# Patient Record
Sex: MALE | Race: BLACK OR AFRICAN AMERICAN | NOT HISPANIC OR LATINO | Employment: FULL TIME | URBAN - METROPOLITAN AREA
[De-identification: names, ages, dates, MRNs, and addresses within clinical notes are randomized per-mention and may not be internally consistent; named-entity substitution may affect disease eponyms.]

---

## 2021-04-07 ENCOUNTER — APPOINTMENT (OUTPATIENT)
Dept: CARDIOLOGY | Facility: HOSPITAL | Age: 66
End: 2021-04-07

## 2021-04-07 ENCOUNTER — HOSPITAL ENCOUNTER (INPATIENT)
Facility: HOSPITAL | Age: 66
LOS: 13 days | Discharge: HOME | End: 2021-04-20
Attending: EMERGENCY MEDICINE | Admitting: FAMILY MEDICINE

## 2021-04-07 ENCOUNTER — APPOINTMENT (OUTPATIENT)
Dept: CT IMAGING | Facility: HOSPITAL | Age: 66
End: 2021-04-07

## 2021-04-07 ENCOUNTER — APPOINTMENT (OUTPATIENT)
Dept: GENERAL RADIOLOGY | Facility: HOSPITAL | Age: 66
End: 2021-04-07

## 2021-04-07 DIAGNOSIS — I95.9 HYPOTENSION, UNSPECIFIED HYPOTENSION TYPE: ICD-10-CM

## 2021-04-07 DIAGNOSIS — H91.93 BILATERAL HEARING LOSS, UNSPECIFIED HEARING LOSS TYPE: ICD-10-CM

## 2021-04-07 DIAGNOSIS — R79.89 ELEVATED LFTS: ICD-10-CM

## 2021-04-07 DIAGNOSIS — N17.9 ACUTE RENAL FAILURE, UNSPECIFIED ACUTE RENAL FAILURE TYPE (HCC): ICD-10-CM

## 2021-04-07 DIAGNOSIS — Z96.651 STATUS POST TOTAL RIGHT KNEE REPLACEMENT: ICD-10-CM

## 2021-04-07 DIAGNOSIS — R41.82 ALTERED MENTAL STATUS, UNSPECIFIED ALTERED MENTAL STATUS TYPE: Primary | ICD-10-CM

## 2021-04-07 DIAGNOSIS — R77.8 ELEVATED TROPONIN: ICD-10-CM

## 2021-04-07 LAB
ALBUMIN SERPL-MCNC: 3.5 G/DL (ref 3.5–5.2)
ALBUMIN SERPL-MCNC: 4.1 G/DL (ref 3.5–5.2)
ALBUMIN/GLOB SERPL: 1.3 G/DL
ALBUMIN/GLOB SERPL: 1.4 G/DL
ALP SERPL-CCNC: 100 U/L (ref 39–117)
ALP SERPL-CCNC: 101 U/L (ref 39–117)
ALT SERPL W P-5'-P-CCNC: 2553 U/L (ref 1–41)
ALT SERPL W P-5'-P-CCNC: 2631 U/L (ref 1–41)
ANION GAP SERPL CALCULATED.3IONS-SCNC: 20 MMOL/L (ref 5–15)
ANION GAP SERPL CALCULATED.3IONS-SCNC: 21 MMOL/L (ref 5–15)
ARTERIAL PATENCY WRIST A: ABNORMAL
AST SERPL-CCNC: 4533 U/L (ref 1–40)
AST SERPL-CCNC: 6207 U/L (ref 1–40)
ATMOSPHERIC PRESS: ABNORMAL MM[HG]
BACTERIA UR QL AUTO: ABNORMAL /HPF
BASE EXCESS BLDA CALC-SCNC: -1.9 MMOL/L (ref 0–2)
BASE EXCESS BLDA CALC-SCNC: -2.4 MMOL/L (ref 0–2)
BASE EXCESS BLDA CALC-SCNC: -6.3 MMOL/L (ref 0–2)
BASOPHILS # BLD AUTO: 0.02 10*3/MM3 (ref 0–0.2)
BASOPHILS NFR BLD AUTO: 0.1 % (ref 0–1.5)
BDY SITE: ABNORMAL
BH CV ECHO MEAS - AO ROOT AREA (BSA CORRECTED): 1.4
BH CV ECHO MEAS - AO ROOT AREA: 8.6 CM^2
BH CV ECHO MEAS - AO ROOT DIAM: 3.3 CM
BH CV ECHO MEAS - ASC AORTA: 2.6 CM
BH CV ECHO MEAS - BSA(HAYCOCK): 2.6 M^2
BH CV ECHO MEAS - BSA: 2.4 M^2
BH CV ECHO MEAS - BZI_BMI: 40.5 KILOGRAMS/M^2
BH CV ECHO MEAS - BZI_METRIC_HEIGHT: 177 CM
BH CV ECHO MEAS - BZI_METRIC_WEIGHT: 127 KG
BH CV ECHO MEAS - EDV(CUBED): 106.8 ML
BH CV ECHO MEAS - EDV(MOD-SP2): 82.7 ML
BH CV ECHO MEAS - EDV(MOD-SP4): 83.7 ML
BH CV ECHO MEAS - EDV(TEICH): 104.6 ML
BH CV ECHO MEAS - EF(CUBED): 28.9 %
BH CV ECHO MEAS - EF(MOD-SP2): 45.6 %
BH CV ECHO MEAS - EF(MOD-SP4): 49.3 %
BH CV ECHO MEAS - EF(TEICH): 23.4 %
BH CV ECHO MEAS - ESV(CUBED): 75.9 ML
BH CV ECHO MEAS - ESV(MOD-SP2): 45 ML
BH CV ECHO MEAS - ESV(MOD-SP4): 42.4 ML
BH CV ECHO MEAS - ESV(TEICH): 80.1 ML
BH CV ECHO MEAS - FS: 10.7 %
BH CV ECHO MEAS - IVS/LVPW: 0.7
BH CV ECHO MEAS - IVSD: 1 CM
BH CV ECHO MEAS - LA DIMENSION: 3.7 CM
BH CV ECHO MEAS - LA/AO: 1.1
BH CV ECHO MEAS - LAD MAJOR: 5 CM
BH CV ECHO MEAS - LAT PEAK E' VEL: 6.6 CM/SEC
BH CV ECHO MEAS - LATERAL E/E' RATIO: 8.6
BH CV ECHO MEAS - LV DIASTOLIC VOL/BSA (35-75): 34.9 ML/M^2
BH CV ECHO MEAS - LV MASS(C)D: 224.7 GRAMS
BH CV ECHO MEAS - LV MASS(C)DI: 93.6 GRAMS/M^2
BH CV ECHO MEAS - LV MAX PG: 2.4 MMHG
BH CV ECHO MEAS - LV MEAN PG: 1 MMHG
BH CV ECHO MEAS - LV SYSTOLIC VOL/BSA (12-30): 17.7 ML/M^2
BH CV ECHO MEAS - LV V1 MAX: 77.7 CM/SEC
BH CV ECHO MEAS - LV V1 MEAN: 42.8 CM/SEC
BH CV ECHO MEAS - LV V1 VTI: 14.7 CM
BH CV ECHO MEAS - LVIDD: 4.7 CM
BH CV ECHO MEAS - LVIDS: 4.2 CM
BH CV ECHO MEAS - LVLD AP2: 7.8 CM
BH CV ECHO MEAS - LVLD AP4: 7.7 CM
BH CV ECHO MEAS - LVLS AP2: 6.9 CM
BH CV ECHO MEAS - LVLS AP4: 6.5 CM
BH CV ECHO MEAS - LVOT AREA (M): 3.5 CM^2
BH CV ECHO MEAS - LVOT AREA: 3.5 CM^2
BH CV ECHO MEAS - LVOT DIAM: 2.1 CM
BH CV ECHO MEAS - LVPWD: 1.5 CM
BH CV ECHO MEAS - MED PEAK E' VEL: 6.4 CM/SEC
BH CV ECHO MEAS - MEDIAL E/E' RATIO: 8.9
BH CV ECHO MEAS - MV A MAX VEL: 51.8 CM/SEC
BH CV ECHO MEAS - MV DEC SLOPE: 313.5 CM/SEC^2
BH CV ECHO MEAS - MV DEC TIME: 0.18 SEC
BH CV ECHO MEAS - MV E MAX VEL: 57.4 CM/SEC
BH CV ECHO MEAS - MV E/A: 1.1
BH CV ECHO MEAS - MV MAX PG: 3.6 MMHG
BH CV ECHO MEAS - MV MEAN PG: 1 MMHG
BH CV ECHO MEAS - MV P1/2T MAX VEL: 92 CM/SEC
BH CV ECHO MEAS - MV P1/2T: 86 MSEC
BH CV ECHO MEAS - MV V2 MAX: 94.8 CM/SEC
BH CV ECHO MEAS - MV V2 MEAN: 55.4 CM/SEC
BH CV ECHO MEAS - MV V2 VTI: 34.1 CM
BH CV ECHO MEAS - MVA P1/2T LCG: 2.4 CM^2
BH CV ECHO MEAS - MVA(P1/2T): 2.6 CM^2
BH CV ECHO MEAS - MVA(VTI): 1.5 CM^2
BH CV ECHO MEAS - PA ACC TIME: 0.11 SEC
BH CV ECHO MEAS - PA PR(ACCEL): 31.3 MMHG
BH CV ECHO MEAS - RAP SYSTOLE: 8 MMHG
BH CV ECHO MEAS - RVSP: 33 MMHG
BH CV ECHO MEAS - SI(CUBED): 12.9 ML/M^2
BH CV ECHO MEAS - SI(LVOT): 21.2 ML/M^2
BH CV ECHO MEAS - SI(MOD-SP2): 15.7 ML/M^2
BH CV ECHO MEAS - SI(MOD-SP4): 17.2 ML/M^2
BH CV ECHO MEAS - SI(TEICH): 10.2 ML/M^2
BH CV ECHO MEAS - SV(CUBED): 30.9 ML
BH CV ECHO MEAS - SV(LVOT): 50.9 ML
BH CV ECHO MEAS - SV(MOD-SP2): 37.7 ML
BH CV ECHO MEAS - SV(MOD-SP4): 41.3 ML
BH CV ECHO MEAS - SV(TEICH): 24.5 ML
BH CV ECHO MEAS - TAPSE (>1.6): 1.2 CM
BH CV ECHO MEAS - TR MAX PG: 25 MMHG
BH CV ECHO MEAS - TR MAX VEL: 227 CM/SEC
BH CV ECHO MEASUREMENTS AVERAGE E/E' RATIO: 8.83
BH CV LOWER VASCULAR LEFT COMMON FEMORAL AUGMENT: NORMAL
BH CV LOWER VASCULAR LEFT COMMON FEMORAL COMPETENT: NORMAL
BH CV LOWER VASCULAR LEFT COMMON FEMORAL COMPRESS: NORMAL
BH CV LOWER VASCULAR LEFT COMMON FEMORAL PHASIC: NORMAL
BH CV LOWER VASCULAR LEFT COMMON FEMORAL SPONT: NORMAL
BH CV LOWER VASCULAR LEFT DISTAL FEMORAL AUGMENT: NORMAL
BH CV LOWER VASCULAR LEFT DISTAL FEMORAL COMPETENT: NORMAL
BH CV LOWER VASCULAR LEFT DISTAL FEMORAL COMPRESS: NORMAL
BH CV LOWER VASCULAR LEFT DISTAL FEMORAL PHASIC: NORMAL
BH CV LOWER VASCULAR LEFT DISTAL FEMORAL SPONT: NORMAL
BH CV LOWER VASCULAR LEFT GASTRONEMIUS COMPRESS: NORMAL
BH CV LOWER VASCULAR LEFT GREATER SAPH AK COMPRESS: NORMAL
BH CV LOWER VASCULAR LEFT GREATER SAPH BK COMPRESS: NORMAL
BH CV LOWER VASCULAR LEFT LESSER SAPH COMPRESS: NORMAL
BH CV LOWER VASCULAR LEFT MID FEMORAL AUGMENT: NORMAL
BH CV LOWER VASCULAR LEFT MID FEMORAL COMPETENT: NORMAL
BH CV LOWER VASCULAR LEFT MID FEMORAL COMPRESS: NORMAL
BH CV LOWER VASCULAR LEFT MID FEMORAL PHASIC: NORMAL
BH CV LOWER VASCULAR LEFT MID FEMORAL SPONT: NORMAL
BH CV LOWER VASCULAR LEFT PERONEAL AUGMENT: NORMAL
BH CV LOWER VASCULAR LEFT PERONEAL COMPRESS: NORMAL
BH CV LOWER VASCULAR LEFT POPLITEAL AUGMENT: NORMAL
BH CV LOWER VASCULAR LEFT POPLITEAL COMPETENT: NORMAL
BH CV LOWER VASCULAR LEFT POPLITEAL COMPRESS: NORMAL
BH CV LOWER VASCULAR LEFT POPLITEAL PHASIC: NORMAL
BH CV LOWER VASCULAR LEFT POPLITEAL SPONT: NORMAL
BH CV LOWER VASCULAR LEFT POSTERIOR TIBIAL AUGMENT: NORMAL
BH CV LOWER VASCULAR LEFT POSTERIOR TIBIAL COMPRESS: NORMAL
BH CV LOWER VASCULAR LEFT PROFUNDA FEMORAL AUGMENT: NORMAL
BH CV LOWER VASCULAR LEFT PROFUNDA FEMORAL COMPETENT: NORMAL
BH CV LOWER VASCULAR LEFT PROFUNDA FEMORAL COMPRESS: NORMAL
BH CV LOWER VASCULAR LEFT PROFUNDA FEMORAL PHASIC: NORMAL
BH CV LOWER VASCULAR LEFT PROFUNDA FEMORAL SPONT: NORMAL
BH CV LOWER VASCULAR LEFT PROXIMAL FEMORAL AUGMENT: NORMAL
BH CV LOWER VASCULAR LEFT PROXIMAL FEMORAL COMPETENT: NORMAL
BH CV LOWER VASCULAR LEFT PROXIMAL FEMORAL COMPRESS: NORMAL
BH CV LOWER VASCULAR LEFT PROXIMAL FEMORAL PHASIC: NORMAL
BH CV LOWER VASCULAR LEFT PROXIMAL FEMORAL SPONT: NORMAL
BH CV LOWER VASCULAR LEFT SAPHENOFEMORAL JUNCTION AUGMENT: NORMAL
BH CV LOWER VASCULAR LEFT SAPHENOFEMORAL JUNCTION COMPETENT: NORMAL
BH CV LOWER VASCULAR LEFT SAPHENOFEMORAL JUNCTION COMPRESS: NORMAL
BH CV LOWER VASCULAR LEFT SAPHENOFEMORAL JUNCTION PHASIC: NORMAL
BH CV LOWER VASCULAR LEFT SAPHENOFEMORAL JUNCTION SPONT: NORMAL
BH CV LOWER VASCULAR RIGHT COMMON FEMORAL AUGMENT: NORMAL
BH CV LOWER VASCULAR RIGHT COMMON FEMORAL COMPETENT: NORMAL
BH CV LOWER VASCULAR RIGHT COMMON FEMORAL COMPRESS: NORMAL
BH CV LOWER VASCULAR RIGHT COMMON FEMORAL PHASIC: NORMAL
BH CV LOWER VASCULAR RIGHT COMMON FEMORAL SPONT: NORMAL
BH CV LOWER VASCULAR RIGHT DISTAL FEMORAL AUGMENT: NORMAL
BH CV LOWER VASCULAR RIGHT DISTAL FEMORAL COMPETENT: NORMAL
BH CV LOWER VASCULAR RIGHT DISTAL FEMORAL COMPRESS: NORMAL
BH CV LOWER VASCULAR RIGHT DISTAL FEMORAL PHASIC: NORMAL
BH CV LOWER VASCULAR RIGHT DISTAL FEMORAL SPONT: NORMAL
BH CV LOWER VASCULAR RIGHT GASTRONEMIUS COMPRESS: NORMAL
BH CV LOWER VASCULAR RIGHT GREATER SAPH AK COMPRESS: NORMAL
BH CV LOWER VASCULAR RIGHT GREATER SAPH BK COMPRESS: NORMAL
BH CV LOWER VASCULAR RIGHT LESSER SAPH COMPRESS: NORMAL
BH CV LOWER VASCULAR RIGHT MID FEMORAL AUGMENT: NORMAL
BH CV LOWER VASCULAR RIGHT MID FEMORAL COMPETENT: NORMAL
BH CV LOWER VASCULAR RIGHT MID FEMORAL COMPRESS: NORMAL
BH CV LOWER VASCULAR RIGHT MID FEMORAL PHASIC: NORMAL
BH CV LOWER VASCULAR RIGHT MID FEMORAL SPONT: NORMAL
BH CV LOWER VASCULAR RIGHT PERONEAL AUGMENT: NORMAL
BH CV LOWER VASCULAR RIGHT PERONEAL COMPRESS: NORMAL
BH CV LOWER VASCULAR RIGHT POPLITEAL AUGMENT: NORMAL
BH CV LOWER VASCULAR RIGHT POPLITEAL COMPETENT: NORMAL
BH CV LOWER VASCULAR RIGHT POPLITEAL COMPRESS: NORMAL
BH CV LOWER VASCULAR RIGHT POPLITEAL PHASIC: NORMAL
BH CV LOWER VASCULAR RIGHT POPLITEAL SPONT: NORMAL
BH CV LOWER VASCULAR RIGHT POSTERIOR TIBIAL AUGMENT: NORMAL
BH CV LOWER VASCULAR RIGHT POSTERIOR TIBIAL COMPRESS: NORMAL
BH CV LOWER VASCULAR RIGHT PROFUNDA FEMORAL AUGMENT: NORMAL
BH CV LOWER VASCULAR RIGHT PROFUNDA FEMORAL COMPETENT: NORMAL
BH CV LOWER VASCULAR RIGHT PROFUNDA FEMORAL COMPRESS: NORMAL
BH CV LOWER VASCULAR RIGHT PROFUNDA FEMORAL PHASIC: NORMAL
BH CV LOWER VASCULAR RIGHT PROFUNDA FEMORAL SPONT: NORMAL
BH CV LOWER VASCULAR RIGHT PROXIMAL FEMORAL AUGMENT: NORMAL
BH CV LOWER VASCULAR RIGHT PROXIMAL FEMORAL COMPETENT: NORMAL
BH CV LOWER VASCULAR RIGHT PROXIMAL FEMORAL COMPRESS: NORMAL
BH CV LOWER VASCULAR RIGHT PROXIMAL FEMORAL PHASIC: NORMAL
BH CV LOWER VASCULAR RIGHT PROXIMAL FEMORAL SPONT: NORMAL
BH CV LOWER VASCULAR RIGHT SAPHENOFEMORAL JUNCTION AUGMENT: NORMAL
BH CV LOWER VASCULAR RIGHT SAPHENOFEMORAL JUNCTION COMPETENT: NORMAL
BH CV LOWER VASCULAR RIGHT SAPHENOFEMORAL JUNCTION COMPRESS: NORMAL
BH CV LOWER VASCULAR RIGHT SAPHENOFEMORAL JUNCTION PHASIC: NORMAL
BH CV LOWER VASCULAR RIGHT SAPHENOFEMORAL JUNCTION SPONT: NORMAL
BH CV VAS BP LEFT ARM: NORMAL MMHG
BH CV XLRA - TDI S': 6.5 CM/SEC
BILIRUB SERPL-MCNC: 0.5 MG/DL (ref 0–1.2)
BILIRUB SERPL-MCNC: 0.6 MG/DL (ref 0–1.2)
BILIRUB UR QL STRIP: NEGATIVE
BODY TEMPERATURE: 37 C
BUN SERPL-MCNC: 25 MG/DL (ref 8–23)
BUN SERPL-MCNC: 28 MG/DL (ref 8–23)
BUN/CREAT SERPL: 6.4 (ref 7–25)
BUN/CREAT SERPL: 6.7 (ref 7–25)
CALCIUM SPEC-SCNC: 6.7 MG/DL (ref 8.6–10.5)
CALCIUM SPEC-SCNC: 7.9 MG/DL (ref 8.6–10.5)
CHLORIDE SERPL-SCNC: 94 MMOL/L (ref 98–107)
CHLORIDE SERPL-SCNC: 97 MMOL/L (ref 98–107)
CLARITY UR: ABNORMAL
CO2 BLDA-SCNC: 22.8 MMOL/L (ref 22–33)
CO2 BLDA-SCNC: 26.5 MMOL/L (ref 22–33)
CO2 BLDA-SCNC: 26.7 MMOL/L (ref 22–33)
CO2 SERPL-SCNC: 22 MMOL/L (ref 22–29)
CO2 SERPL-SCNC: 26 MMOL/L (ref 22–29)
COHGB MFR BLD: 1.2 % (ref 0–2)
COHGB MFR BLD: 1.4 % (ref 0–2)
COHGB MFR BLD: 1.8 % (ref 0–2)
COLOR UR: YELLOW
CREAT SERPL-MCNC: 3.89 MG/DL (ref 0.76–1.27)
CREAT SERPL-MCNC: 4.2 MG/DL (ref 0.76–1.27)
D-LACTATE SERPL-SCNC: 4.8 MMOL/L (ref 0.5–2)
DEPRECATED RDW RBC AUTO: 47.1 FL (ref 37–54)
EOSINOPHIL # BLD AUTO: 0.03 10*3/MM3 (ref 0–0.4)
EOSINOPHIL NFR BLD AUTO: 0.2 % (ref 0.3–6.2)
EPAP: 0
EPAP: 6
ERYTHROCYTE [DISTWIDTH] IN BLOOD BY AUTOMATED COUNT: 14.8 % (ref 12.3–15.4)
FLUAV RNA RESP QL NAA+PROBE: NOT DETECTED
FLUBV RNA RESP QL NAA+PROBE: NOT DETECTED
GFR SERPL CREATININE-BSD FRML MDRD: 17 ML/MIN/1.73
GFR SERPL CREATININE-BSD FRML MDRD: 19 ML/MIN/1.73
GLOBULIN UR ELPH-MCNC: 2.7 GM/DL
GLOBULIN UR ELPH-MCNC: 2.9 GM/DL
GLUCOSE BLDC GLUCOMTR-MCNC: 114 MG/DL (ref 70–130)
GLUCOSE SERPL-MCNC: 116 MG/DL (ref 65–99)
GLUCOSE SERPL-MCNC: 89 MG/DL (ref 65–99)
GLUCOSE UR STRIP-MCNC: NEGATIVE MG/DL
HAV IGM SERPL QL IA: NORMAL
HBV CORE IGM SERPL QL IA: NORMAL
HBV SURFACE AG SERPL QL IA: NORMAL
HCO3 BLDA-SCNC: 21.3 MMOL/L (ref 20–26)
HCO3 BLDA-SCNC: 24.9 MMOL/L (ref 20–26)
HCO3 BLDA-SCNC: 25.1 MMOL/L (ref 20–26)
HCT VFR BLD AUTO: 34.5 % (ref 37.5–51)
HCT VFR BLD CALC: 30.7 %
HCT VFR BLD CALC: 31.3 %
HCT VFR BLD CALC: 31.8 %
HCV AB SER DONR QL: NORMAL
HGB BLD-MCNC: 10.3 G/DL (ref 13–17.7)
HGB BLDA-MCNC: 10 G/DL (ref 13.5–17.5)
HGB BLDA-MCNC: 10.2 G/DL (ref 13.5–17.5)
HGB BLDA-MCNC: 10.4 G/DL (ref 13.5–17.5)
HGB UR QL STRIP.AUTO: NEGATIVE
HOLD SPECIMEN: NORMAL
HYALINE CASTS UR QL AUTO: ABNORMAL /LPF
IMM GRANULOCYTES # BLD AUTO: 0.12 10*3/MM3 (ref 0–0.05)
IMM GRANULOCYTES NFR BLD AUTO: 0.9 % (ref 0–0.5)
INHALED O2 CONCENTRATION: 30 %
INHALED O2 CONCENTRATION: 35 %
INHALED O2 CONCENTRATION: 35 %
IPAP: 0
IPAP: 14
KETONES UR QL STRIP: NEGATIVE
LEFT ATRIUM VOLUME INDEX: 13.2 ML/M^2
LEFT ATRIUM VOLUME: 31.6 ML
LEUKOCYTE ESTERASE UR QL STRIP.AUTO: ABNORMAL
LV EF 2D ECHO EST: 30 %
LYMPHOCYTES # BLD AUTO: 2 10*3/MM3 (ref 0.7–3.1)
LYMPHOCYTES NFR BLD AUTO: 14.7 % (ref 19.6–45.3)
Lab: ABNORMAL
MAGNESIUM SERPL-MCNC: 1.6 MG/DL (ref 1.6–2.4)
MAXIMAL PREDICTED HEART RATE: 156 BPM
MCH RBC QN AUTO: 25.7 PG (ref 26.6–33)
MCHC RBC AUTO-ENTMCNC: 29.9 G/DL (ref 31.5–35.7)
MCV RBC AUTO: 86 FL (ref 79–97)
METHGB BLD QL: 0.1 % (ref 0–1.5)
METHGB BLD QL: 0.2 % (ref 0–1.5)
METHGB BLD QL: 0.5 % (ref 0–1.5)
MODALITY: ABNORMAL
MONOCYTES # BLD AUTO: 1.13 10*3/MM3 (ref 0.1–0.9)
MONOCYTES NFR BLD AUTO: 8.3 % (ref 5–12)
NEUTROPHILS NFR BLD AUTO: 10.33 10*3/MM3 (ref 1.7–7)
NEUTROPHILS NFR BLD AUTO: 75.8 % (ref 42.7–76)
NITRITE UR QL STRIP: NEGATIVE
NOTE: ABNORMAL
NOTIFIED BY: ABNORMAL
NOTIFIED WHO: ABNORMAL
NRBC BLD AUTO-RTO: 0 /100 WBC (ref 0–0.2)
OXYHGB MFR BLDV: 65.2 % (ref 94–99)
OXYHGB MFR BLDV: 96.7 % (ref 94–99)
OXYHGB MFR BLDV: 98 % (ref 94–99)
PAW @ PEAK INSP FLOW SETTING VENT: 0 CMH2O
PAW @ PEAK INSP FLOW SETTING VENT: 0 CMH2O
PCO2 BLDA: 51 MM HG (ref 35–45)
PCO2 BLDA: 52.4 MM HG (ref 35–45)
PCO2 BLDA: 53.9 MM HG (ref 35–45)
PCO2 TEMP ADJ BLD: 51 MM HG (ref 35–48)
PCO2 TEMP ADJ BLD: 52.4 MM HG (ref 35–48)
PCO2 TEMP ADJ BLD: 53.9 MM HG (ref 35–48)
PH BLDA: 7.23 PH UNITS (ref 7.35–7.45)
PH BLDA: 7.27 PH UNITS (ref 7.35–7.45)
PH BLDA: 7.29 PH UNITS (ref 7.35–7.45)
PH UR STRIP.AUTO: 5.5 [PH] (ref 5–8)
PH, TEMP CORRECTED: 7.23 PH UNITS
PH, TEMP CORRECTED: 7.27 PH UNITS
PH, TEMP CORRECTED: 7.29 PH UNITS
PLATELET # BLD AUTO: 274 10*3/MM3 (ref 140–450)
PMV BLD AUTO: 10.6 FL (ref 6–12)
PO2 BLDA: 122 MM HG (ref 83–108)
PO2 BLDA: 199 MM HG (ref 83–108)
PO2 BLDA: 43.1 MM HG (ref 83–108)
PO2 TEMP ADJ BLD: 122 MM HG (ref 83–108)
PO2 TEMP ADJ BLD: 199 MM HG (ref 83–108)
PO2 TEMP ADJ BLD: 43.1 MM HG (ref 83–108)
POTASSIUM SERPL-SCNC: 3.7 MMOL/L (ref 3.5–5.2)
POTASSIUM SERPL-SCNC: 3.9 MMOL/L (ref 3.5–5.2)
PROCALCITONIN SERPL-MCNC: 2.15 NG/ML (ref 0–0.25)
PROT SERPL-MCNC: 6.2 G/DL (ref 6–8.5)
PROT SERPL-MCNC: 7 G/DL (ref 6–8.5)
PROT UR QL STRIP: ABNORMAL
QT INTERVAL: 442 MS
QTC INTERVAL: 483 MS
RBC # BLD AUTO: 4.01 10*6/MM3 (ref 4.14–5.8)
RBC # UR: ABNORMAL /HPF
REF LAB TEST METHOD: ABNORMAL
RENAL EPI CELLS #/AREA URNS HPF: ABNORMAL /HPF
SARS-COV-2 RNA RESP QL NAA+PROBE: NOT DETECTED
SODIUM SERPL-SCNC: 140 MMOL/L (ref 136–145)
SODIUM SERPL-SCNC: 140 MMOL/L (ref 136–145)
SP GR UR STRIP: 1.02 (ref 1–1.03)
SQUAMOUS #/AREA URNS HPF: ABNORMAL /HPF
STRESS TARGET HR: 133 BPM
TOTAL RATE: 0 BREATHS/MINUTE
TOTAL RATE: 0 BREATHS/MINUTE
TRANS CELLS #/AREA URNS HPF: ABNORMAL /HPF
TROPONIN T SERPL-MCNC: 0.29 NG/ML (ref 0–0.03)
UROBILINOGEN UR QL STRIP: ABNORMAL
VENTILATOR MODE: ABNORMAL
VENTILATOR MODE: ABNORMAL
WBC # BLD AUTO: 13.63 10*3/MM3 (ref 3.4–10.8)
WBC UR QL AUTO: ABNORMAL /HPF
WHOLE BLOOD HOLD SPECIMEN: NORMAL
WHOLE BLOOD HOLD SPECIMEN: NORMAL

## 2021-04-07 PROCEDURE — 82375 ASSAY CARBOXYHB QUANT: CPT

## 2021-04-07 PROCEDURE — 93005 ELECTROCARDIOGRAM TRACING: CPT | Performed by: EMERGENCY MEDICINE

## 2021-04-07 PROCEDURE — 84145 PROCALCITONIN (PCT): CPT | Performed by: NURSE PRACTITIONER

## 2021-04-07 PROCEDURE — 99285 EMERGENCY DEPT VISIT HI MDM: CPT

## 2021-04-07 PROCEDURE — 83050 HGB METHEMOGLOBIN QUAN: CPT

## 2021-04-07 PROCEDURE — 84484 ASSAY OF TROPONIN QUANT: CPT | Performed by: EMERGENCY MEDICINE

## 2021-04-07 PROCEDURE — 93970 EXTREMITY STUDY: CPT | Performed by: INTERNAL MEDICINE

## 2021-04-07 PROCEDURE — 93306 TTE W/DOPPLER COMPLETE: CPT | Performed by: INTERNAL MEDICINE

## 2021-04-07 PROCEDURE — 25010000002 SULFUR HEXAFLUORIDE MICROSPH 60.7-25 MG RECONSTITUTED SUSPENSION: Performed by: INTERNAL MEDICINE

## 2021-04-07 PROCEDURE — 99223 1ST HOSP IP/OBS HIGH 75: CPT | Performed by: INTERNAL MEDICINE

## 2021-04-07 PROCEDURE — 82805 BLOOD GASES W/O2 SATURATION: CPT

## 2021-04-07 PROCEDURE — 93970 EXTREMITY STUDY: CPT

## 2021-04-07 PROCEDURE — 25010000002 THIAMINE PER 100 MG: Performed by: NURSE PRACTITIONER

## 2021-04-07 PROCEDURE — 93306 TTE W/DOPPLER COMPLETE: CPT

## 2021-04-07 PROCEDURE — 36600 WITHDRAWAL OF ARTERIAL BLOOD: CPT

## 2021-04-07 PROCEDURE — 94799 UNLISTED PULMONARY SVC/PX: CPT

## 2021-04-07 PROCEDURE — 36556 INSERT NON-TUNNEL CV CATH: CPT | Performed by: NURSE PRACTITIONER

## 2021-04-07 PROCEDURE — 71045 X-RAY EXAM CHEST 1 VIEW: CPT

## 2021-04-07 PROCEDURE — 80074 ACUTE HEPATITIS PANEL: CPT | Performed by: EMERGENCY MEDICINE

## 2021-04-07 PROCEDURE — 76937 US GUIDE VASCULAR ACCESS: CPT | Performed by: NURSE PRACTITIONER

## 2021-04-07 PROCEDURE — 87040 BLOOD CULTURE FOR BACTERIA: CPT | Performed by: NURSE PRACTITIONER

## 2021-04-07 PROCEDURE — 25010000002 HEPARIN (PORCINE) PER 1000 UNITS: Performed by: INTERNAL MEDICINE

## 2021-04-07 PROCEDURE — 80053 COMPREHEN METABOLIC PANEL: CPT | Performed by: EMERGENCY MEDICINE

## 2021-04-07 PROCEDURE — 87636 SARSCOV2 & INF A&B AMP PRB: CPT | Performed by: EMERGENCY MEDICINE

## 2021-04-07 PROCEDURE — 81001 URINALYSIS AUTO W/SCOPE: CPT | Performed by: EMERGENCY MEDICINE

## 2021-04-07 PROCEDURE — 82962 GLUCOSE BLOOD TEST: CPT

## 2021-04-07 PROCEDURE — 70450 CT HEAD/BRAIN W/O DYE: CPT

## 2021-04-07 PROCEDURE — 83605 ASSAY OF LACTIC ACID: CPT | Performed by: NURSE PRACTITIONER

## 2021-04-07 PROCEDURE — 83735 ASSAY OF MAGNESIUM: CPT | Performed by: EMERGENCY MEDICINE

## 2021-04-07 PROCEDURE — 80053 COMPREHEN METABOLIC PANEL: CPT | Performed by: INTERNAL MEDICINE

## 2021-04-07 PROCEDURE — 85025 COMPLETE CBC W/AUTO DIFF WBC: CPT | Performed by: EMERGENCY MEDICINE

## 2021-04-07 RX ORDER — SODIUM CHLORIDE 0.9 % (FLUSH) 0.9 %
10 SYRINGE (ML) INJECTION EVERY 12 HOURS SCHEDULED
Status: DISCONTINUED | OUTPATIENT
Start: 2021-04-07 | End: 2021-04-20 | Stop reason: HOSPADM

## 2021-04-07 RX ORDER — OXYCODONE HYDROCHLORIDE 5 MG/1
5 TABLET ORAL EVERY 6 HOURS PRN
Status: DISCONTINUED | OUTPATIENT
Start: 2021-04-07 | End: 2021-04-20 | Stop reason: HOSPADM

## 2021-04-07 RX ORDER — ASPIRIN 81 MG/1
81 TABLET ORAL DAILY
Status: ON HOLD | COMMUNITY
End: 2021-04-20 | Stop reason: SDUPTHER

## 2021-04-07 RX ORDER — HEPARIN SODIUM 5000 [USP'U]/ML
5000 INJECTION, SOLUTION INTRAVENOUS; SUBCUTANEOUS EVERY 12 HOURS SCHEDULED
Status: DISCONTINUED | OUTPATIENT
Start: 2021-04-07 | End: 2021-04-20 | Stop reason: HOSPADM

## 2021-04-07 RX ORDER — MELOXICAM 15 MG/1
15 TABLET ORAL DAILY
COMMUNITY
End: 2021-04-20 | Stop reason: HOSPADM

## 2021-04-07 RX ORDER — TRAMADOL HYDROCHLORIDE 50 MG/1
100 TABLET ORAL EVERY 6 HOURS PRN
COMMUNITY
End: 2021-04-20 | Stop reason: HOSPADM

## 2021-04-07 RX ORDER — EZETIMIBE 10 MG/1
10 TABLET ORAL DAILY
COMMUNITY

## 2021-04-07 RX ORDER — HYDRALAZINE HYDROCHLORIDE 25 MG/1
25 TABLET, FILM COATED ORAL 3 TIMES DAILY
COMMUNITY
End: 2021-04-20 | Stop reason: HOSPADM

## 2021-04-07 RX ORDER — ASPIRIN 81 MG/1
81 TABLET ORAL DAILY
Status: DISCONTINUED | OUTPATIENT
Start: 2021-04-07 | End: 2021-04-19

## 2021-04-07 RX ORDER — OXYCODONE HYDROCHLORIDE 5 MG/1
5 CAPSULE ORAL EVERY 4 HOURS PRN
Status: ON HOLD | COMMUNITY
End: 2021-04-20 | Stop reason: SDUPTHER

## 2021-04-07 RX ORDER — NOREPINEPHRINE BIT/0.9 % NACL 8 MG/250ML
.02-.1 INFUSION BOTTLE (ML) INTRAVENOUS
Status: DISCONTINUED | OUTPATIENT
Start: 2021-04-07 | End: 2021-04-16

## 2021-04-07 RX ORDER — SPIRONOLACTONE 25 MG/1
25 TABLET ORAL DAILY
COMMUNITY
End: 2021-04-20 | Stop reason: HOSPADM

## 2021-04-07 RX ORDER — CEFADROXIL 500 MG/1
500 CAPSULE ORAL 2 TIMES DAILY
COMMUNITY
End: 2021-04-20 | Stop reason: HOSPADM

## 2021-04-07 RX ORDER — CALCIUM GLUCONATE 94 MG/ML
1 INJECTION, SOLUTION INTRAVENOUS ONCE
Status: DISCONTINUED | OUTPATIENT
Start: 2021-04-07 | End: 2021-04-07

## 2021-04-07 RX ORDER — SODIUM CHLORIDE 0.9 % (FLUSH) 0.9 %
10 SYRINGE (ML) INJECTION AS NEEDED
Status: DISCONTINUED | OUTPATIENT
Start: 2021-04-07 | End: 2021-04-20 | Stop reason: HOSPADM

## 2021-04-07 RX ORDER — SODIUM CHLORIDE 0.9 % (FLUSH) 0.9 %
10 SYRINGE (ML) INJECTION AS NEEDED
Status: DISCONTINUED | OUTPATIENT
Start: 2021-04-07 | End: 2021-04-08

## 2021-04-07 RX ORDER — SODIUM CHLORIDE, SODIUM LACTATE, POTASSIUM CHLORIDE, CALCIUM CHLORIDE 600; 310; 30; 20 MG/100ML; MG/100ML; MG/100ML; MG/100ML
125 INJECTION, SOLUTION INTRAVENOUS CONTINUOUS
Status: DISCONTINUED | OUTPATIENT
Start: 2021-04-07 | End: 2021-04-07

## 2021-04-07 RX ORDER — ONDANSETRON 4 MG/1
4 TABLET, FILM COATED ORAL EVERY 6 HOURS PRN
COMMUNITY

## 2021-04-07 RX ORDER — SODIUM CHLORIDE, SODIUM LACTATE, POTASSIUM CHLORIDE, CALCIUM CHLORIDE 600; 310; 30; 20 MG/100ML; MG/100ML; MG/100ML; MG/100ML
75 INJECTION, SOLUTION INTRAVENOUS CONTINUOUS
Status: DISCONTINUED | OUTPATIENT
Start: 2021-04-07 | End: 2021-04-07

## 2021-04-07 RX ORDER — FERROUS SULFATE 325(65) MG
325 TABLET ORAL 2 TIMES DAILY
COMMUNITY

## 2021-04-07 RX ORDER — CETIRIZINE HYDROCHLORIDE 10 MG/1
10 TABLET ORAL DAILY
Status: DISCONTINUED | OUTPATIENT
Start: 2021-04-07 | End: 2021-04-20 | Stop reason: HOSPADM

## 2021-04-07 RX ORDER — CARVEDILOL 25 MG/1
25 TABLET ORAL 2 TIMES DAILY WITH MEALS
COMMUNITY
End: 2021-04-20 | Stop reason: HOSPADM

## 2021-04-07 RX ORDER — SODIUM CHLORIDE 9 MG/ML
75 INJECTION, SOLUTION INTRAVENOUS CONTINUOUS
Status: DISCONTINUED | OUTPATIENT
Start: 2021-04-07 | End: 2021-04-07

## 2021-04-07 RX ORDER — FUROSEMIDE 40 MG/1
40 TABLET ORAL DAILY
COMMUNITY
End: 2021-04-20 | Stop reason: HOSPADM

## 2021-04-07 RX ORDER — CARVEDILOL 3.12 MG/1
3.12 TABLET ORAL 2 TIMES DAILY WITH MEALS
Status: DISCONTINUED | OUTPATIENT
Start: 2021-04-07 | End: 2021-04-07

## 2021-04-07 RX ADMIN — SODIUM CHLORIDE 1000 ML: 9 INJECTION, SOLUTION INTRAVENOUS at 14:44

## 2021-04-07 RX ADMIN — SODIUM CHLORIDE 75 ML/HR: 9 INJECTION, SOLUTION INTRAVENOUS at 14:46

## 2021-04-07 RX ADMIN — HEPARIN SODIUM 5000 UNITS: 5000 INJECTION INTRAVENOUS; SUBCUTANEOUS at 14:46

## 2021-04-07 RX ADMIN — SODIUM CHLORIDE 1000 ML: 9 INJECTION, SOLUTION INTRAVENOUS at 11:08

## 2021-04-07 RX ADMIN — ASPIRIN 81 MG: 81 TABLET, COATED ORAL at 14:46

## 2021-04-07 RX ADMIN — SODIUM CHLORIDE, PRESERVATIVE FREE 10 ML: 5 INJECTION INTRAVENOUS at 20:48

## 2021-04-07 RX ADMIN — CETIRIZINE HYDROCHLORIDE 10 MG: 10 TABLET, FILM COATED ORAL at 17:50

## 2021-04-07 RX ADMIN — Medication 0.02 MCG/KG/MIN: at 21:00

## 2021-04-07 RX ADMIN — SODIUM BICARBONATE 150 MEQ: 84 INJECTION INTRAVENOUS at 20:48

## 2021-04-07 RX ADMIN — SULFUR HEXAFLUORIDE 3 ML: KIT at 16:00

## 2021-04-07 RX ADMIN — CALCIUM CHLORIDE 1 G: 100 INJECTION INTRAVENOUS; INTRAVENTRICULAR at 19:58

## 2021-04-07 RX ADMIN — SODIUM CHLORIDE, PRESERVATIVE FREE 10 ML: 5 INJECTION INTRAVENOUS at 17:50

## 2021-04-07 RX ADMIN — SODIUM CHLORIDE 75 ML/HR: 9 INJECTION, SOLUTION INTRAVENOUS at 17:18

## 2021-04-07 RX ADMIN — THIAMINE HYDROCHLORIDE 250 MG: 100 INJECTION, SOLUTION INTRAMUSCULAR; INTRAVENOUS at 23:59

## 2021-04-07 NOTE — PROGRESS NOTES
Discharge Planning Assessment  [facility]     Patient Name: Elvis Marks  MRN: [id number]  Today's Date: 4/7/2021    Admit Date: 4/7/2021    Discharge Needs Assessment     Row Name 04/07/21 1225       Living Environment    Lives With  spouse    Name(s) of Who Lives With Patient  JOHANNA MARKS (Spouse) [phone] (Home Phone)    Current Living Arrangements  home/apartment/condo    Potentially Unsafe Housing Conditions  unable to assess    Primary Care Provided by  self    Provides Primary Care For  no one    Family Caregiver if Needed  spouse    Family Caregiver Names  JOHANNA MARKS (Spouse) [phone] (Home Phone)    Quality of Family Relationships  helpful;involved;supportive    Able to Return to Prior Arrangements  yes       Resource/Environmental Concerns    Resource/Environmental Concerns  none    Transportation Concerns  car, none       Transition Planning    Patient/Family Anticipates Transition to  home with family    Patient/Family Anticipated Services at Transition  none    Transportation Anticipated  family or friend will provide       Discharge Needs Assessment    Readmission Within the Last 30 Days  no previous admission in last 30 days    Equipment Currently Used at Home  cpap    Concerns to be Addressed  denies needs/concerns at this time    Anticipated Changes Related to Illness  none    Provided Post Acute Provider List?  Refused    Provided Post Acute Provider Quality & Resource List?  Refused        Discharge Plan     Row Name 04/07/21 1227       Plan    Plan  initial    Plan Comments  Spoke with ptds wife re: D/C planning. Lives in [facility], employed full-time; no DME/outpt services. Plan is home. PCP is April Chen    Final Discharge Disposition Code  30 - still a patient        Continued Care and Services - Admitted Since 4/7/2021    Coordination has not been started for this encounter.         Demographic Summary    No documentation.       Functional Status     Row  Name 04/07/21 1224       Functional Status    Usual Activity Tolerance  good       Functional Status, IADL    Medications  independent    Meal Preparation  independent    Housekeeping  independent    Laundry  independent    Shopping  independent       Mental Status    General Appearance WDL  WDL       Mental Status Summary    Recent Changes in Mental Status/Cognitive Functioning  unable to assess       Employment/    Employment Status  employed full-time        Psychosocial    No documentation.       Abuse/Neglect    No documentation.       Legal    No documentation.       Substance Abuse    No documentation.       Patient Forms    No documentation.           Glenna Silveira RN

## 2021-04-07 NOTE — H&P
INTENSIVIST   INITIAL HOSPITAL VISIT NOTE     Hospital:  LOS: 0 days     Mr. Elvis Hanson, 66 y.o. male is seen for a Chief Complaint of:  Chief Complaint   Patient presents with   • Altered Mental Status       Acute kidney injury (CMS/HCC)    Hypertension    Diabetes mellitus (CMS/HCC)    Hyperlipidemia    Sleep apnea    Elevated liver enzymes    Recent Rt Total Knee Replacement 4/5/21    Elevated troponin    Chronic pain w/pain pump    RINKU (acute kidney injury) (CMS/HCC)    Subjective   SHERICE Hanson is a 66 y.o. male with PMH Recent Rt TKR 4/5, HTN, HLP, T2DM, SHYLA, Chronic pain w/pain pump who presented to the ED with c/o inability to hear upon waking this morning.  He could hear when he went to bed the night before.  He had a Rt TKR by Dr. Story 2 days ago and reportedly labs were normal at the time.  He did well and was discharged home.  He has had some blood oozing from his incision, but nothing purulent.  He denies fevers, but he has had periods of diaphoresis.  He took Oxycodone yesterday and Tramadol today.  Wife states that he stopped urinating, but has been eating and drinking enough at home.  He has been taking his antihypertensives.  In the ED, he had a temp 97.9, P 76, RR 14, 87/51 initially and room air sat 83%.  Significant labs:  Troponin 0.29, BUN 25, Cr 3.89, ALT 2631, AST 4533, WBC 13.6, Hgb 10, HCT 34.  ABG on 3L NC had pH 7.28, pCO2 52 and pO2 199.  CXR and CTH were unremarkable.   He was given 1L NS bolus.  He will be admitted to the ICU per the Intensivist service.  Prior to transfer to ICU, his BP improved with -150.        PMH: He  has a past medical history of Diabetes mellitus (CMS/HCC), Hyperlipidemia, Hypertension, and Sleep apnea.   PSxH: He  has a past surgical history that includes Joint replacement (Right) and Cholecystectomy.      Medications:  No current facility-administered medications on file prior to encounter.     Current Outpatient Medications on File Prior  to Encounter   Medication Sig   • aspirin 81 MG EC tablet Take 81 mg by mouth Daily.   • carvedilol (COREG) 25 MG tablet Take 25 mg by mouth 2 (Two) Times a Day With Meals.   • cefadroxil (DURICEF) 500 MG capsule Take 500 mg by mouth 2 (Two) Times a Day.   • ezetimibe (ZETIA) 10 MG tablet Take 10 mg by mouth Daily.   • ferrous sulfate 325 (65 FE) MG tablet Take 325 mg by mouth 2 (two) times a day.   • furosemide (LASIX) 40 MG tablet Take 40 mg by mouth Daily.   • hydrALAZINE (APRESOLINE) 25 MG tablet Take 25 mg by mouth 3 (Three) Times a Day.   • LOSARTAN POTASSIUM PO Take 50 mg by mouth 2 (two) times a day.   • meloxicam (MOBIC) 15 MG tablet Take 15 mg by mouth Daily.   • metFORMIN (GLUCOPHAGE) 500 MG tablet Take 500 mg by mouth 2 (Two) Times a Day With Meals.   • ondansetron (ZOFRAN) 4 MG tablet Take 4 mg by mouth Every 6 (Six) Hours As Needed for Nausea or Vomiting.   • oxyCODONE (OXY-IR) 5 MG capsule Take 5 mg by mouth Every 4 (Four) Hours As Needed for Moderate Pain .   • spironolactone (ALDACTONE) 25 MG tablet Take 25 mg by mouth Daily.   • traMADol (ULTRAM) 50 MG tablet Take 100 mg by mouth Every 6 (Six) Hours As Needed for Moderate Pain .       Allergies: He has No Known Allergies.   FH: His family history is not on file.   SH: He  reports that he has never smoked. He has never used smokeless tobacco. He reports current alcohol use. He reports that he does not use drugs.     The patient's relevant past medical, surgical and social history were reviewed and updated in Epic as appropriate.     ROS (14):   Review of Systems   Constitutional: Positive for activity change and diaphoresis.   HENT: Positive for congestion, hearing loss and rhinorrhea.    Eyes: Negative.    Respiratory: Negative.    Cardiovascular: Negative.    Gastrointestinal: Negative.    Endocrine: Negative.    Genitourinary: Positive for decreased urine volume and difficulty urinating.   Musculoskeletal: Positive for arthralgias and joint  swelling.   Skin: Positive for wound.   Allergic/Immunologic: Negative.    Neurological: Negative.    Hematological: Negative.    Psychiatric/Behavioral: Negative.        Objective   O     Vitals    Temp  Min: 97.9 °F (36.6 °C)  Max: 97.9 °F (36.6 °C)  BP  Min: 87/51  Max: 154/94  Pulse  Min: 70  Max: 79  Resp  Min: 14  Max: 14  SpO2  Min: 83 %  Max: 100 % No data recorded     I/O 24 hrs (7:00AM - 6:59 AM)  Intake/Output     None        Medications (drips):  sodium chloride        Physical Examination    Telemetry: Normal sinus rhythm.    Constitutional:  No acute distress.  Conversant. Hard of hearing. Diaphoretic.    HEENT: Normocephalic and atraumatic.   Neck:  Normal range of motion. Neck supple.   No JVD present.   No thyromegaly.    Cardiovascular: Regular rate and rhythm.  No murmurs, rub or gallop.  No peripheral edema.   Respiratory: Normal respiratory effort.  Clear to ascultation.   Abdominal:  Soft. No masses.   Non-tender. No distension.   No hepatosplenomegaly.   MSK: Normal muscle strength and tone.   Extremities: No digital cyanosis or clubbing.  Right knee is swollen but not erythematous or hot to the touch. Bloody drainage without purulence is noted.    Skin: Skin is warm and dry.  No rashes, lesions or ulcers noted.    Neurological:   Alert and Oriented to person, place, and time.   Moves all extremities.   Psychiatric:  Normal affect.   Normal judgment.          Results from last 7 days   Lab Units 04/07/21  1050   WBC 10*3/mm3 13.63*   HEMOGLOBIN g/dL 10.3*   MCV fL 86.0   PLATELETS 10*3/mm3 274     Results from last 7 days   Lab Units 04/07/21  1050   SODIUM mmol/L 140   POTASSIUM mmol/L 3.7   CO2 mmol/L 26.0   CREATININE mg/dL 3.89*   MAGNESIUM mg/dL 1.6     Estimated Creatinine Clearance: 25.7 mL/min (A) (by C-G formula based on SCr of 3.89 mg/dL (H)).  Results from last 7 days   Lab Units 04/07/21  1050   ALK PHOS U/L 101   BILIRUBIN mg/dL 0.6   ALT (SGPT) U/L 2,631*   AST (SGOT) U/L 4,533*      Results from last 7 days   Lab Units 04/07/21  1148   PH, ARTERIAL pH units 7.288*   PCO2, ARTERIAL mm Hg 52.4*   PO2 ART mm Hg 199.0*   FIO2 % 30       Images:    Imaging Results (Last 24 Hours)     Procedure Component Value Units Date/Time    CT Head Without Contrast [[id number]] Collected: 04/07/21 1308     Updated: 04/07/21 1311    Narrative:      EXAMINATION: CT HEAD WO CONTRAST- 04/07/2021     INDICATION: Altered mental status; R41.82-Altered mental status,  unspecified; I95.9-Hypotension, unspecified; N17.9-Acute kidney failure,  unspecified; H91.93-Unspecified hearing loss, bilateral; R79.89-Other  specified abnormal findings of blood chemistry; R77.8-Other specified  abnormalities of plasma proteins; confusion     TECHNIQUE: Multiple axial CT imaging was obtained of the head from skull  base to skull vertex without the administration of intravenous contrast.     The radiation dose reduction device was turned on for each scan per the  ALARA (As Low as Reasonably Achievable) protocol.     COMPARISON: NONE     FINDINGS: Brain parenchyma is unremarkable in appearance. No hemorrhage  or hydrocephalus. No mass, mass effect, or midline shift. No abnormal  extra axial fluid collections identified. Bony structures reveal no  evidence of osseous abnormality with some minimal mucosal thickening of  the ethmoid air cells. The mastoid air cells are patent.       Impression:      Mild atrophy and chronic changes seen within the brain with  no acute intracranial abnormality. Mild chronic ethmoid sinusitis.     D:  04/07/2021  E:  04/07/2021          XR Chest 1 View [[id number]] Collected: 04/07/21 1107     Updated: 04/07/21 1109    Narrative:      EXAMINATION: XR CHEST 1 VW-      INDICATION: Weak, dizzy, AMS triage protocol.      COMPARISON: None.     FINDINGS: Portable chest reveals cardiac and mediastinal silhouettes  within normal limits. The lung fields are grossly clear. No focal  parenchymal opacification is  "present.  No pleural effusion or  pneumothorax. Bony structures are unremarkable. Pulmonary vascularity is  within normal limits.           Impression:      No acute cardiopulmonary disease.     D:  04/07/2021  E:  04/07/2021           ECG/EMG Results (last 24 hours)     Procedure Component Value Units Date/Time    ECG 12 Lead [[id number]] Collected: 04/07/21 1053     Updated: 04/07/21 1234     QT Interval 442 ms      QTC Interval 483 ms     Narrative:      Test Reason : Weak/Dizzy/AMS protocol  Blood Pressure : **/** mmHG  Vent. Rate : 072 BPM     Atrial Rate : 072 BPM     P-R Int : 196 ms          QRS Dur : 090 ms      QT Int : 442 ms       P-R-T Axes : 021 043 008 degrees     QTc Int : 483 ms    Normal sinus rhythm  Nonspecific T wave abnormality  Prolonged QT  Abnormal ECG  No previous ECGs available  Confirmed by TENNILLE ONOFRE MD ([id number]) on 4/7/2021 12:34:45 PM    Referred By:  EDMD           Confirmed By:TENNILLE ONOFRE MD        I reviewed the patient's new laboratory and imaging results.  I independently reviewed the patient's new images.    Assessment/Plan   A / P     Mr. Hanson is a 67yo M with a history of recent right total knee replacement on 4/5, HTN, T2DM, SHYLA, and chronic pain with a pain pump who presented to the [facility] ED on 4/7 with complaints of hearing loss. He reports that he could hear normally when he went to bed last night but when he awoke, everything sounded \"muffled.\" In the ED, he was noted to have acute kidney injury as well as elevated LFTs. ABG was performed which showed a pH of 7.28, pCO2 of 52, and pO2 of 199. CXR and CT head were unremarkable. He was noted to be hypotensive and blood pressure improved with IV fluids.     He is on multiple antihypertensive agents and multiple diuretics at home. His wife reports that his PO intake was decreased for the past day or so and his urine output has tapered off. He has been taking Oxycodone and Tramadol as prescribed for knee pain. He is also on " Mobic.       Nutrition:   Diet Regular; Consistent Carbohydrate  Advance Directives:   Code Status and Medical Interventions:   Ordered at: 04/07/21 1422     Code Status:    CPR     Medical Interventions (Level of Support Prior to Arrest):    Full       Active Hospital Problems    Diagnosis    • **Acute kidney injury (CMS/HCC)    • RINKU (acute kidney injury) (CMS/HCC)    • Hypertension    • Diabetes mellitus (CMS/HCC)    • Hyperlipidemia    • Sleep apnea    • Elevated liver enzymes    • Recent Rt Total Knee Replacement 4/5/21    • Elevated troponin    • Chronic pain w/pain pump      Assessment / Plan:    Admit to PCU  Continue gentle IV fluids.   Hold blood pressure medications. Start back as tolerated.   Hold home diuretics.   Check an echocardiogram.   Trend troponin levels.   Follow up repeat creatinine and LFTs this evening.   Nightly Cpap. May need Bipap therapy. Will repeat an ABG.   Orthopedics to see.   Hold antibiotics unless Ortho thinks they are needed.   Check MRI brain as otoscopic exam was unremarkable.   Hold Tramadol as it can cause hypotension.   Consistent carbohydrate diet.   SQH with renal adjustment.   AM labs    ANGUS Hodge, AGACNP-BC, FNP-BC  Pulmonary and Critical Care Service    I have personally seen, interviewed and examined the patient and verified all the key components of the history, physical examination, assessment and plan with ANGUS Hodge, ROBERTOP-BC, FNP-BC and reviewed the note, which reflects my changes and contributions.    Plan of care and goals reviewed during interdisciplinary rounds.  I discussed the patient's findings and my recommendations with patient and family      Rossy Quigley, DO  Pulmonary and Critical Care Medicine

## 2021-04-07 NOTE — ED PROVIDER NOTES
" EMERGENCY DEPARTMENT ENCOUNTER    Pt Name: Elvis Hanson  MRN: [id number]  Pt :   1955  Room Number:    Date of encounter:  2021  PCP: April Chen DO  ED Provider: Tyler Red MD    Historian: Patient, wife, orthopedic surgeon      HPI:  Chief Complaint: Inability to hear.        Context: Elvis Hanson is a 66 y.o. male who presents to the ED c/o inability to hear first noticed this morning.  Patient went to bed last night around 10 PM.  At that time he was able to hear.  This morning he feels as if he cannot hear out of either ear.  His wife has to yell at him and speak with her mask off as others do as well, in order for him to understand what is being said.  The patient's wife reports the patient is normally hypertensive.  She does not know what his blood pressure has been running recently other than preop which was high.  He underwent right knee surgery 2 days ago with Dr. Story.  All went well with the surgery.  He has had some mild bleeding from the wound but has noticed no foul odor or abnormal discharge.  He has had no fevers or chills.  He has been sweaty both yesterday/last evening as well as this morning.  His wife is placed a fan on him but this is not helped much.  The patient has been slower to answer questions but otherwise in no severe distress.  He has a \"pain pump\" in place and has had no problems with this.  He has had little to no pain in his right knee.  He took oxycodone yesterday but none today.  He did take tramadol today.      PAST MEDICAL HISTORY  Active Ambulatory Problems     Diagnosis Date Noted   • No Active Ambulatory Problems     Resolved Ambulatory Problems     Diagnosis Date Noted   • No Resolved Ambulatory Problems     Past Medical History:   Diagnosis Date   • Diabetes mellitus (CMS/HCC)    • Hyperlipidemia    • Hypertension    • Sleep apnea          PAST SURGICAL HISTORY  Past Surgical History:   Procedure Laterality Date   • CHOLECYSTECTOMY "     • JOINT REPLACEMENT Right     KNEE         FAMILY HISTORY  History reviewed. No pertinent family history.      SOCIAL HISTORY  Social History     Socioeconomic History   • Marital status:      Spouse name: Not on file   • Number of children: Not on file   • Years of education: Not on file   • Highest education level: Not on file   Tobacco Use   • Smoking status: Never Smoker   • Smokeless tobacco: Never Used   Vaping Use   • Vaping Use: Never used   Substance and Sexual Activity   • Alcohol use: Yes     Comment: OCASSIONALLY    • Drug use: Never   • Sexual activity: Defer         ALLERGIES  Patient has no known allergies.        REVIEW OF SYSTEMS  Review of Systems     All systems reviewed and negative except for those discussed in HPI.       PHYSICAL EXAM    I have reviewed the triage vital signs and nursing notes.    ED Triage Vitals   Temp Heart Rate Resp BP SpO2   04/07/21 1041 04/07/21 1041 04/07/21 1053 04/07/21 1047 04/07/21 1041   97.9 °F (36.6 °C) 76 14 (!) 87/51 (!) 83 %      Temp src Heart Rate Source Patient Position BP Location FiO2 (%)   04/07/21 1041 04/07/21 1041 04/07/21 1053 04/07/21 1053 --   Oral Monitor Lying Right arm        Physical Exam  GENERAL:   Appears ill.  HENT: Nares patent.  Dry mucous membranes  EYES: No scleral icterus.  CV: Regular rhythm, regular rate.  Distant heart sounds but no murmurs gallops rubs.  RESPIRATORY: Normal effort.  No audible wheezes, rales or rhonchi.  Clear to auscultation but diminished in lower lung fields.  ABDOMEN: Soft, nontender.  Protuberant with adipose.  Bowel sounds within normal limits.  MUSCULOSKELETAL: No deformities.   NEURO: Alert, moves all extremities, follows commands.  SKIN: Warm, dry, no rash visualized.  Right knee surgical wound is slightly bloody.  The dressing has been pulled partially off, inferiorly.  A sterile dressing was still in place over the top of the incision site which is bloody but not dehisced.  Lymphatics: No  lymphangitic streaking in the right lower extremity or elsewhere.  No inguinal lymphadenopathy.      LAB RESULTS  Recent Results (from the past 24 hour(s))   POC Glucose Once    Collection Time: 04/07/21 10:47 AM    Specimen: Blood   Result Value Ref Range    Glucose 114 70 - 130 mg/dL   Comprehensive Metabolic Panel    Collection Time: 04/07/21 10:50 AM    Specimen: Blood   Result Value Ref Range    Glucose 116 (H) 65 - 99 mg/dL    BUN 25 (H) 8 - 23 mg/dL    Creatinine 3.89 (H) 0.76 - 1.27 mg/dL    Sodium 140 136 - 145 mmol/L    Potassium 3.7 3.5 - 5.2 mmol/L    Chloride 94 (L) 98 - 107 mmol/L    CO2 26.0 22.0 - 29.0 mmol/L    Calcium 7.9 (L) 8.6 - 10.5 mg/dL    Total Protein 7.0 6.0 - 8.5 g/dL    Albumin 4.10 3.50 - 5.20 g/dL    ALT (SGPT) 2,631 (H) 1 - 41 U/L    AST (SGOT) 4,533 (H) 1 - 40 U/L    Alkaline Phosphatase 101 39 - 117 U/L    Total Bilirubin 0.6 0.0 - 1.2 mg/dL    eGFR  African Amer 19 (L) >60 mL/min/1.73    Globulin 2.9 gm/dL    A/G Ratio 1.4 g/dL    BUN/Creatinine Ratio 6.4 (L) 7.0 - 25.0    Anion Gap 20.0 (H) 5.0 - 15.0 mmol/L   Troponin    Collection Time: 04/07/21 10:50 AM    Specimen: Blood   Result Value Ref Range    Troponin T 0.290 (C) 0.000 - 0.030 ng/mL   Magnesium    Collection Time: 04/07/21 10:50 AM    Specimen: Blood   Result Value Ref Range    Magnesium 1.6 1.6 - 2.4 mg/dL   Light Blue Top    Collection Time: 04/07/21 10:50 AM   Result Value Ref Range    Extra Tube hold for add-on    Green Top (Gel)    Collection Time: 04/07/21 10:50 AM   Result Value Ref Range    Extra Tube Hold for add-ons.    Lavender Top    Collection Time: 04/07/21 10:50 AM   Result Value Ref Range    Extra Tube hold for add-on    Gold Top - SST    Collection Time: 04/07/21 10:50 AM   Result Value Ref Range    Extra Tube Hold for add-ons.    Gray Top - Ice    Collection Time: 04/07/21 10:50 AM   Result Value Ref Range    Extra Tube Hold for add-ons.    CBC Auto Differential    Collection Time: 04/07/21 10:50 AM     Specimen: Blood   Result Value Ref Range    WBC 13.63 (H) 3.40 - 10.80 10*3/mm3    RBC 4.01 (L) 4.14 - 5.80 10*6/mm3    Hemoglobin 10.3 (L) 13.0 - 17.7 g/dL    Hematocrit 34.5 (L) 37.5 - 51.0 %    MCV 86.0 79.0 - 97.0 fL    MCH 25.7 (L) 26.6 - 33.0 pg    MCHC 29.9 (L) 31.5 - 35.7 g/dL    RDW 14.8 12.3 - 15.4 %    RDW-SD 47.1 37.0 - 54.0 fl    MPV 10.6 6.0 - 12.0 fL    Platelets 274 140 - 450 10*3/mm3    Neutrophil % 75.8 42.7 - 76.0 %    Lymphocyte % 14.7 (L) 19.6 - 45.3 %    Monocyte % 8.3 5.0 - 12.0 %    Eosinophil % 0.2 (L) 0.3 - 6.2 %    Basophil % 0.1 0.0 - 1.5 %    Immature Grans % 0.9 (H) 0.0 - 0.5 %    Neutrophils, Absolute 10.33 (H) 1.70 - 7.00 10*3/mm3    Lymphocytes, Absolute 2.00 0.70 - 3.10 10*3/mm3    Monocytes, Absolute 1.13 (H) 0.10 - 0.90 10*3/mm3    Eosinophils, Absolute 0.03 0.00 - 0.40 10*3/mm3    Basophils, Absolute 0.02 0.00 - 0.20 10*3/mm3    Immature Grans, Absolute 0.12 (H) 0.00 - 0.05 10*3/mm3    nRBC 0.0 0.0 - 0.2 /100 WBC   ECG 12 Lead    Collection Time: 04/07/21 10:53 AM   Result Value Ref Range    QT Interval 442 ms    QTC Interval 483 ms   Blood Gas, Arterial With Co-Ox    Collection Time: 04/07/21 11:48 AM    Specimen: Arterial Blood   Result Value Ref Range    Site Left Radial     Jake's Test N/A     pH, Arterial 7.288 (L) 7.350 - 7.450 pH units    pCO2, Arterial 52.4 (H) 35.0 - 45.0 mm Hg    pO2, Arterial 199.0 (H) 83.0 - 108.0 mm Hg    HCO3, Arterial 25.1 20.0 - 26.0 mmol/L    Base Excess, Arterial -1.9 (L) 0.0 - 2.0 mmol/L    Hemoglobin, Blood Gas 10.2 (L) 13.5 - 17.5 g/dL    Hematocrit, Blood Gas 31.3 %    Oxyhemoglobin 98.0 94 - 99 %    Methemoglobin 0.20 0.00 - 1.50 %    Carboxyhemoglobin 1.8 0 - 2 %    CO2 Content 26.7 22 - 33 mmol/L    Temperature 37.0 C    Barometric Pressure for Blood Gas      Modality Nasal Cannula     FIO2 30 %    Ventilator Mode       Note      pH, Temp Corrected 7.288 pH Units    pCO2, Temperature Corrected 52.4 (H) 35 - 48 mm Hg    pO2, Temperature  Corrected 199 (H) 83 - 108 mm Hg       If labs were ordered, I independently reviewed the results.        RADIOLOGY  XR Chest 1 View    Result Date: 4/7/2021  EXAMINATION: XR CHEST 1 VW-  INDICATION: Weak, dizzy, AMS triage protocol.  COMPARISON: None.  FINDINGS: Portable chest reveals cardiac and mediastinal silhouettes within normal limits. The lung fields are grossly clear. No focal parenchymal opacification is present.  No pleural effusion or pneumothorax. Bony structures are unremarkable. Pulmonary vascularity is within normal limits.         No acute cardiopulmonary disease.  D:  04/07/2021 E:  04/07/2021        I ordered and reviewed the above noted radiographic studies.    I viewed images of chest x-ray which showed no infiltrates per my independent interpretation.  See radiologist's dictation for official interpretation.        PROCEDURES    Critical Care  Performed by: Tyler Red MD  Authorized by: Tyler Red MD     Critical care provider statement:     Critical care time (minutes):  35    Critical care time was exclusive of:  Separately billable procedures and treating other patients    Critical care was necessary to treat or prevent imminent or life-threatening deterioration of the following conditions:  Circulatory failure, CNS failure or compromise, dehydration, hepatic failure and renal failure    Critical care was time spent personally by me on the following activities:  Ordering and performing treatments and interventions, ordering and review of laboratory studies, ordering and review of radiographic studies, pulse oximetry, re-evaluation of patient's condition, review of old charts, obtaining history from patient or surrogate, examination of patient, evaluation of patient's response to treatment, discussions with consultants and development of treatment plan with patient or surrogate        ECG 12 Lead   Final Result   Test Reason : Weak/Dizzy/AMS protocol   Blood Pressure : **/** mmHG    Vent. Rate : 072 BPM     Atrial Rate : 072 BPM      P-R Int : 196 ms          QRS Dur : 090 ms       QT Int : 442 ms       P-R-T Axes : 021 043 008 degrees      QTc Int : 483 ms      Normal sinus rhythm   Nonspecific T wave abnormality   Prolonged QT   Abnormal ECG   No previous ECGs available   Confirmed by TENNILLE RED MD ([id number]) on 4/7/2021 12:34:45 PM      Referred By:  EDMD           Confirmed By:TENNILLE RED MD          MEDICATIONS GIVEN IN ER    Medications   sodium chloride 0.9 % flush 10 mL (has no administration in time range)   sodium chloride 0.9 % bolus 1,000 mL (has no administration in time range)   sodium chloride 0.9 % bolus 1,000 mL (1,000 mL Intravenous New Bag 4/7/21 1108)         PROGRESS, DATA ANALYSIS, CONSULTS, AND MEDICAL DECISION MAKING    All labs have been independently reviewed by me.  All radiology studies have been reviewed by me and the radiologist dictating the report.   EKG's have been independently viewed and interpreted by me.      Differential diagnoses: Concern for prolonged hypotension secondary to inadequate fluid intake versus infection versus cerebral ischemia, etc.      ED Course as of Apr 07 1236   Wed Apr 07, 2021   1213 We are bolusing the patient with normal saline. I have ordered a Peck catheter both to check for urinary outlet obstruction as well as to record hourly urine flow.  I spoke with Dr. Story, the patient's orthopedist. He will see the patient in the emergency department.  I have paged Dr. Evans, intensivist on call.    [MS]   1213 Preop creatinine was 0.8. Now it is 3.9.    [MS]      ED Course User Index  [MS] Tennille Red MD             AS OF 12:36 EDT VITALS:    BP - 102/70  HR - 76  TEMP - 97.9 °F (36.6 °C) (Oral)  O2 SATS - 100%        DIAGNOSIS  Final diagnoses:   Altered mental status, unspecified altered mental status type   Hypotension, unspecified hypotension type   Acute renal failure, unspecified acute renal failure type (CMS/HCC)    Bilateral hearing loss, unspecified hearing loss type   Elevated LFTs   Elevated troponin         DISPOSITION  Admission to ICU           Tyler Red MD  04/07/21 6097

## 2021-04-07 NOTE — PLAN OF CARE
Goal Outcome Evaluation:  Plan of Care Reviewed With: patient      Pt came up from ED and is settled in his room. Pt is hypotensive and is getting his 3rd bolus of NS. Held carvedilol. Will cont to monitor.

## 2021-04-08 ENCOUNTER — APPOINTMENT (OUTPATIENT)
Dept: ULTRASOUND IMAGING | Facility: HOSPITAL | Age: 66
End: 2021-04-08

## 2021-04-08 ENCOUNTER — APPOINTMENT (OUTPATIENT)
Dept: GENERAL RADIOLOGY | Facility: HOSPITAL | Age: 66
End: 2021-04-08

## 2021-04-08 PROBLEM — I50.21 ACUTE HFREF (HEART FAILURE WITH REDUCED EJECTION FRACTION) (HCC): Status: ACTIVE | Noted: 2021-04-08

## 2021-04-08 PROBLEM — E87.20 LACTIC ACIDOSIS: Status: ACTIVE | Noted: 2021-04-08

## 2021-04-08 PROBLEM — R57.9 SHOCK: Status: ACTIVE | Noted: 2021-04-08

## 2021-04-08 LAB
ALBUMIN SERPL-MCNC: 3.4 G/DL (ref 3.5–5.2)
ALBUMIN/GLOB SERPL: 1.3 G/DL
ALP SERPL-CCNC: 154 U/L (ref 39–117)
ALT SERPL W P-5'-P-CCNC: 5392 U/L (ref 1–41)
ANION GAP SERPL CALCULATED.3IONS-SCNC: 18 MMOL/L (ref 5–15)
APTT PPP: 29.4 SECONDS (ref 22–39)
ARTERIAL PATENCY WRIST A: ABNORMAL
ARTERIAL PATENCY WRIST A: ABNORMAL
AST SERPL-CCNC: >7000 U/L (ref 1–40)
ATMOSPHERIC PRESS: ABNORMAL MM[HG]
BASE EXCESS BLDA CALC-SCNC: -3.2 MMOL/L (ref 0–2)
BASE EXCESS BLDA CALC-SCNC: -6.8 MMOL/L (ref 0–2)
BASE EXCESS BLDV CALC-SCNC: -2.2 MMOL/L (ref -2–2)
BDY SITE: ABNORMAL
BILIRUB SERPL-MCNC: 0.6 MG/DL (ref 0–1.2)
BODY TEMPERATURE: 37 C
BUN SERPL-MCNC: 37 MG/DL (ref 8–23)
BUN/CREAT SERPL: 7.3 (ref 7–25)
CA-I SERPL ISE-MCNC: 0.81 MMOL/L (ref 1.12–1.32)
CALCIUM SPEC-SCNC: 6.5 MG/DL (ref 8.6–10.5)
CHLORIDE SERPL-SCNC: 94 MMOL/L (ref 98–107)
CO2 BLDA-SCNC: 23 MMOL/L (ref 22–33)
CO2 BLDA-SCNC: 25.5 MMOL/L (ref 22–33)
CO2 BLDA-SCNC: 26 MMOL/L (ref 22–33)
CO2 SERPL-SCNC: 21 MMOL/L (ref 22–29)
COHGB MFR BLD: 0.9 % (ref 0–2)
COHGB MFR BLD: 1.1 %
COHGB MFR BLD: 1.2 % (ref 0–2)
CORTIS SERPL-MCNC: 8.32 MCG/DL
CREAT SERPL-MCNC: 5.1 MG/DL (ref 0.76–1.27)
CREAT UR-MCNC: 111.2 MG/DL
D-LACTATE SERPL-SCNC: 3.2 MMOL/L (ref 0.5–2)
D-LACTATE SERPL-SCNC: 3.6 MMOL/L (ref 0.5–2)
DEPRECATED RDW RBC AUTO: 45.9 FL (ref 37–54)
EPAP: 0
EPAP: 0
EPAP: 6
ERYTHROCYTE [DISTWIDTH] IN BLOOD BY AUTOMATED COUNT: 14.9 % (ref 12.3–15.4)
GFR SERPL CREATININE-BSD FRML MDRD: 14 ML/MIN/1.73
GFR SERPL CREATININE-BSD FRML MDRD: ABNORMAL ML/MIN/{1.73_M2}
GLOBULIN UR ELPH-MCNC: 2.6 GM/DL
GLUCOSE BLDC GLUCOMTR-MCNC: 146 MG/DL (ref 70–130)
GLUCOSE BLDC GLUCOMTR-MCNC: 178 MG/DL (ref 70–130)
GLUCOSE BLDC GLUCOMTR-MCNC: 204 MG/DL (ref 70–130)
GLUCOSE SERPL-MCNC: 138 MG/DL (ref 65–99)
HCO3 BLDA-SCNC: 21.3 MMOL/L (ref 20–26)
HCO3 BLDA-SCNC: 23.9 MMOL/L (ref 20–26)
HCO3 BLDV-SCNC: 24.4 MMOL/L (ref 22–28)
HCT VFR BLD AUTO: 33.4 % (ref 37.5–51)
HCT VFR BLD CALC: 31.4 %
HCT VFR BLD CALC: 32.6 %
HGB BLD-MCNC: 10.1 G/DL (ref 13–17.7)
HGB BLDA-MCNC: 10.3 G/DL (ref 13.5–17.5)
HGB BLDA-MCNC: 10.6 G/DL (ref 13.5–17.5)
HGB BLDA-MCNC: 9.7 G/DL (ref 13.5–17.5)
INHALED O2 CONCENTRATION: 32 %
INHALED O2 CONCENTRATION: 35 %
INHALED O2 CONCENTRATION: 35 %
INR PPP: 1.87 (ref 0.85–1.16)
IPAP: 0
IPAP: 0
IPAP: 16
Lab: ABNORMAL
MAGNESIUM SERPL-MCNC: 1.6 MG/DL (ref 1.6–2.4)
MCH RBC QN AUTO: 25.4 PG (ref 26.6–33)
MCHC RBC AUTO-ENTMCNC: 30.2 G/DL (ref 31.5–35.7)
MCV RBC AUTO: 84.1 FL (ref 79–97)
METHGB BLD QL: 0.5 % (ref 0–1.5)
METHGB BLD QL: 0.6 %
METHGB BLD QL: 0.6 % (ref 0–1.5)
MODALITY: ABNORMAL
MRSA DNA SPEC QL NAA+PROBE: NEGATIVE
NOTE: ABNORMAL
NOTIFIED BY: ABNORMAL
NOTIFIED WHO: ABNORMAL
OXYHGB MFR BLDV: 55 %
OXYHGB MFR BLDV: 94.6 % (ref 94–99)
OXYHGB MFR BLDV: 95 % (ref 94–99)
PAW @ PEAK INSP FLOW SETTING VENT: 0 CMH2O
PCO2 BLDA: 52.1 MM HG (ref 35–45)
PCO2 BLDA: 54.2 MM HG (ref 35–45)
PCO2 BLDV: 50.1 MM HG (ref 41–51)
PCO2 TEMP ADJ BLD: 52.1 MM HG (ref 35–48)
PCO2 TEMP ADJ BLD: 54.2 MM HG (ref 35–48)
PH BLDA: 7.2 PH UNITS (ref 7.35–7.45)
PH BLDA: 7.27 PH UNITS (ref 7.35–7.45)
PH BLDV: 7.3 PH UNITS (ref 7.31–7.41)
PH, TEMP CORRECTED: 7.2 PH UNITS
PH, TEMP CORRECTED: 7.27 PH UNITS
PHOSPHATE SERPL-MCNC: 11.4 MG/DL (ref 2.5–4.5)
PLATELET # BLD AUTO: 230 10*3/MM3 (ref 140–450)
PMV BLD AUTO: 11 FL (ref 6–12)
PO2 BLDA: 107 MM HG (ref 83–108)
PO2 BLDA: 96.7 MM HG (ref 83–108)
PO2 BLDV: 36.6 MM HG (ref 27–53)
PO2 TEMP ADJ BLD: 107 MM HG (ref 83–108)
PO2 TEMP ADJ BLD: 96.7 MM HG (ref 83–108)
POTASSIUM SERPL-SCNC: 3.9 MMOL/L (ref 3.5–5.2)
PROCALCITONIN SERPL-MCNC: 4.86 NG/ML (ref 0–0.25)
PROT SERPL-MCNC: 6 G/DL (ref 6–8.5)
PROT UR-MCNC: >600 MG/DL
PROTHROMBIN TIME: 20.8 SECONDS (ref 11.4–14.4)
QT INTERVAL: 458 MS
QTC INTERVAL: 497 MS
RBC # BLD AUTO: 3.97 10*6/MM3 (ref 4.14–5.8)
SODIUM SERPL-SCNC: 133 MMOL/L (ref 136–145)
SODIUM UR-SCNC: 29 MMOL/L
TOTAL RATE: 0 BREATHS/MINUTE
TROPONIN T SERPL-MCNC: 0.76 NG/ML (ref 0–0.03)
VENTILATOR MODE: ABNORMAL
WBC # BLD AUTO: 17.13 10*3/MM3 (ref 3.4–10.8)

## 2021-04-08 PROCEDURE — 94660 CPAP INITIATION&MGMT: CPT

## 2021-04-08 PROCEDURE — 82820 HEMOGLOBIN-OXYGEN AFFINITY: CPT

## 2021-04-08 PROCEDURE — 25010000002 HEPARIN (PORCINE) PER 1000 UNITS: Performed by: INTERNAL MEDICINE

## 2021-04-08 PROCEDURE — 82570 ASSAY OF URINE CREATININE: CPT | Performed by: NURSE PRACTITIONER

## 2021-04-08 PROCEDURE — 25010000002 HYDROCORTISONE SODIUM SUCCINATE 100 MG RECONSTITUTED SOLUTION: Performed by: INTERNAL MEDICINE

## 2021-04-08 PROCEDURE — 85610 PROTHROMBIN TIME: CPT | Performed by: NURSE PRACTITIONER

## 2021-04-08 PROCEDURE — 82805 BLOOD GASES W/O2 SATURATION: CPT

## 2021-04-08 PROCEDURE — 93010 ELECTROCARDIOGRAM REPORT: CPT | Performed by: INTERNAL MEDICINE

## 2021-04-08 PROCEDURE — 99254 IP/OBS CNSLTJ NEW/EST MOD 60: CPT | Performed by: INTERNAL MEDICINE

## 2021-04-08 PROCEDURE — 84100 ASSAY OF PHOSPHORUS: CPT | Performed by: INTERNAL MEDICINE

## 2021-04-08 PROCEDURE — 99291 CRITICAL CARE FIRST HOUR: CPT | Performed by: INTERNAL MEDICINE

## 2021-04-08 PROCEDURE — 25010000002 THIAMINE PER 100 MG: Performed by: NURSE PRACTITIONER

## 2021-04-08 PROCEDURE — 82375 ASSAY CARBOXYHB QUANT: CPT

## 2021-04-08 PROCEDURE — 25010000002 MAGNESIUM SULFATE IN D5W 1G/100ML (PREMIX) 1-5 GM/100ML-% SOLUTION: Performed by: NURSE PRACTITIONER

## 2021-04-08 PROCEDURE — 25010000002 PIPERACILLIN SOD-TAZOBACTAM PER 1 G: Performed by: NURSE PRACTITIONER

## 2021-04-08 PROCEDURE — 25010000002 VANCOMYCIN 10 G RECONSTITUTED SOLUTION

## 2021-04-08 PROCEDURE — 76775 US EXAM ABDO BACK WALL LIM: CPT

## 2021-04-08 PROCEDURE — 02HV33Z INSERTION OF INFUSION DEVICE INTO SUPERIOR VENA CAVA, PERCUTANEOUS APPROACH: ICD-10-PCS | Performed by: NURSE PRACTITIONER

## 2021-04-08 PROCEDURE — 84156 ASSAY OF PROTEIN URINE: CPT | Performed by: INTERNAL MEDICINE

## 2021-04-08 PROCEDURE — 71045 X-RAY EXAM CHEST 1 VIEW: CPT

## 2021-04-08 PROCEDURE — 82533 TOTAL CORTISOL: CPT | Performed by: NURSE PRACTITIONER

## 2021-04-08 PROCEDURE — 84484 ASSAY OF TROPONIN QUANT: CPT | Performed by: NURSE PRACTITIONER

## 2021-04-08 PROCEDURE — 87641 MR-STAPH DNA AMP PROBE: CPT | Performed by: NURSE PRACTITIONER

## 2021-04-08 PROCEDURE — 83605 ASSAY OF LACTIC ACID: CPT | Performed by: NURSE PRACTITIONER

## 2021-04-08 PROCEDURE — 85027 COMPLETE CBC AUTOMATED: CPT | Performed by: INTERNAL MEDICINE

## 2021-04-08 PROCEDURE — 85730 THROMBOPLASTIN TIME PARTIAL: CPT | Performed by: NURSE PRACTITIONER

## 2021-04-08 PROCEDURE — 84145 PROCALCITONIN (PCT): CPT | Performed by: NURSE PRACTITIONER

## 2021-04-08 PROCEDURE — 63710000001 INSULIN LISPRO (HUMAN) PER 5 UNITS: Performed by: INTERNAL MEDICINE

## 2021-04-08 PROCEDURE — 84300 ASSAY OF URINE SODIUM: CPT | Performed by: NURSE PRACTITIONER

## 2021-04-08 PROCEDURE — 80053 COMPREHEN METABOLIC PANEL: CPT | Performed by: INTERNAL MEDICINE

## 2021-04-08 PROCEDURE — 36600 WITHDRAWAL OF ARTERIAL BLOOD: CPT

## 2021-04-08 PROCEDURE — 93005 ELECTROCARDIOGRAM TRACING: CPT | Performed by: NURSE PRACTITIONER

## 2021-04-08 PROCEDURE — 94799 UNLISTED PULMONARY SVC/PX: CPT

## 2021-04-08 PROCEDURE — 25010000002 ALBUMIN HUMAN 25% PER 50 ML: Performed by: INTERNAL MEDICINE

## 2021-04-08 PROCEDURE — 82962 GLUCOSE BLOOD TEST: CPT

## 2021-04-08 PROCEDURE — P9047 ALBUMIN (HUMAN), 25%, 50ML: HCPCS | Performed by: INTERNAL MEDICINE

## 2021-04-08 PROCEDURE — 83050 HGB METHEMOGLOBIN QUAN: CPT

## 2021-04-08 PROCEDURE — 82330 ASSAY OF CALCIUM: CPT | Performed by: INTERNAL MEDICINE

## 2021-04-08 PROCEDURE — 83735 ASSAY OF MAGNESIUM: CPT | Performed by: INTERNAL MEDICINE

## 2021-04-08 RX ORDER — MAGNESIUM SULFATE HEPTAHYDRATE 40 MG/ML
4 INJECTION, SOLUTION INTRAVENOUS AS NEEDED
Status: DISCONTINUED | OUTPATIENT
Start: 2021-04-08 | End: 2021-04-08

## 2021-04-08 RX ORDER — MAGNESIUM SULFATE 1 G/100ML
1 INJECTION INTRAVENOUS AS NEEDED
Status: DISCONTINUED | OUTPATIENT
Start: 2021-04-08 | End: 2021-04-08

## 2021-04-08 RX ORDER — MAGNESIUM SULFATE HEPTAHYDRATE 40 MG/ML
2 INJECTION, SOLUTION INTRAVENOUS AS NEEDED
Status: DISCONTINUED | OUTPATIENT
Start: 2021-04-08 | End: 2021-04-08

## 2021-04-08 RX ORDER — BUMETANIDE 0.25 MG/ML
2 INJECTION INTRAMUSCULAR; INTRAVENOUS ONCE
Status: COMPLETED | OUTPATIENT
Start: 2021-04-08 | End: 2021-04-08

## 2021-04-08 RX ORDER — DOCUSATE SODIUM 100 MG/1
100 CAPSULE, LIQUID FILLED ORAL 2 TIMES DAILY
Status: DISCONTINUED | OUTPATIENT
Start: 2021-04-08 | End: 2021-04-20 | Stop reason: HOSPADM

## 2021-04-08 RX ORDER — MAGNESIUM SULFATE 1 G/100ML
1 INJECTION INTRAVENOUS ONCE
Status: COMPLETED | OUTPATIENT
Start: 2021-04-08 | End: 2021-04-08

## 2021-04-08 RX ORDER — POLYETHYLENE GLYCOL 3350 17 G/17G
17 POWDER, FOR SOLUTION ORAL DAILY
Status: DISCONTINUED | OUTPATIENT
Start: 2021-04-09 | End: 2021-04-20

## 2021-04-08 RX ORDER — ECHINACEA PURPUREA EXTRACT 125 MG
2 TABLET ORAL AS NEEDED
Status: DISCONTINUED | OUTPATIENT
Start: 2021-04-08 | End: 2021-04-20 | Stop reason: HOSPADM

## 2021-04-08 RX ORDER — ALBUMIN (HUMAN) 12.5 G/50ML
25 SOLUTION INTRAVENOUS ONCE
Status: COMPLETED | OUTPATIENT
Start: 2021-04-08 | End: 2021-04-08

## 2021-04-08 RX ADMIN — SODIUM BICARBONATE 150 MEQ: 84 INJECTION INTRAVENOUS at 16:59

## 2021-04-08 RX ADMIN — DOCUSATE SODIUM 100 MG: 100 CAPSULE, LIQUID FILLED ORAL at 22:22

## 2021-04-08 RX ADMIN — HEPARIN SODIUM 5000 UNITS: 5000 INJECTION INTRAVENOUS; SUBCUTANEOUS at 08:10

## 2021-04-08 RX ADMIN — HYDROCORTISONE SODIUM SUCCINATE 100 MG: 100 INJECTION, POWDER, FOR SOLUTION INTRAMUSCULAR; INTRAVENOUS at 18:13

## 2021-04-08 RX ADMIN — SODIUM CHLORIDE 250 ML: 9 INJECTION, SOLUTION INTRAVENOUS at 14:46

## 2021-04-08 RX ADMIN — ASPIRIN 81 MG: 81 TABLET, COATED ORAL at 08:10

## 2021-04-08 RX ADMIN — SODIUM CHLORIDE, PRESERVATIVE FREE 10 ML: 5 INJECTION INTRAVENOUS at 20:50

## 2021-04-08 RX ADMIN — THIAMINE HYDROCHLORIDE 250 MG: 100 INJECTION, SOLUTION INTRAMUSCULAR; INTRAVENOUS at 22:22

## 2021-04-08 RX ADMIN — TAZOBACTAM SODIUM AND PIPERACILLIN SODIUM 4.5 G: 500; 4 INJECTION, SOLUTION INTRAVENOUS at 05:29

## 2021-04-08 RX ADMIN — TAZOBACTAM SODIUM AND PIPERACILLIN SODIUM 4.5 G: 500; 4 INJECTION, SOLUTION INTRAVENOUS at 01:29

## 2021-04-08 RX ADMIN — SODIUM CHLORIDE 250 ML: 9 INJECTION, SOLUTION INTRAVENOUS at 15:50

## 2021-04-08 RX ADMIN — SODIUM CHLORIDE 250 ML: 9 INJECTION, SOLUTION INTRAVENOUS at 12:25

## 2021-04-08 RX ADMIN — SODIUM CHLORIDE, PRESERVATIVE FREE 10 ML: 5 INJECTION INTRAVENOUS at 08:11

## 2021-04-08 RX ADMIN — BUMETANIDE 2 MG: 0.25 INJECTION INTRAMUSCULAR; INTRAVENOUS at 08:10

## 2021-04-08 RX ADMIN — CETIRIZINE HYDROCHLORIDE 10 MG: 10 TABLET, FILM COATED ORAL at 08:10

## 2021-04-08 RX ADMIN — OXYCODONE 5 MG: 5 TABLET ORAL at 08:10

## 2021-04-08 RX ADMIN — SODIUM BICARBONATE 150 MEQ: 84 INJECTION INTRAVENOUS at 08:10

## 2021-04-08 RX ADMIN — TAZOBACTAM SODIUM AND PIPERACILLIN SODIUM 4.5 G: 500; 4 INJECTION, SOLUTION INTRAVENOUS at 22:23

## 2021-04-08 RX ADMIN — VANCOMYCIN HYDROCHLORIDE 2500 MG: 10 INJECTION, POWDER, LYOPHILIZED, FOR SOLUTION INTRAVENOUS at 01:30

## 2021-04-08 RX ADMIN — Medication 0.04 MCG/KG/MIN: at 05:34

## 2021-04-08 RX ADMIN — Medication 0.06 MCG/KG/MIN: at 06:41

## 2021-04-08 RX ADMIN — SODIUM CHLORIDE 250 ML: 9 INJECTION, SOLUTION INTRAVENOUS at 13:38

## 2021-04-08 RX ADMIN — HYDROCORTISONE SODIUM SUCCINATE 100 MG: 100 INJECTION, POWDER, FOR SOLUTION INTRAMUSCULAR; INTRAVENOUS at 10:43

## 2021-04-08 RX ADMIN — HEPARIN SODIUM 5000 UNITS: 5000 INJECTION INTRAVENOUS; SUBCUTANEOUS at 20:50

## 2021-04-08 RX ADMIN — ALBUMIN HUMAN 25 G: 0.25 SOLUTION INTRAVENOUS at 11:59

## 2021-04-08 RX ADMIN — INSULIN LISPRO 3 UNITS: 100 INJECTION, SOLUTION INTRAVENOUS; SUBCUTANEOUS at 22:22

## 2021-04-08 RX ADMIN — MAGNESIUM SULFATE IN DEXTROSE 1 G: 10 INJECTION, SOLUTION INTRAVENOUS at 18:13

## 2021-04-08 RX ADMIN — THIAMINE HYDROCHLORIDE 250 MG: 100 INJECTION, SOLUTION INTRAMUSCULAR; INTRAVENOUS at 08:10

## 2021-04-08 RX ADMIN — TAZOBACTAM SODIUM AND PIPERACILLIN SODIUM 4.5 G: 500; 4 INJECTION, SOLUTION INTRAVENOUS at 14:47

## 2021-04-08 NOTE — CONSULTS
Orthopedic Consult    Patient: Elvis Hanson                                           YOB: 1955     Date of Admission: 4/7/2021 10:40 AM            Medical Record Number: [id number]    Attending Physician: Rossy Quigley DO    Consulting Physician: Axel Avalos MD    Reason for Consult: Postoperative hearing loss and left leg abnormalities following right total knee replacement    History of Present Illness: 66 y.o. male admitted to [facility] with RINKU (acute kidney injury) (CMS/Formerly [facility]) [N17.9].     66-year-old male status post a right total knee replacement performed on Monday 4/5 he did very well at home with Ultram the following day pain is under control he is doing well physical therapy overnight on Tuesday and Wednesday no complaints the morning with hearing loss and inability urinate but emergency room found have a significant medical issues with very elevated LFTs and creatinine is admitted and pressures been low is been treated with a levo and Peck was placed as likely abnormalities have also improved his hearing came back this morning he denies any pain in the knee.      Patient denies any history of: DVT/PE, MRSA, COPD, CHF, CAD, , Dementia or A-Fib.   The patient has history of :Diabetes mellitus  The patient is on anticoagulants: Aspirin    Past medical history, past surgical history, social history, family history, ALLERGIES, current medications have been reviewed by me.    Past Medical History:   Diagnosis Date   • Diabetes mellitus (CMS/[facility])    • Hyperlipidemia    • Hypertension    • Sleep apnea      Past Surgical History:   Procedure Laterality Date   • CHOLECYSTECTOMY     • JOINT REPLACEMENT Right     KNEE     Social History     Occupational History   • Not on file   Tobacco Use   • Smoking status: Never Smoker   • Smokeless tobacco: Never Used   Vaping Use   • Vaping Use: Never used   Substance and Sexual Activity   • Alcohol use: Yes     Comment: OCASSIONALLY    •  Drug use: Never   • Sexual activity: Defer      Social History     Social History Narrative   • Not on file     History reviewed. No pertinent family history.     No Known Allergies    Home Medications:  Medications Prior to Admission   Medication Sig Dispense Refill Last Dose   • aspirin 81 MG EC tablet Take 81 mg by mouth Daily.      • carvedilol (COREG) 25 MG tablet Take 25 mg by mouth 2 (Two) Times a Day With Meals.      • cefadroxil (DURICEF) 500 MG capsule Take 500 mg by mouth 2 (Two) Times a Day.      • ezetimibe (ZETIA) 10 MG tablet Take 10 mg by mouth Daily.      • ferrous sulfate 325 (65 FE) MG tablet Take 325 mg by mouth 2 (two) times a day.      • furosemide (LASIX) 40 MG tablet Take 40 mg by mouth Daily.      • hydrALAZINE (APRESOLINE) 25 MG tablet Take 25 mg by mouth 3 (Three) Times a Day.      • LOSARTAN POTASSIUM PO Take 50 mg by mouth 2 (two) times a day.      • meloxicam (MOBIC) 15 MG tablet Take 15 mg by mouth Daily.      • metFORMIN (GLUCOPHAGE) 500 MG tablet Take 500 mg by mouth 2 (Two) Times a Day With Meals.      • ondansetron (ZOFRAN) 4 MG tablet Take 4 mg by mouth Every 6 (Six) Hours As Needed for Nausea or Vomiting.      • oxyCODONE (OXY-IR) 5 MG capsule Take 5 mg by mouth Every 4 (Four) Hours As Needed for Moderate Pain .      • spironolactone (ALDACTONE) 25 MG tablet Take 25 mg by mouth Daily.      • traMADol (ULTRAM) 50 MG tablet Take 100 mg by mouth Every 6 (Six) Hours As Needed for Moderate Pain .          Current Medications:  Scheduled Meds:aspirin, 81 mg, Oral, Daily  cetirizine, 10 mg, Oral, Daily  heparin (porcine), 5,000 Units, Subcutaneous, Q12H  hydrocortisone sodium succinate, 100 mg, Intravenous, Q8H  piperacillin-tazobactam, 4.5 g, Intravenous, Q8H  sodium chloride, 10 mL, Intravenous, Q12H  thiamine (VITAMIN B1) IVPB, 250 mg, Intravenous, BID  vancomycin (dosing per levels), , Does not apply, Daily      Continuous Infusions:norepinephrine, 0.02-0.1 mcg/kg/min, Last Rate:  0.04 mcg/kg/min (04/08/21 0909)  Pharmacy to dose vancomycin,   sodium bicarbonate drip (greater than 75 mEq/bag), 150 mEq, Last Rate: 150 mEq (04/08/21 0810)      PRN Meds:.oxyCODONE  •  Pharmacy to dose vancomycin  •  sodium chloride    Review of Systems:   A 12 point system review was reviewed with the patient and from the chart  and is negative except as in history of present illness.      Physical Exam: 66 y.o. male                    Vitals:    04/08/21 0845 04/08/21 0857 04/08/21 0900 04/08/21 0915   BP: 110/70  112/79 93/66   BP Location:       Patient Position:       Pulse: 71 70 71 70   Resp:       Temp:       TempSrc:       SpO2: 100% 99% 99% 99%   Weight:       Height:            Gait: Could not be tested , patient is nonambulatory.    Mental/HEENT/cardio/skin: The patient's general appearance was well-nourished, well-hydrated, no acute distress.  Orientation was alert and oriented ×3.  The patient's mood was normal.   Pulmonary exam shows normal late exchange, no labored breathing, or shortness of breath.    The skin exam showed normal temperature and color in the area of examination.    Extremities: Right   lower extremity incision appears intact no signs any surrounding erythema or induration mild bloody drainage of the distal end of the incision  pain-free range of motion of the knee neurovascular tact distally    Pulses: Pulses palpable and equal bilaterally    Diagnostic Tests:    Results from last 7 days   Lab Units 04/08/21  0517 04/07/21  1050   WBC 10*3/mm3 17.13* 13.63*   HEMOGLOBIN g/dL 10.1* 10.3*   HEMATOCRIT % 33.4* 34.5*   PLATELETS 10*3/mm3 230 274     Results from last 7 days   Lab Units 04/08/21  0517 04/07/21  1711 04/07/21  1050   SODIUM mmol/L 133* 140 140   POTASSIUM mmol/L 3.9 3.9 3.7   CHLORIDE mmol/L 94* 97* 94*   CO2 mmol/L 21.0* 22.0 26.0   BUN mg/dL 37* 28* 25*   CREATININE mg/dL 5.10* 4.20* 3.89*   GLUCOSE mg/dL 138* 89 116*   CALCIUM mg/dL 6.5* 6.7* 7.9*     Results from  last 7 days   Lab Units 04/08/21  0808   INR  1.87*   APTT seconds 29.4     No results found for: URICACID  No results found for: CRYSTAL  Microbiology Results (last 10 days)     Procedure Component Value - Date/Time    MRSA Screen, PCR (Inpatient) - Swab, Nares [[id number]]  (Normal) Collected: 04/08/21 0341    Lab Status: Final result Specimen: Swab from Nares Updated: 04/08/21 0937     MRSA PCR Negative    Narrative:      MRSA Negative    COVID-19 and FLU A/B PCR - Swab, Nasopharynx [[id number]]  (Normal) Collected: 04/07/21 1357    Lab Status: Final result Specimen: Swab from Nasopharynx Updated: 04/07/21 1509     COVID19 Not Detected     Influenza A PCR Not Detected     Influenza B PCR Not Detected    Narrative:      Fact sheet for providers: https://www.fda.gov/media/428356/download    Fact sheet for patients: https://www.fda.gov/media/940407/download    Test performed by PCR.        CT Head Without Contrast    Result Date: 4/7/2021  Mild atrophy and chronic changes seen within the brain with no acute intracranial abnormality. Mild chronic ethmoid sinusitis.  D:  04/07/2021 E:  04/07/2021   This report was finalized on 4/7/2021 4:54 PM by Dr. Doreen Mcgee MD.      XR Chest 1 View    Result Date: 4/8/2021  Line tip in the left brachiocephalic vein or SVC. No pneumothorax. Signer Name: Aniceto Clark MD  Signed: 4/8/2021 12:44 AM  Workstation Name: [facility]  [facility]    XR Chest 1 View    Result Date: 4/7/2021  No acute cardiopulmonary disease.  D:  04/07/2021 E:  04/07/2021  This report was finalized on 4/7/2021 4:54 PM by Dr. Doreen Mcgee MD.        Assessment: 66-year male status post right total knee replacement with postop day 2 hearing loss and elevated creatinine and low blood pressure    Patient Active Problem List   Diagnosis   • Hypertension   • T2DM   • Dyslipidemia   • Sleep apnea   • Elevated liver enzymes   • RINKU   • Recent Rt Total Knee Replacement 4/5/21    • Chronic pain w/pain pump   • Lactic acidosis   • Acute HFrEF (EF 30%)       Plan:      66-year-old male with postoperative hearing loss pressure was causing the electrode abnormalities are starting to improve.  He is hearing some back this morning still with low blood pressure  ICU medical management of the electrolyte liver function tests and hearing loss as well as blood pressure  Need for stable okay to weight-bear as tolerated recommended Optifoam AG dressing over the incision  PT OT when patient medically able  Recommend aspirin 81 mg for DVT prophylaxis if okay with ICU team    Date: 4/8/2021        CC: April Chen DO; MD Dann Mcclellan, Rossy MARTINEZ DO

## 2021-04-08 NOTE — NURSING NOTE
Patient remains afebrile. Able to wean off Levophed and SBP stayed   >100 and MAP >70.  UOP for shift 29ml.  Bicarb gtt remains at 150ml/hr  Albumin 25 given. 1L fluids over 4 hours.  Oxygen weaned to 3LNC to maintain sats> 90.  Patient starting become edematous

## 2021-04-08 NOTE — CONSULTS
Referring Provider: Dr. Soares Case  Reason for Consultation: Acute kidney injury    Subjective     Chief complaint: Sepsis shock RINKU, altered mental status, recent right total knee replacement 4/5/2021    History of present illness:    66-year-old -American male with no previous history of kidney disease, no epistaxis, hemoptysis hematemesis kidney stones.  Patient had a right TKR on 4/4/2021, postop patient has received nonsteroidal Mobic for a few days, after going home he had a problem with hearing and altered mental status patient was brought into the hospital initially admitted to the floor.  Patient found to have a creatinine of 3.89 BUN of 25 liver enzymes were elevated ALT 2631, AST 4533, high WBC count of 13.6, lactic acidosis was noted, hemoglobin 10.0, patient has decreased urine output, mild metabolic acidosis, due to hypotension patient was transferred from floor to ICU where patient was placed on IV Levophed.  Peck catheter has been in place urine output has been poor at this time.  Patient oral intake recently has been low.  Renal been consulted at this time.  Patient is being treated for sepsis IV vancomycin has been given accompanied with other antihypertensive medication.  Home medications include losartan furosemide other diuretics were stopped.  He was taken off the Mobic and Metformin due to acidosis and renal failure.  He denied any nausea or vomiting or diarrhea at this time no shortness of breath or chest pain.  He has a history of Bell palsy.    History  Past Medical History:   Diagnosis Date   • Diabetes mellitus (CMS/HCC)    • Hyperlipidemia    • Hypertension    • Sleep apnea    ,   Past Surgical History:   Procedure Laterality Date   • CHOLECYSTECTOMY     • JOINT REPLACEMENT Right     KNEE   , History reviewed. No pertinent family history.,   Social History     Socioeconomic History   • Marital status:      Spouse name: Not on file   • Number of children: Not on file   •  Years of education: Not on file   • Highest education level: Not on file   Tobacco Use   • Smoking status: Never Smoker   • Smokeless tobacco: Never Used   Vaping Use   • Vaping Use: Never used   Substance and Sexual Activity   • Alcohol use: Yes     Comment: OCASSIONALLY    • Drug use: Never   • Sexual activity: Defer     E-cigarette/Vaping   • E-cigarette/Vaping Use Never User      E-cigarette/Vaping Substances     E-cigarette/Vaping Devices       ,   Medications Prior to Admission   Medication Sig Dispense Refill Last Dose   • aspirin 81 MG EC tablet Take 81 mg by mouth Daily.      • carvedilol (COREG) 25 MG tablet Take 25 mg by mouth 2 (Two) Times a Day With Meals.      • cefadroxil (DURICEF) 500 MG capsule Take 500 mg by mouth 2 (Two) Times a Day.      • ezetimibe (ZETIA) 10 MG tablet Take 10 mg by mouth Daily.      • ferrous sulfate 325 (65 FE) MG tablet Take 325 mg by mouth 2 (two) times a day.      • furosemide (LASIX) 40 MG tablet Take 40 mg by mouth Daily.      • hydrALAZINE (APRESOLINE) 25 MG tablet Take 25 mg by mouth 3 (Three) Times a Day.      • LOSARTAN POTASSIUM PO Take 50 mg by mouth 2 (two) times a day.      • meloxicam (MOBIC) 15 MG tablet Take 15 mg by mouth Daily.      • metFORMIN (GLUCOPHAGE) 500 MG tablet Take 500 mg by mouth 2 (Two) Times a Day With Meals.      • ondansetron (ZOFRAN) 4 MG tablet Take 4 mg by mouth Every 6 (Six) Hours As Needed for Nausea or Vomiting.      • oxyCODONE (OXY-IR) 5 MG capsule Take 5 mg by mouth Every 4 (Four) Hours As Needed for Moderate Pain .      • spironolactone (ALDACTONE) 25 MG tablet Take 25 mg by mouth Daily.      • traMADol (ULTRAM) 50 MG tablet Take 100 mg by mouth Every 6 (Six) Hours As Needed for Moderate Pain .      , Scheduled Meds:  aspirin, 81 mg, Oral, Daily  cetirizine, 10 mg, Oral, Daily  heparin (porcine), 5,000 Units, Subcutaneous, Q12H  hydrocortisone sodium succinate, 100 mg, Intravenous, Q8H  piperacillin-tazobactam, 4.5 g, Intravenous,  Q8H  sodium chloride, 10 mL, Intravenous, Q12H  thiamine (VITAMIN B1) IVPB, 250 mg, Intravenous, BID  vancomycin (dosing per levels), , Does not apply, Daily    , Continuous Infusions:  norepinephrine, 0.02-0.1 mcg/kg/min, Last Rate: 0.06 mcg/kg/min (04/08/21 1016)  Pharmacy to dose vancomycin,   sodium bicarbonate drip (greater than 75 mEq/bag), 150 mEq, Last Rate: 150 mEq (04/08/21 0810)    , PRN Meds:  oxyCODONE  •  Pharmacy to dose vancomycin  •  sodium chloride and Allergies:  Patient has no known allergies.    Review of Systems  Pertinent items are noted in HPI, all other systems reviewed and negative    Objective     Vital Signs  Temp:  [97.5 °F (36.4 °C)-98.5 °F (36.9 °C)] 98 °F (36.7 °C)  Heart Rate:  [61-79] 69  Resp:  [16-21] 20  BP: ()/(50-99) 103/73    I/O this shift:  In: -   Out: 4 [Urine:4]  I/O last 3 completed shifts:  In: 1750 [P.O.:1050; IV Piggyback:700]  Out: 50 [Urine:50]    Physical Exam:  General Appearance: Alert, oriented, no obvious distress.  -American male.  HEENT: Left-sided Bell's palsy is noted, atraumatic normocephalic head, eyes pupils are reactive, EOMI, nose no bleed, oral mucosa dry.  Neck supple no JVD.  Lymph node not enlarged.  Trachea is midline.  Lungs: Clear auscultation, no rales rhonchi's, equal chest movement, nonlabored.  Heart: No gallop, murmur, rub, RRR.  Abdomen: Soft, nontender, positive bowel sounds, no organomegaly.  Extremities: No edema, no cyanosis.  Neuro: No focal deficit, moving all extremities, alert oriented X 3  Skin: Dry and warm.  : Peck catheter in place.  Psych: Appropriate.    Results Review:   I reviewed the patient's new clinical results.  I reviewed the patient's new imaging results and agree with the interpretation.    WBC WBC   Date Value Ref Range Status   04/08/2021 17.13 (H) 3.40 - 10.80 10*3/mm3 Final   04/07/2021 13.63 (H) 3.40 - 10.80 10*3/mm3 Final      HGB Hemoglobin   Date Value Ref Range Status   04/08/2021 10.1 (L)  13.0 - 17.7 g/dL Final   04/07/2021 10.3 (L) 13.0 - 17.7 g/dL Final      HCT Hematocrit   Date Value Ref Range Status   04/08/2021 33.4 (L) 37.5 - 51.0 % Final   04/07/2021 34.5 (L) 37.5 - 51.0 % Final      Platlets No results found for: LABPLAT   MCV MCV   Date Value Ref Range Status   04/08/2021 84.1 79.0 - 97.0 fL Final   04/07/2021 86.0 79.0 - 97.0 fL Final          Sodium Sodium   Date Value Ref Range Status   04/08/2021 133 (L) 136 - 145 mmol/L Final   04/07/2021 140 136 - 145 mmol/L Final   04/07/2021 140 136 - 145 mmol/L Final      Potassium Potassium   Date Value Ref Range Status   04/08/2021 3.9 3.5 - 5.2 mmol/L Final   04/07/2021 3.9 3.5 - 5.2 mmol/L Final   04/07/2021 3.7 3.5 - 5.2 mmol/L Final      Chloride Chloride   Date Value Ref Range Status   04/08/2021 94 (L) 98 - 107 mmol/L Final   04/07/2021 97 (L) 98 - 107 mmol/L Final   04/07/2021 94 (L) 98 - 107 mmol/L Final      CO2 CO2   Date Value Ref Range Status   04/08/2021 21.0 (L) 22.0 - 29.0 mmol/L Final   04/07/2021 22.0 22.0 - 29.0 mmol/L Final   04/07/2021 26.0 22.0 - 29.0 mmol/L Final      BUN BUN   Date Value Ref Range Status   04/08/2021 37 (H) 8 - 23 mg/dL Final   04/07/2021 28 (H) 8 - 23 mg/dL Final   04/07/2021 25 (H) 8 - 23 mg/dL Final      Creatinine Creatinine   Date Value Ref Range Status   04/08/2021 5.10 (H) 0.76 - 1.27 mg/dL Final   04/07/2021 4.20 (H) 0.76 - 1.27 mg/dL Final   04/07/2021 3.89 (H) 0.76 - 1.27 mg/dL Final      Calcium Calcium   Date Value Ref Range Status   04/08/2021 6.5 (L) 8.6 - 10.5 mg/dL Final   04/07/2021 6.7 (L) 8.6 - 10.5 mg/dL Final   04/07/2021 7.9 (L) 8.6 - 10.5 mg/dL Final      PO4 No results found for: CAPO4   Albumin Albumin   Date Value Ref Range Status   04/08/2021 3.40 (L) 3.50 - 5.20 g/dL Final   04/07/2021 3.50 3.50 - 5.20 g/dL Final   04/07/2021 4.10 3.50 - 5.20 g/dL Final      Magnesium Magnesium   Date Value Ref Range Status   04/08/2021 1.6 1.6 - 2.4 mg/dL Final   04/07/2021 1.6 1.6 - 2.4 mg/dL  Final      Uric Acid No results found for: URICACID         aspirin, 81 mg, Oral, Daily  cetirizine, 10 mg, Oral, Daily  heparin (porcine), 5,000 Units, Subcutaneous, Q12H  hydrocortisone sodium succinate, 100 mg, Intravenous, Q8H  piperacillin-tazobactam, 4.5 g, Intravenous, Q8H  sodium chloride, 10 mL, Intravenous, Q12H  thiamine (VITAMIN B1) IVPB, 250 mg, Intravenous, BID  vancomycin (dosing per levels), , Does not apply, Daily      norepinephrine, 0.02-0.1 mcg/kg/min, Last Rate: 0.06 mcg/kg/min (04/08/21 1016)  Pharmacy to dose vancomycin,   sodium bicarbonate drip (greater than 75 mEq/bag), 150 mEq, Last Rate: 150 mEq (04/08/21 0810)        Assessment/Plan       RINKU    Hypertension    T2DM    Dyslipidemia    Sleep apnea    Elevated liver enzymes    Recent Rt Total Knee Replacement 4/5/21    Chronic pain w/pain pump    Lactic acidosis    Acute HFrEF (EF 30%)  1.  ATN secondary to shock with deterioration renal function.  No previous history of kidney disease per patient no history of epistaxis hemoptysis gross hematuria or kidney stones no family history of kidney disease.  Patient has been taking nonsteroidal Mobic post surgery.  Oral intake has been low due to CNS conditions.  2.  Septic shock: On IV pressors.  3.  Past history of Bell's palsy.  4.  Altered mental status and hearing difficulty improved at this time.  5.  S/p recent right total knee replacement.  6.  Congestive heart failure: Ejection fraction 30%.    Plan:  Check ionized calcium replace as needed.  1 dose of IV albumin ordered  Give IV fluid bolus 250 mL/h x 4 doses.  Keep mean arterial pressure greater than 70.  Hold all antihypertensive medications, Glucophage, nonsteroidals at this time.  Avoid nephrotoxic medications.  Keep systolic blood pressure greater than 100.  Check volume status.  Check labs urine labs, ultrasound of the kidney.  Check labs in the morning.  Daily evaluation for renal replacement therapy will be done if blood  pressure remains low CRRT will be done.  Continue with the bicarb drip at this time.  Monitor bicarb level  Adjust medication for the new GFR.  Case discussed with the medical staff taking care of the patient.  High risk patient with multiple medical problems.  I discussed the patients findings and my recommendations with patient, family and nursing staff    Jimenez Daniels MD  04/08/21  @[facility]

## 2021-04-08 NOTE — PAYOR COMM NOTE
"Ref # [id number]  Ashley Hardy RN, BSN  Phone # [phone]  Fax # [phone]  Elvis Marks (66 y.o. Male)     Date of Birth Social Security Number Address Home Phone MRN    1955  [address]  [facility] [id number] [phone] [id number]    Mormonism Marital Status          None        Admission Date Admission Type Admitting Provider Attending Provider Department, Room/Bed    4/7/21 Emergency Case, Rossy V., DO Case, Rossy AGA., DO [facility] 2A ICU, N208/1    Discharge Date Discharge Disposition Discharge Destination                       Attending Provider: Rossy Quigley DO    Allergies: No Known Allergies    Isolation: None   Infection: None   Code Status: CPR    Ht: 177.8 cm (70\")   Wt: 130 kg (285 lb 14.4 oz)    Admission Cmt: None   Principal Problem: RINKU [N17.9]                 Active Insurance as of 4/7/2021     Primary Coverage     Payor Plan Insurance Group Employer/Plan Group    [employer] A[employer] [id number]     Payor Plan Address Payor Plan Phone Number Payor Plan Fax Number Effective Dates    [address] [phone]  1/1/2018 - None Entered    [facility] [address]       Subscriber Name Subscriber Birth Date Member ID       ELVIS MARKS 1955 [id number]                    History & Physical      Case, Rossy MARTINEZ DO at 04/07/21 1411          INTENSIVIST   INITIAL HOSPITAL VISIT NOTE     Hospital:  LOS: 0 days     Mr. Elvis Marks, 66 y.o. male is seen for a Chief Complaint of:  Chief Complaint   Patient presents with   • Altered Mental Status       Acute kidney injury (CMS/HCC)    Hypertension    Diabetes mellitus (CMS/HCC)    Hyperlipidemia    Sleep apnea    Elevated liver enzymes    Recent Rt Total Knee Replacement 4/5/21    Elevated troponin    Chronic pain w/pain pump    RINKU (acute kidney injury) (CMS/HCC)    Subjective   S     Elvis Marks is a 66 y.o. male with PMH Recent Rt TKR 4/5, HTN, HLP, T2DM, SHYLA, Chronic pain " w/pain pump who presented to the ED with c/o inability to hear upon waking this morning.  He could hear when he went to bed the night before.  He had a Rt TKR by Dr. Story 2 days ago and reportedly labs were normal at the time.  He did well and was discharged home.  He has had some blood oozing from his incision, but nothing purulent.  He denies fevers, but he has had periods of diaphoresis.  He took Oxycodone yesterday and Tramadol today.  Wife states that he stopped urinating, but has been eating and drinking enough at home.  He has been taking his antihypertensives.  In the ED, he had a temp 97.9, P 76, RR 14, 87/51 initially and room air sat 83%.  Significant labs:  Troponin 0.29, BUN 25, Cr 3.89, ALT 2631, AST 4533, WBC 13.6, Hgb 10, HCT 34.  ABG on 3L NC had pH 7.28, pCO2 52 and pO2 199.  CXR and CTH were unremarkable.   He was given 1L NS bolus.  He will be admitted to the ICU per the Intensivist service.  Prior to transfer to ICU, his BP improved with -150.        PMH: He  has a past medical history of Diabetes mellitus (CMS/[facility]), Hyperlipidemia, Hypertension, and Sleep apnea.   PSxH: He  has a past surgical history that includes Joint replacement (Right) and Cholecystectomy.      Medications:  No current facility-administered medications on file prior to encounter.     Current Outpatient Medications on File Prior to Encounter   Medication Sig   • aspirin 81 MG EC tablet Take 81 mg by mouth Daily.   • carvedilol (COREG) 25 MG tablet Take 25 mg by mouth 2 (Two) Times a Day With Meals.   • cefadroxil (DURICEF) 500 MG capsule Take 500 mg by mouth 2 (Two) Times a Day.   • ezetimibe (ZETIA) 10 MG tablet Take 10 mg by mouth Daily.   • ferrous sulfate 325 (65 FE) MG tablet Take 325 mg by mouth 2 (two) times a day.   • furosemide (LASIX) 40 MG tablet Take 40 mg by mouth Daily.   • hydrALAZINE (APRESOLINE) 25 MG tablet Take 25 mg by mouth 3 (Three) Times a Day.   • LOSARTAN POTASSIUM PO Take 50 mg by mouth 2  (two) times a day.   • meloxicam (MOBIC) 15 MG tablet Take 15 mg by mouth Daily.   • metFORMIN (GLUCOPHAGE) 500 MG tablet Take 500 mg by mouth 2 (Two) Times a Day With Meals.   • ondansetron (ZOFRAN) 4 MG tablet Take 4 mg by mouth Every 6 (Six) Hours As Needed for Nausea or Vomiting.   • oxyCODONE (OXY-IR) 5 MG capsule Take 5 mg by mouth Every 4 (Four) Hours As Needed for Moderate Pain .   • spironolactone (ALDACTONE) 25 MG tablet Take 25 mg by mouth Daily.   • traMADol (ULTRAM) 50 MG tablet Take 100 mg by mouth Every 6 (Six) Hours As Needed for Moderate Pain .       Allergies: He has No Known Allergies.   FH: His family history is not on file.   SH: He  reports that he has never smoked. He has never used smokeless tobacco. He reports current alcohol use. He reports that he does not use drugs.     The patient's relevant past medical, surgical and social history were reviewed and updated in Epic as appropriate.     ROS (14):   Review of Systems   Constitutional: Positive for activity change and diaphoresis.   HENT: Positive for congestion, hearing loss and rhinorrhea.    Eyes: Negative.    Respiratory: Negative.    Cardiovascular: Negative.    Gastrointestinal: Negative.    Endocrine: Negative.    Genitourinary: Positive for decreased urine volume and difficulty urinating.   Musculoskeletal: Positive for arthralgias and joint swelling.   Skin: Positive for wound.   Allergic/Immunologic: Negative.    Neurological: Negative.    Hematological: Negative.    Psychiatric/Behavioral: Negative.        Objective   O     Vitals    Temp  Min: 97.9 °F (36.6 °C)  Max: 97.9 °F (36.6 °C)  BP  Min: 87/51  Max: 154/94  Pulse  Min: 70  Max: 79  Resp  Min: 14  Max: 14  SpO2  Min: 83 %  Max: 100 % No data recorded     I/O 24 hrs (7:00AM - 6:59 AM)  Intake/Output     None        Medications (drips):  sodium chloride        Physical Examination    Telemetry: Normal sinus rhythm.    Constitutional:  No acute distress.  Conversant. Hard of  hearing. Diaphoretic.    HEENT: Normocephalic and atraumatic.   Neck:  Normal range of motion. Neck supple.   No JVD present.   No thyromegaly.    Cardiovascular: Regular rate and rhythm.  No murmurs, rub or gallop.  No peripheral edema.   Respiratory: Normal respiratory effort.  Clear to ascultation.   Abdominal:  Soft. No masses.   Non-tender. No distension.   No hepatosplenomegaly.   MSK: Normal muscle strength and tone.   Extremities: No digital cyanosis or clubbing.  Right knee is swollen but not erythematous or hot to the touch. Bloody drainage without purulence is noted.    Skin: Skin is warm and dry.  No rashes, lesions or ulcers noted.    Neurological:   Alert and Oriented to person, place, and time.   Moves all extremities.   Psychiatric:  Normal affect.   Normal judgment.          Results from last 7 days   Lab Units 04/07/21  1050   WBC 10*3/mm3 13.63*   HEMOGLOBIN g/dL 10.3*   MCV fL 86.0   PLATELETS 10*3/mm3 274     Results from last 7 days   Lab Units 04/07/21  1050   SODIUM mmol/L 140   POTASSIUM mmol/L 3.7   CO2 mmol/L 26.0   CREATININE mg/dL 3.89*   MAGNESIUM mg/dL 1.6     Estimated Creatinine Clearance: 25.7 mL/min (A) (by C-G formula based on SCr of 3.89 mg/dL (H)).  Results from last 7 days   Lab Units 04/07/21  1050   ALK PHOS U/L 101   BILIRUBIN mg/dL 0.6   ALT (SGPT) U/L 2,631*   AST (SGOT) U/L 4,533*     Results from last 7 days   Lab Units 04/07/21  1148   PH, ARTERIAL pH units 7.288*   PCO2, ARTERIAL mm Hg 52.4*   PO2 ART mm Hg 199.0*   FIO2 % 30       Images:    Imaging Results (Last 24 Hours)     Procedure Component Value Units Date/Time    CT Head Without Contrast [[id number]] Collected: 04/07/21 1308     Updated: 04/07/21 1311    Narrative:      EXAMINATION: CT HEAD WO CONTRAST- 04/07/2021     INDICATION: Altered mental status; R41.82-Altered mental status,  unspecified; I95.9-Hypotension, unspecified; N17.9-Acute kidney failure,  unspecified; H91.93-Unspecified hearing loss, bilateral;  R79.89-Other  specified abnormal findings of blood chemistry; R77.8-Other specified  abnormalities of plasma proteins; confusion     TECHNIQUE: Multiple axial CT imaging was obtained of the head from skull  base to skull vertex without the administration of intravenous contrast.     The radiation dose reduction device was turned on for each scan per the  ALARA (As Low as Reasonably Achievable) protocol.     COMPARISON: NONE     FINDINGS: Brain parenchyma is unremarkable in appearance. No hemorrhage  or hydrocephalus. No mass, mass effect, or midline shift. No abnormal  extra axial fluid collections identified. Bony structures reveal no  evidence of osseous abnormality with some minimal mucosal thickening of  the ethmoid air cells. The mastoid air cells are patent.       Impression:      Mild atrophy and chronic changes seen within the brain with  no acute intracranial abnormality. Mild chronic ethmoid sinusitis.     D:  04/07/2021  E:  04/07/2021          XR Chest 1 View [[id number]] Collected: 04/07/21 1107     Updated: 04/07/21 1109    Narrative:      EXAMINATION: XR CHEST 1 VW-      INDICATION: Weak, dizzy, AMS triage protocol.      COMPARISON: None.     FINDINGS: Portable chest reveals cardiac and mediastinal silhouettes  within normal limits. The lung fields are grossly clear. No focal  parenchymal opacification is present.  No pleural effusion or  pneumothorax. Bony structures are unremarkable. Pulmonary vascularity is  within normal limits.           Impression:      No acute cardiopulmonary disease.     D:  04/07/2021  E:  04/07/2021           ECG/EMG Results (last 24 hours)     Procedure Component Value Units Date/Time    ECG 12 Lead [[id number]] Collected: 04/07/21 1053     Updated: 04/07/21 1234     QT Interval 442 ms      QTC Interval 483 ms     Narrative:      Test Reason : Weak/Dizzy/AMS protocol  Blood Pressure : **/** mmHG  Vent. Rate : 072 BPM     Atrial Rate : 072 BPM     P-R Int : 196 ms           "QRS Dur : 090 ms      QT Int : 442 ms       P-R-T Axes : 021 043 008 degrees     QTc Int : 483 ms    Normal sinus rhythm  Nonspecific T wave abnormality  Prolonged QT  Abnormal ECG  No previous ECGs available  Confirmed by TENNILLE ONOFRE MD ([id number]) on 4/7/2021 12:34:45 PM    Referred By:  EDMD           Confirmed By:TENNILLE ONOFRE MD        I reviewed the patient's new laboratory and imaging results.  I independently reviewed the patient's new images.    Assessment/Plan   A / P     Mr. Hanson is a 67yo M with a history of recent right total knee replacement on 4/5, HTN, T2DM, SHYLA, and chronic pain with a pain pump who presented to the [facility] ED on 4/7 with complaints of hearing loss. He reports that he could hear normally when he went to bed last night but when he awoke, everything sounded \"muffled.\" In the ED, he was noted to have acute kidney injury as well as elevated LFTs. ABG was performed which showed a pH of 7.28, pCO2 of 52, and pO2 of 199. CXR and CT head were unremarkable. He was noted to be hypotensive and blood pressure improved with IV fluids.     He is on multiple antihypertensive agents and multiple diuretics at home. His wife reports that his PO intake was decreased for the past day or so and his urine output has tapered off. He has been taking Oxycodone and Tramadol as prescribed for knee pain. He is also on Mobic.       Nutrition:   Diet Regular; Consistent Carbohydrate  Advance Directives:   Code Status and Medical Interventions:   Ordered at: 04/07/21 1422     Code Status:    CPR     Medical Interventions (Level of Support Prior to Arrest):    Full       Active Hospital Problems    Diagnosis    • **Acute kidney injury (CMS/HCC)    • RINKU (acute kidney injury) (CMS/HCC)    • Hypertension    • Diabetes mellitus (CMS/HCC)    • Hyperlipidemia    • Sleep apnea    • Elevated liver enzymes    • Recent Rt Total Knee Replacement 4/5/21    • Elevated troponin    • Chronic pain w/pain pump      Assessment / " Plan:    1. Admit to PCU  2. Continue gentle IV fluids.   3. Hold blood pressure medications. Start back as tolerated.   4. Hold home diuretics.   5. Check an echocardiogram.   6. Trend troponin levels.   7. Follow up repeat creatinine and LFTs this evening.   8. Nightly Cpap. May need Bipap therapy. Will repeat an ABG.   9. Orthopedics to see.   10. Hold antibiotics unless Ortho thinks they are needed.   11. Check MRI brain as otoscopic exam was unremarkable.   12. Hold Tramadol as it can cause hypotension.   13. Consistent carbohydrate diet.   14. SQH with renal adjustment.   15. AM labs    ANGUS Hodge, AGACNP-BC, FNP-BC  Pulmonary and Critical Care Service    I have personally seen, interviewed and examined the patient and verified all the key components of the history, physical examination, assessment and plan with ANGUS Hodge, ISABELLCNP-BC, FNP-BC and reviewed the note, which reflects my changes and contributions.    Plan of care and goals reviewed during interdisciplinary rounds.  I discussed the patient's findings and my recommendations with patient and family      Rossy Quigley DO  Pulmonary and Critical Care Medicine    Electronically signed by Rossy Quigley DO at 21 1452          Emergency Department Notes      Tyler Red MD at 21 1148      Procedure Orders    1. Critical Care [[id number]] ordered by Tyler Red MD                EMERGENCY DEPARTMENT ENCOUNTER    Pt Name: Elvis Hanson  MRN: [id number]  Pt :   1955  Room Number:    Date of encounter:  2021  PCP: April Chen DO  ED Provider: Tyler Red MD    Historian: Patient, wife, orthopedic surgeon      HPI:  Chief Complaint: Inability to hear.        Context: Elvis Hanson is a 66 y.o. male who presents to the ED c/o inability to hear first noticed this morning.  Patient went to bed last night around 10 PM.  At that time he was able to hear.  This  "morning he feels as if he cannot hear out of either ear.  His wife has to yell at him and speak with her mask off as others do as well, in order for him to understand what is being said.  The patient's wife reports the patient is normally hypertensive.  She does not know what his blood pressure has been running recently other than preop which was high.  He underwent right knee surgery 2 days ago with Dr. Story.  All went well with the surgery.  He has had some mild bleeding from the wound but has noticed no foul odor or abnormal discharge.  He has had no fevers or chills.  He has been sweaty both yesterday/last evening as well as this morning.  His wife is placed a fan on him but this is not helped much.  The patient has been slower to answer questions but otherwise in no severe distress.  He has a \"pain pump\" in place and has had no problems with this.  He has had little to no pain in his right knee.  He took oxycodone yesterday but none today.  He did take tramadol today.      PAST MEDICAL HISTORY  Active Ambulatory Problems     Diagnosis Date Noted   • No Active Ambulatory Problems     Resolved Ambulatory Problems     Diagnosis Date Noted   • No Resolved Ambulatory Problems     Past Medical History:   Diagnosis Date   • Diabetes mellitus (CMS/[facility])    • Hyperlipidemia    • Hypertension    • Sleep apnea          PAST SURGICAL HISTORY  Past Surgical History:   Procedure Laterality Date   • CHOLECYSTECTOMY     • JOINT REPLACEMENT Right     KNEE         FAMILY HISTORY  History reviewed. No pertinent family history.      SOCIAL HISTORY  Social History     Socioeconomic History   • Marital status:      Spouse name: Not on file   • Number of children: Not on file   • Years of education: Not on file   • Highest education level: Not on file   Tobacco Use   • Smoking status: Never Smoker   • Smokeless tobacco: Never Used   Vaping Use   • Vaping Use: Never used   Substance and Sexual Activity   • Alcohol use: Yes    "  Comment: OCASSIONALLY    • Drug use: Never   • Sexual activity: Defer         ALLERGIES  Patient has no known allergies.        REVIEW OF SYSTEMS  Review of Systems     All systems reviewed and negative except for those discussed in HPI.       PHYSICAL EXAM    I have reviewed the triage vital signs and nursing notes.    ED Triage Vitals   Temp Heart Rate Resp BP SpO2   04/07/21 1041 04/07/21 1041 04/07/21 1053 04/07/21 1047 04/07/21 1041   97.9 °F (36.6 °C) 76 14 (!) 87/51 (!) 83 %      Temp src Heart Rate Source Patient Position BP Location FiO2 (%)   04/07/21 1041 04/07/21 1041 04/07/21 1053 04/07/21 1053 --   Oral Monitor Lying Right arm        Physical Exam  GENERAL:   Appears ill.  HENT: Nares patent.  Dry mucous membranes  EYES: No scleral icterus.  CV: Regular rhythm, regular rate.  Distant heart sounds but no murmurs gallops rubs.  RESPIRATORY: Normal effort.  No audible wheezes, rales or rhonchi.  Clear to auscultation but diminished in lower lung fields.  ABDOMEN: Soft, nontender.  Protuberant with adipose.  Bowel sounds within normal limits.  MUSCULOSKELETAL: No deformities.   NEURO: Alert, moves all extremities, follows commands.  SKIN: Warm, dry, no rash visualized.  Right knee surgical wound is slightly bloody.  The dressing has been pulled partially off, inferiorly.  A sterile dressing was still in place over the top of the incision site which is bloody but not dehisced.  Lymphatics: No lymphangitic streaking in the right lower extremity or elsewhere.  No inguinal lymphadenopathy.      LAB RESULTS  Recent Results (from the past 24 hour(s))   POC Glucose Once    Collection Time: 04/07/21 10:47 AM    Specimen: Blood   Result Value Ref Range    Glucose 114 70 - 130 mg/dL   Comprehensive Metabolic Panel    Collection Time: 04/07/21 10:50 AM    Specimen: Blood   Result Value Ref Range    Glucose 116 (H) 65 - 99 mg/dL    BUN 25 (H) 8 - 23 mg/dL    Creatinine 3.89 (H) 0.76 - 1.27 mg/dL    Sodium 140 136 -  145 mmol/L    Potassium 3.7 3.5 - 5.2 mmol/L    Chloride 94 (L) 98 - 107 mmol/L    CO2 26.0 22.0 - 29.0 mmol/L    Calcium 7.9 (L) 8.6 - 10.5 mg/dL    Total Protein 7.0 6.0 - 8.5 g/dL    Albumin 4.10 3.50 - 5.20 g/dL    ALT (SGPT) 2,631 (H) 1 - 41 U/L    AST (SGOT) 4,533 (H) 1 - 40 U/L    Alkaline Phosphatase 101 39 - 117 U/L    Total Bilirubin 0.6 0.0 - 1.2 mg/dL    eGFR  African Amer 19 (L) >60 mL/min/1.73    Globulin 2.9 gm/dL    A/G Ratio 1.4 g/dL    BUN/Creatinine Ratio 6.4 (L) 7.0 - 25.0    Anion Gap 20.0 (H) 5.0 - 15.0 mmol/L   Troponin    Collection Time: 04/07/21 10:50 AM    Specimen: Blood   Result Value Ref Range    Troponin T 0.290 (C) 0.000 - 0.030 ng/mL   Magnesium    Collection Time: 04/07/21 10:50 AM    Specimen: Blood   Result Value Ref Range    Magnesium 1.6 1.6 - 2.4 mg/dL   Light Blue Top    Collection Time: 04/07/21 10:50 AM   Result Value Ref Range    Extra Tube hold for add-on    Green Top (Gel)    Collection Time: 04/07/21 10:50 AM   Result Value Ref Range    Extra Tube Hold for add-ons.    Lavender Top    Collection Time: 04/07/21 10:50 AM   Result Value Ref Range    Extra Tube hold for add-on    Gold Top - SST    Collection Time: 04/07/21 10:50 AM   Result Value Ref Range    Extra Tube Hold for add-ons.    Gray Top - Ice    Collection Time: 04/07/21 10:50 AM   Result Value Ref Range    Extra Tube Hold for add-ons.    CBC Auto Differential    Collection Time: 04/07/21 10:50 AM    Specimen: Blood   Result Value Ref Range    WBC 13.63 (H) 3.40 - 10.80 10*3/mm3    RBC 4.01 (L) 4.14 - 5.80 10*6/mm3    Hemoglobin 10.3 (L) 13.0 - 17.7 g/dL    Hematocrit 34.5 (L) 37.5 - 51.0 %    MCV 86.0 79.0 - 97.0 fL    MCH 25.7 (L) 26.6 - 33.0 pg    MCHC 29.9 (L) 31.5 - 35.7 g/dL    RDW 14.8 12.3 - 15.4 %    RDW-SD 47.1 37.0 - 54.0 fl    MPV 10.6 6.0 - 12.0 fL    Platelets 274 140 - 450 10*3/mm3    Neutrophil % 75.8 42.7 - 76.0 %    Lymphocyte % 14.7 (L) 19.6 - 45.3 %    Monocyte % 8.3 5.0 - 12.0 %    Eosinophil  % 0.2 (L) 0.3 - 6.2 %    Basophil % 0.1 0.0 - 1.5 %    Immature Grans % 0.9 (H) 0.0 - 0.5 %    Neutrophils, Absolute 10.33 (H) 1.70 - 7.00 10*3/mm3    Lymphocytes, Absolute 2.00 0.70 - 3.10 10*3/mm3    Monocytes, Absolute 1.13 (H) 0.10 - 0.90 10*3/mm3    Eosinophils, Absolute 0.03 0.00 - 0.40 10*3/mm3    Basophils, Absolute 0.02 0.00 - 0.20 10*3/mm3    Immature Grans, Absolute 0.12 (H) 0.00 - 0.05 10*3/mm3    nRBC 0.0 0.0 - 0.2 /100 WBC   ECG 12 Lead    Collection Time: 04/07/21 10:53 AM   Result Value Ref Range    QT Interval 442 ms    QTC Interval 483 ms   Blood Gas, Arterial With Co-Ox    Collection Time: 04/07/21 11:48 AM    Specimen: Arterial Blood   Result Value Ref Range    Site Left Radial     Jake's Test N/A     pH, Arterial 7.288 (L) 7.350 - 7.450 pH units    pCO2, Arterial 52.4 (H) 35.0 - 45.0 mm Hg    pO2, Arterial 199.0 (H) 83.0 - 108.0 mm Hg    HCO3, Arterial 25.1 20.0 - 26.0 mmol/L    Base Excess, Arterial -1.9 (L) 0.0 - 2.0 mmol/L    Hemoglobin, Blood Gas 10.2 (L) 13.5 - 17.5 g/dL    Hematocrit, Blood Gas 31.3 %    Oxyhemoglobin 98.0 94 - 99 %    Methemoglobin 0.20 0.00 - 1.50 %    Carboxyhemoglobin 1.8 0 - 2 %    CO2 Content 26.7 22 - 33 mmol/L    Temperature 37.0 C    Barometric Pressure for Blood Gas      Modality Nasal Cannula     FIO2 30 %    Ventilator Mode       Note      pH, Temp Corrected 7.288 pH Units    pCO2, Temperature Corrected 52.4 (H) 35 - 48 mm Hg    pO2, Temperature Corrected 199 (H) 83 - 108 mm Hg       If labs were ordered, I independently reviewed the results.        RADIOLOGY  XR Chest 1 View    Result Date: 4/7/2021  EXAMINATION: XR CHEST 1 VW-  INDICATION: Weak, dizzy, AMS triage protocol.  COMPARISON: None.  FINDINGS: Portable chest reveals cardiac and mediastinal silhouettes within normal limits. The lung fields are grossly clear. No focal parenchymal opacification is present.  No pleural effusion or pneumothorax. Bony structures are unremarkable. Pulmonary vascularity is  within normal limits.         No acute cardiopulmonary disease.  D:  04/07/2021 E:  04/07/2021        I ordered and reviewed the above noted radiographic studies.    I viewed images of chest x-ray which showed no infiltrates per my independent interpretation.  See radiologist's dictation for official interpretation.        PROCEDURES    Critical Care  Performed by: Tennille Onofre MD  Authorized by: Tennille Onofre MD     Critical care provider statement:     Critical care time (minutes):  35    Critical care time was exclusive of:  Separately billable procedures and treating other patients    Critical care was necessary to treat or prevent imminent or life-threatening deterioration of the following conditions:  Circulatory failure, CNS failure or compromise, dehydration, hepatic failure and renal failure    Critical care was time spent personally by me on the following activities:  Ordering and performing treatments and interventions, ordering and review of laboratory studies, ordering and review of radiographic studies, pulse oximetry, re-evaluation of patient's condition, review of old charts, obtaining history from patient or surrogate, examination of patient, evaluation of patient's response to treatment, discussions with consultants and development of treatment plan with patient or surrogate        ECG 12 Lead   Final Result   Test Reason : Weak/Dizzy/AMS protocol   Blood Pressure : **/** mmHG   Vent. Rate : 072 BPM     Atrial Rate : 072 BPM      P-R Int : 196 ms          QRS Dur : 090 ms       QT Int : 442 ms       P-R-T Axes : 021 043 008 degrees      QTc Int : 483 ms      Normal sinus rhythm   Nonspecific T wave abnormality   Prolonged QT   Abnormal ECG   No previous ECGs available   Confirmed by TENNILLE ONOFRE MD ([id number]) on 4/7/2021 12:34:45 PM      Referred By:  EDMD           Confirmed By:TENNILLE ONOFRE MD          MEDICATIONS GIVEN IN ER    Medications   sodium chloride 0.9 % flush 10 mL (has no  administration in time range)   sodium chloride 0.9 % bolus 1,000 mL (has no administration in time range)   sodium chloride 0.9 % bolus 1,000 mL (1,000 mL Intravenous New Bag 4/7/21 1108)         PROGRESS, DATA ANALYSIS, CONSULTS, AND MEDICAL DECISION MAKING    All labs have been independently reviewed by me.  All radiology studies have been reviewed by me and the radiologist dictating the report.   EKG's have been independently viewed and interpreted by me.      Differential diagnoses: Concern for prolonged hypotension secondary to inadequate fluid intake versus infection versus cerebral ischemia, etc.      ED Course as of Apr 07 1236   Wed Apr 07, 2021   1213 We are bolusing the patient with normal saline. I have ordered a Peck catheter both to check for urinary outlet obstruction as well as to record hourly urine flow.  I spoke with Dr. Story, the patient's orthopedist. He will see the patient in the emergency department.  I have paged Dr. Evans, intensivist on call.    [MS]   1213 Preop creatinine was 0.8. Now it is 3.9.    [MS]      ED Course User Index  [MS] Tyler Red MD             AS OF 12:36 EDT VITALS:    BP - 102/70  HR - 76  TEMP - 97.9 °F (36.6 °C) (Oral)  O2 SATS - 100%        DIAGNOSIS  Final diagnoses:   Altered mental status, unspecified altered mental status type   Hypotension, unspecified hypotension type   Acute renal failure, unspecified acute renal failure type (CMS/[facility])   Bilateral hearing loss, unspecified hearing loss type   Elevated LFTs   Elevated troponin         DISPOSITION  Admission to ICU           Tyler Red MD  04/07/21 1236      Electronically signed by Tyler Red MD at 04/07/21 1236         Current Facility-Administered Medications   Medication Dose Route Frequency Provider Last Rate Last Admin   • aspirin EC tablet 81 mg  81 mg Oral Daily Case, Rossy V., DO   81 mg at 04/08/21 0810   • cetirizine (zyrTEC) tablet 10 mg  10 mg Oral Daily Case, Rossy V.,  DO   10 mg at 04/08/21 0810   • heparin (porcine) 5000 UNIT/ML injection 5,000 Units  5,000 Units Subcutaneous Q12H Case, Rossy V., DO   5,000 Units at 04/08/21 0810   • hydrocortisone sodium succinate (Solu-CORTEF) injection 100 mg  100 mg Intravenous Q8H Julio Montaño MD   100 mg at 04/08/21 1043   • norepinephrine (LEVOPHED) 8 mg in 250 mL NS infusion (premix)  0.02-0.1 mcg/kg/min Intravenous Titrated Prashant Aiken APRN 14.29 mL/hr at 04/08/21 1016 0.06 mcg/kg/min at 04/08/21 1016   • oxyCODONE (ROXICODONE) immediate release tablet 5 mg  5 mg Oral Q6H PRN Case, Rossy V., DO   5 mg at 04/08/21 0810   • Pharmacy to dose vancomycin   Does not apply Continuous PRN Prashant Aiken APRN       • piperacillin-tazobactam (ZOSYN) 4.5 g in iso-osmotic dextrose 100 mL IVPB (premix)  4.5 g Intravenous Q8H Prashant Aiken APRN   4.5 g at 04/08/21 0529   • sodium bicarbonate 8.4 % 150 mEq in dextrose (D5W) 5 % 1,000 mL infusion (greater than 75 mEq)  150 mEq Intravenous Continuous Case, Rossy V.,  mL/hr at 04/08/21 0810 150 mEq at 04/08/21 0810   • sodium chloride 0.9 % flush 10 mL  10 mL Intravenous Q12H Case, Rossy V., DO   10 mL at 04/08/21 0811   • sodium chloride 0.9 % flush 10 mL  10 mL Intravenous PRN Case, Rossy V., DO       • thiamine (B-1) 250 mg in sodium chloride 0.9 % 100 mL IVPB  250 mg Intravenous BID Prashant Aiken APRN 200 mL/hr at 04/08/21 0810 250 mg at 04/08/21 0810   • vancomycin (dosing per levels)   Does not apply Daily Sherry Cooper, PharmD   Stopped at 04/08/21 1006     Lab Results (last 48 hours)     Procedure Component Value Units Date/Time    Sodium, Urine, Random - Urine, Clean Catch [[id number]] Collected: 04/08/21 1030    Specimen: Urine, Clean Catch Updated: 04/08/21 1044    Creatinine, Urine, Random - Urine, Clean Catch [[id number]] Collected: 04/08/21 1030    Specimen: Urine, Clean Catch Updated: 04/08/21 1044    MRSA Screen, PCR (Inpatient) - Swab, Nares  [[id number]]  (Normal) Collected: 04/08/21 0341    Specimen: Swab from Nares Updated: 04/08/21 0937     MRSA PCR Negative    Narrative:      MRSA Negative    Blood Gas, Arterial With Co-Ox [[id number]]  (Abnormal) Collected: 04/08/21 0933    Specimen: Arterial Blood Updated: 04/08/21 0933     Site Left Radial     Jake's Test N/A     pH, Arterial 7.271 pH units      Comment: 84 Value below reference range        pCO2, Arterial 52.1 mm Hg      Comment: 83 Value above reference range        pO2, Arterial 96.7 mm Hg      HCO3, Arterial 23.9 mmol/L      Base Excess, Arterial -3.2 mmol/L      Hemoglobin, Blood Gas 10.3 g/dL      Comment: 84 Value below reference range        Hematocrit, Blood Gas 31.4 %      Oxyhemoglobin 94.6 %      Methemoglobin 0.60 %      Carboxyhemoglobin 0.9 %      CO2 Content 25.5 mmol/L      Temperature 37.0 C      Barometric Pressure for Blood Gas --     Comment: N/A        Modality BiPap     FIO2 35 %      Rate 0 Breaths/minute      PIP 0 cmH2O      Comment: Meter: [id number]     :  [id number]        IPAP 0     EPAP 0     Note --     pH, Temp Corrected 7.271 pH Units      pCO2, Temperature Corrected 52.1 mm Hg      pO2, Temperature Corrected 96.7 mm Hg     Lactic Acid, Plasma [[id number]]  (Abnormal) Collected: 04/08/21 0808    Specimen: Blood Updated: 04/08/21 0903     Lactate 3.2 mmol/L      Comment: Falsely depressed results may occur on samples drawn from patients receiving N-Acetylcysteine (NAC) or Metamizole.       aPTT [[id number]]  (Normal) Collected: 04/08/21 0808    Specimen: Blood Updated: 04/08/21 0855     PTT 29.4 seconds     Narrative:      PTT = The equivalent PTT values for the therapeutic range of heparin levels at 0.3 to 0.5 U/ml are 55 to 70 seconds.    Protime-INR [[id number]]  (Abnormal) Collected: 04/08/21 0808    Specimen: Blood Updated: 04/08/21 0855     Protime 20.8 Seconds      INR 1.87    Procalcitonin [[id number]]  (Abnormal) Collected: 04/08/21 0517     "Specimen: Blood Updated: 04/08/21 0853     Procalcitonin 4.86 ng/mL     Narrative:      As a Marker for Sepsis (Non-Neonates):     1. <0.5 ng/mL represents a low risk of severe sepsis and/or septic shock.  2. >2 ng/mL represents a high risk of severe sepsis and/or septic shock.    As a Marker for Lower Respiratory Tract Infections that require antibiotic therapy:  PCT on Admission     Antibiotic Therapy             6-12 Hrs later  >0.5                          Strongly Recommended            >0.25 - <0.5             Recommended  0.1 - 0.25                  Discouraged                       Remeasure/reassess PCT  <0.1                         Strongly Discouraged         Remeasure/reassess PCT      As 28 day mortality risk marker: \"Change in Procalcitonin Result\" (>80% or <=80%) if Day 0 (or Day 1) and Day 4 values are available. Refer to http://[email or web address]-[email or web address]/    Change in PCT <=80 %   A decrease of PCT levels below or equal to 80% defines a positive change in PCT test result representing a higher risk for 28-day all-cause mortality of patients diagnosed with severe sepsis or septic shock.    Change in PCT >80 %   A decrease of PCT levels of more than 80% defines a negative change in PCT result representing a lower risk for 28-day all-cause mortality of patients diagnosed with severe sepsis or septic shock.              Results may be falsely decreased if patient taking Biotin.     Troponin [[id number]]  (Abnormal) Collected: 04/08/21 0517    Specimen: Blood Updated: 04/08/21 0849     Troponin T 0.760 ng/mL     Narrative:      Troponin T Reference Range:  <= 0.03 ng/mL-   Negative for AMI  >0.03 ng/mL-     Abnormal for myocardial necrosis.  Clinicians would have to utilize clinical acumen, EKG, Troponin and serial changes to determine if it is an Acute Myocardial Infarction or myocardial injury due to an underlying chronic condition.       Results may be falsely decreased if patient taking Biotin.      " Cortisol [[id number]] Collected: 04/08/21 0517    Specimen: Blood Updated: 04/08/21 0715     Cortisol 8.32 mcg/dL     Narrative:      Cortisol Reference Ranges:    Cortisol 6AM - 10AM Range: 6.02-18.40 mcg/dl  Cortisol 4PM - 8PM Range: 2.68-10.50 mcg/dl      Results may be falsely increased if patient taking Biotin.      Comprehensive Metabolic Panel [[id number]]  (Abnormal) Collected: 04/08/21 0517    Specimen: Blood Updated: 04/08/21 0635     Glucose 138 mg/dL      BUN 37 mg/dL      Creatinine 5.10 mg/dL      Sodium 133 mmol/L      Potassium 3.9 mmol/L      Chloride 94 mmol/L      CO2 21.0 mmol/L      Calcium 6.5 mg/dL      Total Protein 6.0 g/dL      Albumin 3.40 g/dL      ALT (SGPT) 5,392 U/L      AST (SGOT) >7,000 U/L      Alkaline Phosphatase 154 U/L      Total Bilirubin 0.6 mg/dL      eGFR Non  Amer --     Comment: <15 Indicative of kidney failure.        eGFR   Amer 14 mL/min/1.73      Comment: <15 Indicative of kidney failure.        Globulin 2.6 gm/dL      A/G Ratio 1.3 g/dL      BUN/Creatinine Ratio 7.3     Anion Gap 18.0 mmol/L     Narrative:      GFR Normal >60  Chronic Kidney Disease <60  Kidney Failure <15      Phosphorus [[id number]]  (Abnormal) Collected: 04/08/21 0517    Specimen: Blood Updated: 04/08/21 0613     Phosphorus 11.4 mg/dL     Magnesium [[id number]]  (Normal) Collected: 04/08/21 0517    Specimen: Blood Updated: 04/08/21 0613     Magnesium 1.6 mg/dL     CBC (No Diff) [[id number]]  (Abnormal) Collected: 04/08/21 0517    Specimen: Blood Updated: 04/08/21 0545     WBC 17.13 10*3/mm3      RBC 3.97 10*6/mm3      Hemoglobin 10.1 g/dL      Hematocrit 33.4 %      MCV 84.1 fL      MCH 25.4 pg      MCHC 30.2 g/dL      RDW 14.9 %      RDW-SD 45.9 fl      MPV 11.0 fL      Platelets 230 10*3/mm3     Blood Gas, Arterial With Co-Ox [[id number]]  (Abnormal) Collected: 04/08/21 0211    Specimen: Arterial Blood Updated: 04/08/21 0211     Site Left Radial     Jake's Test N/A     pH, Arterial 7.203  pH units      Comment: 85 Value below critical limit        pCO2, Arterial 54.2 mm Hg      Comment: 83 Value above reference range        pO2, Arterial 107.0 mm Hg      HCO3, Arterial 21.3 mmol/L      Base Excess, Arterial -6.8 mmol/L      Hemoglobin, Blood Gas 10.6 g/dL      Comment: 84 Value below reference range        Hematocrit, Blood Gas 32.6 %      Oxyhemoglobin 95.0 %      Methemoglobin 0.50 %      Carboxyhemoglobin 1.2 %      CO2 Content 23.0 mmol/L      Temperature 37.0 C      Barometric Pressure for Blood Gas --     Comment: N/A        Modality BiPap     FIO2 35 %      Ventilator Mode BiPAP     Rate 0 Breaths/minute      PIP 0 cmH2O      Comment: Meter: [id number]     :  [id number]        IPAP 16     EPAP 6     Note --     Notified Who ANGUS Slade     Notified By [id number]     Notified Time 04/08/2021 02:11     pH, Temp Corrected 7.203 pH Units      pCO2, Temperature Corrected 54.2 mm Hg      pO2, Temperature Corrected 107 mm Hg     Timed Lactic Acid, Reflex [[id number]]  (Abnormal) Collected: 04/08/21 0051    Specimen: Blood Updated: 04/08/21 0153     Lactate 3.6 mmol/L      Comment: Falsely depressed results may occur on samples drawn from patients receiving N-Acetylcysteine (NAC) or Metamizole.       Lactic Acid, Plasma [[id number]]  (Abnormal) Collected: 04/07/21 2056    Specimen: Blood Updated: 04/07/21 2147     Lactate 4.8 mmol/L      Comment: Falsely depressed results may occur on samples drawn from patients receiving N-Acetylcysteine (NAC) or Metamizole.       Blood Culture - Blood, Hand, Left [[id number]] Collected: 04/07/21 2057    Specimen: Blood from Hand, Left Updated: 04/07/21 2140    Blood Culture - Blood, Hand, Right [[id number]] Collected: 04/07/21 2057    Specimen: Blood from Hand, Right Updated: 04/07/21 2140    Procalcitonin [[id number]]  (Abnormal) Collected: 04/07/21 1711    Specimen: Blood Updated: 04/07/21 2058     Procalcitonin 2.15 ng/mL     Narrative:      As a Marker for  "Sepsis (Non-Neonates):     1. <0.5 ng/mL represents a low risk of severe sepsis and/or septic shock.  2. >2 ng/mL represents a high risk of severe sepsis and/or septic shock.    As a Marker for Lower Respiratory Tract Infections that require antibiotic therapy:  PCT on Admission     Antibiotic Therapy             6-12 Hrs later  >0.5                          Strongly Recommended            >0.25 - <0.5             Recommended  0.1 - 0.25                  Discouraged                       Remeasure/reassess PCT  <0.1                         Strongly Discouraged         Remeasure/reassess PCT      As 28 day mortality risk marker: \"Change in Procalcitonin Result\" (>80% or <=80%) if Day 0 (or Day 1) and Day 4 values are available. Refer to http://[email or web address]-calculator.[email or web address]/    Change in PCT <=80 %   A decrease of PCT levels below or equal to 80% defines a positive change in PCT test result representing a higher risk for 28-day all-cause mortality of patients diagnosed with severe sepsis or septic shock.    Change in PCT >80 %   A decrease of PCT levels of more than 80% defines a negative change in PCT result representing a lower risk for 28-day all-cause mortality of patients diagnosed with severe sepsis or septic shock.              Results may be falsely decreased if patient taking Biotin.     Blood Gas, Arterial With Co-Ox [[id number]]  (Abnormal) Collected: 04/07/21 2003    Specimen: Arterial Blood Updated: 04/07/21 2004     Site Left Radial     Jake's Test N/A     pH, Arterial 7.229 pH units      Comment: 84 Value below reference range        pCO2, Arterial 51.0 mm Hg      Comment: 83 Value above reference range        pO2, Arterial 122.0 mm Hg      Comment: 83 Value above reference range        HCO3, Arterial 21.3 mmol/L      Base Excess, Arterial -6.3 mmol/L      Hemoglobin, Blood Gas 10.4 g/dL      Comment: 84 Value below reference range        Hematocrit, Blood Gas 31.8 %      Oxyhemoglobin 96.7 %      " Methemoglobin 0.10 %      Carboxyhemoglobin 1.2 %      CO2 Content 22.8 mmol/L      Temperature 37.0 C      Barometric Pressure for Blood Gas --     Comment: N/A        Modality BiPap     FIO2 35 %      Ventilator Mode BiPAP     Rate 0 Breaths/minute      PIP 0 cmH2O      Comment: Meter: [id number]     :  [id number]        IPAP 14     EPAP 6     Note --     pH, Temp Corrected 7.229 pH Units      pCO2, Temperature Corrected 51.0 mm Hg      pO2, Temperature Corrected 122 mm Hg     Comprehensive Metabolic Panel [[id number]]  (Abnormal) Collected: 04/07/21 1711    Specimen: Blood Updated: 04/07/21 1801     Glucose 89 mg/dL      BUN 28 mg/dL      Creatinine 4.20 mg/dL      Sodium 140 mmol/L      Potassium 3.9 mmol/L      Chloride 97 mmol/L      CO2 22.0 mmol/L      Calcium 6.7 mg/dL      Total Protein 6.2 g/dL      Albumin 3.50 g/dL      ALT (SGPT) 2,553 U/L      AST (SGOT) 6,207 U/L      Alkaline Phosphatase 100 U/L      Total Bilirubin 0.5 mg/dL      eGFR  African Amer 17 mL/min/1.73      Globulin 2.7 gm/dL      A/G Ratio 1.3 g/dL      BUN/Creatinine Ratio 6.7     Anion Gap 21.0 mmol/L     Narrative:      GFR Normal >60  Chronic Kidney Disease <60  Kidney Failure <15      Blood Gas, Arterial With Co-Ox [[id number]]  (Abnormal) Collected: 04/07/21 1757    Specimen: Arterial Blood Updated: 04/07/21 1758     Site Left Brachial     Jake's Test N/A     pH, Arterial 7.272 pH units      Comment: 84 Value below reference range        pCO2, Arterial 53.9 mm Hg      Comment: 83 Value above reference range        pO2, Arterial 43.1 mm Hg      Comment: 85 Value below critical limit        HCO3, Arterial 24.9 mmol/L      Base Excess, Arterial -2.4 mmol/L      Hemoglobin, Blood Gas 10.0 g/dL      Comment: 84 Value below reference range        Hematocrit, Blood Gas 30.7 %      Oxyhemoglobin 65.2 %      Comment: 84 Value below reference range        Methemoglobin 0.50 %      Carboxyhemoglobin 1.4 %      CO2 Content 26.5  mmol/L      Temperature 37.0 C      Barometric Pressure for Blood Gas --     Comment: N/A        Modality BiPap     FIO2 35 %      Rate 0 Breaths/minute      PIP 0 cmH2O      Comment: Meter: [id number]     :  [id number]        IPAP 0     EPAP 0     Note --     Notified Who MICHELLE PALMER RN     Notified By [id number]     Notified Time 04/07/2021 17:54     pH, Temp Corrected 7.272 pH Units      pCO2, Temperature Corrected 53.9 mm Hg      pO2, Temperature Corrected 43.1 mm Hg     COVID-19 and FLU A/B PCR - Swab, Nasopharynx [[id number]]  (Normal) Collected: 04/07/21 1357    Specimen: Swab from Nasopharynx Updated: 04/07/21 1509     COVID19 Not Detected     Influenza A PCR Not Detected     Influenza B PCR Not Detected    Narrative:      Fact sheet for providers: https://www.fda.gov/media/983283/download    Fact sheet for patients: https://www.fda.gov/media/600975/download    Test performed by PCR.    Urinalysis, Microscopic Only - Urine, Clean Catch [[id number]]  (Abnormal) Collected: 04/07/21 1325    Specimen: Urine, Clean Catch Updated: 04/07/21 1404     RBC, UA 7-12 /HPF      WBC, UA 21-30 /HPF      Bacteria, UA None Seen /HPF      Squamous Epithelial Cells, UA 3-6 /HPF      Transitional Epithelial Cells, UA 0-2 /HPF      Renal Epithelial Cells, UA 0-2 /HPF      Hyaline Casts, UA 0-6 /LPF      Methodology Manual Light Microscopy    Urinalysis With Microscopic If Indicated (No Culture) - Urine, Clean Catch [[id number]]  (Abnormal) Collected: 04/07/21 1325    Specimen: Urine, Clean Catch Updated: 04/07/21 1359     Color, UA Yellow     Appearance, UA Cloudy     pH, UA 5.5     Specific Gravity, UA 1.022     Glucose, UA Negative     Ketones, UA Negative     Bilirubin, UA Negative     Blood, UA Negative     Protein,  mg/dL (2+)     Leuk Esterase, UA Small (1+)     Nitrite, UA Negative     Urobilinogen, UA 0.2 E.U./dL    Hepatitis Panel, Acute [[id number]]  (Normal) Collected: 04/07/21 1050    Specimen: Blood  Updated: 04/07/21 1302     Hepatitis B Surface Ag Non-Reactive     Hep A IgM Non-Reactive     Hep B C IgM Non-Reactive     Hepatitis C Ab Non-Reactive    Narrative:      Results may be falsely decreased if patient taking Biotin.     Rodney Draw [[id number]] Collected: 04/07/21 1050    Specimen: Blood Updated: 04/07/21 1200    Narrative:      The following orders were created for panel order Rodney Draw.  Procedure                               Abnormality         Status                     ---------                               -----------         ------                     Light Blue Top[[id number]]                                   Final result               Green Top (Gel)[[id number]]                                  Final result               Lavender Top[[id number]]                                     Final result               Gold Top - SST[[id number]]                                   Final result               Gray Top - Ice[[id number]]                                   Final result                 Please view results for these tests on the individual orders.    Gold Top - SST [[id number]] Collected: 04/07/21 1050    Specimen: Blood Updated: 04/07/21 1200     Extra Tube Hold for add-ons.     Comment: Auto resulted.       Light Blue Top [[id number]] Collected: 04/07/21 1050    Specimen: Blood Updated: 04/07/21 1200     Extra Tube hold for add-on     Comment: Auto resulted       Green Top (Gel) [[id number]] Collected: 04/07/21 1050    Specimen: Blood Updated: 04/07/21 1200     Extra Tube Hold for add-ons.     Comment: Auto resulted.       Lavender Top [[id number]] Collected: 04/07/21 1050    Specimen: Blood Updated: 04/07/21 1200     Extra Tube hold for add-on     Comment: Auto resulted       Gray Top - Ice [[id number]] Collected: 04/07/21 1050    Specimen: Blood Updated: 04/07/21 1200     Extra Tube Hold for add-ons.     Comment: Auto resulted.       Blood Gas, Arterial With Co-Ox [[id number]]  (Abnormal) Collected:  04/07/21 1148    Specimen: Arterial Blood Updated: 04/07/21 1158     Site Left Radial     Jake's Test N/A     pH, Arterial 7.288 pH units      Comment: 84 Value below reference range        pCO2, Arterial 52.4 mm Hg      Comment: 83 Value above reference range        pO2, Arterial 199.0 mm Hg      Comment: 83 Value above reference range        HCO3, Arterial 25.1 mmol/L      Base Excess, Arterial -1.9 mmol/L      Hemoglobin, Blood Gas 10.2 g/dL      Comment: 84 Value below reference range        Hematocrit, Blood Gas 31.3 %      Oxyhemoglobin 98.0 %      Methemoglobin 0.20 %      Carboxyhemoglobin 1.8 %      CO2 Content 26.7 mmol/L      Temperature 37.0 C      Barometric Pressure for Blood Gas --     Comment: N/A        Modality Nasal Cannula     FIO2 30 %      Ventilator Mode       Comment: Meter: [id number]     :  [id number]        Note --     pH, Temp Corrected 7.288 pH Units      pCO2, Temperature Corrected 52.4 mm Hg      pO2, Temperature Corrected 199 mm Hg     Comprehensive Metabolic Panel [[id number]]  (Abnormal) Collected: 04/07/21 1050    Specimen: Blood Updated: 04/07/21 1138     Glucose 116 mg/dL      BUN 25 mg/dL      Creatinine 3.89 mg/dL      Sodium 140 mmol/L      Potassium 3.7 mmol/L      Chloride 94 mmol/L      CO2 26.0 mmol/L      Calcium 7.9 mg/dL      Total Protein 7.0 g/dL      Albumin 4.10 g/dL      ALT (SGPT) 2,631 U/L      AST (SGOT) 4,533 U/L      Alkaline Phosphatase 101 U/L      Total Bilirubin 0.6 mg/dL      eGFR  African Amer 19 mL/min/1.73      Globulin 2.9 gm/dL      A/G Ratio 1.4 g/dL      BUN/Creatinine Ratio 6.4     Anion Gap 20.0 mmol/L     Narrative:      GFR Normal >60  Chronic Kidney Disease <60  Kidney Failure <15      Troponin [[id number]]  (Abnormal) Collected: 04/07/21 1050    Specimen: Blood Updated: 04/07/21 1133     Troponin T 0.290 ng/mL     Narrative:      Troponin T Reference Range:  <= 0.03 ng/mL-   Negative for AMI  >0.03 ng/mL-     Abnormal for  myocardial necrosis.  Clinicians would have to utilize clinical acumen, EKG, Troponin and serial changes to determine if it is an Acute Myocardial Infarction or myocardial injury due to an underlying chronic condition.       Results may be falsely decreased if patient taking Biotin.      Magnesium [[id number]]  (Normal) Collected: 04/07/21 1050    Specimen: Blood Updated: 04/07/21 1120     Magnesium 1.6 mg/dL     CBC & Differential [[id number]]  (Abnormal) Collected: 04/07/21 1050    Specimen: Blood Updated: 04/07/21 1059    Narrative:      The following orders were created for panel order CBC & Differential.  Procedure                               Abnormality         Status                     ---------                               -----------         ------                     CBC Auto Differential[[id number]]        Abnormal            Final result                 Please view results for these tests on the individual orders.    CBC Auto Differential [[id number]]  (Abnormal) Collected: 04/07/21 1050    Specimen: Blood Updated: 04/07/21 1059     WBC 13.63 10*3/mm3      RBC 4.01 10*6/mm3      Hemoglobin 10.3 g/dL      Hematocrit 34.5 %      MCV 86.0 fL      MCH 25.7 pg      MCHC 29.9 g/dL      RDW 14.8 %      RDW-SD 47.1 fl      MPV 10.6 fL      Platelets 274 10*3/mm3      Neutrophil % 75.8 %      Lymphocyte % 14.7 %      Monocyte % 8.3 %      Eosinophil % 0.2 %      Basophil % 0.1 %      Immature Grans % 0.9 %      Neutrophils, Absolute 10.33 10*3/mm3      Lymphocytes, Absolute 2.00 10*3/mm3      Monocytes, Absolute 1.13 10*3/mm3      Eosinophils, Absolute 0.03 10*3/mm3      Basophils, Absolute 0.02 10*3/mm3      Immature Grans, Absolute 0.12 10*3/mm3      nRBC 0.0 /100 WBC     POC Glucose Once [[id number]]  (Normal) Collected: 04/07/21 1047    Specimen: Blood Updated: 04/07/21 1048     Glucose 114 mg/dL           Imaging Results (Last 48 Hours)     Procedure Component Value Units Date/Time    XR Chest 1 View  [[id number]] Collected: 04/08/21 0044     Updated: 04/08/21 0046    Narrative:      CR Chest 1 Vw    INDICATION:   Line placement.     COMPARISON:    4/7/2021    FINDINGS:  Single portable AP view(s) of the chest.    Cardiomegaly is stable. The lungs are clear. There is mild chronic elevation of the right hemidiaphragm. Left IJ line tip is in the left brachiocephalic vein or SVC. No pneumothorax.       Impression:      Line tip in the left brachiocephalic vein or SVC. No pneumothorax.    Signer Name: Aniceto Clark MD   Signed: 4/8/2021 12:44 AM   Workstation Name: [facility]    CT Head Without Contrast [[id number]] Collected: 04/07/21 1308     Updated: 04/07/21 1658    Narrative:      EXAMINATION: CT HEAD WO CONTRAST- 04/07/2021     INDICATION: Altered mental status; R41.82-Altered mental status,  unspecified; I95.9-Hypotension, unspecified; N17.9-Acute kidney failure,  unspecified; H91.93-Unspecified hearing loss, bilateral; R79.89-Other  specified abnormal findings of blood chemistry; R77.8-Other specified  abnormalities of plasma proteins; confusion     TECHNIQUE: Multiple axial CT imaging was obtained of the head from skull  base to skull vertex without the administration of intravenous contrast.     The radiation dose reduction device was turned on for each scan per the  ALARA (As Low as Reasonably Achievable) protocol.     COMPARISON: NONE     FINDINGS: Brain parenchyma is unremarkable in appearance. No hemorrhage  or hydrocephalus. No mass, mass effect, or midline shift. No abnormal  extra axial fluid collections identified. Bony structures reveal no  evidence of osseous abnormality with some minimal mucosal thickening of  the ethmoid air cells. The mastoid air cells are patent.       Impression:      Mild atrophy and chronic changes seen within the brain with  no acute intracranial abnormality. Mild chronic ethmoid sinusitis.     D:  04/07/2021  E:  04/07/2021         This report was finalized on 4/7/2021 4:54 PM by Dr. Doreen Mcgee MD.       XR Chest 1 View [[id number]] Collected: 04/07/21 1107     Updated: 04/07/21 1657    Narrative:      EXAMINATION: XR CHEST 1 VW-      INDICATION: Weak, dizzy, AMS triage protocol.      COMPARISON: None.     FINDINGS: Portable chest reveals cardiac and mediastinal silhouettes  within normal limits. The lung fields are grossly clear. No focal  parenchymal opacification is present.  No pleural effusion or  pneumothorax. Bony structures are unremarkable. Pulmonary vascularity is  within normal limits.           Impression:      No acute cardiopulmonary disease.     D:  04/07/2021  E:  04/07/2021     This report was finalized on 4/7/2021 4:54 PM by Dr. Doreen Mcgee MD.              Operative/Procedure Notes (last 48 hours) (Notes from 04/06/21 1051 through 04/08/21 1051)      Prashant Aiken APRN at 04/07/21 2353      Procedure Orders    1. Insert Central Line At Bedside [[id number]] ordered by Prashant Aiken APRN           Post-procedure Diagnoses    1. Altered mental status, unspecified altered mental status type [R41.82]             Insert Central Line At Bedside    Date/Time: 4/7/2021 11:53 PM  Performed by: Prashant Aiken APRN  Authorized by: Prashant Aiken APRN   Consent: Verbal consent obtained. Written consent obtained.  Risks and benefits: risks, benefits and alternatives were discussed  Consent given by: patient and spouse  Patient understanding: patient states understanding of the procedure being performed  Patient consent: the patient's understanding of the procedure matches consent given  Procedure consent: procedure consent matches procedure scheduled  Relevant documents: relevant documents present and verified  Test results: test results available and properly labeled  Site marked: the operative site was marked  Required items: required blood products, implants, devices, and special equipment  "available  Patient identity confirmed: verbally with patient, arm band and hospital-assigned identification number  Time out: Immediately prior to procedure a \"time out\" was called to verify the correct patient, procedure, equipment, support staff and site/side marked as required.  Indications: vascular access  Anesthesia: local infiltration    Anesthesia:  Local Anesthetic: lidocaine 2% without epinephrine  Anesthetic total: 5 mL    Sedation:  Patient sedated: no    Preparation: skin prepped with 2% chlorhexidine  Skin prep agent dried: skin prep agent completely dried prior to procedure  Sterile barriers: all five maximum sterile barriers used - cap, mask, sterile gown, sterile gloves, and large sterile sheet  Hand hygiene: hand hygiene performed prior to central venous catheter insertion  Location details: left internal jugular  Patient position: flat  Catheter type: triple lumen  Catheter size: 7 Fr  Pre-procedure: landmarks identified  Ultrasound guidance: yes  Sterile ultrasound techniques: sterile gel and sterile probe covers were used  Number of attempts: 4  Successful placement: yes  Post-procedure: line sutured and dressing applied  Assessment: blood return through all ports,  free fluid flow,  placement verified by x-ray and no pneumothorax on x-ray  Patient tolerance: Patient tolerated the procedure well with no immediate complications          Electronically signed by Prashant Aiken APRN at 04/08/21 0121          Consult Notes (last 48 hours) (Notes from 04/06/21 1051 through 04/08/21 1051)      Axel Avalos MD at 04/08/21 1038                 Orthopedic Consult    Patient: Elvis Hanson                                           YOB: 1955     Date of Admission: 4/7/2021 10:40 AM            Medical Record Number: [id number]    Attending Physician: Rossy Quigley DO    Consulting Physician: Axel Avalos MD    Reason for Consult: Postoperative hearing loss and left leg " abnormalities following right total knee replacement    History of Present Illness: 66 y.o. male admitted to [facility] with RINKU (acute kidney injury) (CMS/[facility]) [N17.9].     66-year-old male status post a right total knee replacement performed on Monday 4/5 he did very well at home with Ultram the following day pain is under control he is doing well physical therapy overnight on Tuesday and Wednesday no complaints the morning with hearing loss and inability urinate but emergency room found have a significant medical issues with very elevated LFTs and creatinine is admitted and pressures been low is been treated with a levo and Peck was placed as likely abnormalities have also improved his hearing came back this morning he denies any pain in the knee.      Patient denies any history of: DVT/PE, MRSA, COPD, CHF, CAD, , Dementia or A-Fib.   The patient has history of :Diabetes mellitus  The patient is on anticoagulants: Aspirin    Past medical history, past surgical history, social history, family history, ALLERGIES, current medications have been reviewed by me.    Past Medical History:   Diagnosis Date   • Diabetes mellitus (CMS/[facility])    • Hyperlipidemia    • Hypertension    • Sleep apnea      Past Surgical History:   Procedure Laterality Date   • CHOLECYSTECTOMY     • JOINT REPLACEMENT Right     KNEE     Social History     Occupational History   • Not on file   Tobacco Use   • Smoking status: Never Smoker   • Smokeless tobacco: Never Used   Vaping Use   • Vaping Use: Never used   Substance and Sexual Activity   • Alcohol use: Yes     Comment: OCASSIONALLY    • Drug use: Never   • Sexual activity: Defer      Social History     Social History Narrative   • Not on file     History reviewed. No pertinent family history.     No Known Allergies    Home Medications:  Medications Prior to Admission   Medication Sig Dispense Refill Last Dose   • aspirin 81 MG EC tablet Take 81 mg by mouth Daily.      • carvedilol  (COREG) 25 MG tablet Take 25 mg by mouth 2 (Two) Times a Day With Meals.      • cefadroxil (DURICEF) 500 MG capsule Take 500 mg by mouth 2 (Two) Times a Day.      • ezetimibe (ZETIA) 10 MG tablet Take 10 mg by mouth Daily.      • ferrous sulfate 325 (65 FE) MG tablet Take 325 mg by mouth 2 (two) times a day.      • furosemide (LASIX) 40 MG tablet Take 40 mg by mouth Daily.      • hydrALAZINE (APRESOLINE) 25 MG tablet Take 25 mg by mouth 3 (Three) Times a Day.      • LOSARTAN POTASSIUM PO Take 50 mg by mouth 2 (two) times a day.      • meloxicam (MOBIC) 15 MG tablet Take 15 mg by mouth Daily.      • metFORMIN (GLUCOPHAGE) 500 MG tablet Take 500 mg by mouth 2 (Two) Times a Day With Meals.      • ondansetron (ZOFRAN) 4 MG tablet Take 4 mg by mouth Every 6 (Six) Hours As Needed for Nausea or Vomiting.      • oxyCODONE (OXY-IR) 5 MG capsule Take 5 mg by mouth Every 4 (Four) Hours As Needed for Moderate Pain .      • spironolactone (ALDACTONE) 25 MG tablet Take 25 mg by mouth Daily.      • traMADol (ULTRAM) 50 MG tablet Take 100 mg by mouth Every 6 (Six) Hours As Needed for Moderate Pain .          Current Medications:  Scheduled Meds:aspirin, 81 mg, Oral, Daily  cetirizine, 10 mg, Oral, Daily  heparin (porcine), 5,000 Units, Subcutaneous, Q12H  hydrocortisone sodium succinate, 100 mg, Intravenous, Q8H  piperacillin-tazobactam, 4.5 g, Intravenous, Q8H  sodium chloride, 10 mL, Intravenous, Q12H  thiamine (VITAMIN B1) IVPB, 250 mg, Intravenous, BID  vancomycin (dosing per levels), , Does not apply, Daily      Continuous Infusions:norepinephrine, 0.02-0.1 mcg/kg/min, Last Rate: 0.04 mcg/kg/min (04/08/21 0909)  Pharmacy to dose vancomycin,   sodium bicarbonate drip (greater than 75 mEq/bag), 150 mEq, Last Rate: 150 mEq (04/08/21 0810)      PRN Meds:.oxyCODONE  •  Pharmacy to dose vancomycin  •  sodium chloride    Review of Systems:   A 12 point system review was reviewed with the patient and from the chart  and is negative  except as in history of present illness.      Physical Exam: 66 y.o. male                    Vitals:    04/08/21 0845 04/08/21 0857 04/08/21 0900 04/08/21 0915   BP: 110/70  112/79 93/66   BP Location:       Patient Position:       Pulse: 71 70 71 70   Resp:       Temp:       TempSrc:       SpO2: 100% 99% 99% 99%   Weight:       Height:            Gait: Could not be tested , patient is nonambulatory.    Mental/HEENT/cardio/skin: The patient's general appearance was well-nourished, well-hydrated, no acute distress.  Orientation was alert and oriented ×3.  The patient's mood was normal.   Pulmonary exam shows normal late exchange, no labored breathing, or shortness of breath.    The skin exam showed normal temperature and color in the area of examination.    Extremities: Right   lower extremity incision appears intact no signs any surrounding erythema or induration mild bloody drainage of the distal end of the incision  pain-free range of motion of the knee neurovascular tact distally    Pulses: Pulses palpable and equal bilaterally    Diagnostic Tests:    Results from last 7 days   Lab Units 04/08/21  0517 04/07/21  1050   WBC 10*3/mm3 17.13* 13.63*   HEMOGLOBIN g/dL 10.1* 10.3*   HEMATOCRIT % 33.4* 34.5*   PLATELETS 10*3/mm3 230 274     Results from last 7 days   Lab Units 04/08/21  0517 04/07/21  1711 04/07/21  1050   SODIUM mmol/L 133* 140 140   POTASSIUM mmol/L 3.9 3.9 3.7   CHLORIDE mmol/L 94* 97* 94*   CO2 mmol/L 21.0* 22.0 26.0   BUN mg/dL 37* 28* 25*   CREATININE mg/dL 5.10* 4.20* 3.89*   GLUCOSE mg/dL 138* 89 116*   CALCIUM mg/dL 6.5* 6.7* 7.9*     Results from last 7 days   Lab Units 04/08/21  0808   INR  1.87*   APTT seconds 29.4     No results found for: URICACID  No results found for: CRYSTAL  Microbiology Results (last 10 days)     Procedure Component Value - Date/Time    MRSA Screen, PCR (Inpatient) - Swab, Nares [[id number]]  (Normal) Collected: 04/08/21 0341    Lab Status: Final result Specimen: Swab  from Nares Updated: 04/08/21 0937     MRSA PCR Negative    Narrative:      MRSA Negative    COVID-19 and FLU A/B PCR - Swab, Nasopharynx [[id number]]  (Normal) Collected: 04/07/21 1357    Lab Status: Final result Specimen: Swab from Nasopharynx Updated: 04/07/21 1509     COVID19 Not Detected     Influenza A PCR Not Detected     Influenza B PCR Not Detected    Narrative:      Fact sheet for providers: https://www.fda.gov/media/775875/download    Fact sheet for patients: https://www.fda.gov/media/186148/download    Test performed by PCR.        CT Head Without Contrast    Result Date: 4/7/2021  Mild atrophy and chronic changes seen within the brain with no acute intracranial abnormality. Mild chronic ethmoid sinusitis.  D:  04/07/2021 E:  04/07/2021   This report was finalized on 4/7/2021 4:54 PM by Dr. Doreen Mcgee MD.      XR Chest 1 View    Result Date: 4/8/2021  Line tip in the left brachiocephalic vein or SVC. No pneumothorax. Signer Name: Aniceto Clark MD  Signed: 4/8/2021 12:44 AM  Workstation Name: [facility]  [facility]    XR Chest 1 View    Result Date: 4/7/2021  No acute cardiopulmonary disease.  D:  04/07/2021 E:  04/07/2021  This report was finalized on 4/7/2021 4:54 PM by Dr. Doreen Mcgee MD.        Assessment: 66-year male status post right total knee replacement with postop day 2 hearing loss and elevated creatinine and low blood pressure    Patient Active Problem List   Diagnosis   • Hypertension   • T2DM   • Dyslipidemia   • Sleep apnea   • Elevated liver enzymes   • RINKU   • Recent Rt Total Knee Replacement 4/5/21   • Chronic pain w/pain pump   • Lactic acidosis   • Acute HFrEF (EF 30%)       Plan:      66-year-old male with postoperative hearing loss pressure was causing the electrode abnormalities are starting to improve.  He is hearing some back this morning still with low blood pressure  ICU medical management of the electrolyte liver function tests and  hearing loss as well as blood pressure  Need for stable okay to weight-bear as tolerated recommended Optifoam AG dressing over the incision  PT OT when patient medically able  Recommend aspirin 81 mg for DVT prophylaxis if okay with ICU team    Date: 4/8/2021        CC: April Chen DO; MD Dann Mcclellan Theresa V., DO                  Electronically signed by Axel Avalos MD at 04/08/21 1040

## 2021-04-08 NOTE — PROGRESS NOTES
"Pharmacy Consult-Vancomycin Dosing  Elvis Hanson is a  66 y.o. male receiving vancomycin therapy.     Indication: Bone/joint infection, sepsis, SSTI  Consulting Provider: Intensivist  ID Consult: No    Goal Trough: 15-20mcg/mL    Current Antimicrobial Therapy  Anti-Infectives (From admission, onward)      Ordered     Dose/Rate Route Frequency Start Stop    04/07/21 2128  vancomycin (dosing per levels)     Ordering Provider: Sherry Cooper PharmD     Does not apply Daily 04/08/21 0900 04/14/21 0859    04/07/21 2122  piperacillin-tazobactam (ZOSYN) 4.5 g in iso-osmotic dextrose 100 mL IVPB (premix)     Ordering Provider: Prashant Aiken APRN    4.5 g  over 4 Hours Intravenous Every 8 Hours 04/08/21 0600 04/18/21 0559    04/07/21 2122  piperacillin-tazobactam (ZOSYN) 4.5 g in iso-osmotic dextrose 100 mL IVPB (premix)     Ordering Provider: Prashant Aiken APRN    4.5 g  over 30 Minutes Intravenous Once 04/07/21 2300 04/07/21 2128  vancomycin 2500 mg/500 mL 0.9% NS IVPB ([facility])     Ordering Provider: Sherry Cooper PharmD    20 mg/kg × 127 kg  over 180 Minutes Intravenous Once 04/07/21 2300 04/07/21 2122  Pharmacy to dose vancomycin     Ordering Provider: Prashant Aiken APRN     Does not apply Continuous PRN 04/07/21 2121 04/17/21 2120            Allergies  Allergies as of 04/07/2021    (No Known Allergies)       Labs    Results from last 7 days   Lab Units 04/07/21  1711 04/07/21  1050   BUN mg/dL 28* 25*   CREATININE mg/dL 4.20* 3.89*       Results from last 7 days   Lab Units 04/07/21  1050   WBC 10*3/mm3 13.63*       Evaluation of Dosing     Last Dose Received in the ED/Outside Facility: NA  Is Patient on Dialysis or Renal Replacement: No    Ht - 177.8 cm (70\")  Wt - 127 kg (279 lb 15.8 oz)    Estimated Creatinine Clearance: 23.8 mL/min (A) (by C-G formula based on SCr of 4.2 mg/dL (H)).    Intake & Output (last 3 days)       None            Microbiology and Radiology  Microbiology Results " (last 10 days)       Procedure Component Value - Date/Time    COVID-19 and FLU A/B PCR - Swab, Nasopharynx [[id number]]  (Normal) Collected: 04/07/21 1357    Lab Status: Final result Specimen: Swab from Nasopharynx Updated: 04/07/21 1509     COVID19 Not Detected     Influenza A PCR Not Detected     Influenza B PCR Not Detected    Narrative:      Fact sheet for providers: https://www.fda.gov/media/532233/download    Fact sheet for patients: https://www.fda.gov/media/738775/download    Test performed by PCR.            Vancomycin Levels:                        Assessment/Plan:  Pharmacy to dose vancomycin for bone/joint infection, sepsis, SSTI. Patient had right knee surgery 2 days ago with Dr. Avalos. Due to current renal function, will target goal trough 15-20mcg/mL.  Give loading dose of vancomycin 2500mg (~20mg/kg) IV on 4/7 @ 2300. Due to RINKU, will dose per level at this time.   Assess clearance by random level on 4/9 with morning labs.  Pharmacy will continue to monitor renal function, cultures and sensitivities, and clinical status to adjust regimen as necessary.    Thanks,  Sherry Cooper, PharmD  4/7/2021  21:33 EDT

## 2021-04-08 NOTE — CONSULTS
[facility] at [facility]  Cardiovascular Consultation Note    Reason for consultation: Elevated troponins #2 left ventricular dysfunction    History of Present Illness:  66-year-old male with diabetes hypertension hyperlipidemia status post recent knee replacement presented to the hospital after acute hearing loss.  When he arrived in the ER was found to be hypoxic and hypotensive with acute renal failure and elevated troponin.  The patient denies any history of heart problems.  He has never been told he had any left ventricular dysfunction.  There is no family history of sudden death or coronary artery disease.  The patient denies any dyspnea on exertion, no tightness heaviness squeezing pressure chest jaw throat arms.  He has had no syncope no palpitations no near syncope.  Has a history of remittent lower extremity edema for the last 2 years and he uses support hose and diuretics.    Cardiac risk factors: Male sex #2 age greater than 45 #3 hypertension #4 hyperlipidemia #5 diabetes    Past medical and surgical history, social and family history reviewed in EMR.    REVIEW OF SYSTEMS:     Past Medical History:   Diagnosis Date   • Diabetes mellitus (CMS/[facility])    • Hyperlipidemia    • Hypertension    • Sleep apnea      Past Surgical History:   Procedure Laterality Date   • CHOLECYSTECTOMY     • JOINT REPLACEMENT Right     KNEE     Social History     Socioeconomic History   • Marital status:      Spouse name: Not on file   • Number of children: Not on file   • Years of education: Not on file   • Highest education level: Not on file   Tobacco Use   • Smoking status: Never Smoker   • Smokeless tobacco: Never Used   Vaping Use   • Vaping Use: Never used   Substance and Sexual Activity   • Alcohol use: Yes     Comment: OCASSIONALLY    • Drug use: Never   • Sexual activity: Defer     History reviewed. No pertinent family history.    H&P ROS reviewed and pertinent CV ROS as noted in  HPI.    Cardiac: No angina no palpitations no syncope.  No family history  Respiratory: Has rare wheezing.  But has noticed no dyspnea on exertion no hemoptysis  Lower Extremities: History of venous stasis and edema  GI: No nausea vomiting diarrhea bright red blood per rectum melena  Neuro: History of Bell's palsy.  No history of stroke TIA or neurologic event  Hematology: No bleeding diathesis ecchymosis or petechiae      Physical Exam: General Pleasant overweight male in bed not dyspneic not tachypneic current sat 99% on oxygen current systolic blood pressure 104       HEENT: No JVP, no icterus, central line present left neck no icterus       Respiratory: Equal bilateral symmetrical expansion good auscultation bilaterally       Cardiovascular: Regular rate and rhythm with a grade 3 over systolic ejection murmur and no significant edema       GI: Obese positive bowel sounds nontender           Neuro: Mild facial droop is noted is a persistent finding from his Bell's palsy.  Handgrip strength equal bilaterally 5/5       Skin: Warm and dry.       Psych: Pleasant affect oriented x3    Results Review: The patient's creatinine on admission was 3.89 now it is up to 5.1.  Systolic blood pressure ranges 76-1 17 he is at 1.7 L.  ALT was 2231 AST was 4533 his lactate was initially 3.2 is now up to 4.8.  I personally reviewed the patient's echocardiogram and it shows thickened mitral valve leaflets the myocardium has an abnormal appearance significant right ventricular enlargement and RV hypokinesis with severe TR septum is D-shaped.  The interatrial septum bulges towards the right.  EKG shows sinus rhythm with diffuse T wave abnormalities I personally viewed the chest x-ray there is cardiomegaly           Vital Sign Min/Max for last 24 hours  Temp  Min: 97.5 °F (36.4 °C)  Max: 98.5 °F (36.9 °C)   BP  Min: 71/50  Max: 154/94   Pulse  Min: 61  Max: 79   Resp  Min: 16  Max: 21   SpO2  Min: 89 %  Max: 100 %   No data recorded       Intake/Output Summary (Last 24 hours) at 4/8/2021 1152  Last data filed at 4/8/2021 1016  Gross per 24 hour   Intake 1750 ml   Output 54 ml   Net 1696 ml             Current Facility-Administered Medications:   •  albumin human 25 % IV SOLN 25 g, 25 g, Intravenous, Once, Jimenez Daniels MD  •  aspirin EC tablet 81 mg, 81 mg, Oral, Daily, Case, Rossy V., DO, 81 mg at 04/08/21 0810  •  cetirizine (zyrTEC) tablet 10 mg, 10 mg, Oral, Daily, Case, Rossy V., DO, 10 mg at 04/08/21 0810  •  heparin (porcine) 5000 UNIT/ML injection 5,000 Units, 5,000 Units, Subcutaneous, Q12H, Case, Rossy V., DO, 5,000 Units at 04/08/21 0810  •  hydrocortisone sodium succinate (Solu-CORTEF) injection 100 mg, 100 mg, Intravenous, Q8H, Julio Montaño MD, 100 mg at 04/08/21 1043  •  norepinephrine (LEVOPHED) 8 mg in 250 mL NS infusion (premix), 0.02-0.1 mcg/kg/min, Intravenous, Titrated, Prashant Aiken, ANGUS, Last Rate: 14.29 mL/hr at 04/08/21 1016, 0.06 mcg/kg/min at 04/08/21 1016  •  oxyCODONE (ROXICODONE) immediate release tablet 5 mg, 5 mg, Oral, Q6H PRN, Case, Rossy V., DO, 5 mg at 04/08/21 0810  •  Pharmacy to dose vancomycin, , Does not apply, Continuous PRN, Prashant Aiken, APRN  •  piperacillin-tazobactam (ZOSYN) 4.5 g in iso-osmotic dextrose 100 mL IVPB (premix), 4.5 g, Intravenous, Q8H, Prashant Aiken, APRN, 4.5 g at 04/08/21 0529  •  sodium bicarbonate 8.4 % 150 mEq in dextrose (D5W) 5 % 1,000 mL infusion (greater than 75 mEq), 150 mEq, Intravenous, Continuous, Case, Rossy V., DO, Last Rate: 125 mL/hr at 04/08/21 0810, 150 mEq at 04/08/21 0810  •  sodium chloride 0.9 % bolus 250 mL, 250 mL, Intravenous, Q1H, Jimenez Daniels MD  •  sodium chloride 0.9 % flush 10 mL, 10 mL, Intravenous, Q12H, Case, Rossy V., DO, 10 mL at 04/08/21 0811  •  sodium chloride 0.9 % flush 10 mL, 10 mL, Intravenous, PRN, Case, Rossy V., DO  •  thiamine (B-1) 250 mg in sodium chloride 0.9 % 100 mL IVPB, 250 mg,  Intravenous, BID, Prashant Aiken APRN, Last Rate: 200 mL/hr at 04/08/21 0810, 250 mg at 04/08/21 0810  •  vancomycin (dosing per levels), , Does not apply, Daily, Sherry Cooper, PharmD, Stopped at 04/08/21 1006    Lab Review:   Results from last 7 days   Lab Units 04/08/21  0517 04/07/21  1050   WBC 10*3/mm3 17.13* 13.63*   HEMOGLOBIN g/dL 10.1* 10.3*   PLATELETS 10*3/mm3 230 274     Results from last 7 days   Lab Units 04/08/21  0517 04/07/21  1711 04/07/21  1050   SODIUM mmol/L 133* 140 140   POTASSIUM mmol/L 3.9 3.9 3.7   CO2 mmol/L 21.0* 22.0 26.0   BUN mg/dL 37* 28* 25*   CREATININE mg/dL 5.10* 4.20* 3.89*   MAGNESIUM mg/dL 1.6  --  1.6   PHOSPHORUS mg/dL 11.4*  --   --    GLUCOSE mg/dL 138* 89 116*     Estimated Creatinine Clearance: 19.8 mL/min (A) (by C-G formula based on SCr of 5.1 mg/dL (H)).        .lipd  Lab Results   Component Value Date    AST >7,000 (H) 04/08/2021    ALT 5,392 (H) 04/08/2021       Radiology Reports:  Imaging Results (Last 72 Hours)     Procedure Component Value Units Date/Time    XR Chest 1 View [[id number]] Collected: 04/08/21 0044     Updated: 04/08/21 0046    Narrative:      CR Chest 1 Vw    INDICATION:   Line placement.     COMPARISON:    4/7/2021    FINDINGS:  Single portable AP view(s) of the chest.    Cardiomegaly is stable. The lungs are clear. There is mild chronic elevation of the right hemidiaphragm. Left IJ line tip is in the left brachiocephalic vein or SVC. No pneumothorax.       Impression:      Line tip in the left brachiocephalic vein or SVC. No pneumothorax.    Signer Name: Aniceto Clark MD   Signed: 4/8/2021 12:44 AM   Workstation Name: [id number]    [facility]    CT Head Without Contrast [[id number]] Collected: 04/07/21 1308     Updated: 04/07/21 1658    Narrative:      EXAMINATION: CT HEAD WO CONTRAST- 04/07/2021     INDICATION: Altered mental status; R41.82-Altered mental status,  unspecified; I95.9-Hypotension, unspecified;  N17.9-Acute kidney failure,  unspecified; H91.93-Unspecified hearing loss, bilateral; R79.89-Other  specified abnormal findings of blood chemistry; R77.8-Other specified  abnormalities of plasma proteins; confusion     TECHNIQUE: Multiple axial CT imaging was obtained of the head from skull  base to skull vertex without the administration of intravenous contrast.     The radiation dose reduction device was turned on for each scan per the  ALARA (As Low as Reasonably Achievable) protocol.     COMPARISON: NONE     FINDINGS: Brain parenchyma is unremarkable in appearance. No hemorrhage  or hydrocephalus. No mass, mass effect, or midline shift. No abnormal  extra axial fluid collections identified. Bony structures reveal no  evidence of osseous abnormality with some minimal mucosal thickening of  the ethmoid air cells. The mastoid air cells are patent.       Impression:      Mild atrophy and chronic changes seen within the brain with  no acute intracranial abnormality. Mild chronic ethmoid sinusitis.     D:  04/07/2021  E:  04/07/2021        This report was finalized on 4/7/2021 4:54 PM by Dr. Doreen Mcgee MD.       XR Chest 1 View [[id number]] Collected: 04/07/21 1107     Updated: 04/07/21 1657    Narrative:      EXAMINATION: XR CHEST 1 VW-      INDICATION: Weak, dizzy, AMS triage protocol.      COMPARISON: None.     FINDINGS: Portable chest reveals cardiac and mediastinal silhouettes  within normal limits. The lung fields are grossly clear. No focal  parenchymal opacification is present.  No pleural effusion or  pneumothorax. Bony structures are unremarkable. Pulmonary vascularity is  within normal limits.           Impression:      No acute cardiopulmonary disease.     D:  04/07/2021  E:  04/07/2021     This report was finalized on 4/7/2021 4:54 PM by Dr. Doreen Mcgee MD.             Assessment/Plan: #1 elevated troponins with left ventricular systolic dysfunction.  The patient has multiple CAD risk  factors but has had no angina or dyspnea.  Troponins could be elevated due to his renal failure but LV dysfunction is new.  At this time the patient appears euvolemic with no signs of heart failure so ongoing hydration with bicarb and fluid boluses is good.  Patient currently is on aspirin only.  He can take any medications lowering his blood pressure with his acute renal failure.  Any ischemic evaluation will be delayed until renal function improves.  Of note on his echocardiogram there is some D shaving of the septum and the RV is enlarged.  He had a venous Doppler which was negative but PE is still a possibility.  He is on subcu heparin at this time.  Consider a VQ scan but the patient looks really stable.  2 acute renal ooheqcv-jczmamvoh-lutzzxephh is seeing the patient  3 hypotension-currently on low-dose Levophed.  Consider the addition of midodrine.  Avoid all agents of lower blood pressure  Avoid statin since the LFTs are increased      Aniceto Newby MD  04/08/21  11:52 EDT

## 2021-04-08 NOTE — PROGRESS NOTES
Multidisciplinary Rounds    Time: 20min  Patient Name: Elvis Hanson  Date of Encounter: 04/08/21 13:13 EDT  MRN: [id number]  Admission date: 4/7/2021      Reason for visit: MDR. RD to continue to follow per protocol.     Additional information obtained during MDR: Phos= 11.4; K+ WNL. Will add low phos restrcition to pt's diet order at this time.     Current diet: Diet Regular; Consistent Carbohydrate      Intervention:  Follow treatment plan  Care plan reviewed    Follow up:   Per protocol      Julieth Merlos MS RD/LD CNSC  13:13 EDT

## 2021-04-08 NOTE — PROCEDURES
"Insert Central Line At Bedside    Date/Time: 4/7/2021 11:53 PM  Performed by: Prashant Aiken APRN  Authorized by: Prashant Aiken APRN   Consent: Verbal consent obtained. Written consent obtained.  Risks and benefits: risks, benefits and alternatives were discussed  Consent given by: patient and spouse  Patient understanding: patient states understanding of the procedure being performed  Patient consent: the patient's understanding of the procedure matches consent given  Procedure consent: procedure consent matches procedure scheduled  Relevant documents: relevant documents present and verified  Test results: test results available and properly labeled  Site marked: the operative site was marked  Required items: required blood products, implants, devices, and special equipment available  Patient identity confirmed: verbally with patient, arm band and hospital-assigned identification number  Time out: Immediately prior to procedure a \"time out\" was called to verify the correct patient, procedure, equipment, support staff and site/side marked as required.  Indications: vascular access  Anesthesia: local infiltration    Anesthesia:  Local Anesthetic: lidocaine 2% without epinephrine  Anesthetic total: 5 mL    Sedation:  Patient sedated: no    Preparation: skin prepped with 2% chlorhexidine  Skin prep agent dried: skin prep agent completely dried prior to procedure  Sterile barriers: all five maximum sterile barriers used - cap, mask, sterile gown, sterile gloves, and large sterile sheet  Hand hygiene: hand hygiene performed prior to central venous catheter insertion  Location details: left internal jugular  Patient position: flat  Catheter type: triple lumen  Catheter size: 7 Fr  Pre-procedure: landmarks identified  Ultrasound guidance: yes  Sterile ultrasound techniques: sterile gel and sterile probe covers were used  Number of attempts: 4  Successful placement: yes  Post-procedure: line sutured and dressing " applied  Assessment: blood return through all ports,  free fluid flow,  placement verified by x-ray and no pneumothorax on x-ray  Patient tolerance: Patient tolerated the procedure well with no immediate complications

## 2021-04-08 NOTE — PLAN OF CARE
Goal Outcome Evaluation:  Plan of Care Reviewed With: patient  Progress: no change  Outcome Summary: VSS.  No complaints of pain or discomfort.  Pt was noted to be hypotensive to start shift (SBP in the 70's) despite two 1,000 mL boluses.  Provider notified and ordered Levophed gtt to be initiated, currently at 0.04 mcg/min with last BP 98/68 with MAP on 78.  Ph trending downward on ABG, provider aware.  Bicarb drip initiated currently infusing at 125 mL/hr.  Triple lumen IJ placed by provider on lt side.  Pt had total rt knee replacement 3 days ago 4/5/21, and his incision dressing was noted to have old blood and fresh blood oozing.  Dressing replaced per provider recommendation, no purulent drainage or foul-smelling odor observed.  Lactic acid was noted to be 4.8 then 3.6 later on, provider aware.  Pt remains on BiPAP at 35% FiO2 with no apparent issue.  ABX therapy initiated per provider orders.  Pt was noted to have bilateral hearing loss at the beginning of shift, but during 0500 round the patient had his hearing level return to almost his baseline.  Pt has had little to no urine output throughout the shift, despite having indwelling catheter in place.  No acute overnight events, no further complaints at this time.  Continue POC.

## 2021-04-08 NOTE — PROGRESS NOTES
Pulmonary/Critical Care Follow-up     LOS: 1 day   Patient Care Team:  April Chen DO as PCP - General (Internal Medicine)        Chief Complaint   Patient presents with   • Altered Mental Status     Subjective     History of Present Illness:   Elvis Hanson is a 66 y.o. male who presented to [facility] ED 4/7 with complaints of hearing loss.     Patient has a PMH of HTN, T2DM, dyslipidemia, SHYLA, and chronic pain s/p pain pump. He underwent right knee surgery 3 days ago per Dr. Story, with preoperative labs and cardiac eval reportedly normal at that time. He did well postoperatively and was discharged home. While home he developed some intermittent periods of diaphoresis. His wife reported he stopped urinating. On the morning of 4/7 he awoke and was unable to hear out of either ear, prompting presentation to our ED for evaluation.     In the ED he was found to be hypoxic and hypotensive with RINKU and elevated LFTs. CXR and CTH unremarkable. His blood pressure improved with fluids and he was admitted to PCU. TTE on admission revealed severe LV systolic dysfunction with EF of 30% and global HK.     He deteriorated overnight with hypotension despite additional fluids and a TLDL was placed / he was initiated on a Levophed infusion. Lab work revealed lactic acidosis with pH of 7.2 as well as worsening renal function and LFTs. He was placed on broad spectrum antibiotics and a bicarbonate infusion, and both Nephrology and Cardiology consults were placed for the AM. Due to his acute decline he was transferred to ICU for further management.      Interval History:   On exam patient's blood pressure is currently adequate and he is oxygenating appropriately on BiPAP. He denies current shortness of breath, chest pain, abdominal discomfort or pain, nausea, vomiting, or diarrhea. He denies any cardiac history aside from high blood pressure.    PMH/FH/Social History were reviewed and updated appropriately in the electronic  medical record.     Review of Systems:    Review of 14 systems was completed with positives and pertinent negatives noted in the subjective section.  All other systems reviewed and are negative.         Objective     Vital Signs  Temp:  [96.9 °F (36.1 °C)-98.5 °F (36.9 °C)] 97.7 °F (36.5 °C)  Heart Rate:  [65-79] 66  Resp:  [12-20] 12  BP: ()/() 118/77  04/07 0701 - 04/08 0700  In: 1750 [P.O.:1050]  Out: 50 [Urine:50]  Body mass index is 41.02 kg/m².     IV drips:  norepinephrine, Last Rate: Stopped (04/08/21 1550)  Pharmacy to dose vancomycin  sodium bicarbonate drip (greater than 75 mEq/bag), Last Rate: 150 mEq (04/08/21 1659)       Physical Exam:     Constitutional:   Alert, cooperative, in no acute distress   Head:   Normocephalic, without obvious abnormality, atraumatic   Eyes:           Lids and lashes normal, conjunctivae and sclerae normal.  No icterus, no pallor, corneas clear, PER   ENMT:  Ears appear intact with no abnormalities noted     No oral lesions, no thrush, oral mucosa moist   Neck:  No adenopathy, supple, trachea midline, no thyromegaly, no JVD   Lungs/Resp:    Normal effort, symmetric chest rise, no crepitus, clear to      auscultation bilaterally, no chest wall tenderness                Heart/CV:   Regular rhythm and normal rate, normal S1 and S2, no            murmur   Abdomen/GI:    Normal bowel sounds, no masses, no organomegaly, soft        nontender, nondistended   :    Deferred   Extremities/MSK:  No clubbing or cyanosis.  Mild lower extremity edema.  Normal tone.  Right knee dressing intact.  Distal right lower extremity dressings intact.   Pulses:  Pulses palpable and equal bilaterally   Skin:  No bleeding, bruising or rash   Heme/Lymph:  No cervical or supraclavicular adenopathy.   Neurologic:    Psychiatric:    Moves all extremities with no obvious focal motor deficit.  Cranial nerves 2 - 12 grossly intact  Oriented x 3, normal affect.    The above physical exam  findings were reviewed and reflect exam findings as of today's exam.    Electronically signed by:Julio Montaño MD 04/08/21 22:15 EDT    Results Review:     I reviewed the patient's new clinical results.   Results from last 7 days   Lab Units 04/08/21  0517 04/07/21  1711 04/07/21  1050   SODIUM mmol/L 133* 140 140   POTASSIUM mmol/L 3.9 3.9 3.7   CHLORIDE mmol/L 94* 97* 94*   CO2 mmol/L 21.0* 22.0 26.0   BUN mg/dL 37* 28* 25*   CREATININE mg/dL 5.10* 4.20* 3.89*   CALCIUM mg/dL 6.5* 6.7* 7.9*   BILIRUBIN mg/dL 0.6 0.5 0.6   ALK PHOS U/L 154* 100 101   ALT (SGPT) U/L 5,392* 2,553* 2,631*   AST (SGOT) U/L >7,000* 6,207* 4,533*   GLUCOSE mg/dL 138* 89 116*     Results from last 7 days   Lab Units 04/08/21  0517 04/07/21  1050   WBC 10*3/mm3 17.13* 13.63*   HEMOGLOBIN g/dL 10.1* 10.3*   HEMATOCRIT % 33.4* 34.5*   PLATELETS 10*3/mm3 230 274     Results from last 7 days   Lab Units 04/08/21  0933   PH, ARTERIAL pH units 7.271*   PO2 ART mm Hg 96.7   PCO2, ARTERIAL mm Hg 52.1*   HCO3 ART mmol/L 23.9     Results from last 7 days   Lab Units 04/08/21  0517 04/07/21  1050   MAGNESIUM mg/dL 1.6 1.6   PHOSPHORUS mg/dL 11.4*  --      Procalcitonin 4/8/2021: 4.86.    I reviewed the patient's new imaging including images and reports.    Medication Review:   aspirin, 81 mg, Oral, Daily  cetirizine, 10 mg, Oral, Daily  docusate sodium, 100 mg, Oral, BID  heparin (porcine), 5,000 Units, Subcutaneous, Q12H  hydrocortisone sodium succinate, 100 mg, Intravenous, Q8H  insulin lispro, 0-7 Units, Subcutaneous, 4x Daily AC & at Bedtime  piperacillin-tazobactam, 4.5 g, Intravenous, Q8H  [START ON 4/9/2021] polyethylene glycol, 17 g, Oral, Daily  sodium chloride, 10 mL, Intravenous, Q12H  thiamine (VITAMIN B1) IVPB, 250 mg, Intravenous, BID  vancomycin (dosing per levels), , Does not apply, Daily      norepinephrine, 0.02-0.1 mcg/kg/min, Last Rate: Stopped (04/08/21 1550)  Pharmacy to dose vancomycin,   sodium bicarbonate drip (greater  than 75 mEq/bag), 150 mEq, Last Rate: 150 mEq (04/08/21 1659)        Assessment/Plan         RINKU    Hypertension    T2DM    Dyslipidemia    Sleep apnea    Elevated liver enzymes    Recent Rt Total Knee Replacement 4/5/21    Chronic pain w/pain pump    Lactic acidosis    Acute HFrEF (EF 30%)    Shock (CMS/HCC) -cardiogenic versus septic    66 y.o. male with history of hypertension, hyperlipidemia, type 2 diabetes, obstructive sleep apnea, chronic pain with pain pump, recent total knee replacement on 4/5/2021 who presented with hearing loss to the emergency department and was found to be hypotensive.    Subsequent evaluation has revealed congestive heart failure (acute?)  With left ventricular ejection fraction of 30%.  Urinalysis somewhat suggestive of urinary tract infection present on admission.  Procalcitonin elevated concerning for sepsis.    Patient decompensated after admission requiring pressors, central line placement, BiPAP and was transferred to the intensive care unit the morning of 4/8/2021.  He has developed worsening renal failure and elevated troponin.  Shock was likely cardiogenic versus septic or combination.    Plan:  1.  Adjust norepinephrine as needed.  Wean as tolerated.  2.  BiPAP as needed.  3.  Continue broad-spectrum antibiotics.  4.  Check culture from initial urinalysis sample.  5.  Follow-up blood cultures.  6.  Monitor blood sugars with correction insulin as needed.  7.  Hold antihypertensives.  8.  Cardiology following.  9.  Continue aspirin.  10.  Nephrology consultation, hopefully will not require dialysis.  11.  Eventually will likely need ischemic evaluation for coronary disease.  Currently contraindicated due to acute renal failure.    Patient is critically ill secondary to shock, acute renal failure and has high risk of life-threatening decompensation in condition.   As such, the patient requires continuous monitoring and frequent reassessment for consideration of adjustment in  management to minimize this risk.  I personally reassessed the patient multiple times today.    Beth Willams, WALLACE, APRN, [facility]  Pulmonary and Critical Care Medicine     I performed an independent history and physical examination. Portions of the history were obtained by APRN and were modified by me according to my findings. The above note reflects my findings, assessment, and plan.    Julio Montaño MD    Critical care time : 38 minutes spent by me personally and independently.(This excludes time spent performing separately reportable procedures and services).  Critical care time includes high complexity decision making to assess, manipulate, and support vital organ system failure in this individual who has impairment of one or more vital organ systems such that there is a high probability of imminent or life threatening deterioration in the patient’s condition.  Electronically signed by:  Julio Montaño MD  04/08/21  22:15 EDT      *. Please note that portions of this note were completed with [employer] - a voice recognition program.

## 2021-04-09 ENCOUNTER — APPOINTMENT (OUTPATIENT)
Dept: INTERVENTIONAL RADIOLOGY/VASCULAR | Facility: HOSPITAL | Age: 66
End: 2021-04-09

## 2021-04-09 ENCOUNTER — APPOINTMENT (OUTPATIENT)
Dept: NEPHROLOGY | Facility: HOSPITAL | Age: 66
End: 2021-04-09

## 2021-04-09 LAB
ALBUMIN SERPL-MCNC: 3.3 G/DL (ref 3.5–5.2)
ALBUMIN/GLOB SERPL: 1.3 G/DL
ALP SERPL-CCNC: 165 U/L (ref 39–117)
ALT SERPL W P-5'-P-CCNC: 3838 U/L (ref 1–41)
ANION GAP SERPL CALCULATED.3IONS-SCNC: 19 MMOL/L (ref 5–15)
AST SERPL-CCNC: 6772 U/L (ref 1–40)
BILIRUB SERPL-MCNC: 0.6 MG/DL (ref 0–1.2)
BUN SERPL-MCNC: 53 MG/DL (ref 8–23)
BUN/CREAT SERPL: 8 (ref 7–25)
CA-I SERPL ISE-MCNC: 0.71 MMOL/L (ref 1.12–1.32)
CALCIUM SPEC-SCNC: 5.4 MG/DL (ref 8.6–10.5)
CHLORIDE SERPL-SCNC: 89 MMOL/L (ref 98–107)
CO2 SERPL-SCNC: 27 MMOL/L (ref 22–29)
CREAT SERPL-MCNC: 6.62 MG/DL (ref 0.76–1.27)
D-LACTATE SERPL-SCNC: 2.6 MMOL/L (ref 0.5–2)
DEPRECATED RDW RBC AUTO: 44.7 FL (ref 37–54)
ERYTHROCYTE [DISTWIDTH] IN BLOOD BY AUTOMATED COUNT: 14.6 % (ref 12.3–15.4)
GFR SERPL CREATININE-BSD FRML MDRD: 10 ML/MIN/1.73
GFR SERPL CREATININE-BSD FRML MDRD: ABNORMAL ML/MIN/{1.73_M2}
GLOBULIN UR ELPH-MCNC: 2.6 GM/DL
GLUCOSE BLDC GLUCOMTR-MCNC: 166 MG/DL (ref 70–130)
GLUCOSE BLDC GLUCOMTR-MCNC: 187 MG/DL (ref 70–130)
GLUCOSE BLDC GLUCOMTR-MCNC: 192 MG/DL (ref 70–130)
GLUCOSE SERPL-MCNC: 174 MG/DL (ref 65–99)
HCT VFR BLD AUTO: 29.7 % (ref 37.5–51)
HGB BLD-MCNC: 9.2 G/DL (ref 13–17.7)
IRON 24H UR-MRATE: 105 MCG/DL (ref 59–158)
IRON SATN MFR SERPL: 39 % (ref 20–50)
MAGNESIUM SERPL-MCNC: 2 MG/DL (ref 1.6–2.4)
MCH RBC QN AUTO: 25.8 PG (ref 26.6–33)
MCHC RBC AUTO-ENTMCNC: 31 G/DL (ref 31.5–35.7)
MCV RBC AUTO: 83.4 FL (ref 79–97)
PHOSPHATE SERPL-MCNC: 9.4 MG/DL (ref 2.5–4.5)
PLATELET # BLD AUTO: 149 10*3/MM3 (ref 140–450)
PMV BLD AUTO: 11.5 FL (ref 6–12)
POTASSIUM SERPL-SCNC: 3.7 MMOL/L (ref 3.5–5.2)
PROT SERPL-MCNC: 5.9 G/DL (ref 6–8.5)
RBC # BLD AUTO: 3.56 10*6/MM3 (ref 4.14–5.8)
SODIUM SERPL-SCNC: 135 MMOL/L (ref 136–145)
TIBC SERPL-MCNC: 270 MCG/DL (ref 298–536)
TRANSFERRIN SERPL-MCNC: 181 MG/DL (ref 200–360)
VANCOMYCIN SERPL-MCNC: 16.1 MCG/ML (ref 5–40)
WBC # BLD AUTO: 11.34 10*3/MM3 (ref 3.4–10.8)

## 2021-04-09 PROCEDURE — 84100 ASSAY OF PHOSPHORUS: CPT | Performed by: INTERNAL MEDICINE

## 2021-04-09 PROCEDURE — 63710000001 INSULIN DETEMIR PER 5 UNITS: Performed by: INTERNAL MEDICINE

## 2021-04-09 PROCEDURE — 99232 SBSQ HOSP IP/OBS MODERATE 35: CPT | Performed by: INTERNAL MEDICINE

## 2021-04-09 PROCEDURE — 87086 URINE CULTURE/COLONY COUNT: CPT | Performed by: INTERNAL MEDICINE

## 2021-04-09 PROCEDURE — 83735 ASSAY OF MAGNESIUM: CPT | Performed by: INTERNAL MEDICINE

## 2021-04-09 PROCEDURE — 25010000002 FENTANYL CITRATE (PF) 100 MCG/2ML SOLUTION: Performed by: RADIOLOGY

## 2021-04-09 PROCEDURE — 80053 COMPREHEN METABOLIC PANEL: CPT | Performed by: INTERNAL MEDICINE

## 2021-04-09 PROCEDURE — 25010000002 THIAMINE PER 100 MG: Performed by: NURSE PRACTITIONER

## 2021-04-09 PROCEDURE — 82330 ASSAY OF CALCIUM: CPT | Performed by: NURSE PRACTITIONER

## 2021-04-09 PROCEDURE — 76937 US GUIDE VASCULAR ACCESS: CPT

## 2021-04-09 PROCEDURE — 25010000002 MIDAZOLAM PER 1 MG: Performed by: RADIOLOGY

## 2021-04-09 PROCEDURE — 25010000002 PIPERACILLIN SOD-TAZOBACTAM PER 1 G

## 2021-04-09 PROCEDURE — 99152 MOD SED SAME PHYS/QHP 5/>YRS: CPT

## 2021-04-09 PROCEDURE — 25010000002 VANCOMYCIN PER 500 MG

## 2021-04-09 PROCEDURE — C1894 INTRO/SHEATH, NON-LASER: HCPCS | Performed by: INTERNAL MEDICINE

## 2021-04-09 PROCEDURE — 36558 INSERT TUNNELED CV CATH: CPT | Performed by: INTERNAL MEDICINE

## 2021-04-09 PROCEDURE — 85027 COMPLETE CBC AUTOMATED: CPT | Performed by: INTERNAL MEDICINE

## 2021-04-09 PROCEDURE — 80202 ASSAY OF VANCOMYCIN: CPT

## 2021-04-09 PROCEDURE — 82962 GLUCOSE BLOOD TEST: CPT

## 2021-04-09 PROCEDURE — 25010000002 HEPARIN (PORCINE) PER 1000 UNITS: Performed by: INTERNAL MEDICINE

## 2021-04-09 PROCEDURE — 25010000002 HYDROCORTISONE SODIUM SUCCINATE 100 MG RECONSTITUTED SOLUTION: Performed by: INTERNAL MEDICINE

## 2021-04-09 PROCEDURE — 83605 ASSAY OF LACTIC ACID: CPT | Performed by: INTERNAL MEDICINE

## 2021-04-09 PROCEDURE — 25010000002 PIPERACILLIN SOD-TAZOBACTAM PER 1 G: Performed by: NURSE PRACTITIONER

## 2021-04-09 PROCEDURE — 63710000001 INSULIN LISPRO (HUMAN) PER 5 UNITS: Performed by: INTERNAL MEDICINE

## 2021-04-09 PROCEDURE — 25010000002 HEPARIN (PORCINE) PER 1000 UNITS

## 2021-04-09 PROCEDURE — 84466 ASSAY OF TRANSFERRIN: CPT | Performed by: INTERNAL MEDICINE

## 2021-04-09 PROCEDURE — 99233 SBSQ HOSP IP/OBS HIGH 50: CPT | Performed by: INTERNAL MEDICINE

## 2021-04-09 PROCEDURE — 77001 FLUOROGUIDE FOR VEIN DEVICE: CPT

## 2021-04-09 PROCEDURE — 83540 ASSAY OF IRON: CPT | Performed by: INTERNAL MEDICINE

## 2021-04-09 PROCEDURE — 25010000002 CALCIUM GLUCONATE PER 10 ML: Performed by: NURSE PRACTITIONER

## 2021-04-09 PROCEDURE — C1750 CATH, HEMODIALYSIS,LONG-TERM: HCPCS | Performed by: INTERNAL MEDICINE

## 2021-04-09 RX ORDER — ALBUMIN (HUMAN) 12.5 G/50ML
12.5 SOLUTION INTRAVENOUS AS NEEDED
Status: DISCONTINUED | OUTPATIENT
Start: 2021-04-09 | End: 2021-04-09 | Stop reason: SDUPTHER

## 2021-04-09 RX ORDER — LIDOCAINE HYDROCHLORIDE 10 MG/ML
INJECTION, SOLUTION EPIDURAL; INFILTRATION; INTRACAUDAL; PERINEURAL
Status: COMPLETED
Start: 2021-04-09 | End: 2021-04-09

## 2021-04-09 RX ORDER — MIDAZOLAM HYDROCHLORIDE 1 MG/ML
INJECTION INTRAMUSCULAR; INTRAVENOUS
Status: COMPLETED | OUTPATIENT
Start: 2021-04-09 | End: 2021-04-09

## 2021-04-09 RX ORDER — OXYMETAZOLINE HYDROCHLORIDE 0.05 G/100ML
2 SPRAY NASAL 2 TIMES DAILY
Status: COMPLETED | OUTPATIENT
Start: 2021-04-09 | End: 2021-04-11

## 2021-04-09 RX ORDER — MIDAZOLAM HYDROCHLORIDE 1 MG/ML
INJECTION INTRAMUSCULAR; INTRAVENOUS
Status: DISPENSED
Start: 2021-04-09 | End: 2021-04-10

## 2021-04-09 RX ORDER — VANCOMYCIN HYDROCHLORIDE 1 G/200ML
1000 INJECTION, SOLUTION INTRAVENOUS ONCE
Status: COMPLETED | OUTPATIENT
Start: 2021-04-09 | End: 2021-04-09

## 2021-04-09 RX ORDER — HEPARIN SODIUM 1000 [USP'U]/ML
1600 INJECTION, SOLUTION INTRAVENOUS; SUBCUTANEOUS AS NEEDED
Status: DISCONTINUED | OUTPATIENT
Start: 2021-04-09 | End: 2021-04-20 | Stop reason: HOSPADM

## 2021-04-09 RX ORDER — FENTANYL CITRATE 50 UG/ML
INJECTION, SOLUTION INTRAMUSCULAR; INTRAVENOUS
Status: DISPENSED
Start: 2021-04-09 | End: 2021-04-10

## 2021-04-09 RX ORDER — ALBUMIN (HUMAN) 12.5 G/50ML
12.5 SOLUTION INTRAVENOUS AS NEEDED
Status: ACTIVE | OUTPATIENT
Start: 2021-04-10 | End: 2021-04-10

## 2021-04-09 RX ORDER — FENTANYL CITRATE 50 UG/ML
INJECTION, SOLUTION INTRAMUSCULAR; INTRAVENOUS
Status: COMPLETED | OUTPATIENT
Start: 2021-04-09 | End: 2021-04-09

## 2021-04-09 RX ORDER — HEPARIN SODIUM 1000 [USP'U]/ML
INJECTION, SOLUTION INTRAVENOUS; SUBCUTANEOUS
Status: COMPLETED
Start: 2021-04-09 | End: 2021-04-09

## 2021-04-09 RX ADMIN — TAZOBACTAM SODIUM AND PIPERACILLIN SODIUM 4.5 G: 500; 4 INJECTION, SOLUTION INTRAVENOUS at 06:08

## 2021-04-09 RX ADMIN — THIAMINE HYDROCHLORIDE 250 MG: 100 INJECTION, SOLUTION INTRAMUSCULAR; INTRAVENOUS at 22:11

## 2021-04-09 RX ADMIN — HYDROCORTISONE SODIUM SUCCINATE 100 MG: 100 INJECTION, POWDER, FOR SOLUTION INTRAMUSCULAR; INTRAVENOUS at 04:07

## 2021-04-09 RX ADMIN — LIDOCAINE HYDROCHLORIDE 7 ML: 10 INJECTION, SOLUTION EPIDURAL; INFILTRATION; INTRACAUDAL; PERINEURAL at 15:18

## 2021-04-09 RX ADMIN — SODIUM CHLORIDE, PRESERVATIVE FREE 10 ML: 5 INJECTION INTRAVENOUS at 22:13

## 2021-04-09 RX ADMIN — Medication 2 SPRAY: at 22:12

## 2021-04-09 RX ADMIN — TAZOBACTAM SODIUM AND PIPERACILLIN SODIUM 4.5 G: 500; 4 INJECTION, SOLUTION INTRAVENOUS at 20:48

## 2021-04-09 RX ADMIN — DOCUSATE SODIUM 100 MG: 100 CAPSULE, LIQUID FILLED ORAL at 09:19

## 2021-04-09 RX ADMIN — Medication 2 SPRAY: at 10:18

## 2021-04-09 RX ADMIN — THIAMINE HYDROCHLORIDE 250 MG: 100 INJECTION, SOLUTION INTRAMUSCULAR; INTRAVENOUS at 09:19

## 2021-04-09 RX ADMIN — DOCUSATE SODIUM 100 MG: 100 CAPSULE, LIQUID FILLED ORAL at 22:11

## 2021-04-09 RX ADMIN — CALCIUM GLUCONATE 1 G: 98 INJECTION, SOLUTION INTRAVENOUS at 11:36

## 2021-04-09 RX ADMIN — VANCOMYCIN HYDROCHLORIDE 1000 MG: 1 INJECTION, SOLUTION INTRAVENOUS at 18:27

## 2021-04-09 RX ADMIN — HYDROCORTISONE SODIUM SUCCINATE 100 MG: 100 INJECTION, POWDER, FOR SOLUTION INTRAMUSCULAR; INTRAVENOUS at 18:26

## 2021-04-09 RX ADMIN — SODIUM BICARBONATE 150 MEQ: 84 INJECTION INTRAVENOUS at 18:19

## 2021-04-09 RX ADMIN — HEPARIN SODIUM 3200 UNITS: 1000 INJECTION, SOLUTION INTRAVENOUS; SUBCUTANEOUS at 15:17

## 2021-04-09 RX ADMIN — INSULIN LISPRO 2 UNITS: 100 INJECTION, SOLUTION INTRAVENOUS; SUBCUTANEOUS at 16:30

## 2021-04-09 RX ADMIN — ASPIRIN 81 MG: 81 TABLET, COATED ORAL at 09:19

## 2021-04-09 RX ADMIN — HYDROCORTISONE SODIUM SUCCINATE 100 MG: 100 INJECTION, POWDER, FOR SOLUTION INTRAMUSCULAR; INTRAVENOUS at 09:18

## 2021-04-09 RX ADMIN — SODIUM CHLORIDE, PRESERVATIVE FREE 10 ML: 5 INJECTION INTRAVENOUS at 09:19

## 2021-04-09 RX ADMIN — MIDAZOLAM 2 MG: 1 INJECTION INTRAMUSCULAR; INTRAVENOUS at 14:41

## 2021-04-09 RX ADMIN — HEPARIN SODIUM 5000 UNITS: 5000 INJECTION INTRAVENOUS; SUBCUTANEOUS at 22:13

## 2021-04-09 RX ADMIN — INSULIN LISPRO 2 UNITS: 100 INJECTION, SOLUTION INTRAVENOUS; SUBCUTANEOUS at 22:11

## 2021-04-09 RX ADMIN — FENTANYL CITRATE 50 MCG: 50 INJECTION, SOLUTION INTRAMUSCULAR; INTRAVENOUS at 14:40

## 2021-04-09 RX ADMIN — POLYETHYLENE GLYCOL 3350 17 G: 17 POWDER, FOR SOLUTION ORAL at 09:19

## 2021-04-09 RX ADMIN — CETIRIZINE HYDROCHLORIDE 10 MG: 10 TABLET, FILM COATED ORAL at 09:18

## 2021-04-09 RX ADMIN — SODIUM BICARBONATE 150 MEQ: 84 INJECTION INTRAVENOUS at 10:18

## 2021-04-09 RX ADMIN — INSULIN LISPRO 2 UNITS: 100 INJECTION, SOLUTION INTRAVENOUS; SUBCUTANEOUS at 09:18

## 2021-04-09 RX ADMIN — INSULIN DETEMIR 10 UNITS: 100 INJECTION, SOLUTION SUBCUTANEOUS at 18:19

## 2021-04-09 RX ADMIN — SODIUM BICARBONATE 150 MEQ: 84 INJECTION INTRAVENOUS at 01:01

## 2021-04-09 RX ADMIN — CALCIUM GLUCONATE 6 G: 98 INJECTION, SOLUTION INTRAVENOUS at 13:17

## 2021-04-09 RX ADMIN — HEPARIN SODIUM 5000 UNITS: 5000 INJECTION INTRAVENOUS; SUBCUTANEOUS at 09:19

## 2021-04-09 RX ADMIN — HEPARIN SODIUM 1600 UNITS: 1000 INJECTION INTRAVENOUS; SUBCUTANEOUS at 18:58

## 2021-04-09 NOTE — PROGRESS NOTES
Continued Stay Note  [facility]     Patient Name: Elvis Hanson  MRN: [id number]  Today's Date: 4/9/2021    Admit Date: 4/7/2021    Discharge Plan     Row Name 04/09/21 1245       Plan    Plan  Home    Patient/Family in Agreement with Plan  yes    Plan Comments  Spoke with patient and wife at bedside.  Patient plans to go home at this time when discharge.  Patient to begin dialysis.  Discharge plan may change.  Patient would benefit from PT/OT evals.  CM will continue to follow.        Discharge Codes    No documentation.       Expected Discharge Date and Time     Expected Discharge Date Expected Discharge Time    Apr 12, 2021             Ilana Moreno RN

## 2021-04-09 NOTE — NURSING NOTE
14.5 tunneled dialysis catheter placed in IR by Dr Franz.  Pt tolerated well.  Returned to ICU with nurse via bed.

## 2021-04-09 NOTE — NURSING NOTE
Remains afebrile and off pressors.  Tunneled dialysis cath placed. HD currently. Tolerating  UOP 20ml  No BM but passing gas  Calcium still infusing. Will get recheck at appropriate time when finsihed

## 2021-04-09 NOTE — PLAN OF CARE
Goal Outcome Evaluation:         * on 4L NC with ETCo2, co2 40-50, patient refusing bipap  Periods of shallow breathing with Rrs 8-10 with long expiratory phase  SBP stable off Levo   Urine output 20ml  Calcium 5.4, ordered Ical lab per APRN, awaiting results  CR increased this am  Right knee dressing has small old/new bloody drainage, denies pain

## 2021-04-09 NOTE — PROGRESS NOTES
Pulmonary/Critical Care Follow-up     LOS: 2 days   Patient Care Team:  April Chen DO as PCP - General (Internal Medicine)        Chief Complaint   Patient presents with   • Altered Mental Status     Subjective     History of Present Illness:   Elvis Hanson is a 66 y.o. male who presented to [facility] ED 4/7 with complaints of hearing loss.     Patient has a PMH of HTN, T2DM, dyslipidemia, SHYLA, and chronic pain s/p pain pump. He underwent right knee surgery 3 days ago per Dr. Story, with preoperative labs and cardiac eval reportedly normal at that time. He did well postoperatively and was discharged home. While home he developed some intermittent periods of diaphoresis. His wife reported he stopped urinating. On the morning of 4/7 he awoke and was unable to hear out of either ear, prompting presentation to our ED for evaluation.     In the ED he was found to be hypoxic and hypotensive with RINKU and elevated LFTs. CXR and CTH unremarkable. His blood pressure improved with fluids and he was admitted to PCU. TTE on admission revealed severe LV systolic dysfunction with EF of 30% and global HK.     He deteriorated overnight with hypotension despite additional fluids and a TLDL was placed / he was initiated on a Levophed infusion. Lab work revealed lactic acidosis with pH of 7.2 as well as worsening renal function and LFTs. He was placed on broad spectrum antibiotics and a bicarbonate infusion, and both Nephrology and Cardiology consults were placed for the AM. Due to his acute decline he was transferred to ICU for further management.      Interval History:     Patient is awake and alert.  No chest pain.  No fevers or chills.  No nausea or vomiting.  Minimal urine output.  Patient has been off pressors since yesterday.    PMH/FH/Social History were reviewed and updated appropriately in the electronic medical record.     Review of Systems:    Review of 14 systems was completed with positives and pertinent  negatives noted in the subjective section.  All other systems reviewed and are negative.         Objective     Vital Signs  Temp:  [96.9 °F (36.1 °C)-98.4 °F (36.9 °C)] 97.9 °F (36.6 °C)  Heart Rate:  [64-75] 67  Resp:  [8-20] 12  BP: ()/() 127/93  04/08 0701 - 04/09 0700  In: 4039.8 [P.O.:240; I.V.:2676]  Out: 49 [Urine:49]  Body mass index is 45.93 kg/m².     IV drips:  norepinephrine, Last Rate: Stopped (04/08/21 1550)  Pharmacy to dose vancomycin  sodium bicarbonate drip (greater than 75 mEq/bag), Last Rate: 150 mEq (04/09/21 0101)       Physical Exam:     Constitutional:   Alert, cooperative, in no acute distress   Head:   Normocephalic, without obvious abnormality, atraumatic   Eyes:           Lids and lashes normal, conjunctivae and sclerae normal.  No icterus, no pallor, corneas clear, PER   ENMT:  Ears appear intact with no abnormalities noted     No oral lesions, no thrush, oral mucosa moist   Neck:  No adenopathy, supple, trachea midline, no thyromegaly, no JVD   Lungs/Resp:    Normal effort, symmetric chest rise, no crepitus, mild bibasilar rales, no chest wall tenderness                Heart/CV:   Regular rhythm and normal rate, normal S1 and S2, no            murmur   Abdomen/GI:    Normal bowel sounds, no masses, no organomegaly, soft        nontender, nondistended   :    Deferred   Extremities/MSK:  No clubbing or cyanosis.  Mild lower extremity edema.  Normal tone.  Right knee dressing intact.  Distal right lower extremity dressings intact.   Pulses:  Pulses palpable and equal bilaterally   Skin:  No bleeding, bruising or rash   Heme/Lymph:  No cervical or supraclavicular adenopathy.   Neurologic:    Psychiatric:    Moves all extremities with no obvious focal motor deficit.  Cranial nerves 2 - 12 grossly intact  Oriented x 3, normal affect.    The above physical exam findings were reviewed and reflect exam findings as of today's exam.    Electronically signed by:Julio Montaño MD  04/09/21 10:01 EDT    Results Review:     I reviewed the patient's new clinical results.   Results from last 7 days   Lab Units 04/09/21  0205 04/08/21 0517 04/07/21  1711   SODIUM mmol/L 135* 133* 140   POTASSIUM mmol/L 3.7 3.9 3.9   CHLORIDE mmol/L 89* 94* 97*   CO2 mmol/L 27.0 21.0* 22.0   BUN mg/dL 53* 37* 28*   CREATININE mg/dL 6.62* 5.10* 4.20*   CALCIUM mg/dL 5.4* 6.5* 6.7*   BILIRUBIN mg/dL 0.6 0.6 0.5   ALK PHOS U/L 165* 154* 100   ALT (SGPT) U/L 3,838* 5,392* 2,553*   AST (SGOT) U/L 6,772* >7,000* 6,207*   GLUCOSE mg/dL 174* 138* 89     Results from last 7 days   Lab Units 04/09/21 0205 04/08/21 0517 04/07/21  1050   WBC 10*3/mm3 11.34* 17.13* 13.63*   HEMOGLOBIN g/dL 9.2* 10.1* 10.3*   HEMATOCRIT % 29.7* 33.4* 34.5*   PLATELETS 10*3/mm3 149 230 274     Results from last 7 days   Lab Units 04/08/21  0933   PH, ARTERIAL pH units 7.271*   PO2 ART mm Hg 96.7   PCO2, ARTERIAL mm Hg 52.1*   HCO3 ART mmol/L 23.9     Results from last 7 days   Lab Units 04/09/21 0205 04/08/21 0517 04/07/21  1050   MAGNESIUM mg/dL 2.0 1.6 1.6   PHOSPHORUS mg/dL 9.4* 11.4*  --      Procalcitonin 4/8/2021: 4.86.    I reviewed the patient's new imaging including images and reports.    Chest x-ray 4/8/2021, 12:27 AM: Left internal jugular catheter in place.  Cardiomegaly present.    Renal ultrasound 4/8/2021:       IMPRESSION:  Unremarkable sonographic appearance of both kidneys.    Medication Review:   aspirin, 81 mg, Oral, Daily  cetirizine, 10 mg, Oral, Daily  docusate sodium, 100 mg, Oral, BID  heparin (porcine), 5,000 Units, Subcutaneous, Q12H  hydrocortisone sodium succinate, 100 mg, Intravenous, Q8H  insulin lispro, 0-7 Units, Subcutaneous, 4x Daily AC & at Bedtime  oxymetazoline, 2 spray, Each Nare, BID  piperacillin-tazobactam, 4.5 g, Intravenous, Q12H  polyethylene glycol, 17 g, Oral, Daily  sodium chloride, 10 mL, Intravenous, Q12H  thiamine (VITAMIN B1) IVPB, 250 mg, Intravenous, BID  vancomycin (dosing per levels), ,  Does not apply, Daily      norepinephrine, 0.02-0.1 mcg/kg/min, Last Rate: Stopped (04/08/21 1550)  Pharmacy to dose vancomycin,   sodium bicarbonate drip (greater than 75 mEq/bag), 150 mEq, Last Rate: 150 mEq (04/09/21 0101)        Assessment/Plan         RINKU    Hypertension    T2DM    Dyslipidemia    Sleep apnea    Elevated liver enzymes    Recent Rt Total Knee Replacement 4/5/21    Chronic pain w/pain pump    Lactic acidosis    Acute HFrEF (EF 30%)    Shock (CMS/HCC) -cardiogenic versus septic    66 y.o. male with history of hypertension, hyperlipidemia, type 2 diabetes, obstructive sleep apnea, chronic pain with pain pump, recent total knee replacement on 4/5/2021 who presented with hearing loss to the emergency department and was found to be hypotensive.    Subsequent evaluation has revealed congestive heart failure (acute?)  With left ventricular ejection fraction of 30%.  Urinalysis somewhat suggestive of urinary tract infection present on admission.  Procalcitonin elevated concerning for sepsis.    Patient decompensated after admission requiring pressors, central line placement, BiPAP and was transferred to the intensive care unit the morning of 4/8/2021.  He has developed worsening renal failure and elevated troponin.  Shock was likely cardiogenic versus septic or combination.    Renal failure has worsened and he had hemodialysis catheter placement done today.  We will initiate hemodialysis tonight.  Patient is off of pressors since yesterday.  Okay to telemetry after hemodialysis.    Plan:  1.  Okay to telemetry after hemodialysis.  2.  BiPAP as needed.  3.  Continue broad-spectrum antibiotics.  4.  Check culture from initial urinalysis sample.  Need to check with lab on this.  5.  Follow-up blood cultures.  6.  Monitor blood sugars with correction insulin as needed.  Add low-dose Levemir.  7.  Hold antihypertensives.  8.  Cardiology following.  9.  Continue aspirin.  10.  Initiate hemodialysis per  nephrology.  11.  Eventually will likely need ischemic evaluation for coronary disease.  Currently contraindicated due to acute renal failure.    Level of Risk High due to:  illness with threat to life or bodily function including renal failure, resolving shock, respiratory failure with somewhat tenuous respiratory status.  Though no longer critically ill and needing an intensive care unit bed, he is a complex patient with multiple problems being actively managed.    Electronically signed by:  Julio Montaño MD  04/09/21  10:01 EDT      *. Please note that portions of this note were completed with [employer] - a voice recognition program.

## 2021-04-09 NOTE — POST-PROCEDURE NOTE
An immediate patient assessment was done prior to the administration of moderate and/or deep conscious sedation.      Elvis Hanson      Pre-op Diagnosis:   ATN secondary to shock with deterioration renal function.  No previous history of kidney disease per patient no history of epistaxis hemoptysis gross hematuria or kidney stones no family history of kidney disease.    Protein creatinine ratio nephrotic range (nephrotic proteinuria greater than 6 g in the setting of diabetes). Septic shock: On IV pressors. Congestive heart failure: Ejection fraction 30%.  Post-op diagnosis:  Same    Anesthesia: Moderate sedation    ASA SCALE ASSESSMENT:  4-Severe incapacitating disease process that is a constant threat to life    MALLAMPATI CLASSIFICATION:  3-Only the soft palate and base of uvula are visible    Staff:   Wil Franz MD      Estimated Blood Loss: Trace    Urine Voided: * No values recorded between 4/9/2021 12:00 AM and 4/9/2021  3:46 PM *    Specimens:                None      Drains:  None    Findings: Patent right internal jugular vein    Complications: No immediate      Wil Franz MD     Date: 4/9/2021  Time: 15:46 EDT

## 2021-04-09 NOTE — PROGRESS NOTES
[facility] at [facility]  IP Progress Note      Chief Complaint/Reason for service: #1 systolic dysfunction #2 elevated troponin    Subjective   Subjective: The patient denies overt shortness of breath.  He states he is very uncomfortable and wants a bigger bed.  He also states he wants the fluid to come off.  He denies chest pain    Past medical, surgical, social and family history reviewed in the patient's electronic medical record.    Objective     Vital Sign Min/Max for last 24 hours  Temp  Min: 96.9 °F (36.1 °C)  Max: 98.4 °F (36.9 °C)   BP  Min: 60/50  Max: 146/91   Pulse  Min: 64  Max: 75   Resp  Min: 8  Max: 20   SpO2  Min: 85 %  Max: 100 %   Flow (L/min)  Min: 3  Max: 6      Intake/Output Summary (Last 24 hours) at 4/9/2021 0759  Last data filed at 4/9/2021 0400  Gross per 24 hour   Intake 4039.8 ml   Output 49 ml   Net 3990.8 ml             Current Facility-Administered Medications:   •  aspirin EC tablet 81 mg, 81 mg, Oral, Daily, Case, Rossy V., DO, 81 mg at 04/08/21 0810  •  calcium gluconate 1 g in sodium chloride 0.9 % 100 mL IVPB, 1 g, Intravenous, PRN **AND** calcium gluconate 6 g in sodium chloride 0.9 % 500 mL IVPB, 6 g, Intravenous, PRN **AND** Calcium, Ionized, , , PRN, Mike Gillette, APRN  •  cetirizine (zyrTEC) tablet 10 mg, 10 mg, Oral, Daily, Case, Rossy V., DO, 10 mg at 04/08/21 0810  •  docusate sodium (COLACE) capsule 100 mg, 100 mg, Oral, BID, Kianna Patel, APRN, 100 mg at 04/08/21 2222  •  heparin (porcine) 5000 UNIT/ML injection 5,000 Units, 5,000 Units, Subcutaneous, Q12H, Case, Rossy V., DO, 5,000 Units at 04/08/21 2050  •  hydrocortisone sodium succinate (Solu-CORTEF) injection 100 mg, 100 mg, Intravenous, Q8H, Julio Montaño MD, 100 mg at 04/09/21 0407  •  insulin lispro (humaLOG) injection 0-7 Units, 0-7 Units, Subcutaneous, 4x Daily AC & at Bedtime, Juilo Montaño MD, 3 Units at 04/08/21 2222  •  norepinephrine (LEVOPHED) 8 mg in 250  mL NS infusion (premix), 0.02-0.1 mcg/kg/min, Intravenous, Titrated, Prashant Aiken APRN, Stopped at 04/08/21 1550  •  oxyCODONE (ROXICODONE) immediate release tablet 5 mg, 5 mg, Oral, Q6H PRN, Case, Rossy V., DO, 5 mg at 04/08/21 0810  •  Pharmacy to dose vancomycin, , Does not apply, Continuous PRN, Prashant Aiken APRN  •  piperacillin-tazobactam (ZOSYN) 4.5 g in iso-osmotic dextrose 100 mL IVPB (premix), 4.5 g, Intravenous, Q12H, Nayely Colón, PharmD  •  polyethylene glycol (MIRALAX) packet 17 g, 17 g, Oral, Daily, Kianna Patel APRN  •  sodium bicarbonate 8.4 % 150 mEq in dextrose (D5W) 5 % 1,000 mL infusion (greater than 75 mEq), 150 mEq, Intravenous, Continuous, Case, Rossy V., DO, Last Rate: 125 mL/hr at 04/09/21 0101, 150 mEq at 04/09/21 0101  •  sodium chloride 0.9 % flush 10 mL, 10 mL, Intravenous, Q12H, Case, Rossy V., DO, 10 mL at 04/08/21 2050  •  sodium chloride 0.9 % flush 10 mL, 10 mL, Intravenous, PRN, Case, Rossy V., DO  •  sodium chloride nasal spray 2 spray, 2 spray, Each Nare, PRN, Mike Gillette APRN  •  thiamine (B-1) 250 mg in sodium chloride 0.9 % 100 mL IVPB, 250 mg, Intravenous, BID, Prashant Aiken APRN, Last Rate: 200 mL/hr at 04/08/21 2222, 250 mg at 04/08/21 2222  •  vancomycin (dosing per levels), , Does not apply, Daily, Sherry Cooper, PharmD, Stopped at 04/08/21 1006    Physical Exam: General obese male in bed no overt dyspnea but mild tachypnea stable heart rate and blood pressure        HEENT: No JVP, no icterus       Respiratory: Equal bilateral symmetrical expansion with isolated crackle       Cardiovascular: Distant but regular without any obvious murmur positive pitting edema to palpation       GI: Obese       Lower Extremities: Positive edema no lesions       Neuro: Facial expressions show mild facial droop which is chronic.  He moves all 4 extremities       Skin: Warm and dry with trace pitting edema to palpation       Psych: Pleasant  affect    Results Review: Patient overnight is 3.9 L ahead.  Net 5.6 L.  Heart rates in the 60s.  Blood pressures 1 20-1 37.  The creatinine is now up to 6.62.  Calcium is low.  ALT is 3838 AST 6772.  Hemoglobin is 9 point    Radiology Results:  Imaging Results (Last 72 Hours)     Procedure Component Value Units Date/Time    US Renal Limited [[id number]] Resulted: 04/08/21 1533     Updated: 04/08/21 1534    XR Chest 1 View [[id number]] Collected: 04/08/21 0044     Updated: 04/08/21 0046    Narrative:      CR Chest 1 Vw    INDICATION:   Line placement.     COMPARISON:    4/7/2021    FINDINGS:  Single portable AP view(s) of the chest.    Cardiomegaly is stable. The lungs are clear. There is mild chronic elevation of the right hemidiaphragm. Left IJ line tip is in the left brachiocephalic vein or SVC. No pneumothorax.       Impression:      Line tip in the left brachiocephalic vein or SVC. No pneumothorax.    Signer Name: Aniceto Clark MD   Signed: 4/8/2021 12:44 AM   Workstation Name: [facility]    [facility]    CT Head Without Contrast [[id number]] Collected: 04/07/21 1308     Updated: 04/07/21 1658    Narrative:      EXAMINATION: CT HEAD WO CONTRAST- 04/07/2021     INDICATION: Altered mental status; R41.82-Altered mental status,  unspecified; I95.9-Hypotension, unspecified; N17.9-Acute kidney failure,  unspecified; H91.93-Unspecified hearing loss, bilateral; R79.89-Other  specified abnormal findings of blood chemistry; R77.8-Other specified  abnormalities of plasma proteins; confusion     TECHNIQUE: Multiple axial CT imaging was obtained of the head from skull  base to skull vertex without the administration of intravenous contrast.     The radiation dose reduction device was turned on for each scan per the  ALARA (As Low as Reasonably Achievable) protocol.     COMPARISON: NONE     FINDINGS: Brain parenchyma is unremarkable in appearance. No hemorrhage  or hydrocephalus. No mass, mass effect,  or midline shift. No abnormal  extra axial fluid collections identified. Bony structures reveal no  evidence of osseous abnormality with some minimal mucosal thickening of  the ethmoid air cells. The mastoid air cells are patent.       Impression:      Mild atrophy and chronic changes seen within the brain with  no acute intracranial abnormality. Mild chronic ethmoid sinusitis.     D:  04/07/2021  E:  04/07/2021        This report was finalized on 4/7/2021 4:54 PM by Dr. Doreen Mcgee MD.       XR Chest 1 View [[id number]] Collected: 04/07/21 1107     Updated: 04/07/21 1657    Narrative:      EXAMINATION: XR CHEST 1 VW-      INDICATION: Weak, dizzy, AMS triage protocol.      COMPARISON: None.     FINDINGS: Portable chest reveals cardiac and mediastinal silhouettes  within normal limits. The lung fields are grossly clear. No focal  parenchymal opacification is present.  No pleural effusion or  pneumothorax. Bony structures are unremarkable. Pulmonary vascularity is  within normal limits.           Impression:      No acute cardiopulmonary disease.     D:  04/07/2021  E:  04/07/2021     This report was finalized on 4/7/2021 4:54 PM by Dr. Doreen Mcgee MD.             EKG: Sinus rhythm nonspecific T wave flattening    ECHO: EF 30% with wall motion abnormality    Tele: No arrhythmias noted overnight    Assessment   Assessment/Plan: 1 LV systolic dysfunction with wall motion abnormality-patient currently has mild tachypnea but no JVP and lungs are relatively clear.  Unfortunate is not making any urine and is received 5 L.  Await nephrology's evaluation or possible filtration  2 elevated troponin-with patient's LV dysfunction this may represent a non-STEMI.  The patient is on aspirin.  He cannot receive statin because of severely elevated transaminases.  He is not on any beta blockade or antihypertensive because of his acute renal failure   3 elevated transaminases-the enzymes have increased further  4 acute  worsening renal failure-minimal urine output.  Patient made need dialysis    Aniceto Newby MD  04/09/21  07:59 EDT

## 2021-04-09 NOTE — PROGRESS NOTES
Pharmacy Consult-Vancomycin Dosing  Elvis Hanson is a  66 y.o. male receiving vancomycin therapy.     Indication: Bone/joint infection, sepsis, SSTI  Consulting Provider: Intensivist  ID Consult: No    Goal Trough: 15-20mcg/mL    Current Antimicrobial Therapy  Anti-Infectives (From admission, onward)      Ordered     Dose/Rate Route Frequency Start Stop    04/09/21 0722  piperacillin-tazobactam (ZOSYN) 4.5 g in iso-osmotic dextrose 100 mL IVPB (premix)     Ordering Provider: Nayely Colón, PharmD    4.5 g  over 4 Hours Intravenous Every 12 Hours 04/09/21 1800 04/18/21 0559    04/09/21 1155  vancomycin (VANCOCIN) in iso-osmotic dextrose IVPB 1 g (premix) 200 mL     Ordering Provider: Nayely Colón, PharmD    1,000 mg  over 60 Minutes Intravenous Once 04/09/21 1800      04/07/21 2128  vancomycin (dosing per levels)     Nayely Colón, PharmD reviewed the order on 04/09/21 0720.   Ordering Provider: Sherry Cooper, Sam     Does not apply Daily 04/08/21 0900 04/14/21 0859    04/07/21 2122  piperacillin-tazobactam (ZOSYN) 4.5 g in iso-osmotic dextrose 100 mL IVPB (premix)     Ordering Provider: Prashant Aiken APRN    4.5 g  over 30 Minutes Intravenous Once 04/07/21 2300 04/08/21 0159    04/07/21 2128  vancomycin 2500 mg/500 mL 0.9% NS IVPB ([facility])     Ordering Provider: Sherry Cooper PharmD    20 mg/kg × 127 kg  over 180 Minutes Intravenous Once 04/07/21 2300 04/08/21 0430    04/07/21 2122  Pharmacy to dose vancomycin     De Ballard RPH reviewed the order on 04/08/21 0856.   Ordering Provider: Prashant Aiken APRN     Does not apply Continuous PRN 04/07/21 2121 04/17/21 2120          Allergies  Allergies as of 04/07/2021    (No Known Allergies)     Labs    Results from last 7 days   Lab Units 04/09/21  0205 04/08/21  0517 04/07/21  1711   BUN mg/dL 53* 37* 28*   CREATININE mg/dL 6.62* 5.10* 4.20*     Results from last 7 days   Lab Units 04/09/21  0205 04/08/21  0517 04/07/21  1050   WBC 10*3/mm3 11.34* 17.13*  "13.63*     Evaluation of Dosing     Last Dose Received in the ED/Outside Facility: NA  Is Patient on Dialysis or Renal Replacement: No    Ht - 177.8 cm (70\")  Wt - (!) 145 kg (320 lb 1.7 oz)    Estimated Creatinine Clearance: 16.3 mL/min (A) (by C-G formula based on SCr of 6.62 mg/dL (H)).    Intake & Output (last 3 days)         04/06 0701 - 04/07 0700 04/07 0701 - 04/08 0700 04/08 0701 - 04/09 0700 04/09 0701 - 04/10 0700    P.O.  1050 240     I.V. (mL/kg)   2676 (18.5) 1113.6 (7.7)    IV Piggyback  700 1123.8 200    Total Intake(mL/kg)  1750 (13.5) 4039.8 (27.9) 1313.6 (9.1)    Urine (mL/kg/hr)  50 49 (0) 20 (0)    Total Output  50 49 20    Net  +1700 +3990.8 +1293.6                  Microbiology and Radiology  Microbiology Results (last 10 days)       Procedure Component Value - Date/Time    MRSA Screen, PCR (Inpatient) - Swab, Nares [[id number]]  (Normal) Collected: 04/08/21 0341    Lab Status: Final result Specimen: Swab from Nares Updated: 04/08/21 0937     MRSA PCR Negative    Narrative:      MRSA Negative    Blood Culture - Blood, Hand, Left [[id number]] Collected: 04/07/21 2057    Lab Status: Preliminary result Specimen: Blood from Hand, Left Updated: 04/08/21 2145     Blood Culture No growth at 24 hours    Blood Culture - Blood, Hand, Right [[id number]] Collected: 04/07/21 2057    Lab Status: Preliminary result Specimen: Blood from Hand, Right Updated: 04/08/21 2145     Blood Culture No growth at 24 hours    COVID-19 and FLU A/B PCR - Swab, Nasopharynx [[id number]]  (Normal) Collected: 04/07/21 1357    Lab Status: Final result Specimen: Swab from Nasopharynx Updated: 04/07/21 1509     COVID19 Not Detected     Influenza A PCR Not Detected     Influenza B PCR Not Detected    Narrative:      Fact sheet for providers: https://www.fda.gov/media/627541/download    Fact sheet for patients: https://www.fda.gov/media/760714/download    Test performed by PCR.          Vancomycin Levels:    Results from last 7 days "   Lab Units 04/09/21  0205   VANCOMYCIN RM mcg/mL 16.10               Assessment/Plan:    Pharmacy to dose vancomycin for bone/joint infection, sepsis, SSTI. Patient had right knee surgery 2 days ago with Dr. Avalos.   Patient vancomycin random concentration at 16.1 mcg/mL. Patient is currently within therapeutic range. Starting HD on 4/9. Will give 1000 mg IV on 4/9 after dialysis. Will get random concentration tomorrow morning 4/10 before dialysis.   Scr continued to increase from yesterday  Pharmacy will continue to monitor renal function, cultures and sensitivities, and clinical status to adjust regimen as necessary.    Thanks,     Julieth De Oliveira, Pharmacy Intern

## 2021-04-09 NOTE — PROGRESS NOTES
LOS: 2 days    Patient Care Team:  April Chen DO as PCP - General (Internal Medicine)    Chief Complaint:    History of present illness:    66-year-old -American male with no previous history of kidney disease, no epistaxis, hemoptysis hematemesis kidney stones.  Patient had a right TKR on 4/4/2021, postop patient has received nonsteroidal Mobic for a few days, after going home he had a problem with hearing and altered mental status patient was brought into the hospital initially admitted to the floor.  Patient found to have a creatinine of 3.89 BUN of 25 liver enzymes were elevated ALT 2631, AST 4533, high WBC count of 13.6, lactic acidosis was noted, hemoglobin 10.0, patient has decreased urine output, mild metabolic acidosis, due to hypotension patient was transferred from floor to ICU where patient was placed on IV Levophed.  Peck catheter has been in place urine output has been poor at this time.  Patient oral intake recently has been low.  Renal been consulted at this time.  Patient is being treated for sepsis IV vancomycin has been given accompanied with other antihypertensive medication.  Home medications include losartan furosemide other diuretics were stopped.  He was taken off the Mobic and Metformin due to acidosis and renal failure.  He denied any nausea or vomiting or diarrhea at this time no shortness of breath or chest pain.  He has a history of Bell palsy.    Subjective     Interval History:   Oliguric acute renal failure.  Hypocalcemia, proteinuria nephrotic range.  Patient critically ill.  Complains of generalized weakness denies any nausea vomiting chest pain.  Other complaints lower extremity edema    Review of Systems:   All other negative except for edema, generalized weakness as noted above.    Objective     Vital Sign Min/Max for last 24 hours  Temp  Min: 96.9 °F (36.1 °C)  Max: 98.4 °F (36.9 °C)   BP  Min: 60/50  Max: 146/91   Pulse  Min: 64  Max: 75   Resp  Min: 8   "Max: 20   SpO2  Min: 85 %  Max: 100 %   Flow (L/min)  Min: 3  Max: 4   Weight  Min: 145 kg (320 lb 1.7 oz)  Max: 145 kg (320 lb 1.7 oz)     Flowsheet Rows      First Filed Value   Admission Height  177.8 cm (70\") Documented at 04/07/2021 1041   Admission Weight  127 kg (280 lb) Documented at 04/07/2021 1041          I/O this shift:  In: 837.4 [I.V.:737.4; IV Piggyback:100]  Out: -   I/O last 3 completed shifts:  In: 5789.8 [P.O.:1290; I.V.:2676; IV Piggyback:1823.8]  Out: 99 [Urine:99]    Physical Exam:  General Appearance: Alert, oriented, no obvious distress.  Obesity -American male  Facial: Left-sided Bell's palsy noted.  Eyes: PER, EOMI.  Neck: Supple no JVD.  Lungs: Clear auscultation, no rales rhonchi's, equal chest movement, nonlabored.  Heart: No gallop, murmur, rub, RRR.  Abdomen: Soft, nontender, positive bowel sounds, no organomegaly.  Extremities: Trace edema edema, no cyanosis.  Neuro: No focal deficit, moving all extremities, alert oriented X 3  : Peck catheter in place.    WBC WBC   Date Value Ref Range Status   04/09/2021 11.34 (H) 3.40 - 10.80 10*3/mm3 Final   04/08/2021 17.13 (H) 3.40 - 10.80 10*3/mm3 Final   04/07/2021 13.63 (H) 3.40 - 10.80 10*3/mm3 Final      HGB Hemoglobin   Date Value Ref Range Status   04/09/2021 9.2 (L) 13.0 - 17.7 g/dL Final   04/08/2021 10.1 (L) 13.0 - 17.7 g/dL Final   04/07/2021 10.3 (L) 13.0 - 17.7 g/dL Final      HCT Hematocrit   Date Value Ref Range Status   04/09/2021 29.7 (L) 37.5 - 51.0 % Final   04/08/2021 33.4 (L) 37.5 - 51.0 % Final   04/07/2021 34.5 (L) 37.5 - 51.0 % Final      Platlets No results found for: LABPLAT   MCV MCV   Date Value Ref Range Status   04/09/2021 83.4 79.0 - 97.0 fL Final   04/08/2021 84.1 79.0 - 97.0 fL Final   04/07/2021 86.0 79.0 - 97.0 fL Final          Sodium Sodium   Date Value Ref Range Status   04/09/2021 135 (L) 136 - 145 mmol/L Final   04/08/2021 133 (L) 136 - 145 mmol/L Final   04/07/2021 140 136 - 145 mmol/L Final "   04/07/2021 140 136 - 145 mmol/L Final      Potassium Potassium   Date Value Ref Range Status   04/09/2021 3.7 3.5 - 5.2 mmol/L Final     Comment:     Slight hemolysis detected by analyzer. Results may be affected.   04/08/2021 3.9 3.5 - 5.2 mmol/L Final   04/07/2021 3.9 3.5 - 5.2 mmol/L Final   04/07/2021 3.7 3.5 - 5.2 mmol/L Final      Chloride Chloride   Date Value Ref Range Status   04/09/2021 89 (L) 98 - 107 mmol/L Final   04/08/2021 94 (L) 98 - 107 mmol/L Final   04/07/2021 97 (L) 98 - 107 mmol/L Final   04/07/2021 94 (L) 98 - 107 mmol/L Final      CO2 CO2   Date Value Ref Range Status   04/09/2021 27.0 22.0 - 29.0 mmol/L Final   04/08/2021 21.0 (L) 22.0 - 29.0 mmol/L Final   04/07/2021 22.0 22.0 - 29.0 mmol/L Final   04/07/2021 26.0 22.0 - 29.0 mmol/L Final      BUN BUN   Date Value Ref Range Status   04/09/2021 53 (H) 8 - 23 mg/dL Final   04/08/2021 37 (H) 8 - 23 mg/dL Final   04/07/2021 28 (H) 8 - 23 mg/dL Final   04/07/2021 25 (H) 8 - 23 mg/dL Final      Creatinine Creatinine   Date Value Ref Range Status   04/09/2021 6.62 (H) 0.76 - 1.27 mg/dL Final   04/08/2021 5.10 (H) 0.76 - 1.27 mg/dL Final   04/07/2021 4.20 (H) 0.76 - 1.27 mg/dL Final   04/07/2021 3.89 (H) 0.76 - 1.27 mg/dL Final      Calcium Calcium   Date Value Ref Range Status   04/09/2021 5.4 (C) 8.6 - 10.5 mg/dL Final   04/08/2021 6.5 (L) 8.6 - 10.5 mg/dL Final   04/07/2021 6.7 (L) 8.6 - 10.5 mg/dL Final   04/07/2021 7.9 (L) 8.6 - 10.5 mg/dL Final      PO4 No results found for: CAPO4   Albumin Albumin   Date Value Ref Range Status   04/09/2021 3.30 (L) 3.50 - 5.20 g/dL Final   04/08/2021 3.40 (L) 3.50 - 5.20 g/dL Final   04/07/2021 3.50 3.50 - 5.20 g/dL Final   04/07/2021 4.10 3.50 - 5.20 g/dL Final      Magnesium Magnesium   Date Value Ref Range Status   04/09/2021 2.0 1.6 - 2.4 mg/dL Final   04/08/2021 1.6 1.6 - 2.4 mg/dL Final   04/07/2021 1.6 1.6 - 2.4 mg/dL Final      Uric Acid No results found for: URICACID     Right kidney measures 11.2  cm in length without apparent mass or   hydronephrosis. Normal color Doppler flow.       Left kidney measures 11.0 cm in length without apparent mass or   hydronephrosis. Normal color Doppler flow.       Unremarkable catheterized and contracted urinary bladder.       Impression:     Unremarkable sonographic appearance of both kidneys.         Results Review:     I reviewed the patient's new clinical results.    aspirin, 81 mg, Oral, Daily  cetirizine, 10 mg, Oral, Daily  docusate sodium, 100 mg, Oral, BID  heparin (porcine), 5,000 Units, Subcutaneous, Q12H  hydrocortisone sodium succinate, 100 mg, Intravenous, Q8H  insulin lispro, 0-7 Units, Subcutaneous, 4x Daily AC & at Bedtime  oxymetazoline, 2 spray, Each Nare, BID  piperacillin-tazobactam, 4.5 g, Intravenous, Q12H  polyethylene glycol, 17 g, Oral, Daily  sodium chloride, 10 mL, Intravenous, Q12H  thiamine (VITAMIN B1) IVPB, 250 mg, Intravenous, BID  vancomycin (dosing per levels), , Does not apply, Daily      norepinephrine, 0.02-0.1 mcg/kg/min, Last Rate: Stopped (04/08/21 1550)  Pharmacy to dose vancomycin,   sodium bicarbonate drip (greater than 75 mEq/bag), 150 mEq, Last Rate: 150 mEq (04/09/21 1018)        Medication Review: As noted above    Assessment/Plan       RINKU    Hypertension    T2DM    Dyslipidemia    Sleep apnea    Elevated liver enzymes    Recent Rt Total Knee Replacement 4/5/21    Chronic pain w/pain pump    Lactic acidosis    Acute HFrEF (EF 30%)    Shock (CMS/HCC) -cardiogenic versus septic  1.  ATN secondary to shock with deterioration renal function.  No previous history of kidney disease per patient no history of epistaxis hemoptysis gross hematuria or kidney stones no family history of kidney disease.    Protein creatinine ratio nephrotic range.  2.  Septic shock: On IV pressors.  3.  Nephrotic proteinuria greater than 6 g in the setting of diabetes.  4.  Altered mental status and hearing difficulty improved at this time.  5.  S/p recent  right total knee replacement.  6.  Congestive heart failure: Ejection fraction 30%.     Plan:  Ionized calcium low replacement ordered.  Dialysis to be started as patient is oliguric with increasing BUN/creatinine  Keep mean arterial pressure greater than 70.  Hold all antihypertensive medications, Glucophage, nonsteroidals at this time.  Avoid nephrotoxic medications.   Check volume status.  Check labs in the morning.  Daily evaluation for renal replacement therapy will be done if blood pressure remains low CRRT will be done.  Continue with the bicarb drip at this time.  Monitor bicarb level  Adjust medication for the new GFR.   High risk patient with multiple medical problems.  I discussed the patients findings and my recommendations with patient, family and nursing staff.  Interventional radiology informed.  Dialysis RN informed order for dialysis written.  Right kidney 11.2 cm, left kidney 11.0 cm normal color Doppler unremarkable bilateral kidneys.    Jimenez Daniels MD  04/09/21  11:30 EDT

## 2021-04-09 NOTE — PROGRESS NOTES
Multidisciplinary Rounds    Time: 20min  Patient Name: Elvis Hanson  Date of Encounter: 04/09/21 10:02 EDT  MRN: [id number]  Admission date: 4/7/2021      Reason for visit: MDR. RD to continue to follow per protocol.     Additional information obtained during MDR: Will add oral nutrition supplements with meals- Boost Glucose Control 3x/day (note that Boost Glucose Control is low in K+ and phos).     Current diet: Diet Regular; Consistent Carbohydrate; Low Phosphorus      Intervention:  Follow treatment plan  Care plan reviewed    Follow up:   Per protocol      Julieth Merlos MS RD/LD [facility]  10:02 EDT

## 2021-04-09 NOTE — PROGRESS NOTES
Orthopedic Progress Note      Patient: Elvis Hanson  YOB: 1955     Date of Admission: 4/7/2021 10:40 AM Medical Record Number: [id number]     Attending Physician: Rossy Quigley,     Status Post:  R TKA  Post Operative Day Number: 5    Subjective : No new orthopaedic complaints     Pain Relief: some relief with present medication.     Systemic Complaints: No Complaints  Blood pressure improved   Vitals:    04/09/21 0830 04/09/21 0900 04/09/21 0930 04/09/21 1000   BP: 127/78 131/81 133/83 134/84   BP Location:       Patient Position:       Pulse: 70 69 69 68   Resp:       Temp:       TempSrc:       SpO2: 98% 97% 99% 96%   Weight:       Height:           Physical Exam: 66 y.o. male    General Appearance:       Alert, cooperative, in no acute distress                  Extremities:    Dressing mild drainage              No clinical sign of DVT        Able to do good movements of digits    Pulses:   Pulses palpable and equal bilaterally           Diagnostic Tests:     Results from last 7 days   Lab Units 04/09/21  0205 04/08/21  0517 04/07/21  1050   WBC 10*3/mm3 11.34* 17.13* 13.63*   HEMOGLOBIN g/dL 9.2* 10.1* 10.3*   HEMATOCRIT % 29.7* 33.4* 34.5*   PLATELETS 10*3/mm3 149 230 274     Results from last 7 days   Lab Units 04/09/21  0205 04/08/21  0517 04/07/21  1711   SODIUM mmol/L 135* 133* 140   POTASSIUM mmol/L 3.7 3.9 3.9   CHLORIDE mmol/L 89* 94* 97*   CO2 mmol/L 27.0 21.0* 22.0   BUN mg/dL 53* 37* 28*   CREATININE mg/dL 6.62* 5.10* 4.20*   GLUCOSE mg/dL 174* 138* 89   CALCIUM mg/dL 5.4* 6.5* 6.7*     Results from last 7 days   Lab Units 04/08/21  0808   INR  1.87*   APTT seconds 29.4     No results found for: URICACID  No results found for: CRYSTAL  Microbiology Results (last 10 days)     Procedure Component Value - Date/Time    MRSA Screen, PCR (Inpatient) - Swab, Nares [[id number]]  (Normal) Collected: 04/08/21 0341    Lab Status: Final result Specimen: Swab from Nares Updated: 04/08/21 0937      MRSA PCR Negative    Narrative:      MRSA Negative    Blood Culture - Blood, Hand, Left [[id number]] Collected: 04/07/21 2057    Lab Status: Preliminary result Specimen: Blood from Hand, Left Updated: 04/08/21 2145     Blood Culture No growth at 24 hours    Blood Culture - Blood, Hand, Right [[id number]] Collected: 04/07/21 2057    Lab Status: Preliminary result Specimen: Blood from Hand, Right Updated: 04/08/21 2145     Blood Culture No growth at 24 hours    COVID-19 and FLU A/B PCR - Swab, Nasopharynx [[id number]]  (Normal) Collected: 04/07/21 1357    Lab Status: Final result Specimen: Swab from Nasopharynx Updated: 04/07/21 1509     COVID19 Not Detected     Influenza A PCR Not Detected     Influenza B PCR Not Detected    Narrative:      Fact sheet for providers: https://www.fda.gov/media/706321/download    Fact sheet for patients: https://www.fda.gov/media/421070/download    Test performed by PCR.        CT Head Without Contrast    Result Date: 4/7/2021  Mild atrophy and chronic changes seen within the brain with no acute intracranial abnormality. Mild chronic ethmoid sinusitis.  D:  04/07/2021 E:  04/07/2021   This report was finalized on 4/7/2021 4:54 PM by Dr. Doreen Mcgee MD.      US Renal Limited    Result Date: 4/9/2021  Unremarkable sonographic appearance of both kidneys.  This report was finalized on 4/9/2021 8:21 AM by Chaim Gomez.      XR Chest 1 View    Result Date: 4/8/2021  Line tip in the left brachiocephalic vein or SVC. No pneumothorax. Signer Name: Aniceto Clark MD  Signed: 4/8/2021 12:44 AM  Workstation Name: [facility]  [facility]    XR Chest 1 View    Result Date: 4/7/2021  No acute cardiopulmonary disease.  D:  04/07/2021 E:  04/07/2021  This report was finalized on 4/7/2021 4:54 PM by Dr. Doreen Mcgee MD.          Current Medications:  Scheduled Meds:aspirin, 81 mg, Oral, Daily  cetirizine, 10 mg, Oral, Daily  docusate sodium, 100 mg, Oral,  BID  heparin (porcine), 5,000 Units, Subcutaneous, Q12H  hydrocortisone sodium succinate, 100 mg, Intravenous, Q8H  insulin lispro, 0-7 Units, Subcutaneous, 4x Daily AC & at Bedtime  oxymetazoline, 2 spray, Each Nare, BID  piperacillin-tazobactam, 4.5 g, Intravenous, Q12H  polyethylene glycol, 17 g, Oral, Daily  sodium chloride, 10 mL, Intravenous, Q12H  thiamine (VITAMIN B1) IVPB, 250 mg, Intravenous, BID  vancomycin (dosing per levels), , Does not apply, Daily      Continuous Infusions:norepinephrine, 0.02-0.1 mcg/kg/min, Last Rate: Stopped (04/08/21 1550)  Pharmacy to dose vancomycin,   sodium bicarbonate drip (greater than 75 mEq/bag), 150 mEq, Last Rate: 150 mEq (04/09/21 1018)      PRN Meds:.calcium gluconate IVPB **AND** calcium gluconate IVPB **AND** Calcium, Ionized  •  oxyCODONE  •  Pharmacy to dose vancomycin  •  sodium chloride  •  sodium chloride    Assessment: Status post  No admission procedures for hospital encounter.    Patient Active Problem List   Diagnosis   • Hypertension   • T2DM   • Dyslipidemia   • Sleep apnea   • Elevated liver enzymes   • RINKU   • Recent Rt Total Knee Replacement 4/5/21   • Chronic pain w/pain pump   • Lactic acidosis   • Acute HFrEF (EF 30%)   • Shock (CMS/HCC) -cardiogenic versus septic       PLAN:   Continues current post-op course  Mobilize with PT as tolerated per protocol  Asa 81 mg bid for dvt ppx when ok with medical team   Nursing to apply ace wrap to knee to add compression with mild drainage    Weight Bearing: WBAT  Discharge Plan: pending medical improvement     Axel Avalos MD    Date: 4/9/2021    Time: 10:46 EDT

## 2021-04-10 ENCOUNTER — APPOINTMENT (OUTPATIENT)
Dept: NEPHROLOGY | Facility: HOSPITAL | Age: 66
End: 2021-04-10

## 2021-04-10 LAB
ALBUMIN SERPL-MCNC: 3.6 G/DL (ref 3.5–5.2)
ALBUMIN/GLOB SERPL: 1.4 G/DL
ALP SERPL-CCNC: 166 U/L (ref 39–117)
ALT SERPL W P-5'-P-CCNC: 2171 U/L (ref 1–41)
ANION GAP SERPL CALCULATED.3IONS-SCNC: 19 MMOL/L (ref 5–15)
AST SERPL-CCNC: 1376 U/L (ref 1–40)
BACTERIA SPEC AEROBE CULT: NO GROWTH
BILIRUB SERPL-MCNC: 0.7 MG/DL (ref 0–1.2)
BUN SERPL-MCNC: 50 MG/DL (ref 8–23)
BUN/CREAT SERPL: 7.7 (ref 7–25)
CA-I SERPL ISE-MCNC: 0.88 MMOL/L (ref 1.12–1.32)
CALCIUM SPEC-SCNC: 6.5 MG/DL (ref 8.6–10.5)
CHLORIDE SERPL-SCNC: 88 MMOL/L (ref 98–107)
CO2 SERPL-SCNC: 28 MMOL/L (ref 22–29)
CREAT SERPL-MCNC: 6.53 MG/DL (ref 0.76–1.27)
DEPRECATED RDW RBC AUTO: 42.4 FL (ref 37–54)
ERYTHROCYTE [DISTWIDTH] IN BLOOD BY AUTOMATED COUNT: 14.6 % (ref 12.3–15.4)
GFR SERPL CREATININE-BSD FRML MDRD: 10 ML/MIN/1.73
GFR SERPL CREATININE-BSD FRML MDRD: ABNORMAL ML/MIN/{1.73_M2}
GLOBULIN UR ELPH-MCNC: 2.5 GM/DL
GLUCOSE BLDC GLUCOMTR-MCNC: 147 MG/DL (ref 70–130)
GLUCOSE BLDC GLUCOMTR-MCNC: 159 MG/DL (ref 70–130)
GLUCOSE SERPL-MCNC: 144 MG/DL (ref 65–99)
HAV IGM SERPL QL IA: NORMAL
HBV CORE IGM SERPL QL IA: NORMAL
HBV SURFACE AG SERPL QL IA: NORMAL
HCT VFR BLD AUTO: 28 % (ref 37.5–51)
HCV AB SER DONR QL: NORMAL
HGB BLD-MCNC: 9 G/DL (ref 13–17.7)
MAGNESIUM SERPL-MCNC: 1.7 MG/DL (ref 1.6–2.4)
MCH RBC QN AUTO: 25.8 PG (ref 26.6–33)
MCHC RBC AUTO-ENTMCNC: 32.1 G/DL (ref 31.5–35.7)
MCV RBC AUTO: 80.2 FL (ref 79–97)
PHOSPHATE SERPL-MCNC: 6.8 MG/DL (ref 2.5–4.5)
PLATELET # BLD AUTO: 172 10*3/MM3 (ref 140–450)
PMV BLD AUTO: 10.9 FL (ref 6–12)
POTASSIUM SERPL-SCNC: 3.1 MMOL/L (ref 3.5–5.2)
PROT SERPL-MCNC: 6.1 G/DL (ref 6–8.5)
RBC # BLD AUTO: 3.49 10*6/MM3 (ref 4.14–5.8)
SODIUM SERPL-SCNC: 135 MMOL/L (ref 136–145)
VANCOMYCIN SERPL-MCNC: 21.5 MCG/ML (ref 5–40)
WBC # BLD AUTO: 11.93 10*3/MM3 (ref 3.4–10.8)

## 2021-04-10 PROCEDURE — 25010000002 HEPARIN (PORCINE) PER 1000 UNITS: Performed by: INTERNAL MEDICINE

## 2021-04-10 PROCEDURE — 85027 COMPLETE CBC AUTOMATED: CPT | Performed by: INTERNAL MEDICINE

## 2021-04-10 PROCEDURE — 83735 ASSAY OF MAGNESIUM: CPT | Performed by: INTERNAL MEDICINE

## 2021-04-10 PROCEDURE — 25010000002 HYDROCORTISONE SODIUM SUCCINATE 100 MG RECONSTITUTED SOLUTION: Performed by: INTERNAL MEDICINE

## 2021-04-10 PROCEDURE — 80053 COMPREHEN METABOLIC PANEL: CPT | Performed by: INTERNAL MEDICINE

## 2021-04-10 PROCEDURE — 25010000002 THIAMINE PER 100 MG: Performed by: NURSE PRACTITIONER

## 2021-04-10 PROCEDURE — 80074 ACUTE HEPATITIS PANEL: CPT | Performed by: INTERNAL MEDICINE

## 2021-04-10 PROCEDURE — 99233 SBSQ HOSP IP/OBS HIGH 50: CPT | Performed by: INTERNAL MEDICINE

## 2021-04-10 PROCEDURE — 25010000002 CALCIUM GLUCONATE PER 10 ML: Performed by: NURSE PRACTITIONER

## 2021-04-10 PROCEDURE — 82330 ASSAY OF CALCIUM: CPT | Performed by: INTERNAL MEDICINE

## 2021-04-10 PROCEDURE — 63710000001 INSULIN DETEMIR PER 5 UNITS: Performed by: INTERNAL MEDICINE

## 2021-04-10 PROCEDURE — 63710000001 INSULIN LISPRO (HUMAN) PER 5 UNITS: Performed by: INTERNAL MEDICINE

## 2021-04-10 PROCEDURE — 82962 GLUCOSE BLOOD TEST: CPT

## 2021-04-10 PROCEDURE — 84100 ASSAY OF PHOSPHORUS: CPT | Performed by: INTERNAL MEDICINE

## 2021-04-10 PROCEDURE — 25010000002 PIPERACILLIN SOD-TAZOBACTAM PER 1 G

## 2021-04-10 PROCEDURE — 80202 ASSAY OF VANCOMYCIN: CPT

## 2021-04-10 PROCEDURE — 25010000002 HYDRALAZINE PER 20 MG: Performed by: INTERNAL MEDICINE

## 2021-04-10 RX ORDER — CARVEDILOL 3.12 MG/1
3.12 TABLET ORAL 2 TIMES DAILY WITH MEALS
Status: DISCONTINUED | OUTPATIENT
Start: 2021-04-10 | End: 2021-04-12

## 2021-04-10 RX ORDER — HYDRALAZINE HYDROCHLORIDE 20 MG/ML
10 INJECTION INTRAMUSCULAR; INTRAVENOUS EVERY 6 HOURS PRN
Status: DISCONTINUED | OUTPATIENT
Start: 2021-04-10 | End: 2021-04-20 | Stop reason: HOSPADM

## 2021-04-10 RX ADMIN — TAZOBACTAM SODIUM AND PIPERACILLIN SODIUM 4.5 G: 500; 4 INJECTION, SOLUTION INTRAVENOUS at 17:31

## 2021-04-10 RX ADMIN — Medication 2 SPRAY: at 20:49

## 2021-04-10 RX ADMIN — INSULIN LISPRO 2 UNITS: 100 INJECTION, SOLUTION INTRAVENOUS; SUBCUTANEOUS at 23:34

## 2021-04-10 RX ADMIN — TAZOBACTAM SODIUM AND PIPERACILLIN SODIUM 4.5 G: 500; 4 INJECTION, SOLUTION INTRAVENOUS at 05:45

## 2021-04-10 RX ADMIN — Medication 2 SPRAY: at 13:52

## 2021-04-10 RX ADMIN — HYDROCORTISONE SODIUM SUCCINATE 100 MG: 100 INJECTION, POWDER, FOR SOLUTION INTRAMUSCULAR; INTRAVENOUS at 13:44

## 2021-04-10 RX ADMIN — HYDROCORTISONE SODIUM SUCCINATE 100 MG: 100 INJECTION, POWDER, FOR SOLUTION INTRAMUSCULAR; INTRAVENOUS at 20:48

## 2021-04-10 RX ADMIN — HYDROCORTISONE SODIUM SUCCINATE 100 MG: 100 INJECTION, POWDER, FOR SOLUTION INTRAMUSCULAR; INTRAVENOUS at 02:37

## 2021-04-10 RX ADMIN — SALINE NASAL SPRAY 2 SPRAY: 1.5 SOLUTION NASAL at 13:46

## 2021-04-10 RX ADMIN — CETIRIZINE HYDROCHLORIDE 10 MG: 10 TABLET, FILM COATED ORAL at 13:45

## 2021-04-10 RX ADMIN — ASPIRIN 81 MG: 81 TABLET, COATED ORAL at 13:57

## 2021-04-10 RX ADMIN — HYDRALAZINE HYDROCHLORIDE 10 MG: 20 INJECTION INTRAMUSCULAR; INTRAVENOUS at 15:07

## 2021-04-10 RX ADMIN — CALCIUM GLUCONATE 1 G: 98 INJECTION, SOLUTION INTRAVENOUS at 17:31

## 2021-04-10 RX ADMIN — THIAMINE HYDROCHLORIDE 250 MG: 100 INJECTION, SOLUTION INTRAMUSCULAR; INTRAVENOUS at 15:07

## 2021-04-10 RX ADMIN — DOCUSATE SODIUM 100 MG: 100 CAPSULE, LIQUID FILLED ORAL at 13:45

## 2021-04-10 RX ADMIN — CARVEDILOL 3.12 MG: 3.12 TABLET, FILM COATED ORAL at 17:48

## 2021-04-10 RX ADMIN — SODIUM BICARBONATE 150 MEQ: 84 INJECTION INTRAVENOUS at 02:37

## 2021-04-10 RX ADMIN — HEPARIN SODIUM 5000 UNITS: 5000 INJECTION INTRAVENOUS; SUBCUTANEOUS at 13:45

## 2021-04-10 RX ADMIN — MAGNESIUM OXIDE TAB 400 MG (241.3 MG ELEMENTAL MG) 1000 MG: 400 (241.3 MG) TAB at 15:06

## 2021-04-10 RX ADMIN — INSULIN DETEMIR 10 UNITS: 100 INJECTION, SOLUTION SUBCUTANEOUS at 15:07

## 2021-04-10 RX ADMIN — HYDRALAZINE HYDROCHLORIDE 10 MG: 20 INJECTION INTRAMUSCULAR; INTRAVENOUS at 20:47

## 2021-04-10 RX ADMIN — HEPARIN SODIUM 5000 UNITS: 5000 INJECTION INTRAVENOUS; SUBCUTANEOUS at 20:48

## 2021-04-10 RX ADMIN — OXYCODONE 5 MG: 5 TABLET ORAL at 02:37

## 2021-04-10 RX ADMIN — POLYETHYLENE GLYCOL 3350 17 G: 17 POWDER, FOR SOLUTION ORAL at 13:45

## 2021-04-10 NOTE — PROGRESS NOTES
Pharmacy Consult-Vancomycin Dosing  Elvis Hanson is a 66 y.o. male receiving vancomycin therapy.     Indication: Bone/joint infection, sepsis, SSTI  Consulting Provider: Intensivist  ID Consult: No    Goal Trough: 15-20mcg/mL    Current Antimicrobial Therapy  Anti-Infectives (From admission, onward)      Ordered     Dose/Rate Route Frequency Start Stop    04/09/21 0722  piperacillin-tazobactam (ZOSYN) 4.5 g in iso-osmotic dextrose 100 mL IVPB (premix)     Ordering Provider: Nayely Colón, PharmD    4.5 g  over 4 Hours Intravenous Every 12 Hours 04/09/21 1800 04/18/21 0559    04/09/21 1155  vancomycin (VANCOCIN) in iso-osmotic dextrose IVPB 1 g (premix) 200 mL     Ordering Provider: Nayely Colón, PharmD    1,000 mg  over 60 Minutes Intravenous Once 04/09/21 1800 04/09/21 1927    04/07/21 2128  vancomycin (dosing per levels)     Nayely Colón, PharmD reviewed the order on 04/09/21 0720.   Ordering Provider: Sherry Cooper, Sam     Does not apply Daily 04/08/21 0900 04/14/21 0859    04/07/21 2122  piperacillin-tazobactam (ZOSYN) 4.5 g in iso-osmotic dextrose 100 mL IVPB (premix)     Ordering Provider: Prashant Aiken APRN    4.5 g  over 30 Minutes Intravenous Once 04/07/21 2300 04/08/21 0159    04/07/21 2128  vancomycin 2500 mg/500 mL 0.9% NS IVPB ([facility])     Ordering Provider: Sherry Cooper PharmD    20 mg/kg × 127 kg  over 180 Minutes Intravenous Once 04/07/21 2300 04/08/21 0430    04/07/21 2122  Pharmacy to dose vancomycin     [facility] reviewed the order on 04/08/21 0856.   Ordering Provider: Prashant Aiken APRN     Does not apply Continuous PRN 04/07/21 2121 04/17/21 2120          Allergies  Allergies as of 04/07/2021    (No Known Allergies)     Labs  Results from last 7 days   Lab Units 04/10/21  0905 04/09/21  0205 04/08/21  0517   BUN mg/dL 50* 53* 37*   CREATININE mg/dL 6.53* 6.62* 5.10*     Results from last 7 days   Lab Units 04/10/21  0906 04/09/21  0205 04/08/21  0517   WBC 10*3/mm3 11.93*  "11.34* 17.13*     Evaluation of Dosing  Last Dose Received in the ED/Outside Facility: NA  Is Patient on Dialysis or Renal Replacement: Yes    Ht - 177.8 cm (70\")  Wt - (!) 150 kg (330 lb 1.6 oz)    Estimated Creatinine Clearance: 16.8 mL/min (A) (by C-G formula based on SCr of 6.53 mg/dL (H)).    Intake & Output (last 3 days)         04/07 0701 - 04/08 0700 04/08 0701 - 04/09 0700 04/09 0701 - 04/10 0700 04/10 0701 - 04/11 0700    P.O. 1050 240      I.V. (mL/kg)  2676 (18.5) 2073.6 (13.8)     IV Piggyback 700 1123.8 582.1     Total Intake(mL/kg) 1750 (13.5) 4039.8 (27.9) 2655.7 (17.7)     Urine (mL/kg/hr) 50 49 (0) 20 (0)     Other   1330     Total Output 50 49 1350     Net +1700 +3990.8 +1305.7             Stool Unmeasured Occurrence   1 x           Microbiology and Radiology  Microbiology Results (last 10 days)       Procedure Component Value - Date/Time    Urine Culture - Urine, Urine, Clean Catch [[id number]]  (Normal) Collected: 04/09/21 1325    Lab Status: Preliminary result Specimen: Urine, Clean Catch Updated: 04/10/21 1346     Urine Culture No growth    MRSA Screen, PCR (Inpatient) - Swab, Nares [[id number]]  (Normal) Collected: 04/08/21 0341    Lab Status: Final result Specimen: Swab from Nares Updated: 04/08/21 0937     MRSA PCR Negative    Narrative:      MRSA Negative    Blood Culture - Blood, Hand, Left [[id number]] Collected: 04/07/21 2057    Lab Status: Preliminary result Specimen: Blood from Hand, Left Updated: 04/09/21 2145     Blood Culture No growth at 2 days    Blood Culture - Blood, Hand, Right [[id number]] Collected: 04/07/21 2057    Lab Status: Preliminary result Specimen: Blood from Hand, Right Updated: 04/09/21 2145     Blood Culture No growth at 2 days    COVID-19 and FLU A/B PCR - Swab, Nasopharynx [[id number]]  (Normal) Collected: 04/07/21 5441    Lab Status: Final result Specimen: Swab from Nasopharynx Updated: 04/07/21 1509     COVID19 Not Detected     Influenza A PCR Not Detected     " Influenza B PCR Not Detected    Narrative:      Fact sheet for providers: https://www.fda.gov/media/758281/download    Fact sheet for patients: https://www.fda.gov/media/765961/download    Test performed by PCR.          Vancomycin Levels  Results from last 7 days   Lab Units 04/10/21  0905 04/09/21  0205   VANCOMYCIN RM mcg/mL 21.50 16.10     Assessment/Plan  Pharmacy to dose vancomycin for bone/joint infection, sepsis, SSTI. Vanc level was supratherapeutic this morning following a 1g IV dose yesterday. Assuming ~25-30% drug removal with HD today, repeat level should be therapeutic ~15-16 mcg/mL. Hold any further doses for now and repeat a random vanc level prior to the next HD session (will follow for plan). Minimal UOP documented 4/9.   Pharmacy will continue to monitor and adjust vancomycin dose as necessary based on renal function, cultures, labs, and clinical status.     Thank you,  Lina Silverman, JameelD, BCPS  4/10/2021  13:59 EDT

## 2021-04-10 NOTE — PROGRESS NOTES
[facility] Medicine Services  PROGRESS NOTE    Patient Name: Elvis Hanson  : 1955  MRN: [id number]    Date of Admission: 2021  Primary Care Physician: April Chen DO    Subjective   Subjective     CC:  Loss of hearing    HPI:  Pt doing well this am, seen while in HD.  States that his hearing has returned to normal. Also mentions that he feels much better overall.  Hopes to get santana catheter removed.     ROS:  Gen- No fevers, chills  CV- No chest pain, palpitations  Resp- No cough, dyspnea  GI- No N/V/D, abd pain      Objective   Objective     Vital Signs:   Temp:  [97.7 °F (36.5 °C)-97.9 °F (36.6 °C)] 97.8 °F (36.6 °C)  Heart Rate:  [62-85] 67  Resp:  [12-22] 22  BP: (122-170)/() 167/95        Physical Exam:  Constitutional: No acute distress, awake, alert, seen while in HD  HENT: NCAT, mucous membranes moist  Respiratory: Clear to auscultation bilaterally, respiratory effort normal   Cardiovascular: RRR, no murmurs, rubs, or gallops  Gastrointestinal: Positive bowel sounds, soft, nontender, nondistended  : Santana catheter in place with minimal dark urine in bag  Musculoskeletal: No bilateral ankle edema, right chest wall HD catheter in place  Psychiatric: Appropriate affect, cooperative  Neurologic: Oriented x 3, strength symmetric in all extremities, Cranial Nerves grossly intact to confrontation, speech clear  Skin: No rashes    Results Reviewed:  Results from last 7 days   Lab Units 21  0205 21  0808 21  0517 21  1711 21  1050   WBC 10*3/mm3 11.34*  --  17.13*  --  13.63*   HEMOGLOBIN g/dL 9.2*  --  10.1*  --  10.3*   HEMATOCRIT % 29.7*  --  33.4*  --  34.5*   PLATELETS 10*3/mm3 149  --  230  --  274   INR   --  1.87*  --   --   --    PROCALCITONIN ng/mL  --   --  4.86* 2.15*  --      Results from last 7 days   Lab Units 21  0205 21  0517 21  1711 21  1050   SODIUM mmol/L 135* 133* 140 140    POTASSIUM mmol/L 3.7 3.9 3.9 3.7   CHLORIDE mmol/L 89* 94* 97* 94*   CO2 mmol/L 27.0 21.0* 22.0 26.0   BUN mg/dL 53* 37* 28* 25*   CREATININE mg/dL 6.62* 5.10* 4.20* 3.89*   GLUCOSE mg/dL 174* 138* 89 116*   CALCIUM mg/dL 5.4* 6.5* 6.7* 7.9*   ALT (SGPT) U/L 3,838* 5,392* 2,553* 2,631*   AST (SGOT) U/L 6,772* >7,000* 6,207* 4,533*   TROPONIN T ng/mL  --  0.760*  --  0.290*     Estimated Creatinine Clearance: 16.6 mL/min (A) (by C-G formula based on SCr of 6.62 mg/dL (H)).    Microbiology Results Abnormal     Procedure Component Value - Date/Time    Blood Culture - Blood, Hand, Left [[id number]] Collected: 04/07/21 2057    Lab Status: Preliminary result Specimen: Blood from Hand, Left Updated: 04/09/21 2145     Blood Culture No growth at 2 days    Blood Culture - Blood, Hand, Right [[id number]] Collected: 04/07/21 2057    Lab Status: Preliminary result Specimen: Blood from Hand, Right Updated: 04/09/21 2145     Blood Culture No growth at 2 days    MRSA Screen, PCR (Inpatient) - Swab, Nares [[id number]]  (Normal) Collected: 04/08/21 0341    Lab Status: Final result Specimen: Swab from Nares Updated: 04/08/21 0937     MRSA PCR Negative    Narrative:      MRSA Negative    COVID-19 and FLU A/B PCR - Swab, Nasopharynx [[id number]]  (Normal) Collected: 04/07/21 1357    Lab Status: Final result Specimen: Swab from Nasopharynx Updated: 04/07/21 1509     COVID19 Not Detected     Influenza A PCR Not Detected     Influenza B PCR Not Detected    Narrative:      Fact sheet for providers: https://www.fda.gov/media/555291/download    Fact sheet for patients: https://www.fda.gov/media/227164/download    Test performed by PCR.          Imaging Results (Last 24 Hours)     Procedure Component Value Units Date/Time    IR Tunneled Catheter [[id number]] Collected: 04/09/21 1549     Updated: 04/09/21 1600    Narrative:      PROCEDURE: Tunneled dialysis catheter placement      Procedural Personnel  Attending physician(s): MADYSON Franz  M.D.  Fellow physician(s): None  Resident physician(s): None  Advanced practice provider(s): None     Pre-procedure diagnosis: ATN secondary to shock with deterioration renal  function. ?No previous history of kidney disease per patient no history  of epistaxis hemoptysis gross hematuria or kidney stones no family  history of kidney disease.????Protein creatinine ratio nephrotic range  (nephrotic proteinuria greater than 6 g in the setting of diabetes).  Septic shock: On IV pressors. Congestive heart failure: Ejection  fraction 30%.  Post-procedure diagnosis: Same  Indication (QCDR): Initiate dialysis  Additional clinical history: None     Complications: No immediate complications.       Impression:         Insertion of right-sided tunneled dialysis catheter, with tip in the  expected location of the cavoatrial junction.     Plan:      The catheter may be used immediately.  _______________________________________________________________     PROCEDURE SUMMARY:  - Venous access with ultrasound guidance  - Tunneled dialysis catheter insertion with fluoroscopic guidance  - Additional procedure(s): None     PROCEDURE DETAILS:     Pre-procedure  Consent: Informed consent for the procedure including risks, benefits  and alternatives was obtained and time-out was performed prior to the  procedure.  Preparation (MIPS): The site was prepared and draped using all elements  of maximal sterile barrier technique including sterile gloves, sterile  gown, cap, mask, large sterile sheet, sterile ultrasound probe cover,  hand hygiene and cutaneous antisepsis with 2% chlorhexidine.   Medical reason for site preparation exception (MIPS): Not applicable     Anesthesia/sedation  Level of anesthesia/sedation: Moderate sedation (conscious sedation)  Anesthesia/sedation administered by: Independent trained observer under  attending supervision with continuous monitoring of the patient?s level  of consciousness and physiologic status  Total  intra-service sedation time (minutes): 20     Access  Local anesthesia was administered. The vessel was sonographically  evaluated and determined to be patent. Real time ultrasound was used to  visualize needle entry into the vessel and a permanent image obtained.  Vein accessed (QCDR): Right internal jugular vein  Internal jugular vein patency (QCDR): Patent  Access technique: Micropuncture technique under ultrasound guidance     Venography  Indication for venography: Not performed  Catheter tip position for venography: Not applicable  Findings: Not applicable     Catheter placement  An incision was made near the venous access site and the catheter was  tunneled subcutaneously to the venous access site. The catheter was  advanced via a peel-away sheath into the vein under fluoroscopic  guidance. Catheter tip location was fluoroscopically verified and a  permanent image was stored.  Catheter placed: 14.5 [place] [employer] SI Silver Ion Catheter  Catheter cuff-to-tip length (cm): 19  Catheter flush: Heparin (1000 units per mL)     Closure  A sterile dressing was applied.  Access site closure technique: Tissue adhesive  Catheter securement technique: Non-absorbable suture     Contrast  Contrast agent: None  Contrast volume (mL): 0     Radiation Dose  Fluoroscopy time: 0.2 minutes  Dose: 2.1 mGy     Additional Details  Additional description of procedure: None  Equipment details: None  Specimens removed: None  Estimated blood loss (mL): Less than 10  Standardized report: TunneledDialysisCatheter     Attestation  I was present and scrubbed for the entire procedure. Imaging reviewed.  Agree with final report as written.     This report was finalized on 4/9/2021 3:52 PM by Wil Franz.             Results for orders placed during the hospital encounter of 04/07/21    Adult Transthoracic Echo Complete W/ Cont if Necessary Per Protocol    Interpretation Summary  · Estimated left ventricular EF = 30% Left  ventricular systolic function is severely decreased.  · There is global hypokinesis. Basal inferior and inferoseptal wall appears akinetic  · Left ventricular wall thickness is consistent with mild to moderate posterior asymmetric hypertrophy  · Moderately reduced right ventricular systolic function noted.  · Moderate tricuspid valve regurgitation is present. Estimated right ventricular systolic pressure from tricuspid regurgitation is normal (<35 mmHg).      I have reviewed the medications:  Scheduled Meds:aspirin, 81 mg, Oral, Daily  cetirizine, 10 mg, Oral, Daily  docusate sodium, 100 mg, Oral, BID  heparin (porcine), 5,000 Units, Subcutaneous, Q12H  hydrocortisone sodium succinate, 100 mg, Intravenous, Q8H  insulin detemir, 10 Units, Subcutaneous, Daily  insulin lispro, 0-7 Units, Subcutaneous, 4x Daily AC & at Bedtime  oxymetazoline, 2 spray, Each Nare, BID  piperacillin-tazobactam, 4.5 g, Intravenous, Q12H  polyethylene glycol, 17 g, Oral, Daily  sodium chloride, 10 mL, Intravenous, Q12H  thiamine (VITAMIN B1) IVPB, 250 mg, Intravenous, BID  vancomycin (dosing per levels), , Does not apply, Daily      Continuous Infusions:norepinephrine, 0.02-0.1 mcg/kg/min, Last Rate: Stopped (04/08/21 1550)  Pharmacy to dose vancomycin,   sodium bicarbonate drip (greater than 75 mEq/bag), 150 mEq, Last Rate: 150 mEq (04/10/21 0237)      PRN Meds:.•  albumin human  •  calcium gluconate IVPB **AND** calcium gluconate IVPB **AND** Calcium, Ionized  •  heparin (porcine)  •  oxyCODONE  •  Pharmacy to dose vancomycin  •  sodium chloride  •  sodium chloride    Assessment/Plan   Assessment & Plan     Active Hospital Problems    Diagnosis  POA   • **RINKU [N17.9]  Yes   • Lactic acidosis [E87.2]  Yes   • Acute HFrEF (EF 30%) [I50.21]  Yes   • Shock (CMS/HCC) -cardiogenic versus septic [R57.9]  Yes   • Hypertension [I10]  Yes   • T2DM [E11.9]  Yes   • Dyslipidemia [E78.5]  Yes   • Sleep apnea [G47.30]  Yes   • Elevated liver enzymes  [R74.8]  Yes   • Recent Rt Total Knee Replacement 4/5/21 [Z96.659]  Not Applicable   • Chronic pain w/pain pump [G89.29]  Yes      Resolved Hospital Problems   No resolved problems to display.        Brief Hospital Course to date:  Elvis Hanson is a 66 y.o. male with a history of recent right total knee replacement on 4/5, HTN, T2DM, SHYLA, and chronic pain with a pain pump who presented to the [facility] ED on 4/7 with complaints of hearing loss. Pt was found to have RINKU, elevated LFTs and was admitted to the ICU service on the PCU floor.  He became hypotensive requiring pressors and was transferred to the ICU. Pt was found to have an EF of 30% and Cardiology was consulted.  He was also initiated on HD during this admission.  He was transferred to our service on 4/10. Of note, his hearing loss has resolved and was thought to be due to Furosemide in setting of decreased renal function.     Plan:    Acute systolic HF  Elevated troponin  --Cardiology following, pt will eventually require ischemic evaluation to determine etiology of his HF unable to get this done currently due to renal function  --denies chest pain, suspect elevated troponin is due to volume overload  --minimal UOP, so unable to diurese with Bumex/Lasix.  Started on HD this admission  --continue ASA, not able to tolerate statin due to transaminitis    Oliguric RINKU   --now on HD, NAL following  --minimal UOP over last 24 hours.  Per NAL, okay to d/c Peck catheter    Elevated LFTs  --AST/ALT significantly elevated  --likely congestive hepatopathy  --monitor    Septic vs Cardiogenic shock  --continue hydrocortisone IV, wean pressors over next few days  --hold antihypertensives  --continue Vanc/Zosyn  --cultures thus far unremarkable      Recent right knee replacement  --Dr. Avalos following    Chronic Pain  --pain pump present    T2DM  --low dose basal insulin with SSI for now    Hearing loss  --resolved. Thought to be due to home dose diuretics.          DVT  Prophylaxis:  BEBETO      Disposition: I expect the patient to be discharged TBD    CODE STATUS:   Code Status and Medical Interventions:   Ordered at: 04/07/21 1422     Code Status:    CPR     Medical Interventions (Level of Support Prior to Arrest):    Full       Kay Diaz MD  04/10/21

## 2021-04-10 NOTE — PROGRESS NOTES
Orthopedic Progress Note      Patient: Elvis Hanson  YOB: 1955     Date of Admission: 4/7/2021 10:40 AM Medical Record Number: [id number]     Attending Physician: Kay Diaz MD    Status Post:  R TKA  Post Operative Day Number: 6    Subjective : No new orthopaedic complaints     Pain Relief: some relief with present medication.     Systemic Complaints: No Complaints  Blood pressure improved   Vitals:    04/09/21 2315 04/10/21 0320 04/10/21 0325 04/10/21 0700   BP: 154/96 149/88  167/95   BP Location: Right arm Right arm  Right arm   Patient Position: Lying Lying  Lying   Pulse: 70 67     Resp: 20 20  22   Temp: 97.9 °F (36.6 °C) 97.8 °F (36.6 °C)  97.8 °F (36.6 °C)   TempSrc: Oral Oral  Oral   SpO2: 91% (!) 81%     Weight:   (!) 150 kg (330 lb 1.6 oz)    Height:           Physical Exam: 66 y.o. male    General Appearance:       Alert, cooperative, in no acute distress                  Extremities:    Dressing mild drainage              No clinical sign of DVT        Able to do good movements of digits    Pulses:   Pulses palpable and equal bilaterally           Diagnostic Tests:     Results from last 7 days   Lab Units 04/09/21  0205 04/08/21 0517 04/07/21  1050   WBC 10*3/mm3 11.34* 17.13* 13.63*   HEMOGLOBIN g/dL 9.2* 10.1* 10.3*   HEMATOCRIT % 29.7* 33.4* 34.5*   PLATELETS 10*3/mm3 149 230 274     Results from last 7 days   Lab Units 04/09/21  0205 04/08/21  0517 04/07/21  1711   SODIUM mmol/L 135* 133* 140   POTASSIUM mmol/L 3.7 3.9 3.9   CHLORIDE mmol/L 89* 94* 97*   CO2 mmol/L 27.0 21.0* 22.0   BUN mg/dL 53* 37* 28*   CREATININE mg/dL 6.62* 5.10* 4.20*   GLUCOSE mg/dL 174* 138* 89   CALCIUM mg/dL 5.4* 6.5* 6.7*     Results from last 7 days   Lab Units 04/08/21  0808   INR  1.87*   APTT seconds 29.4     No results found for: URICACID  No results found for: CRYSTAL  Microbiology Results (last 10 days)     Procedure Component Value - Date/Time    MRSA Screen, PCR (Inpatient) - Swab,  Nares [[id number]]  (Normal) Collected: 04/08/21 0341    Lab Status: Final result Specimen: Swab from Nares Updated: 04/08/21 0937     MRSA PCR Negative    Narrative:      MRSA Negative    Blood Culture - Blood, Hand, Left [[id number]] Collected: 04/07/21 2057    Lab Status: Preliminary result Specimen: Blood from Hand, Left Updated: 04/09/21 2145     Blood Culture No growth at 2 days    Blood Culture - Blood, Hand, Right [[id number]] Collected: 04/07/21 2057    Lab Status: Preliminary result Specimen: Blood from Hand, Right Updated: 04/09/21 2145     Blood Culture No growth at 2 days    COVID-19 and FLU A/B PCR - Swab, Nasopharynx [[id number]]  (Normal) Collected: 04/07/21 1357    Lab Status: Final result Specimen: Swab from Nasopharynx Updated: 04/07/21 1509     COVID19 Not Detected     Influenza A PCR Not Detected     Influenza B PCR Not Detected    Narrative:      Fact sheet for providers: https://www.fda.gov/media/625346/download    Fact sheet for patients: https://www.fda.gov/media/557340/download    Test performed by PCR.        CT Head Without Contrast    Result Date: 4/7/2021  Mild atrophy and chronic changes seen within the brain with no acute intracranial abnormality. Mild chronic ethmoid sinusitis.  D:  04/07/2021 E:  04/07/2021   This report was finalized on 4/7/2021 4:54 PM by Dr. Doreen Mcgee MD.      IR Tunneled Catheter    Result Date: 4/9/2021   Insertion of right-sided tunneled dialysis catheter, with tip in the expected location of the cavoatrial junction.  Plan:  The catheter may be used immediately. _______________________________________________________________  PROCEDURE SUMMARY: - Venous access with ultrasound guidance - Tunneled dialysis catheter insertion with fluoroscopic guidance - Additional procedure(s): None  PROCEDURE DETAILS:  Pre-procedure Consent: Informed consent for the procedure including risks, benefits and alternatives was obtained and time-out was performed prior to the  procedure. Preparation (MIPS): The site was prepared and draped using all elements of maximal sterile barrier technique including sterile gloves, sterile gown, cap, mask, large sterile sheet, sterile ultrasound probe cover, hand hygiene and cutaneous antisepsis with 2% chlorhexidine. Medical reason for site preparation exception (MIPS): Not applicable  Anesthesia/sedation Level of anesthesia/sedation: Moderate sedation (conscious sedation) Anesthesia/sedation administered by: Independent trained observer under attending supervision with continuous monitoring of the patient?s level of consciousness and physiologic status Total intra-service sedation time (minutes): 20  Access Local anesthesia was administered. The vessel was sonographically evaluated and determined to be patent. Real time ultrasound was used to visualize needle entry into the vessel and a permanent image obtained. Vein accessed (QCDR): Right internal jugular vein Internal jugular vein patency (QCDR): Patent Access technique: Micropuncture technique under ultrasound guidance  Venography Indication for venography: Not performed Catheter tip position for venography: Not applicable Findings: Not applicable  Catheter placement An incision was made near the venous access site and the catheter was tunneled subcutaneously to the venous access site. The catheter was advanced via a peel-away sheath into the vein under fluoroscopic guidance. Catheter tip location was fluoroscopically verified and a permanent image was stored. Catheter placed: 14.5 [place] [employer] SI Silver Ion Catheter Catheter cuff-to-tip length (cm): 19 Catheter flush: Heparin (1000 units per mL)  Closure A sterile dressing was applied. Access site closure technique: Tissue adhesive Catheter securement technique: Non-absorbable suture  Contrast Contrast agent: None Contrast volume (mL): 0  Radiation Dose Fluoroscopy time: 0.2 minutes Dose: 2.1 mGy  Additional Details Additional  description of procedure: None Equipment details: None Specimens removed: None Estimated blood loss (mL): Less than 10 Standardized report: TunneledDialysisCatheter  Attestation I was present and scrubbed for the entire procedure. Imaging reviewed. Agree with final report as written.  This report was finalized on 4/9/2021 3:52 PM by Wil Franz.      US Renal Limited    Result Date: 4/9/2021  Unremarkable sonographic appearance of both kidneys.  This report was finalized on 4/9/2021 8:21 AM by Chaim Gomez.      XR Chest 1 View    Result Date: 4/8/2021  Line tip in the left brachiocephalic vein or SVC. No pneumothorax. Signer Name: Aniceto Clark MD  Signed: 4/8/2021 12:44 AM  Workstation Name: [facility]  [facility]    XR Chest 1 View    Result Date: 4/7/2021  No acute cardiopulmonary disease.  D:  04/07/2021 E:  04/07/2021  This report was finalized on 4/7/2021 4:54 PM by Dr. Doreen Mcgee MD.          Current Medications:  Scheduled Meds:aspirin, 81 mg, Oral, Daily  cetirizine, 10 mg, Oral, Daily  docusate sodium, 100 mg, Oral, BID  heparin (porcine), 5,000 Units, Subcutaneous, Q12H  hydrocortisone sodium succinate, 100 mg, Intravenous, Q8H  insulin detemir, 10 Units, Subcutaneous, Daily  insulin lispro, 0-7 Units, Subcutaneous, 4x Daily AC & at Bedtime  oxymetazoline, 2 spray, Each Nare, BID  piperacillin-tazobactam, 4.5 g, Intravenous, Q12H  polyethylene glycol, 17 g, Oral, Daily  sodium chloride, 10 mL, Intravenous, Q12H  thiamine (VITAMIN B1) IVPB, 250 mg, Intravenous, BID  vancomycin (dosing per levels), , Does not apply, Daily      Continuous Infusions:norepinephrine, 0.02-0.1 mcg/kg/min, Last Rate: Stopped (04/08/21 1550)  Pharmacy to dose vancomycin,   sodium bicarbonate drip (greater than 75 mEq/bag), 150 mEq, Last Rate: 150 mEq (04/10/21 0237)      PRN Meds:.•  albumin human  •  calcium gluconate IVPB **AND** calcium gluconate IVPB **AND** Calcium, Ionized  •   heparin (porcine)  •  oxyCODONE  •  Pharmacy to dose vancomycin  •  sodium chloride  •  sodium chloride    Assessment: Status post  No admission procedures for hospital encounter.    Patient Active Problem List   Diagnosis   • Hypertension   • T2DM   • Dyslipidemia   • Sleep apnea   • Elevated liver enzymes   • RINKU   • Recent Rt Total Knee Replacement 4/5/21   • Chronic pain w/pain pump   • Lactic acidosis   • Acute HFrEF (EF 30%)   • Shock (CMS/HCC) -cardiogenic versus septic       PLAN:   Continues current post-op course  Mobilize with PT as tolerated per protocol  Asa 81 mg bid for dvt ppx when ok with medical team   Nursing to apply ace wrap to knee to add compression with mild drainage  Medical management of kidney function     Weight Bearing: WBAT  Discharge Plan: pending medical improvement     Axel Avalos MD    Date: 4/10/2021    Time: 08:09 EDT

## 2021-04-10 NOTE — PROGRESS NOTES
LOS: 3 days    Patient Care Team:  April Chen DO as PCP - General (Internal Medicine)    Chief Complaint:    History of present illness:    66-year-old -American male with no previous history of kidney disease, no epistaxis, hemoptysis hematemesis kidney stones.  Patient had a right TKR on 4/4/2021, postop patient has received nonsteroidal Mobic for a few days, after going home he had a problem with hearing and altered mental status patient was brought into the hospital initially admitted to the floor.  Patient found to have a creatinine of 3.89 BUN of 25 liver enzymes were elevated ALT 2631, AST 4533, high WBC count of 13.6, lactic acidosis was noted, hemoglobin 10.0, patient has decreased urine output, mild metabolic acidosis, due to hypotension patient was transferred from floor to ICU where patient was placed on IV Levophed.  Peck catheter has been in place urine output has been poor at this time.  Patient oral intake recently has been low.  Renal been consulted at this time.  Patient is being treated for sepsis IV vancomycin has been given accompanied with other antihypertensive medication.  Home medications include losartan furosemide other diuretics were stopped.  He was taken off the Mobic and Metformin due to acidosis and renal failure.  He denied any nausea or vomiting or diarrhea at this time no shortness of breath or chest pain.  He has a history of Bell palsy.    Subjective     Interval History:   Oliguric acute renal failure with nephrotic range proteinuria.  Patient is transferred to the floor.  Still on bicarb drip.  Remain critically ill but stable complains of some chest discomfort nausea.     Review of Systems:   All other negative except for edema, nausea chest discomfort generalized weakness as noted above.    Objective     Vital Sign Min/Max for last 24 hours  Temp  Min: 97.7 °F (36.5 °C)  Max: 97.9 °F (36.6 °C)   BP  Min: 122/79  Max: 170/102   Pulse  Min: 62  Max: 85  "  Resp  Min: 14  Max: 22   SpO2  Min: 81 %  Max: 100 %   Flow (L/min)  Min: 2  Max: 4   Weight  Min: 150 kg (330 lb 1.6 oz)  Max: 150 kg (330 lb 1.6 oz)     Flowsheet Rows      First Filed Value   Admission Height  177.8 cm (70\") Documented at 04/07/2021 1041   Admission Weight  127 kg (280 lb) Documented at 04/07/2021 1041          No intake/output data recorded.  I/O last 3 completed shifts:  In: 4243.1 [I.V.:3326.2; IV Piggyback:916.9]  Out: 1370 [Urine:40; Other:1330]    Physical Exam:  General Appearance: Alert, oriented, no obvious distress.  Obesity -American male  Facial: Left-sided Bell's palsy noted.  Eyes: PER, EOMI.  Neck: Supple no JVD.  Lungs: Clear auscultation, no rales rhonchi's, equal chest movement, nonlabored.  Heart: No gallop, murmur, rub, RRR.  Abdomen: Soft, nontender, positive bowel sounds, no organomegaly.  Extremities: 2+ edema edema  , no cyanosis.  Neuro: No focal deficit, moving all extremities, alert oriented X 3  : Peck catheter in place.    WBC WBC   Date Value Ref Range Status   04/09/2021 11.34 (H) 3.40 - 10.80 10*3/mm3 Final   04/08/2021 17.13 (H) 3.40 - 10.80 10*3/mm3 Final   04/07/2021 13.63 (H) 3.40 - 10.80 10*3/mm3 Final      HGB Hemoglobin   Date Value Ref Range Status   04/09/2021 9.2 (L) 13.0 - 17.7 g/dL Final   04/08/2021 10.1 (L) 13.0 - 17.7 g/dL Final   04/07/2021 10.3 (L) 13.0 - 17.7 g/dL Final      HCT Hematocrit   Date Value Ref Range Status   04/09/2021 29.7 (L) 37.5 - 51.0 % Final   04/08/2021 33.4 (L) 37.5 - 51.0 % Final   04/07/2021 34.5 (L) 37.5 - 51.0 % Final      Platlets No results found for: LABPLAT   MCV MCV   Date Value Ref Range Status   04/09/2021 83.4 79.0 - 97.0 fL Final   04/08/2021 84.1 79.0 - 97.0 fL Final   04/07/2021 86.0 79.0 - 97.0 fL Final          Sodium Sodium   Date Value Ref Range Status   04/09/2021 135 (L) 136 - 145 mmol/L Final   04/08/2021 133 (L) 136 - 145 mmol/L Final   04/07/2021 140 136 - 145 mmol/L Final   04/07/2021 140 " 136 - 145 mmol/L Final      Potassium Potassium   Date Value Ref Range Status   04/09/2021 3.7 3.5 - 5.2 mmol/L Final     Comment:     Slight hemolysis detected by analyzer. Results may be affected.   04/08/2021 3.9 3.5 - 5.2 mmol/L Final   04/07/2021 3.9 3.5 - 5.2 mmol/L Final   04/07/2021 3.7 3.5 - 5.2 mmol/L Final      Chloride Chloride   Date Value Ref Range Status   04/09/2021 89 (L) 98 - 107 mmol/L Final   04/08/2021 94 (L) 98 - 107 mmol/L Final   04/07/2021 97 (L) 98 - 107 mmol/L Final   04/07/2021 94 (L) 98 - 107 mmol/L Final      CO2 CO2   Date Value Ref Range Status   04/09/2021 27.0 22.0 - 29.0 mmol/L Final   04/08/2021 21.0 (L) 22.0 - 29.0 mmol/L Final   04/07/2021 22.0 22.0 - 29.0 mmol/L Final   04/07/2021 26.0 22.0 - 29.0 mmol/L Final      BUN BUN   Date Value Ref Range Status   04/09/2021 53 (H) 8 - 23 mg/dL Final   04/08/2021 37 (H) 8 - 23 mg/dL Final   04/07/2021 28 (H) 8 - 23 mg/dL Final   04/07/2021 25 (H) 8 - 23 mg/dL Final      Creatinine Creatinine   Date Value Ref Range Status   04/09/2021 6.62 (H) 0.76 - 1.27 mg/dL Final   04/08/2021 5.10 (H) 0.76 - 1.27 mg/dL Final   04/07/2021 4.20 (H) 0.76 - 1.27 mg/dL Final   04/07/2021 3.89 (H) 0.76 - 1.27 mg/dL Final      Calcium Calcium   Date Value Ref Range Status   04/09/2021 5.4 (C) 8.6 - 10.5 mg/dL Final   04/08/2021 6.5 (L) 8.6 - 10.5 mg/dL Final   04/07/2021 6.7 (L) 8.6 - 10.5 mg/dL Final   04/07/2021 7.9 (L) 8.6 - 10.5 mg/dL Final      PO4 No results found for: CAPO4   Albumin Albumin   Date Value Ref Range Status   04/09/2021 3.30 (L) 3.50 - 5.20 g/dL Final   04/08/2021 3.40 (L) 3.50 - 5.20 g/dL Final   04/07/2021 3.50 3.50 - 5.20 g/dL Final   04/07/2021 4.10 3.50 - 5.20 g/dL Final      Magnesium Magnesium   Date Value Ref Range Status   04/09/2021 2.0 1.6 - 2.4 mg/dL Final   04/08/2021 1.6 1.6 - 2.4 mg/dL Final   04/07/2021 1.6 1.6 - 2.4 mg/dL Final      Uric Acid No results found for: URICACID     Right kidney measures 11.2 cm in length  without apparent mass or   hydronephrosis. Normal color Doppler flow.       Left kidney measures 11.0 cm in length without apparent mass or   hydronephrosis. Normal color Doppler flow.       Unremarkable catheterized and contracted urinary bladder.       Impression:     Unremarkable sonographic appearance of both kidneys.         Results Review:     I reviewed the patient's new clinical results.    aspirin, 81 mg, Oral, Daily  cetirizine, 10 mg, Oral, Daily  docusate sodium, 100 mg, Oral, BID  heparin (porcine), 5,000 Units, Subcutaneous, Q12H  hydrocortisone sodium succinate, 100 mg, Intravenous, Q8H  insulin detemir, 10 Units, Subcutaneous, Daily  insulin lispro, 0-7 Units, Subcutaneous, 4x Daily AC & at Bedtime  oxymetazoline, 2 spray, Each Nare, BID  piperacillin-tazobactam, 4.5 g, Intravenous, Q12H  polyethylene glycol, 17 g, Oral, Daily  sodium chloride, 10 mL, Intravenous, Q12H  thiamine (VITAMIN B1) IVPB, 250 mg, Intravenous, BID  vancomycin (dosing per levels), , Does not apply, Daily      norepinephrine, 0.02-0.1 mcg/kg/min, Last Rate: Stopped (04/08/21 5190)  Pharmacy to dose vancomycin,   sodium bicarbonate drip (greater than 75 mEq/bag), 150 mEq, Last Rate: 150 mEq (04/10/21 7176)        Medication Review: As noted above    Assessment/Plan       RINKU    Hypertension    T2DM    Dyslipidemia    Sleep apnea    Elevated liver enzymes    Recent Rt Total Knee Replacement 4/5/21    Chronic pain w/pain pump    Lactic acidosis    Acute HFrEF (EF 30%)    Shock (CMS/HCC) -cardiogenic versus septic  1.  ATN secondary to shock with deterioration renal function.  No previous history of kidney disease per patient no history of epistaxis hemoptysis gross hematuria or kidney stones no family history of kidney disease.    Protein creatinine ratio nephrotic range.Right kidney 11.2 cm, left kidney 11.0 cm normal color Doppler unremarkable bilateral kidneys  2.  Septic shock: On IV pressors.  3.  Nephrotic proteinuria  greater than 6 g in the setting of diabetes.  4.  Altered mental status and hearing difficulty improved at this time.  5.  S/p recent right total knee replacement.  6.  Congestive heart failure: Ejection fraction 30%.  7.  Hypocalcemia.  Plan:  Ionized calcium low replacement ordered along with high calcium bath with dialysis.  First dialysis 4/9/2021  Tunnel catheter clean  Keep mean arterial pressure greater than 70.  Hold all antihypertensive medications, Glucophage, nonsteroidals at this time.  Avoid nephrotoxic medications.   Check volume status.  Check labs in the morning.  Daily evaluation for renal replacement therapy will be done.  Discontinue the bicarb drip at this time.  Monitor bicarb level  Adjust medication for the new GFR.   High risk patient with multiple medical problems.  DC Peck catheter  Next week may consider kidney biopsy.  Ionized calcium will be checked and replacement will be done.    Jimenez Daniels MD  04/10/21  08:57 EDT

## 2021-04-11 LAB
ALBUMIN SERPL-MCNC: 3.6 G/DL (ref 3.5–5.2)
ALBUMIN/GLOB SERPL: 1.3 G/DL
ALP SERPL-CCNC: 171 U/L (ref 39–117)
ALT SERPL W P-5'-P-CCNC: 1725 U/L (ref 1–41)
ANION GAP SERPL CALCULATED.3IONS-SCNC: 17 MMOL/L (ref 5–15)
AST SERPL-CCNC: 496 U/L (ref 1–40)
BILIRUB SERPL-MCNC: 1 MG/DL (ref 0–1.2)
BUN SERPL-MCNC: 48 MG/DL (ref 8–23)
BUN/CREAT SERPL: 7.2 (ref 7–25)
CA-I SERPL ISE-MCNC: 1.01 MMOL/L (ref 1.12–1.32)
CALCIUM SPEC-SCNC: 7.5 MG/DL (ref 8.6–10.5)
CHLORIDE SERPL-SCNC: 92 MMOL/L (ref 98–107)
CO2 SERPL-SCNC: 26 MMOL/L (ref 22–29)
CREAT SERPL-MCNC: 6.71 MG/DL (ref 0.76–1.27)
DEPRECATED RDW RBC AUTO: 43.2 FL (ref 37–54)
ERYTHROCYTE [DISTWIDTH] IN BLOOD BY AUTOMATED COUNT: 14.7 % (ref 12.3–15.4)
GFR SERPL CREATININE-BSD FRML MDRD: 10 ML/MIN/1.73
GFR SERPL CREATININE-BSD FRML MDRD: ABNORMAL ML/MIN/{1.73_M2}
GLOBULIN UR ELPH-MCNC: 2.7 GM/DL
GLUCOSE BLDC GLUCOMTR-MCNC: 126 MG/DL (ref 70–130)
GLUCOSE BLDC GLUCOMTR-MCNC: 140 MG/DL (ref 70–130)
GLUCOSE BLDC GLUCOMTR-MCNC: 156 MG/DL (ref 70–130)
GLUCOSE BLDC GLUCOMTR-MCNC: 156 MG/DL (ref 70–130)
GLUCOSE SERPL-MCNC: 132 MG/DL (ref 65–99)
HCT VFR BLD AUTO: 29.7 % (ref 37.5–51)
HGB BLD-MCNC: 9.3 G/DL (ref 13–17.7)
MAGNESIUM SERPL-MCNC: 1.9 MG/DL (ref 1.6–2.4)
MCH RBC QN AUTO: 25.7 PG (ref 26.6–33)
MCHC RBC AUTO-ENTMCNC: 31.3 G/DL (ref 31.5–35.7)
MCV RBC AUTO: 82 FL (ref 79–97)
PHOSPHATE SERPL-MCNC: 6 MG/DL (ref 2.5–4.5)
PLATELET # BLD AUTO: 195 10*3/MM3 (ref 140–450)
PMV BLD AUTO: 11 FL (ref 6–12)
POTASSIUM SERPL-SCNC: 3.2 MMOL/L (ref 3.5–5.2)
PROT SERPL-MCNC: 6.3 G/DL (ref 6–8.5)
RBC # BLD AUTO: 3.62 10*6/MM3 (ref 4.14–5.8)
SODIUM SERPL-SCNC: 135 MMOL/L (ref 136–145)
WBC # BLD AUTO: 12.08 10*3/MM3 (ref 3.4–10.8)

## 2021-04-11 PROCEDURE — 25010000002 PIPERACILLIN SOD-TAZOBACTAM PER 1 G

## 2021-04-11 PROCEDURE — 25010000002 HEPARIN (PORCINE) PER 1000 UNITS: Performed by: INTERNAL MEDICINE

## 2021-04-11 PROCEDURE — 25010000002 MAGNESIUM SULFATE 2 GM/50ML SOLUTION: Performed by: INTERNAL MEDICINE

## 2021-04-11 PROCEDURE — 84100 ASSAY OF PHOSPHORUS: CPT | Performed by: INTERNAL MEDICINE

## 2021-04-11 PROCEDURE — 63710000001 INSULIN LISPRO (HUMAN) PER 5 UNITS: Performed by: INTERNAL MEDICINE

## 2021-04-11 PROCEDURE — 83735 ASSAY OF MAGNESIUM: CPT | Performed by: INTERNAL MEDICINE

## 2021-04-11 PROCEDURE — 25010000002 HYDROCORTISONE SODIUM SUCCINATE 100 MG RECONSTITUTED SOLUTION: Performed by: INTERNAL MEDICINE

## 2021-04-11 PROCEDURE — 82962 GLUCOSE BLOOD TEST: CPT

## 2021-04-11 PROCEDURE — 80053 COMPREHEN METABOLIC PANEL: CPT | Performed by: INTERNAL MEDICINE

## 2021-04-11 PROCEDURE — 63710000001 INSULIN DETEMIR PER 5 UNITS: Performed by: INTERNAL MEDICINE

## 2021-04-11 PROCEDURE — 85027 COMPLETE CBC AUTOMATED: CPT | Performed by: INTERNAL MEDICINE

## 2021-04-11 PROCEDURE — 25010000002 HYDRALAZINE PER 20 MG: Performed by: INTERNAL MEDICINE

## 2021-04-11 PROCEDURE — 82330 ASSAY OF CALCIUM: CPT | Performed by: INTERNAL MEDICINE

## 2021-04-11 PROCEDURE — 99233 SBSQ HOSP IP/OBS HIGH 50: CPT | Performed by: INTERNAL MEDICINE

## 2021-04-11 RX ORDER — MAGNESIUM SULFATE 1 G/100ML
1 INJECTION INTRAVENOUS AS NEEDED
Status: DISCONTINUED | OUTPATIENT
Start: 2021-04-11 | End: 2021-04-20 | Stop reason: HOSPADM

## 2021-04-11 RX ORDER — MAGNESIUM SULFATE HEPTAHYDRATE 40 MG/ML
4 INJECTION, SOLUTION INTRAVENOUS AS NEEDED
Status: DISCONTINUED | OUTPATIENT
Start: 2021-04-11 | End: 2021-04-20 | Stop reason: HOSPADM

## 2021-04-11 RX ORDER — MAGNESIUM SULFATE HEPTAHYDRATE 40 MG/ML
2 INJECTION, SOLUTION INTRAVENOUS AS NEEDED
Status: DISCONTINUED | OUTPATIENT
Start: 2021-04-11 | End: 2021-04-20 | Stop reason: HOSPADM

## 2021-04-11 RX ORDER — BUMETANIDE 0.25 MG/ML
3 INJECTION INTRAMUSCULAR; INTRAVENOUS ONCE
Status: COMPLETED | OUTPATIENT
Start: 2021-04-11 | End: 2021-04-11

## 2021-04-11 RX ORDER — METOLAZONE 5 MG/1
5 TABLET ORAL ONCE
Status: COMPLETED | OUTPATIENT
Start: 2021-04-11 | End: 2021-04-11

## 2021-04-11 RX ADMIN — Medication 2 SPRAY: at 12:28

## 2021-04-11 RX ADMIN — MAGNESIUM SULFATE HEPTAHYDRATE 2 G: 2 INJECTION, SOLUTION INTRAVENOUS at 16:11

## 2021-04-11 RX ADMIN — HYDRALAZINE HYDROCHLORIDE 10 MG: 20 INJECTION INTRAMUSCULAR; INTRAVENOUS at 03:41

## 2021-04-11 RX ADMIN — HYDROCORTISONE SODIUM SUCCINATE 100 MG: 100 INJECTION, POWDER, FOR SOLUTION INTRAMUSCULAR; INTRAVENOUS at 03:06

## 2021-04-11 RX ADMIN — CARVEDILOL 3.12 MG: 3.12 TABLET, FILM COATED ORAL at 08:55

## 2021-04-11 RX ADMIN — DOCUSATE SODIUM 100 MG: 100 CAPSULE, LIQUID FILLED ORAL at 21:23

## 2021-04-11 RX ADMIN — HEPARIN SODIUM 5000 UNITS: 5000 INJECTION INTRAVENOUS; SUBCUTANEOUS at 08:54

## 2021-04-11 RX ADMIN — HEPARIN SODIUM 5000 UNITS: 5000 INJECTION INTRAVENOUS; SUBCUTANEOUS at 21:23

## 2021-04-11 RX ADMIN — METOLAZONE 5 MG: 5 TABLET ORAL at 12:28

## 2021-04-11 RX ADMIN — INSULIN DETEMIR 10 UNITS: 100 INJECTION, SOLUTION SUBCUTANEOUS at 09:00

## 2021-04-11 RX ADMIN — Medication 2 SPRAY: at 21:25

## 2021-04-11 RX ADMIN — TAZOBACTAM SODIUM AND PIPERACILLIN SODIUM 4.5 G: 500; 4 INJECTION, SOLUTION INTRAVENOUS at 05:35

## 2021-04-11 RX ADMIN — CARVEDILOL 3.12 MG: 3.12 TABLET, FILM COATED ORAL at 17:03

## 2021-04-11 RX ADMIN — HYDRALAZINE HYDROCHLORIDE 10 MG: 20 INJECTION INTRAMUSCULAR; INTRAVENOUS at 19:18

## 2021-04-11 RX ADMIN — ASPIRIN 81 MG: 81 TABLET, COATED ORAL at 08:54

## 2021-04-11 RX ADMIN — INSULIN LISPRO 2 UNITS: 100 INJECTION, SOLUTION INTRAVENOUS; SUBCUTANEOUS at 09:05

## 2021-04-11 RX ADMIN — DOCUSATE SODIUM 100 MG: 100 CAPSULE, LIQUID FILLED ORAL at 08:56

## 2021-04-11 RX ADMIN — SODIUM CHLORIDE, PRESERVATIVE FREE 10 ML: 5 INJECTION INTRAVENOUS at 08:56

## 2021-04-11 RX ADMIN — CETIRIZINE HYDROCHLORIDE 10 MG: 10 TABLET, FILM COATED ORAL at 08:55

## 2021-04-11 RX ADMIN — OXYCODONE 5 MG: 5 TABLET ORAL at 09:02

## 2021-04-11 RX ADMIN — HYDROCORTISONE SODIUM SUCCINATE 100 MG: 100 INJECTION, POWDER, FOR SOLUTION INTRAMUSCULAR; INTRAVENOUS at 08:54

## 2021-04-11 RX ADMIN — TAZOBACTAM SODIUM AND PIPERACILLIN SODIUM 4.5 G: 500; 4 INJECTION, SOLUTION INTRAVENOUS at 17:04

## 2021-04-11 RX ADMIN — BUMETANIDE 3 MG: 0.25 INJECTION INTRAMUSCULAR; INTRAVENOUS at 12:30

## 2021-04-11 RX ADMIN — MAGNESIUM OXIDE TAB 400 MG (241.3 MG ELEMENTAL MG) 1000 MG: 400 (241.3 MG) TAB at 08:55

## 2021-04-11 RX ADMIN — INSULIN LISPRO 2 UNITS: 100 INJECTION, SOLUTION INTRAVENOUS; SUBCUTANEOUS at 17:04

## 2021-04-11 RX ADMIN — HYDROCORTISONE SODIUM SUCCINATE 50 MG: 100 INJECTION, POWDER, FOR SOLUTION INTRAMUSCULAR; INTRAVENOUS at 16:11

## 2021-04-11 RX ADMIN — HYDROCORTISONE SODIUM SUCCINATE 50 MG: 100 INJECTION, POWDER, FOR SOLUTION INTRAMUSCULAR; INTRAVENOUS at 23:55

## 2021-04-11 NOTE — PLAN OF CARE
Goal Outcome Evaluation:           Patient and family seeking out staff throughout the day. Patient electrolytes replace per nephrology, went to dialysis. Had high BP, given PRN hydralazine and restarted on coreg, santana d/c, bladder scanned 29cc at 1800.  Patient has been sinus reece, 2LNC, alert and oriented, lethargic during the day.

## 2021-04-11 NOTE — PROGRESS NOTES
LOS: 4 days    Patient Care Team:  April Chen DO as PCP - General (Internal Medicine)    Chief Complaint:    History of present illness:    66-year-old -American male with no previous history of kidney disease, no epistaxis, hemoptysis hematemesis kidney stones.  Patient had a right TKR on 4/4/2021, postop patient has received nonsteroidal Mobic for a few days, after going home he had a problem with hearing and altered mental status patient was brought into the hospital initially admitted to the floor.  Patient found to have a creatinine of 3.89 BUN of 25 liver enzymes were elevated ALT 2631, AST 4533, high WBC count of 13.6, lactic acidosis was noted, hemoglobin 10.0, patient has decreased urine output, mild metabolic acidosis, due to hypotension patient was transferred from floor to ICU where patient was placed on IV Levophed.  Peck catheter has been in place urine output has been poor at this time.  Patient oral intake recently has been low.  Renal been consulted at this time.  Patient is being treated for sepsis IV vancomycin has been given accompanied with other antihypertensive medication.  Home medications include losartan furosemide other diuretics were stopped.  He was taken off the Mobic and Metformin due to acidosis and renal failure.  He denied any nausea or vomiting or diarrhea at this time no shortness of breath or chest pain.  He has a history of Bell palsy.    Subjective     Interval History:   Oliguric RINKU with nephrotic range proteinuria in the setting of diabetes longstanding, transferred to the floor has started on dialysis.  Still has significant edema.      Review of Systems:   Complains of oliguria, edema, denies, nausea vomiting dysuria hematuria no shortness of breath or chest pain.  All other systems are negative.    Objective     Vital Sign Min/Max for last 24 hours  Temp  Min: 97.6 °F (36.4 °C)  Max: 97.8 °F (36.6 °C)   BP  Min: 146/89  Max: 177/95   Pulse  Min:  "62  Max: 90   Resp  Min: 18  Max: 20   SpO2  Min: 95 %  Max: 95 %   Flow (L/min)  Min: 2  Max: 2   Weight  Min: 148 kg (327 lb)  Max: 148 kg (327 lb)     Flowsheet Rows      First Filed Value   Admission Height  177.8 cm (70\") Documented at 04/07/2021 1041   Admission Weight  127 kg (280 lb) Documented at 04/07/2021 1041          No intake/output data recorded.  I/O last 3 completed shifts:  In: -   Out: 129 [Urine:129]    Physical Exam:  General Appearance: Morbid obesity alert oriented x4 no obvious distress.  -American male  Facial: Left-sided Bell's palsy noted.  Eyes: PER, EOMI.  Neck: Supple no JVD.  Lungs: Clear auscultation, no rales rhonchi's, equal chest movement, nonlabored.  Heart: No gallop, murmur, rub, RRR.  Abdomen: Obesity distended soft nontender, positive bowel sounds, no organomegaly.  Extremities: Lateral lower extremity 2+ edema trace to 1+ upper extremity edema.  Neuro: No focal deficit, moving all extremities, alert oriented X 3  : Peck catheter in place.  Skin warm and dry.  WBC WBC   Date Value Ref Range Status   04/11/2021 12.08 (H) 3.40 - 10.80 10*3/mm3 Final   04/10/2021 11.93 (H) 3.40 - 10.80 10*3/mm3 Final   04/09/2021 11.34 (H) 3.40 - 10.80 10*3/mm3 Final      HGB Hemoglobin   Date Value Ref Range Status   04/11/2021 9.3 (L) 13.0 - 17.7 g/dL Final   04/10/2021 9.0 (L) 13.0 - 17.7 g/dL Final   04/09/2021 9.2 (L) 13.0 - 17.7 g/dL Final      HCT Hematocrit   Date Value Ref Range Status   04/11/2021 29.7 (L) 37.5 - 51.0 % Final   04/10/2021 28.0 (L) 37.5 - 51.0 % Final   04/09/2021 29.7 (L) 37.5 - 51.0 % Final      Platlets No results found for: LABPLAT   MCV MCV   Date Value Ref Range Status   04/11/2021 82.0 79.0 - 97.0 fL Final   04/10/2021 80.2 79.0 - 97.0 fL Final   04/09/2021 83.4 79.0 - 97.0 fL Final          Sodium Sodium   Date Value Ref Range Status   04/10/2021 135 (L) 136 - 145 mmol/L Final   04/09/2021 135 (L) 136 - 145 mmol/L Final      Potassium Potassium   Date " Value Ref Range Status   04/10/2021 3.1 (L) 3.5 - 5.2 mmol/L Final   04/09/2021 3.7 3.5 - 5.2 mmol/L Final     Comment:     Slight hemolysis detected by analyzer. Results may be affected.      Chloride Chloride   Date Value Ref Range Status   04/10/2021 88 (L) 98 - 107 mmol/L Final   04/09/2021 89 (L) 98 - 107 mmol/L Final      CO2 CO2   Date Value Ref Range Status   04/10/2021 28.0 22.0 - 29.0 mmol/L Final   04/09/2021 27.0 22.0 - 29.0 mmol/L Final      BUN BUN   Date Value Ref Range Status   04/10/2021 50 (H) 8 - 23 mg/dL Final   04/09/2021 53 (H) 8 - 23 mg/dL Final      Creatinine Creatinine   Date Value Ref Range Status   04/10/2021 6.53 (H) 0.76 - 1.27 mg/dL Final   04/09/2021 6.62 (H) 0.76 - 1.27 mg/dL Final      Calcium Calcium   Date Value Ref Range Status   04/10/2021 6.5 (L) 8.6 - 10.5 mg/dL Final   04/09/2021 5.4 (C) 8.6 - 10.5 mg/dL Final      PO4 No results found for: CAPO4   Albumin Albumin   Date Value Ref Range Status   04/10/2021 3.60 3.50 - 5.20 g/dL Final   04/09/2021 3.30 (L) 3.50 - 5.20 g/dL Final      Magnesium Magnesium   Date Value Ref Range Status   04/11/2021 1.9 1.6 - 2.4 mg/dL Final   04/10/2021 1.7 1.6 - 2.4 mg/dL Final   04/09/2021 2.0 1.6 - 2.4 mg/dL Final      Uric Acid No results found for: URICACID     Right kidney measures 11.2 cm in length without apparent mass or   hydronephrosis. Normal color Doppler flow.       Left kidney measures 11.0 cm in length without apparent mass or   hydronephrosis. Normal color Doppler flow.       Unremarkable catheterized and contracted urinary bladder.       Impression:     Unremarkable sonographic appearance of both kidneys.         Results Review:     I reviewed the patient's new clinical results.    aspirin, 81 mg, Oral, Daily  carvedilol, 3.125 mg, Oral, BID With Meals  cetirizine, 10 mg, Oral, Daily  docusate sodium, 100 mg, Oral, BID  heparin (porcine), 5,000 Units, Subcutaneous, Q12H  hydrocortisone sodium succinate, 50 mg, Intravenous,  Q8H  insulin detemir, 10 Units, Subcutaneous, Daily  insulin lispro, 0-7 Units, Subcutaneous, 4x Daily AC & at Bedtime  magnesium oxide, 1,000 mg, Oral, Daily  oxymetazoline, 2 spray, Each Nare, BID  piperacillin-tazobactam, 4.5 g, Intravenous, Q12H  polyethylene glycol, 17 g, Oral, Daily  sodium chloride, 10 mL, Intravenous, Q12H  vancomycin (dosing per levels), , Does not apply, Daily      norepinephrine, 0.02-0.1 mcg/kg/min, Last Rate: Stopped (04/08/21 1550)  Pharmacy to dose vancomycin,         Medication Review: As noted above    Assessment/Plan       RINKU    Hypertension    T2DM    Dyslipidemia    Sleep apnea    Elevated liver enzymes    Recent Rt Total Knee Replacement 4/5/21    Chronic pain w/pain pump    Lactic acidosis    Acute HFrEF (EF 30%)    Shock (CMS/HCC) -cardiogenic versus septic  1.  ATN secondary to shock with deterioration renal function.  No previous history of kidney disease per patient no history of epistaxis hemoptysis gross hematuria or kidney stones no family history of kidney disease.    Protein creatinine ratio nephrotic range.Right kidney 11.2 cm, left kidney 11.0 cm normal color Doppler unremarkable bilateral kidneys  2.  Septic shock: On IV pressors.  3.  Nephrotic proteinuria greater than 6 g in the setting of diabetes.  4.  Altered mental status and hearing difficulty improved at this time.  5.  S/p recent right total knee replacement.  6.  Congestive heart failure: Ejection fraction 30%.  7.  Hypocalcemia.  Plan:  Trial of IV Bumex and Diuril will be done today.  Plan dialysis for tomorrow.  With ultrafiltration.  First dialysis 4/9/2021  Tunnel catheter clean  Keep mean arterial pressure greater than 70.  Hold all antihypertensive medications, Glucophage, nonsteroidals at this time.  Avoid nephrotoxic medications.   Check volume status.  Check labs in the morning.  Daily evaluation for renal replacement therapy will be done.  Discontinue the bicarb drip at this time.  Monitor  bicarb level  Adjust medication for the new GFR.   High risk patient with multiple medical problems.  DC Peck catheter  Next week if no renal recovery may consider kidney biopsy discussed with the wife and patient.  Ionized calcium 1.01 will be checked again in a.m. replacement will be done.    Jimenez Daniels MD  04/11/21  10:47 EDT

## 2021-04-11 NOTE — PLAN OF CARE
Goal Outcome Evaluation:  Plan of Care Reviewed With: patient  Pt has been normal sinus on tele and on RA. Knee bandage changed and knee was wrapped per orders. No urine output this shift. Bladder scan showed no urine. Potassium was 3.2 and Mag 1.9. Called Dr. Donaldson from nephrology who ordered 2 mg mag replacement and no potassium replacement. He will have dialysis tomorrow. Will cont to monitor.

## 2021-04-11 NOTE — PROGRESS NOTES
"    [facility] Medicine Services  PROGRESS NOTE    Patient Name: Elvis Hanson  : 1955  MRN: [id number]    Date of Admission: 2021  Primary Care Physician: April Chen DO    Subjective   Subjective     CC:  Loss of hearing    HPI:  Pt doing okay this am, but didn't sleep all night. Daughter is at bedside and has a list of complaints today- mainly upset that he \"sat in the chair all night and didn't sleep.\" Awaiting a new bed as apparently the current bed he is in is nonfunctioning?  Daughter also demands something for him to sleep today (pt is currently falling asleep as we are talking), states that she wants him to sleep now since he didn't all night.     ROS:  Gen- No fevers, chills  CV- No chest pain, palpitations  Resp- No cough, dyspnea  GI- No N/V/D, abd pain      Objective   Objective     Vital Signs:   Temp:  [97.6 °F (36.4 °C)-97.8 °F (36.6 °C)] 97.6 °F (36.4 °C)  Heart Rate:  [60-90] 64  Resp:  [18-20] 20  BP: (145-182)/() 153/82        Physical Exam:  Constitutional: No acute distress, sleeping, wakes easily, falls asleep easily  HENT: NCAT, mucous membranes moist, LIJ catheter in place  Respiratory: Clear to auscultation bilaterally, respiratory effort normal   Cardiovascular: RRR, no murmurs, rubs, or gallops  Gastrointestinal: Positive bowel sounds, soft, nontender, nondistended  Musculoskeletal: No bilateral ankle edema, right chest wall HD catheter in place  Psychiatric: Appropriate affect, cooperative  Neurologic: Oriented x 3, strength symmetric in all extremities, Cranial Nerves grossly intact to confrontation, speech clear  Skin: No rashes    Results Reviewed:  Results from last 7 days   Lab Units 04/10/21  0906 21  0205 21  0808 21  0517 21  1711   WBC 10*3/mm3 11.93* 11.34*  --  17.13*  --    HEMOGLOBIN g/dL 9.0* 9.2*  --  10.1*  --    HEMATOCRIT % 28.0* 29.7*  --  33.4*  --    PLATELETS 10*3/mm3 172 149  --  230  --  "   INR   --   --  1.87*  --   --    PROCALCITONIN ng/mL  --   --   --  4.86* 2.15*     Results from last 7 days   Lab Units 04/10/21  0905 04/09/21  0205 04/08/21  0517 04/07/21  1050   SODIUM mmol/L 135* 135* 133* 140   POTASSIUM mmol/L 3.1* 3.7 3.9 3.7   CHLORIDE mmol/L 88* 89* 94* 94*   CO2 mmol/L 28.0 27.0 21.0* 26.0   BUN mg/dL 50* 53* 37* 25*   CREATININE mg/dL 6.53* 6.62* 5.10* 3.89*   GLUCOSE mg/dL 144* 174* 138* 116*   CALCIUM mg/dL 6.5* 5.4* 6.5* 7.9*   ALT (SGPT) U/L 2,171* 3,838* 5,392* 2,631*   AST (SGOT) U/L 1,376* 6,772* >7,000* 4,533*   TROPONIN T ng/mL  --   --  0.760* 0.290*     Estimated Creatinine Clearance: 16.6 mL/min (A) (by C-G formula based on SCr of 6.53 mg/dL (H)).    Microbiology Results Abnormal     Procedure Component Value - Date/Time    Blood Culture - Blood, Hand, Left [[id number]] Collected: 04/07/21 2057    Lab Status: Preliminary result Specimen: Blood from Hand, Left Updated: 04/10/21 2145     Blood Culture No growth at 3 days    Blood Culture - Blood, Hand, Right [[id number]] Collected: 04/07/21 2057    Lab Status: Preliminary result Specimen: Blood from Hand, Right Updated: 04/10/21 2145     Blood Culture No growth at 3 days    Urine Culture - Urine, Urine, Clean Catch [[id number]]  (Normal) Collected: 04/09/21 1325    Lab Status: Final result Specimen: Urine, Clean Catch Updated: 04/10/21 1742     Urine Culture No growth    MRSA Screen, PCR (Inpatient) - Swab, Nares [[id number]]  (Normal) Collected: 04/08/21 0341    Lab Status: Final result Specimen: Swab from Nares Updated: 04/08/21 0937     MRSA PCR Negative    Narrative:      MRSA Negative    COVID-19 and FLU A/B PCR - Swab, Nasopharynx [[id number]]  (Normal) Collected: 04/07/21 1357    Lab Status: Final result Specimen: Swab from Nasopharynx Updated: 04/07/21 1509     COVID19 Not Detected     Influenza A PCR Not Detected     Influenza B PCR Not Detected    Narrative:      Fact sheet for providers:  https://www.fda.gov/media/390602/download    Fact sheet for patients: https://www.fda.gov/media/969238/download    Test performed by PCR.          Imaging Results (Last 24 Hours)     ** No results found for the last 24 hours. **          Results for orders placed during the hospital encounter of 04/07/21    Adult Transthoracic Echo Complete W/ Cont if Necessary Per Protocol    Interpretation Summary  · Estimated left ventricular EF = 30% Left ventricular systolic function is severely decreased.  · There is global hypokinesis. Basal inferior and inferoseptal wall appears akinetic  · Left ventricular wall thickness is consistent with mild to moderate posterior asymmetric hypertrophy  · Moderately reduced right ventricular systolic function noted.  · Moderate tricuspid valve regurgitation is present. Estimated right ventricular systolic pressure from tricuspid regurgitation is normal (<35 mmHg).      I have reviewed the medications:  Scheduled Meds:aspirin, 81 mg, Oral, Daily  carvedilol, 3.125 mg, Oral, BID With Meals  cetirizine, 10 mg, Oral, Daily  docusate sodium, 100 mg, Oral, BID  heparin (porcine), 5,000 Units, Subcutaneous, Q12H  hydrocortisone sodium succinate, 100 mg, Intravenous, Q8H  insulin detemir, 10 Units, Subcutaneous, Daily  insulin lispro, 0-7 Units, Subcutaneous, 4x Daily AC & at Bedtime  magnesium oxide, 1,000 mg, Oral, Daily  oxymetazoline, 2 spray, Each Nare, BID  piperacillin-tazobactam, 4.5 g, Intravenous, Q12H  polyethylene glycol, 17 g, Oral, Daily  sodium chloride, 10 mL, Intravenous, Q12H  vancomycin (dosing per levels), , Does not apply, Daily      Continuous Infusions:norepinephrine, 0.02-0.1 mcg/kg/min, Last Rate: Stopped (04/08/21 1550)  Pharmacy to dose vancomycin,       PRN Meds:.calcium gluconate IVPB **AND** calcium gluconate IVPB **AND** Calcium, Ionized  •  heparin (porcine)  •  hydrALAZINE  •  oxyCODONE  •  Pharmacy to dose vancomycin  •  sodium chloride  •  sodium  chloride    Assessment/Plan   Assessment & Plan     Active Hospital Problems    Diagnosis  POA   • **RINKU [N17.9]  Yes   • Lactic acidosis [E87.2]  Yes   • Acute HFrEF (EF 30%) [I50.21]  Yes   • Shock (CMS/HCC) -cardiogenic versus septic [R57.9]  Yes   • Hypertension [I10]  Yes   • T2DM [E11.9]  Yes   • Dyslipidemia [E78.5]  Yes   • Sleep apnea [G47.30]  Yes   • Elevated liver enzymes [R74.8]  Yes   • Recent Rt Total Knee Replacement 4/5/21 [Z96.659]  Not Applicable   • Chronic pain w/pain pump [G89.29]  Yes      Resolved Hospital Problems   No resolved problems to display.        Brief Hospital Course to date:  Elvis Hanson is a 66 y.o. male with a history of recent right total knee replacement on 4/5, HTN, T2DM, SHYLA, and chronic pain with a pain pump who presented to [facility] ED on 4/7 with complaints of hearing loss. Pt was found to have RINKU, elevated LFTs and was admitted to the ICU service on the PCU floor.  He became hypotensive requiring pressors and was transferred to the ICU. Pt was found to have an EF of 30% and Cardiology was consulted.  He was also initiated on HD during this admission.  He was transferred to our service on 4/10. Of note, his hearing loss has resolved and was thought to be due to Furosemide in setting of decreased renal function.     Plan:    Acute systolic HF  Elevated troponin  --Cardiology following, pt will eventually require ischemic evaluation to determine etiology of his HF unable to get this done currently due to renal function  --denies chest pain, suspect elevated troponin is due to volume overload  --minimal UOP. Trial of diuretics today per NAL.  Started on HD this admission  --continue ASA, not able to tolerate statin due to transaminitis    Oliguric RINKU   --now on HD, NAL following  --minimal UOP over last 24 hours. Peck catheter d/c'd 4/10.      Elevated LFTs  --AST/ALT significantly elevated  --likely congestive hepatopathy  --monitor, improving daily    Septic vs  Cardiogenic shock  --taper hydrocortisone due to hypertension now- decrease to 50mg [id number] today.   --resumed antihypertensives but at much lower dose than home dose. Monitor closely  --continue Vanc/Zosyn  --cultures thus far unremarkable    Recent right knee replacement  --Dr. Avalos following    Chronic Pain  --pain pump present    T2DM  --low dose basal insulin with SSI for now    Hearing loss  --resolved. Thought to be due to home dose diuretics.          DVT Prophylaxis:  BEBETO      Disposition: I expect the patient to be discharged TBD    CODE STATUS:   Code Status and Medical Interventions:   Ordered at: 04/07/21 1422     Code Status:    CPR     Medical Interventions (Level of Support Prior to Arrest):    Full       Kay Diaz MD  04/11/21        More than 50% of time spent counseling on current illness and plan of care. Case discussed with: patient, daughter and nurse  Total time spent face to face with the patient was 20 minutes.  Total time of the encounter was 35 minutes.

## 2021-04-12 ENCOUNTER — APPOINTMENT (OUTPATIENT)
Dept: NEPHROLOGY | Facility: HOSPITAL | Age: 66
End: 2021-04-12

## 2021-04-12 LAB
ALBUMIN SERPL-MCNC: 3.5 G/DL (ref 3.5–5.2)
ALBUMIN/GLOB SERPL: 1.2 G/DL
ALP SERPL-CCNC: 155 U/L (ref 39–117)
ALT SERPL W P-5'-P-CCNC: 1197 U/L (ref 1–41)
ANION GAP SERPL CALCULATED.3IONS-SCNC: 19 MMOL/L (ref 5–15)
AST SERPL-CCNC: 188 U/L (ref 1–40)
BACTERIA SPEC AEROBE CULT: NORMAL
BACTERIA SPEC AEROBE CULT: NORMAL
BILIRUB SERPL-MCNC: 1 MG/DL (ref 0–1.2)
BUN SERPL-MCNC: 69 MG/DL (ref 8–23)
BUN/CREAT SERPL: 8.6 (ref 7–25)
C3 SERPL-MCNC: 68 MG/DL (ref 82–167)
C4 SERPL-MCNC: 12 MG/DL (ref 14–44)
CALCIUM SPEC-SCNC: 7.6 MG/DL (ref 8.6–10.5)
CHLORIDE SERPL-SCNC: 92 MMOL/L (ref 98–107)
CO2 SERPL-SCNC: 24 MMOL/L (ref 22–29)
CREAT SERPL-MCNC: 7.99 MG/DL (ref 0.76–1.27)
DEPRECATED RDW RBC AUTO: 42.8 FL (ref 37–54)
ERYTHROCYTE [DISTWIDTH] IN BLOOD BY AUTOMATED COUNT: 14.9 % (ref 12.3–15.4)
GFR SERPL CREATININE-BSD FRML MDRD: 8 ML/MIN/1.73
GFR SERPL CREATININE-BSD FRML MDRD: ABNORMAL ML/MIN/{1.73_M2}
GLOBULIN UR ELPH-MCNC: 2.9 GM/DL
GLUCOSE BLDC GLUCOMTR-MCNC: 139 MG/DL (ref 70–130)
GLUCOSE BLDC GLUCOMTR-MCNC: 141 MG/DL (ref 70–130)
GLUCOSE BLDC GLUCOMTR-MCNC: 144 MG/DL (ref 70–130)
GLUCOSE BLDC GLUCOMTR-MCNC: 170 MG/DL (ref 70–130)
GLUCOSE SERPL-MCNC: 128 MG/DL (ref 65–99)
HCT VFR BLD AUTO: 30 % (ref 37.5–51)
HGB BLD-MCNC: 9.7 G/DL (ref 13–17.7)
MAGNESIUM SERPL-MCNC: 2.4 MG/DL (ref 1.6–2.4)
MCH RBC QN AUTO: 25.8 PG (ref 26.6–33)
MCHC RBC AUTO-ENTMCNC: 32.3 G/DL (ref 31.5–35.7)
MCV RBC AUTO: 79.8 FL (ref 79–97)
PHOSPHATE SERPL-MCNC: 7.3 MG/DL (ref 2.5–4.5)
PLATELET # BLD AUTO: 223 10*3/MM3 (ref 140–450)
PMV BLD AUTO: 10.8 FL (ref 6–12)
POTASSIUM SERPL-SCNC: 3.4 MMOL/L (ref 3.5–5.2)
PROT SERPL-MCNC: 6.4 G/DL (ref 6–8.5)
RBC # BLD AUTO: 3.76 10*6/MM3 (ref 4.14–5.8)
SODIUM SERPL-SCNC: 135 MMOL/L (ref 136–145)
TROPONIN T SERPL-MCNC: 0.72 NG/ML (ref 0–0.03)
WBC # BLD AUTO: 12.59 10*3/MM3 (ref 3.4–10.8)

## 2021-04-12 PROCEDURE — 82962 GLUCOSE BLOOD TEST: CPT

## 2021-04-12 PROCEDURE — 86038 ANTINUCLEAR ANTIBODIES: CPT | Performed by: INTERNAL MEDICINE

## 2021-04-12 PROCEDURE — 83520 IMMUNOASSAY QUANT NOS NONAB: CPT | Performed by: INTERNAL MEDICINE

## 2021-04-12 PROCEDURE — 83516 IMMUNOASSAY NONANTIBODY: CPT | Performed by: INTERNAL MEDICINE

## 2021-04-12 PROCEDURE — 84484 ASSAY OF TROPONIN QUANT: CPT | Performed by: INTERNAL MEDICINE

## 2021-04-12 PROCEDURE — 86160 COMPLEMENT ANTIGEN: CPT | Performed by: INTERNAL MEDICINE

## 2021-04-12 PROCEDURE — 25010000002 HEPARIN (PORCINE) PER 1000 UNITS: Performed by: INTERNAL MEDICINE

## 2021-04-12 PROCEDURE — 84100 ASSAY OF PHOSPHORUS: CPT | Performed by: INTERNAL MEDICINE

## 2021-04-12 PROCEDURE — 99233 SBSQ HOSP IP/OBS HIGH 50: CPT | Performed by: INTERNAL MEDICINE

## 2021-04-12 PROCEDURE — 86225 DNA ANTIBODY NATIVE: CPT | Performed by: INTERNAL MEDICINE

## 2021-04-12 PROCEDURE — 80053 COMPREHEN METABOLIC PANEL: CPT | Performed by: INTERNAL MEDICINE

## 2021-04-12 PROCEDURE — 25010000002 PIPERACILLIN SOD-TAZOBACTAM PER 1 G

## 2021-04-12 PROCEDURE — 86256 FLUORESCENT ANTIBODY TITER: CPT | Performed by: INTERNAL MEDICINE

## 2021-04-12 PROCEDURE — 63710000001 INSULIN DETEMIR PER 5 UNITS: Performed by: INTERNAL MEDICINE

## 2021-04-12 PROCEDURE — 83735 ASSAY OF MAGNESIUM: CPT | Performed by: INTERNAL MEDICINE

## 2021-04-12 PROCEDURE — 63710000001 INSULIN LISPRO (HUMAN) PER 5 UNITS: Performed by: INTERNAL MEDICINE

## 2021-04-12 PROCEDURE — 25010000002 HYDROCORTISONE SODIUM SUCCINATE 100 MG RECONSTITUTED SOLUTION: Performed by: INTERNAL MEDICINE

## 2021-04-12 PROCEDURE — 85027 COMPLETE CBC AUTOMATED: CPT | Performed by: INTERNAL MEDICINE

## 2021-04-12 PROCEDURE — 25010000002 HYDRALAZINE PER 20 MG: Performed by: INTERNAL MEDICINE

## 2021-04-12 PROCEDURE — 99232 SBSQ HOSP IP/OBS MODERATE 35: CPT | Performed by: INTERNAL MEDICINE

## 2021-04-12 PROCEDURE — 25010000002 PIPERACILLIN SOD-TAZOBACTAM PER 1 G: Performed by: INTERNAL MEDICINE

## 2021-04-12 RX ORDER — ALBUMIN (HUMAN) 12.5 G/50ML
12.5 SOLUTION INTRAVENOUS AS NEEDED
Status: ACTIVE | OUTPATIENT
Start: 2021-04-12 | End: 2021-04-12

## 2021-04-12 RX ORDER — CHOLECALCIFEROL (VITAMIN D3) 125 MCG
5 CAPSULE ORAL NIGHTLY PRN
Status: DISCONTINUED | OUTPATIENT
Start: 2021-04-12 | End: 2021-04-20 | Stop reason: HOSPADM

## 2021-04-12 RX ORDER — ALBUMIN (HUMAN) 12.5 G/50ML
12.5 SOLUTION INTRAVENOUS AS NEEDED
Status: ACTIVE | OUTPATIENT
Start: 2021-04-13 | End: 2021-04-13

## 2021-04-12 RX ORDER — CALCIUM CARBONATE 200(500)MG
2 TABLET,CHEWABLE ORAL 3 TIMES DAILY PRN
Status: DISCONTINUED | OUTPATIENT
Start: 2021-04-12 | End: 2021-04-20 | Stop reason: HOSPADM

## 2021-04-12 RX ORDER — CARVEDILOL 12.5 MG/1
12.5 TABLET ORAL 2 TIMES DAILY WITH MEALS
Status: DISCONTINUED | OUTPATIENT
Start: 2021-04-12 | End: 2021-04-13

## 2021-04-12 RX ADMIN — HEPARIN SODIUM 1600 UNITS: 1000 INJECTION INTRAVENOUS; SUBCUTANEOUS at 11:19

## 2021-04-12 RX ADMIN — SODIUM CHLORIDE, PRESERVATIVE FREE 10 ML: 5 INJECTION INTRAVENOUS at 00:41

## 2021-04-12 RX ADMIN — MAGNESIUM OXIDE TAB 400 MG (241.3 MG ELEMENTAL MG) 1000 MG: 400 (241.3 MG) TAB at 12:38

## 2021-04-12 RX ADMIN — INSULIN LISPRO 2 UNITS: 100 INJECTION, SOLUTION INTRAVENOUS; SUBCUTANEOUS at 12:54

## 2021-04-12 RX ADMIN — POLYETHYLENE GLYCOL 3350 17 G: 17 POWDER, FOR SOLUTION ORAL at 12:39

## 2021-04-12 RX ADMIN — ASPIRIN 81 MG: 81 TABLET, COATED ORAL at 12:38

## 2021-04-12 RX ADMIN — HEPARIN SODIUM 5000 UNITS: 5000 INJECTION INTRAVENOUS; SUBCUTANEOUS at 12:38

## 2021-04-12 RX ADMIN — SODIUM CHLORIDE, PRESERVATIVE FREE 10 ML: 5 INJECTION INTRAVENOUS at 22:34

## 2021-04-12 RX ADMIN — HEPARIN SODIUM 5000 UNITS: 5000 INJECTION INTRAVENOUS; SUBCUTANEOUS at 22:33

## 2021-04-12 RX ADMIN — CARVEDILOL 12.5 MG: 12.5 TABLET, FILM COATED ORAL at 07:25

## 2021-04-12 RX ADMIN — ANTACID TABLETS 1 TABLET: 500 TABLET, CHEWABLE ORAL at 22:33

## 2021-04-12 RX ADMIN — TAZOBACTAM SODIUM AND PIPERACILLIN SODIUM 4.5 G: 500; 4 INJECTION, SOLUTION INTRAVENOUS at 12:54

## 2021-04-12 RX ADMIN — CARVEDILOL 12.5 MG: 12.5 TABLET, FILM COATED ORAL at 18:16

## 2021-04-12 RX ADMIN — TAZOBACTAM SODIUM AND PIPERACILLIN SODIUM 4.5 G: 500; 4 INJECTION, SOLUTION INTRAVENOUS at 06:36

## 2021-04-12 RX ADMIN — INSULIN DETEMIR 10 UNITS: 100 INJECTION, SOLUTION SUBCUTANEOUS at 12:00

## 2021-04-12 RX ADMIN — SODIUM CHLORIDE, PRESERVATIVE FREE 10 ML: 5 INJECTION INTRAVENOUS at 12:39

## 2021-04-12 RX ADMIN — Medication 5 MG: at 03:13

## 2021-04-12 RX ADMIN — HYDROCORTISONE SODIUM SUCCINATE 50 MG: 100 INJECTION, POWDER, FOR SOLUTION INTRAMUSCULAR; INTRAVENOUS at 12:38

## 2021-04-12 RX ADMIN — DOCUSATE SODIUM 100 MG: 100 CAPSULE, LIQUID FILLED ORAL at 22:33

## 2021-04-12 RX ADMIN — DOCUSATE SODIUM 100 MG: 100 CAPSULE, LIQUID FILLED ORAL at 12:37

## 2021-04-12 RX ADMIN — Medication 5 MG: at 22:44

## 2021-04-12 RX ADMIN — CETIRIZINE HYDROCHLORIDE 10 MG: 10 TABLET, FILM COATED ORAL at 12:38

## 2021-04-12 RX ADMIN — HYDRALAZINE HYDROCHLORIDE 10 MG: 20 INJECTION INTRAMUSCULAR; INTRAVENOUS at 03:12

## 2021-04-12 NOTE — PLAN OF CARE
Goal Outcome Evaluation:  Plan of Care Reviewed With: patient, spouse  Progress: no change  Outcome Summary: VSS. on RA. dialysis today, 4L removed. critical troponin 0.717 reported to cardiology, no new orders. ekg performed. records requested from [facility], still pending. Right knee dresing changed. PT consult ordered. no other concerns at this time. will continue to monitor

## 2021-04-12 NOTE — PROGRESS NOTES
Continued Stay Note  Psychiatric     Patient Name: Elvis Hanson  MRN: [id number]  Today's Date: 4/12/2021    Admit Date: 4/7/2021    Discharge Plan     Row Name 04/12/21 1449       Plan    Plan  update    Patient/Family in Agreement with Plan  yes    Plan Comments  Spoke with patient at bedside regarding discharge plan.  Patient had HD today.  CM advised that if he would need HD on an OP basis CM would be the one who makes those arrangements for him.  No immediate discharge needs verbalized.  Nephrology working to determined dialysis needs.  CM continues to follow.  Patient plan is to discharge home via car with family to transport.    Final Discharge Disposition Code  01 - home or self-care        Discharge Codes    No documentation.       Expected Discharge Date and Time     Expected Discharge Date Expected Discharge Time    Apr 12, 2021             Kristen Prado RN

## 2021-04-12 NOTE — PROGRESS NOTES
[facility] Medicine Services  PROGRESS NOTE    Patient Name: Elvis Hanson  : 1955  MRN: [id number]    Date of Admission: 2021  Primary Care Physician: April Chen DO    Subjective   Subjective     CC:  Loss of hearing    HPI:  Pt doing well, no issues overnight.  Slept better last pm. States that he made a little bit of urine last pm and this am    ROS:  Gen- No fevers, chills  CV- No chest pain, palpitations  Resp- No cough, dyspnea  GI- No N/V/D, abd pain      Objective   Objective     Vital Signs:   Temp:  [97.9 °F (36.6 °C)-98.5 °F (36.9 °C)] 97.9 °F (36.6 °C)  Heart Rate:  [64-75] 69  Resp:  [16-20] 20  BP: (120-184)/() 184/91        Physical Exam:  Constitutional: No acute distress, seen while in HD  HENT: NCAT, mucous membranes moist, LIJ catheter in place  Respiratory: Clear to auscultation bilaterally, respiratory effort normal   Cardiovascular: RRR, no murmurs, rubs, or gallops  Gastrointestinal: Positive bowel sounds, soft, nontender, nondistended  Musculoskeletal: No bilateral ankle edema, right chest wall HD catheter in place  Psychiatric: Appropriate affect, cooperative  Neurologic: Oriented x 3, strength symmetric in all extremities, Cranial Nerves grossly intact to confrontation, speech clear  Skin: No rashes    Results Reviewed:  Results from last 7 days   Lab Units 21  0849 04/10/21  0906 21  0205 21  0808 21  0517 21  1711   WBC 10*3/mm3 12.08* 11.93* 11.34*  --  17.13*  --    HEMOGLOBIN g/dL 9.3* 9.0* 9.2*  --  10.1*  --    HEMATOCRIT % 29.7* 28.0* 29.7*  --  33.4*  --    PLATELETS 10*3/mm3 195 172 149  --  230  --    INR   --   --   --  1.87*  --   --    PROCALCITONIN ng/mL  --   --   --   --  4.86* 2.15*     Results from last 7 days   Lab Units 21  0849 04/10/21  0905 21  0205 21  0517 21  1050   SODIUM mmol/L 135* 135* 135* 133* 140   POTASSIUM mmol/L 3.2* 3.1* 3.7 3.9 3.7    CHLORIDE mmol/L 92* 88* 89* 94* 94*   CO2 mmol/L 26.0 28.0 27.0 21.0* 26.0   BUN mg/dL 48* 50* 53* 37* 25*   CREATININE mg/dL 6.71* 6.53* 6.62* 5.10* 3.89*   GLUCOSE mg/dL 132* 144* 174* 138* 116*   CALCIUM mg/dL 7.5* 6.5* 5.4* 6.5* 7.9*   ALT (SGPT) U/L 1,725* 2,171* 3,838* 5,392* 2,631*   AST (SGOT) U/L 496* 1,376* 6,772* >7,000* 4,533*   TROPONIN T ng/mL  --   --   --  0.760* 0.290*     Estimated Creatinine Clearance: 16.2 mL/min (A) (by C-G formula based on SCr of 6.71 mg/dL (H)).    Microbiology Results Abnormal     Procedure Component Value - Date/Time    Blood Culture - Blood, Hand, Left [[id number]] Collected: 04/07/21 2057    Lab Status: Preliminary result Specimen: Blood from Hand, Left Updated: 04/11/21 2145     Blood Culture No growth at 4 days    Blood Culture - Blood, Hand, Right [[id number]] Collected: 04/07/21 2057    Lab Status: Preliminary result Specimen: Blood from Hand, Right Updated: 04/11/21 2145     Blood Culture No growth at 4 days    Urine Culture - Urine, Urine, Clean Catch [[id number]]  (Normal) Collected: 04/09/21 1325    Lab Status: Final result Specimen: Urine, Clean Catch Updated: 04/10/21 1742     Urine Culture No growth    MRSA Screen, PCR (Inpatient) - Swab, Nares [[id number]]  (Normal) Collected: 04/08/21 0341    Lab Status: Final result Specimen: Swab from Nares Updated: 04/08/21 0937     MRSA PCR Negative    Narrative:      MRSA Negative    COVID-19 and FLU A/B PCR - Swab, Nasopharynx [[id number]]  (Normal) Collected: 04/07/21 1357    Lab Status: Final result Specimen: Swab from Nasopharynx Updated: 04/07/21 1509     COVID19 Not Detected     Influenza A PCR Not Detected     Influenza B PCR Not Detected    Narrative:      Fact sheet for providers: https://www.fda.gov/media/843818/download    Fact sheet for patients: https://www.fda.gov/media/473264/download    Test performed by PCR.          Imaging Results (Last 24 Hours)     ** No results found for the last 24 hours. **           Results for orders placed during the hospital encounter of 04/07/21    Adult Transthoracic Echo Complete W/ Cont if Necessary Per Protocol    Interpretation Summary  · Estimated left ventricular EF = 30% Left ventricular systolic function is severely decreased.  · There is global hypokinesis. Basal inferior and inferoseptal wall appears akinetic  · Left ventricular wall thickness is consistent with mild to moderate posterior asymmetric hypertrophy  · Moderately reduced right ventricular systolic function noted.  · Moderate tricuspid valve regurgitation is present. Estimated right ventricular systolic pressure from tricuspid regurgitation is normal (<35 mmHg).      I have reviewed the medications:  Scheduled Meds:aspirin, 81 mg, Oral, Daily  carvedilol, 3.125 mg, Oral, BID With Meals  cetirizine, 10 mg, Oral, Daily  docusate sodium, 100 mg, Oral, BID  heparin (porcine), 5,000 Units, Subcutaneous, Q12H  hydrocortisone sodium succinate, 50 mg, Intravenous, Q8H  insulin detemir, 10 Units, Subcutaneous, Daily  insulin lispro, 0-7 Units, Subcutaneous, 4x Daily AC & at Bedtime  magnesium oxide, 1,000 mg, Oral, Daily  piperacillin-tazobactam, 4.5 g, Intravenous, Q12H  polyethylene glycol, 17 g, Oral, Daily  sodium chloride, 10 mL, Intravenous, Q12H  vancomycin (dosing per levels), , Does not apply, Daily      Continuous Infusions:norepinephrine, 0.02-0.1 mcg/kg/min, Last Rate: Stopped (04/08/21 1550)  Pharmacy to dose vancomycin,       PRN Meds:.calcium gluconate IVPB **AND** calcium gluconate IVPB **AND** Calcium, Ionized  •  heparin (porcine)  •  hydrALAZINE  •  magnesium sulfate **OR** magnesium sulfate in D5W 1g/100mL (PREMIX) **OR** magnesium sulfate  •  melatonin  •  oxyCODONE  •  Pharmacy to dose vancomycin  •  sodium chloride  •  sodium chloride    Assessment/Plan   Assessment & Plan     Active Hospital Problems    Diagnosis  POA   • **RINKU [N17.9]  Yes   • Lactic acidosis [E87.2]  Yes   • Acute HFrEF (EF 30%)  [I50.21]  Yes   • Shock (CMS/HCC) -cardiogenic versus septic [R57.9]  Yes   • Hypertension [I10]  Yes   • T2DM [E11.9]  Yes   • Dyslipidemia [E78.5]  Yes   • Sleep apnea [G47.30]  Yes   • Elevated liver enzymes [R74.8]  Yes   • Recent Rt Total Knee Replacement 4/5/21 [Z96.659]  Not Applicable   • Chronic pain w/pain pump [G89.29]  Yes      Resolved Hospital Problems   No resolved problems to display.        Brief Hospital Course to date:  Elvis Hanson is a 66 y.o. male with a history of recent right total knee replacement on 4/5, HTN, T2DM, SHYLA, and chronic pain with a pain pump who presented to the [facility] ED on 4/7 with complaints of hearing loss. Pt was found to have RINKU, elevated LFTs and was admitted to the ICU service on the PCU floor.  He became hypotensive requiring pressors and was transferred to the ICU. Pt was found to have an EF of 30% and Cardiology was consulted.  He was also initiated on HD during this admission.  He was transferred to our service on 4/10. Of note, his hearing loss has resolved and was thought to be due to Furosemide in setting of decreased renal function.     Plan:    Acute systolic HF  Elevated troponin  --Cardiology following, pt will eventually require ischemic evaluation to determine etiology of his HF unable to get this done currently due to renal function  --denies chest pain, suspect elevated troponin is due to volume overload  --Troponin peaked at 0.76  --minimal UOP. Trial of diuretics yesterday per NAL- no UOP noted nor any urine on bladder scan  --continue ASA, not able to tolerate statin due to transaminitis    Oliguric RINKU   --now on HD, NAL following.   --minimal UOP over last 24 hours. Peck catheter d/c'd 4/10.    -- likely to have renal bx this week     Elevated LFTs  --AST/ALT significantly elevated on admission  --likely congestive hepatopathy  --monitor, improving daily    Septic vs Cardiogenic shock  --taper hydrocortisone due to hypertension now- decrease to 50 mg  Q12 hours today  --resumed antihypertensives but at much lower dose than home dose. Monitor closely  --stop Vanc, continue Zosyn.  If does well today, likely d/c Zosyn tomorrow  --his leukocytosis is slightly worse today, so will watch closely given change in ABX  --cultures thus far unremarkable    Recent right knee replacement  --Dr. Avalos following    Chronic Pain  --pain pump present    T2DM  --low dose basal insulin with SSI for now    Hearing loss  --resolved. Thought to be due to home dose diuretics.          DVT Prophylaxis:  BEBETO      Disposition: I expect the patient to be discharged TBD, will have PT/OT reassess now that he is out of ICU and will have CM start working on HD placement    CODE STATUS:   Code Status and Medical Interventions:   Ordered at: 04/07/21 1422     Code Status:    CPR     Medical Interventions (Level of Support Prior to Arrest):    Full       Kay Diaz MD  04/12/21

## 2021-04-12 NOTE — PAYOR COMM NOTE
"Ref # [id number]  Ashley Hardy RN, BSN  Phone # [phone]  Fax # [phone]  Elvis Marks (66 y.o. Male)     Date of Birth Social Security Number Address Home Phone MRN    1955  [address]  [facility] [id number] [phone] [id number]    Druze Marital Status          None        Admission Date Admission Type Admitting Provider Attending Provider Department, Room/Bed    4/7/21 Emergency Kay Diaz MD Howard, Gabriela Kirk, MD [facility] 6B, N646/1    Discharge Date Discharge Disposition Discharge Destination                       Attending Provider: Kay iDaz MD    Allergies: No Known Allergies    Isolation: None   Infection: None   Code Status: CPR    Ht: 177.8 cm (70\")   Wt: 157 kg (345 lb 12.8 oz)    Admission Cmt: None   Principal Problem: RINKU [N17.9]                 Active Insurance as of 4/7/2021     Primary Coverage     Payor Plan Insurance Group Employer/Plan Group    [employer] AETNA [id number]     Payor Plan Address Payor Plan Phone Number Payor Plan Fax Number Effective Dates    [address] [phone]  1/1/2018 - None Entered    [id number] [id number]       Subscriber Name Subscriber Birth Date Member ID       ELVIS MRAKS 1955 [id number]                   Current Facility-Administered Medications   Medication Dose Route Frequency Provider Last Rate Last Admin   • albumin human 25 % IV SOLN 12.5 g  12.5 g Intravenous PRN Jimenez Daniels MD       • [START ON 4/13/2021] albumin human 25 % IV SOLN 12.5 g  12.5 g Intravenous PRN Jimenez Daniels MD       • aspirin EC tablet 81 mg  81 mg Oral Daily Julio Montaño MD   81 mg at 04/12/21 1238   • calcium gluconate 1 g in sodium chloride 0.9 % 100 mL IVPB  1 g Intravenous PRN Julio Montaño  mL/hr at 04/10/21 1731 1 g at 04/10/21 1731    And   • calcium gluconate 6 g in sodium chloride 0.9 % 500 mL IVPB  6 g Intravenous PRN Julio Montaño" MD 83.3 mL/hr at 04/09/21 1317 6 g at 04/09/21 1317   • carvedilol (COREG) tablet 12.5 mg  12.5 mg Oral BID With Meals Kay Diaz MD   12.5 mg at 04/12/21 0725   • cetirizine (zyrTEC) tablet 10 mg  10 mg Oral Daily Julio Montaño MD   10 mg at 04/12/21 1238   • docusate sodium (COLACE) capsule 100 mg  100 mg Oral BID Julio Montaño MD   100 mg at 04/12/21 1237   • heparin (porcine) 5000 UNIT/ML injection 5,000 Units  5,000 Units Subcutaneous Q12H Julio Montaño MD   5,000 Units at 04/12/21 1238   • heparin (porcine) injection 1,600 Units  1,600 Units Intracatheter PRN Juloi Montaño MD   1,600 Units at 04/12/21 1119   • hydrALAZINE (APRESOLINE) injection 10 mg  10 mg Intravenous Q6H PRN Kay Diaz MD   10 mg at 04/12/21 0312   • hydrocortisone sodium succinate (Solu-CORTEF) injection 50 mg  50 mg Intravenous Q12H Kay Diaz MD   50 mg at 04/12/21 1238   • insulin detemir (LEVEMIR) injection 10 Units  10 Units Subcutaneous Daily Julio Montaño MD   10 Units at 04/12/21 1200   • insulin lispro (humaLOG) injection 0-7 Units  0-7 Units Subcutaneous 4x Daily AC & at Bedtime Julio Montaño MD   2 Units at 04/12/21 1254   • magnesium oxide (MAG-OX) tablet 1,000 mg  1,000 mg Oral Daily Jimenez Daniels MD   1,000 mg at 04/12/21 1238   • magnesium sulfate 4 gram infusion - Mg less than or equal to 1mg/dL  4 g Intravenous PRN Keagan Garcia MD        Or   • magnesium sulfate 3 gram infusion (1gm x 3) - Mg 1.1 - 1.5 mg/dL  1 g Intravenous PRN Keagan aGrcia MD        Or   • Magnesium Sulfate 2 gram infusion- Mg 1.6 - 1.9 mg/dL  2 g Intravenous PRN Keagan Garcia MD 25 mL/hr at 04/11/21 1611 2 g at 04/11/21 1611   • melatonin tablet 5 mg  5 mg Oral Nightly PRN Nayely Cleveland APRN   5 mg at 04/12/21 0313   • norepinephrine (LEVOPHED) 8 mg in 250 mL NS infusion (premix)  0.02-0.1 mcg/kg/min Intravenous Titrated Julio Montaño MD    Stopped at 04/08/21 1550   • oxyCODONE (ROXICODONE) immediate release tablet 5 mg  5 mg Oral Q6H PRN Julio Montaño MD   5 mg at 04/11/21 0902   • piperacillin-tazobactam (ZOSYN) 4.5 g in iso-osmotic dextrose 100 mL IVPB (premix)  4.5 g Intravenous Q12H Kay Diaz MD   4.5 g at 04/12/21 1254   • polyethylene glycol (MIRALAX) packet 17 g  17 g Oral Daily Julio Montaño MD   17 g at 04/12/21 1239   • sodium chloride 0.9 % flush 10 mL  10 mL Intravenous Q12H Julio Montaño MD   10 mL at 04/12/21 1239   • sodium chloride 0.9 % flush 10 mL  10 mL Intravenous PRN Julio Montaño MD       • sodium chloride nasal spray 2 spray  2 spray Each Nare PRN Julio Montaño MD   2 spray at 04/10/21 1346     Lab Results (last 72 hours)     Procedure Component Value Units Date/Time    Anti-DNA Antibody, Double-stranded [[id number]] Collected: 04/12/21 1404    Specimen: Blood Updated: 04/12/21 1426    ANCA Panel [[id number]] Collected: 04/12/21 1404    Specimen: Blood Updated: 04/12/21 1426    Glomerular Basement Membrane Antibodies [[id number]] Collected: 04/12/21 1404    Specimen: Blood Updated: 04/12/21 1426    Nuclear Antigen Antibody, IFA [[id number]] Collected: 04/12/21 1404    Specimen: Blood Updated: 04/12/21 1426    C4 Complement [[id number]]  (Abnormal) Collected: 04/12/21 0400    Specimen: Blood Updated: 04/12/21 1420     C4 Complement 12.0 mg/dl     C3 Complement [[id number]]  (Abnormal) Collected: 04/12/21 0400    Specimen: Blood Updated: 04/12/21 1419     C3 Complement 68.0 mg/dl     POC Glucose Once [[id number]]  (Abnormal) Collected: 04/12/21 1236    Specimen: Blood Updated: 04/12/21 1237     Glucose 170 mg/dL     Comprehensive Metabolic Panel [[id number]]  (Abnormal) Collected: 04/12/21 0800    Specimen: Blood from Cannula Updated: 04/12/21 0916     Glucose 128 mg/dL      BUN 69 mg/dL      Creatinine 7.99 mg/dL      Sodium 135 mmol/L      Potassium 3.4 mmol/L      Comment: Slight hemolysis  detected by analyzer. Results may be affected.        Chloride 92 mmol/L      CO2 24.0 mmol/L      Calcium 7.6 mg/dL      Total Protein 6.4 g/dL      Albumin 3.50 g/dL      ALT (SGPT) 1,197 U/L      AST (SGOT) 188 U/L      Alkaline Phosphatase 155 U/L      Total Bilirubin 1.0 mg/dL      eGFR Non  Amer --     Comment: <15 Indicative of kidney failure.        eGFR  African Amer 8 mL/min/1.73      Comment: <15 Indicative of kidney failure.        Globulin 2.9 gm/dL      A/G Ratio 1.2 g/dL      BUN/Creatinine Ratio 8.6     Anion Gap 19.0 mmol/L     Narrative:      GFR Normal >60  Chronic Kidney Disease <60  Kidney Failure <15      Troponin [[id number]]  (Abnormal) Collected: 04/12/21 0800    Specimen: Blood from Cannula Updated: 04/12/21 0914     Troponin T 0.717 ng/mL     Narrative:      Troponin T Reference Range:  <= 0.03 ng/mL-   Negative for AMI  >0.03 ng/mL-     Abnormal for myocardial necrosis.  Clinicians would have to utilize clinical acumen, EKG, Troponin and serial changes to determine if it is an Acute Myocardial Infarction or myocardial injury due to an underlying chronic condition.       Results may be falsely decreased if patient taking Biotin.      Magnesium [[id number]]  (Normal) Collected: 04/12/21 0800    Specimen: Blood from Cannula Updated: 04/12/21 0857     Magnesium 2.4 mg/dL     Phosphorus [[id number]]  (Abnormal) Collected: 04/12/21 0800    Specimen: Blood from Cannula Updated: 04/12/21 0851     Phosphorus 7.3 mg/dL     CBC (No Diff) [[id number]]  (Abnormal) Collected: 04/12/21 0800    Specimen: Blood from Cannula Updated: 04/12/21 0823     WBC 12.59 10*3/mm3      RBC 3.76 10*6/mm3      Hemoglobin 9.7 g/dL      Hematocrit 30.0 %      MCV 79.8 fL      MCH 25.8 pg      MCHC 32.3 g/dL      RDW 14.9 %      RDW-SD 42.8 fl      MPV 10.8 fL      Platelets 223 10*3/mm3     POC Glucose Once [[id number]]  (Abnormal) Collected: 04/12/21 0653    Specimen: Blood Updated: 04/12/21 0655     Glucose 141 mg/dL      Blood Culture - Blood, Hand, Left [[id number]] Collected: 04/07/21 2057    Specimen: Blood from Hand, Left Updated: 04/11/21 2145     Blood Culture No growth at 4 days    Blood Culture - Blood, Hand, Right [[id number]] Collected: 04/07/21 2057    Specimen: Blood from Hand, Right Updated: 04/11/21 2145     Blood Culture No growth at 4 days    POC Glucose Once [[id number]]  (Abnormal) Collected: 04/11/21 2003    Specimen: Blood Updated: 04/11/21 2015     Glucose 140 mg/dL     POC Glucose Once [[id number]]  (Abnormal) Collected: 04/11/21 1620    Specimen: Blood Updated: 04/11/21 1632     Glucose 156 mg/dL     POC Glucose Once [[id number]]  (Normal) Collected: 04/11/21 1219    Specimen: Blood Updated: 04/11/21 1224     Glucose 126 mg/dL     Comprehensive Metabolic Panel [[id number]]  (Abnormal) Collected: 04/11/21 0849    Specimen: Blood Updated: 04/11/21 1056     Glucose 132 mg/dL      BUN 48 mg/dL      Creatinine 6.71 mg/dL      Sodium 135 mmol/L      Potassium 3.2 mmol/L      Chloride 92 mmol/L      CO2 26.0 mmol/L      Calcium 7.5 mg/dL      Total Protein 6.3 g/dL      Albumin 3.60 g/dL      ALT (SGPT) 1,725 U/L      AST (SGOT) 496 U/L      Alkaline Phosphatase 171 U/L      Total Bilirubin 1.0 mg/dL      eGFR Non  Amer --     Comment: <15 Indicative of kidney failure.        eGFR  African Amer 10 mL/min/1.73      Comment: <15 Indicative of kidney failure.        Globulin 2.7 gm/dL      A/G Ratio 1.3 g/dL      BUN/Creatinine Ratio 7.2     Anion Gap 17.0 mmol/L     Narrative:      GFR Normal >60  Chronic Kidney Disease <60  Kidney Failure <15      Phosphorus [[id number]]  (Abnormal) Collected: 04/11/21 0849    Specimen: Blood Updated: 04/11/21 1042     Phosphorus 6.0 mg/dL     Magnesium [[id number]]  (Normal) Collected: 04/11/21 0849    Specimen: Blood Updated: 04/11/21 1042     Magnesium 1.9 mg/dL     Calcium, Ionized [[id number]]  (Abnormal) Collected: 04/11/21 0849    Specimen: Blood Updated: 04/11/21 1037      Ionized Calcium 1.01 mmol/L     CBC (No Diff) [[id number]]  (Abnormal) Collected: 04/11/21 0849    Specimen: Blood Updated: 04/11/21 1019     WBC 12.08 10*3/mm3      RBC 3.62 10*6/mm3      Hemoglobin 9.3 g/dL      Hematocrit 29.7 %      MCV 82.0 fL      MCH 25.7 pg      MCHC 31.3 g/dL      RDW 14.7 %      RDW-SD 43.2 fl      MPV 11.0 fL      Platelets 195 10*3/mm3     POC Glucose Once [[id number]]  (Abnormal) Collected: 04/11/21 0902    Specimen: Blood Updated: 04/11/21 0904     Glucose 156 mg/dL     POC Glucose Once [[id number]]  (Abnormal) Collected: 04/10/21 2259    Specimen: Blood Updated: 04/10/21 2300     Glucose 159 mg/dL     Urine Culture - Urine, Urine, Clean Catch [[id number]]  (Normal) Collected: 04/09/21 1325    Specimen: Urine, Clean Catch Updated: 04/10/21 1742     Urine Culture No growth    POC Glucose Once [[id number]]  (Abnormal) Collected: 04/10/21 1352    Specimen: Blood Updated: 04/10/21 1353     Glucose 147 mg/dL     Hepatitis Panel, Acute [[id number]]  (Normal) Collected: 04/10/21 0906    Specimen: Blood Updated: 04/10/21 1140     Hepatitis B Surface Ag Non-Reactive     Hep A IgM Non-Reactive     Hep B C IgM Non-Reactive     Hepatitis C Ab Non-Reactive    Narrative:      Results may be falsely decreased if patient taking Biotin.     Comprehensive Metabolic Panel [[id number]]  (Abnormal) Collected: 04/10/21 0905    Specimen: Blood from Cannula Updated: 04/10/21 1116     Glucose 144 mg/dL      BUN 50 mg/dL      Creatinine 6.53 mg/dL      Sodium 135 mmol/L      Potassium 3.1 mmol/L      Chloride 88 mmol/L      CO2 28.0 mmol/L      Calcium 6.5 mg/dL      Total Protein 6.1 g/dL      Albumin 3.60 g/dL      ALT (SGPT) 2,171 U/L      AST (SGOT) 1,376 U/L      Alkaline Phosphatase 166 U/L      Total Bilirubin 0.7 mg/dL      eGFR Non  Amer --     Comment: <15 Indicative of kidney failure.        eGFR  African Amer 10 mL/min/1.73      Comment: <15 Indicative of kidney failure.        Globulin 2.5 gm/dL       A/G Ratio 1.4 g/dL      BUN/Creatinine Ratio 7.7     Anion Gap 19.0 mmol/L     Narrative:      GFR Normal >60  Chronic Kidney Disease <60  Kidney Failure <15      Vancomycin, Random [[id number]]  (Normal) Collected: 04/10/21 0905    Specimen: Blood from Cannula Updated: 04/10/21 1006     Vancomycin Random 21.50 mcg/mL     Narrative:      Therapeutic Ranges for Vancomycin    Vancomycin Random   5.0-40.0 mcg/mL  Vancomycin Trough   5.0-20.0 mcg/mL  Vancomycin Peak     20.0-40.0 mcg/mL    Phosphorus [[id number]]  (Abnormal) Collected: 04/10/21 0905    Specimen: Blood from Cannula Updated: 04/10/21 0952     Phosphorus 6.8 mg/dL     Magnesium [[id number]]  (Normal) Collected: 04/10/21 0905    Specimen: Blood from Cannula Updated: 04/10/21 0952     Magnesium 1.7 mg/dL     CBC (No Diff) [[id number]]  (Abnormal) Collected: 04/10/21 0906    Specimen: Blood from Cannula Updated: 04/10/21 0920     WBC 11.93 10*3/mm3      RBC 3.49 10*6/mm3      Hemoglobin 9.0 g/dL      Hematocrit 28.0 %      MCV 80.2 fL      MCH 25.8 pg      MCHC 32.1 g/dL      RDW 14.6 %      RDW-SD 42.4 fl      MPV 10.9 fL      Platelets 172 10*3/mm3     Calcium, Ionized [[id number]]  (Abnormal) Collected: 04/10/21 0905    Specimen: Blood from Cannula Updated: 04/10/21 0919     Ionized Calcium 0.88 mmol/L     POC Glucose Once [[id number]]  (Abnormal) Collected: 04/09/21 2000    Specimen: Blood Updated: 04/09/21 2018     Glucose 187 mg/dL           Imaging Results (Last 72 Hours)     ** No results found for the last 72 hours. **        Operative/Procedure Notes (last 72 hours) (Notes from 04/09/21 1454 through 04/12/21 1454)    No notes of this type exist for this encounter.            Physician Progress Notes (last 72 hours) (Notes from 04/09/21 1454 through 04/12/21 1454)      Braden Hawley MD at 04/12/21 0946          [facility] Progress Note     LOS: 5 days   Patient Care Team:  April Chen  "DO Michell as PCP - General (Internal Medicine)  PCP:  April Chen DO    Chief Complaint: Follow-up systolic heart failure, shock liver, acute renal failure    Subjective: Patient seen in dialysis.  He denies chest pain or pressure or significant shortness of breath now or preceding hospitalization.  Reports has chronic lower extremity edema that has been treated with Lasix as an outpatient.  He also states he had a stress test done at [facility] prior to his knee replacement      Review of Systems:   All systems have been reviewed and are negative with the exception of those mentioned above.      Objective:    Vital Sign Min/Max for last 24 hours  Temp  Min: 97 °F (36.1 °C)  Max: 98.5 °F (36.9 °C)   BP  Min: 120/79  Max: 190/104   Pulse  Min: 64  Max: 75   Resp  Min: 16  Max: 20   SpO2  Min: 93 %  Max: 97 %   No data recorded   Weight  Min: 157 kg (345 lb 12.8 oz)  Max: 157 kg (345 lb 12.8 oz)     Flowsheet Rows      First Filed Value   Admission Height  177.8 cm (70\") Documented at 04/07/2021 1041   Admission Weight  127 kg (280 lb) Documented at 04/07/2021 1041          Telemetry: Sinus rhythm      Intake/Output Summary (Last 24 hours) at 4/12/2021 0946  Last data filed at 4/12/2021 0315  Gross per 24 hour   Intake 250 ml   Output 50 ml   Net 200 ml     Intake & Output (last 3 days)       04/09 0701 - 04/10 0700 04/10 0701 - 04/11 0700 04/11 0701 - 04/12 0700 04/12 0701 - 04/13 0700    P.O.   100     I.V. (mL/kg) 2073.6 (13.8)  50 (0.3)     IV Piggyback 582.1  100     Total Intake(mL/kg) 2655.7 (17.7)  250 (1.6)     Urine (mL/kg/hr) 20 (0) 129 (0) 50 (0)     Other 1330       Stool   0     Total Output 1350 129 50     Net +1305.7 -129 +200             Urine Unmeasured Occurrence  2 x      Stool Unmeasured Occurrence 1 x 2 x 2 x            Physical Exam:  Constitutional:       Appearance: Not in distress.   Pulmonary:      Effort: Pulmonary effort is normal.      Comments: Decreased at " bases  Cardiovascular:      PMI at left midclavicular line. Regular rhythm.      Comments: 1-2+ lower extremity edema  Abdominal:      Palpations: Abdomen is soft.   Neurological:      Mental Status: Alert and oriented to person, place and time.          LABS/DIAGNOSTIC DATA:  Results from last 7 days   Lab Units 04/12/21  0800 04/11/21  0849 04/10/21  0906   WBC 10*3/mm3 12.59* 12.08* 11.93*   HEMOGLOBIN g/dL 9.7* 9.3* 9.0*   HEMATOCRIT % 30.0* 29.7* 28.0*   PLATELETS 10*3/mm3 223 195 172     Lab Results   Lab Value Date/Time    TROPONINT 0.717 (C) 04/12/2021 0800    TROPONINT 0.760 (C) 04/08/2021 0517    TROPONINT 0.290 (C) 04/07/2021 1050     Results from last 7 days   Lab Units 04/08/21  0808   INR  1.87*   APTT seconds 29.4     Results from last 7 days   Lab Units 04/12/21  0800 04/11/21  0849 04/10/21  0905   SODIUM mmol/L 135* 135* 135*   POTASSIUM mmol/L 3.4* 3.2* 3.1*   CHLORIDE mmol/L 92* 92* 88*   CO2 mmol/L 24.0 26.0 28.0   BUN mg/dL 69* 48* 50*   CREATININE mg/dL 7.99* 6.71* 6.53*   CALCIUM mg/dL 7.6* 7.5* 6.5*   BILIRUBIN mg/dL 1.0 1.0 0.7   ALK PHOS U/L 155* 171* 166*   ALT (SGPT) U/L 1,197* 1,725* 2,171*   AST (SGOT) U/L 188* 496* 1,376*   GLUCOSE mg/dL 128* 132* 144*                       Medication Review:   aspirin, 81 mg, Oral, Daily  carvedilol, 12.5 mg, Oral, BID With Meals  cetirizine, 10 mg, Oral, Daily  docusate sodium, 100 mg, Oral, BID  heparin (porcine), 5,000 Units, Subcutaneous, Q12H  hydrocortisone sodium succinate, 50 mg, Intravenous, Q12H  insulin detemir, 10 Units, Subcutaneous, Daily  insulin lispro, 0-7 Units, Subcutaneous, 4x Daily AC & at Bedtime  magnesium oxide, 1,000 mg, Oral, Daily  piperacillin-tazobactam, 4.5 g, Intravenous, Q12H  polyethylene glycol, 17 g, Oral, Daily  sodium chloride, 10 mL, Intravenous, Q12H       norepinephrine, 0.02-0.1 mcg/kg/min, Last Rate: Stopped (04/08/21 1550)           RINKU    Hypertension    T2DM    Dyslipidemia    Sleep apnea    Elevated liver  enzymes    Recent Rt Total Knee Replacement 4/5/21    Chronic pain w/pain pump    Lactic acidosis    Acute HFrEF (EF 30%)    Shock (CMS/HCC) -cardiogenic versus septic    Echo:  · Estimated left ventricular EF = 30% Left ventricular systolic function is severely decreased.  · There is global hypokinesis. Basal inferior and inferoseptal wall appears akinetic  · Left ventricular wall thickness is consistent with mild to moderate posterior asymmetric hypertrophy  · Moderately reduced right ventricular systolic function noted.  · Moderate tricuspid valve regurgitation is present. Estimated right ventricular systolic pressure from tricuspid regurgitation is normal (<35 mmHg).    Assessment/Plan:      1.  Acute systolic heart failure, biventricular involvement  -Etiology unclear, possible non-STEMI with RV involvement  -We will request the stress test results done prior to his knee surgery  -Continue aspirin and carvedilol at this time  -Holding off on statin due to recent shock liver persistently elevated LFTs  -Troponin repeated today, 0.717, stable compared with 4/8  -No current chest pain  -Would benefit from heart catheterization in the future if renal function allows  -Repeat EKG today    2.  Acute renal failure  -Nephrology following, work-up ongoing  -Possible biopsy next week  -Allowing some permissive hypertension to help with potential renal recovery    3.  Shock liver  -Improving, secondary to hypotension RV dysfunction    4.  Hypertension  -Hold nephrotoxic agents  -Nephrology recommends MAP greater than 100 for now while awaiting renal recovery  -Continue carvedilol 12.5 mg twice daily    5.  Recent knee surgery  -No evidence of DVT on duplex, there was elevated venous pressure  -No no current hypoxia    We will follow      Braden Hawley MD [facility]  04/12/21      Electronically signed by Braden Hawley MD at 04/12/21 4926     Jimenez Daniels MD at 04/12/21 3612             LOS: 5 days     Patient Care Team:  April Chen DO as PCP - General (Internal Medicine)    Chief Complaint:    History of present illness:    66-year-old -American male with no previous history of kidney disease, no epistaxis, hemoptysis hematemesis kidney stones.  Patient had a right TKR on 4/4/2021, postop patient has received nonsteroidal Mobic for a few days, after going home he had a problem with hearing and altered mental status patient was brought into the hospital initially admitted to the floor.  Patient found to have a creatinine of 3.89 BUN of 25 liver enzymes were elevated ALT 2631, AST 4533, high WBC count of 13.6, lactic acidosis was noted, hemoglobin 10.0, patient has decreased urine output, mild metabolic acidosis, due to hypotension patient was transferred from floor to ICU where patient was placed on IV Levophed.  Peck catheter has been in place urine output has been poor at this time.  Patient oral intake recently has been low.  Renal been consulted at this time.  Patient is being treated for sepsis IV vancomycin has been given accompanied with other antihypertensive medication.  Home medications include losartan furosemide other diuretics were stopped.  He was taken off the Mobic and Metformin due to acidosis and renal failure.  He denied any nausea or vomiting or diarrhea at this time no shortness of breath or chest pain.  He has a history of Bell palsy.    Subjective     Interval History:   Oliguric RINKU with nephrotic range proteinuria in the setting of diabetes longstanding, still have significant edema.  Review of Systems:   Complains of low urine output, edema, denies, nausea vomiting dysuria hematuria no shortness of breath or chest pain.  All other systems are negative.    Objective     Vital Sign Min/Max for last 24 hours  Temp  Min: 97 °F (36.1 °C)  Max: 98.5 °F (36.9 °C)   BP  Min: 120/79  Max: 190/104   Pulse  Min: 64  Max: 75   Resp  Min: 16  Max: 20   SpO2  Min: 93 %  Max: 97  "%   No data recorded   Weight  Min: 157 kg (345 lb 12.8 oz)  Max: 157 kg (345 lb 12.8 oz)     Flowsheet Rows      First Filed Value   Admission Height  177.8 cm (70\") Documented at 04/07/2021 1041   Admission Weight  127 kg (280 lb) Documented at 04/07/2021 1041          No intake/output data recorded.  I/O last 3 completed shifts:  In: 250 [P.O.:100; I.V.:50; IV Piggyback:100]  Out: 50 [Urine:50]    Physical Exam:  General Appearance: -American male morbid obesity comfortable in bed -American male  Facial: Left-sided Bell's palsy noted chronic.  Eyes: PER, EOMI.  Neck: Supple no JVD.  Lungs: Clear auscultation, no rales rhonchi's, equal chest movement, nonlabored.  Heart: No gallop, murmur, rub, RRR.  Abdomen: Obesity distended soft nontender, positive bowel sounds, no organomegaly.  Extremities: Lateral lower extremity 3+ edema trace to 1+ upper extremity edema.  Neuro: No focal deficit, moving all extremities, alert oriented X 3  : No suprapubic fullness  Skin warm and dry.  WBC WBC   Date Value Ref Range Status   04/12/2021 12.59 (H) 3.40 - 10.80 10*3/mm3 Final   04/11/2021 12.08 (H) 3.40 - 10.80 10*3/mm3 Final   04/10/2021 11.93 (H) 3.40 - 10.80 10*3/mm3 Final      HGB Hemoglobin   Date Value Ref Range Status   04/12/2021 9.7 (L) 13.0 - 17.7 g/dL Final   04/11/2021 9.3 (L) 13.0 - 17.7 g/dL Final   04/10/2021 9.0 (L) 13.0 - 17.7 g/dL Final      HCT Hematocrit   Date Value Ref Range Status   04/12/2021 30.0 (L) 37.5 - 51.0 % Final   04/11/2021 29.7 (L) 37.5 - 51.0 % Final   04/10/2021 28.0 (L) 37.5 - 51.0 % Final      Platlets No results found for: LABPLAT   MCV MCV   Date Value Ref Range Status   04/12/2021 79.8 79.0 - 97.0 fL Final   04/11/2021 82.0 79.0 - 97.0 fL Final   04/10/2021 80.2 79.0 - 97.0 fL Final          Sodium Sodium   Date Value Ref Range Status   04/11/2021 135 (L) 136 - 145 mmol/L Final   04/10/2021 135 (L) 136 - 145 mmol/L Final      Potassium Potassium   Date Value Ref Range " Status   04/11/2021 3.2 (L) 3.5 - 5.2 mmol/L Final   04/10/2021 3.1 (L) 3.5 - 5.2 mmol/L Final      Chloride Chloride   Date Value Ref Range Status   04/11/2021 92 (L) 98 - 107 mmol/L Final   04/10/2021 88 (L) 98 - 107 mmol/L Final      CO2 CO2   Date Value Ref Range Status   04/11/2021 26.0 22.0 - 29.0 mmol/L Final   04/10/2021 28.0 22.0 - 29.0 mmol/L Final      BUN BUN   Date Value Ref Range Status   04/11/2021 48 (H) 8 - 23 mg/dL Final   04/10/2021 50 (H) 8 - 23 mg/dL Final      Creatinine Creatinine   Date Value Ref Range Status   04/11/2021 6.71 (H) 0.76 - 1.27 mg/dL Final   04/10/2021 6.53 (H) 0.76 - 1.27 mg/dL Final      Calcium Calcium   Date Value Ref Range Status   04/11/2021 7.5 (L) 8.6 - 10.5 mg/dL Final   04/10/2021 6.5 (L) 8.6 - 10.5 mg/dL Final      PO4 No results found for: CAPO4   Albumin Albumin   Date Value Ref Range Status   04/11/2021 3.60 3.50 - 5.20 g/dL Final   04/10/2021 3.60 3.50 - 5.20 g/dL Final      Magnesium Magnesium   Date Value Ref Range Status   04/11/2021 1.9 1.6 - 2.4 mg/dL Final   04/10/2021 1.7 1.6 - 2.4 mg/dL Final      Uric Acid No results found for: URICACID     Right kidney measures 11.2 cm in length without apparent mass or   hydronephrosis. Normal color Doppler flow.       Left kidney measures 11.0 cm in length without apparent mass or   hydronephrosis. Normal color Doppler flow.       Unremarkable catheterized and contracted urinary bladder.       Impression:     Unremarkable sonographic appearance of both kidneys.         Results Review:     I reviewed the patient's new clinical results.    aspirin, 81 mg, Oral, Daily  carvedilol, 12.5 mg, Oral, BID With Meals  cetirizine, 10 mg, Oral, Daily  docusate sodium, 100 mg, Oral, BID  heparin (porcine), 5,000 Units, Subcutaneous, Q12H  hydrocortisone sodium succinate, 50 mg, Intravenous, Q12H  insulin detemir, 10 Units, Subcutaneous, Daily  insulin lispro, 0-7 Units, Subcutaneous, 4x Daily AC & at Bedtime  magnesium oxide,  1,000 mg, Oral, Daily  piperacillin-tazobactam, 4.5 g, Intravenous, Q12H  polyethylene glycol, 17 g, Oral, Daily  sodium chloride, 10 mL, Intravenous, Q12H      norepinephrine, 0.02-0.1 mcg/kg/min, Last Rate: Stopped (04/08/21 1550)        Medication Review: As noted above    Assessment/Plan       RINKU    Hypertension    T2DM    Dyslipidemia    Sleep apnea    Elevated liver enzymes    Recent Rt Total Knee Replacement 4/5/21    Chronic pain w/pain pump    Lactic acidosis    Acute HFrEF (EF 30%)    Shock (CMS/HCC) -cardiogenic versus septic  1.  ATN secondary to shock with deterioration renal function.  No previous history of kidney disease per patient no history of epistaxis hemoptysis gross hematuria or kidney stones no family history of kidney disease.    Protein creatinine ratio nephrotic range.Right kidney 11.2 cm, left kidney 11.0 cm normal color Doppler unremarkable bilateral kidneys.  Hepatitis negative, on IV vancomycin on admission  2.  Septic shock: On IV pressors.  3.  Nephrotic proteinuria greater than 6 g in the setting of diabetes.  4.  Altered mental status and hearing difficulty improved at this time.  5.  S/p recent right total knee replacement.  6.  Congestive heart failure: Ejection fraction 30%.  7.  Hypocalcemia.  Plan:  Trial of IV Bumex and metolazone failed.   Plan to filtration tomorrow, dialysis in progress at this time with ultrafiltration  First dialysis 4/9/2021  Tunnel catheter clean  Keep mean arterial pressure greater than 100  Hold  Glucophage, nonsteroidals at this time.  Avoid nephrotoxic medications.   Check volume status.  Check labs in the morning.    Adjust medication for the new GFR.   High risk patient with multiple medical problems.  Next week if no renal recovery may consider kidney biopsy discussed with the wife and patient.  To complete the work-up I am sending the serologies at this time.       Jimenez Daniels MD  04/12/21  08:29 EDT       Electronically signed by  Jimenez Daniels MD at 21 0834     Kay Diaz MD at 21 0458              [facility] Medicine Services  PROGRESS NOTE    Patient Name: Elvis Hanson  : 1955  MRN: [id number]    Date of Admission: 2021  Primary Care Physician: April Chen DO    Subjective   Subjective     CC:  Loss of hearing    HPI:  Pt doing well, no issues overnight.  Slept better last pm. States that he made a little bit of urine last pm and this am    ROS:  Gen- No fevers, chills  CV- No chest pain, palpitations  Resp- No cough, dyspnea  GI- No N/V/D, abd pain      Objective   Objective     Vital Signs:   Temp:  [97.9 °F (36.6 °C)-98.5 °F (36.9 °C)] 97.9 °F (36.6 °C)  Heart Rate:  [64-75] 69  Resp:  [16-20] 20  BP: (120-184)/() 184/91        Physical Exam:  Constitutional: No acute distress, seen while in HD  HENT: NCAT, mucous membranes moist, LIJ catheter in place  Respiratory: Clear to auscultation bilaterally, respiratory effort normal   Cardiovascular: RRR, no murmurs, rubs, or gallops  Gastrointestinal: Positive bowel sounds, soft, nontender, nondistended  Musculoskeletal: No bilateral ankle edema, right chest wall HD catheter in place  Psychiatric: Appropriate affect, cooperative  Neurologic: Oriented x 3, strength symmetric in all extremities, Cranial Nerves grossly intact to confrontation, speech clear  Skin: No rashes    Results Reviewed:  Results from last 7 days   Lab Units 21  0849 04/10/21  0906 21  0205 21  0808 21  0517 21  1711   WBC 10*3/mm3 12.08* 11.93* 11.34*  --  17.13*  --    HEMOGLOBIN g/dL 9.3* 9.0* 9.2*  --  10.1*  --    HEMATOCRIT % 29.7* 28.0* 29.7*  --  33.4*  --    PLATELETS 10*3/mm3 195 172 149  --  230  --    INR   --   --   --  1.87*  --   --    PROCALCITONIN ng/mL  --   --   --   --  4.86* 2.15*     Results from last 7 days   Lab Units 21  0849 04/10/21  0905 21  0205 21  0517  04/07/21  1050   SODIUM mmol/L 135* 135* 135* 133* 140   POTASSIUM mmol/L 3.2* 3.1* 3.7 3.9 3.7   CHLORIDE mmol/L 92* 88* 89* 94* 94*   CO2 mmol/L 26.0 28.0 27.0 21.0* 26.0   BUN mg/dL 48* 50* 53* 37* 25*   CREATININE mg/dL 6.71* 6.53* 6.62* 5.10* 3.89*   GLUCOSE mg/dL 132* 144* 174* 138* 116*   CALCIUM mg/dL 7.5* 6.5* 5.4* 6.5* 7.9*   ALT (SGPT) U/L 1,725* 2,171* 3,838* 5,392* 2,631*   AST (SGOT) U/L 496* 1,376* 6,772* >7,000* 4,533*   TROPONIN T ng/mL  --   --   --  0.760* 0.290*     Estimated Creatinine Clearance: 16.2 mL/min (A) (by C-G formula based on SCr of 6.71 mg/dL (H)).    Microbiology Results Abnormal     Procedure Component Value - Date/Time    Blood Culture - Blood, Hand, Left [[id number]] Collected: 04/07/21 2057    Lab Status: Preliminary result Specimen: Blood from Hand, Left Updated: 04/11/21 2145     Blood Culture No growth at 4 days    Blood Culture - Blood, Hand, Right [[id number]] Collected: 04/07/21 2057    Lab Status: Preliminary result Specimen: Blood from Hand, Right Updated: 04/11/21 2145     Blood Culture No growth at 4 days    Urine Culture - Urine, Urine, Clean Catch [[id number]]  (Normal) Collected: 04/09/21 1325    Lab Status: Final result Specimen: Urine, Clean Catch Updated: 04/10/21 1742     Urine Culture No growth    MRSA Screen, PCR (Inpatient) - Swab, Nares [[id number]]  (Normal) Collected: 04/08/21 0341    Lab Status: Final result Specimen: Swab from Nares Updated: 04/08/21 0937     MRSA PCR Negative    Narrative:      MRSA Negative    COVID-19 and FLU A/B PCR - Swab, Nasopharynx [[id number]]  (Normal) Collected: 04/07/21 4317    Lab Status: Final result Specimen: Swab from Nasopharynx Updated: 04/07/21 1509     COVID19 Not Detected     Influenza A PCR Not Detected     Influenza B PCR Not Detected    Narrative:      Fact sheet for providers: https://www.fda.gov/media/947783/download    Fact sheet for patients: https://www.fda.gov/media/022433/download    Test performed by PCR.           Imaging Results (Last 24 Hours)     ** No results found for the last 24 hours. **          Results for orders placed during the hospital encounter of 04/07/21    Adult Transthoracic Echo Complete W/ Cont if Necessary Per Protocol    Interpretation Summary  · Estimated left ventricular EF = 30% Left ventricular systolic function is severely decreased.  · There is global hypokinesis. Basal inferior and inferoseptal wall appears akinetic  · Left ventricular wall thickness is consistent with mild to moderate posterior asymmetric hypertrophy  · Moderately reduced right ventricular systolic function noted.  · Moderate tricuspid valve regurgitation is present. Estimated right ventricular systolic pressure from tricuspid regurgitation is normal (<35 mmHg).      I have reviewed the medications:  Scheduled Meds:aspirin, 81 mg, Oral, Daily  carvedilol, 3.125 mg, Oral, BID With Meals  cetirizine, 10 mg, Oral, Daily  docusate sodium, 100 mg, Oral, BID  heparin (porcine), 5,000 Units, Subcutaneous, Q12H  hydrocortisone sodium succinate, 50 mg, Intravenous, Q8H  insulin detemir, 10 Units, Subcutaneous, Daily  insulin lispro, 0-7 Units, Subcutaneous, 4x Daily AC & at Bedtime  magnesium oxide, 1,000 mg, Oral, Daily  piperacillin-tazobactam, 4.5 g, Intravenous, Q12H  polyethylene glycol, 17 g, Oral, Daily  sodium chloride, 10 mL, Intravenous, Q12H  vancomycin (dosing per levels), , Does not apply, Daily      Continuous Infusions:norepinephrine, 0.02-0.1 mcg/kg/min, Last Rate: Stopped (04/08/21 1550)  Pharmacy to dose vancomycin,       PRN Meds:.calcium gluconate IVPB **AND** calcium gluconate IVPB **AND** Calcium, Ionized  •  heparin (porcine)  •  hydrALAZINE  •  magnesium sulfate **OR** magnesium sulfate in D5W 1g/100mL (PREMIX) **OR** magnesium sulfate  •  melatonin  •  oxyCODONE  •  Pharmacy to dose vancomycin  •  sodium chloride  •  sodium chloride    Assessment/Plan   Assessment & Plan     Active Hospital Problems     Diagnosis  POA   • **RINKU [N17.9]  Yes   • Lactic acidosis [E87.2]  Yes   • Acute HFrEF (EF 30%) [I50.21]  Yes   • Shock (CMS/HCC) -cardiogenic versus septic [R57.9]  Yes   • Hypertension [I10]  Yes   • T2DM [E11.9]  Yes   • Dyslipidemia [E78.5]  Yes   • Sleep apnea [G47.30]  Yes   • Elevated liver enzymes [R74.8]  Yes   • Recent Rt Total Knee Replacement 4/5/21 [Z96.659]  Not Applicable   • Chronic pain w/pain pump [G89.29]  Yes      Resolved Hospital Problems   No resolved problems to display.        Brief Hospital Course to date:  Elvis Hanson is a 66 y.o. male with a history of recent right total knee replacement on 4/5, HTN, T2DM, SHYLA, and chronic pain with a pain pump who presented to the [facility] ED on 4/7 with complaints of hearing loss. Pt was found to have RINKU, elevated LFTs and was admitted to the ICU service on the PCU floor.  He became hypotensive requiring pressors and was transferred to the ICU. Pt was found to have an EF of 30% and Cardiology was consulted.  He was also initiated on HD during this admission.  He was transferred to our service on 4/10. Of note, his hearing loss has resolved and was thought to be due to Furosemide in setting of decreased renal function.     Plan:    Acute systolic HF  Elevated troponin  --Cardiology following, pt will eventually require ischemic evaluation to determine etiology of his HF unable to get this done currently due to renal function  --denies chest pain, suspect elevated troponin is due to volume overload  --Troponin peaked at 0.76  --minimal UOP. Trial of diuretics yesterday per NAL- no UOP noted nor any urine on bladder scan  --continue ASA, not able to tolerate statin due to transaminitis    Oliguric RINKU   --now on HD, NAL following.   --minimal UOP over last 24 hours. Peck catheter d/c'd 4/10.    -- likely to have renal bx this week     Elevated LFTs  --AST/ALT significantly elevated on admission  --likely congestive hepatopathy  --monitor, improving  daily    Septic vs Cardiogenic shock  --taper hydrocortisone due to hypertension now- decrease to 50 mg Q12 hours today  --resumed antihypertensives but at much lower dose than home dose. Monitor closely  --stop Vanc, continue Zosyn.  If does well today, likely d/c Zosyn tomorrow  --his leukocytosis is slightly worse today, so will watch closely given change in ABX  --cultures thus far unremarkable    Recent right knee replacement  --Dr. Avalos following    Chronic Pain  --pain pump present    T2DM  --low dose basal insulin with SSI for now    Hearing loss  --resolved. Thought to be due to home dose diuretics.          DVT Prophylaxis:  BEBETO      Disposition: I expect the patient to be discharged TBD, will have PT/OT reassess now that he is out of ICU and will have CM start working on HD placement    CODE STATUS:   Code Status and Medical Interventions:   Ordered at: 04/07/21 1422     Code Status:    CPR     Medical Interventions (Level of Support Prior to Arrest):    Full       Kay Diaz MD  04/12/21            Electronically signed by Kay Diaz MD at 04/12/21 1336     Jimenez Daniels MD at 04/11/21 1047             LOS: 4 days    Patient Care Team:  April Chen DO as PCP - General (Internal Medicine)    Chief Complaint:    History of present illness:    66-year-old -American male with no previous history of kidney disease, no epistaxis, hemoptysis hematemesis kidney stones.  Patient had a right TKR on 4/4/2021, postop patient has received nonsteroidal Mobic for a few days, after going home he had a problem with hearing and altered mental status patient was brought into the hospital initially admitted to the floor.  Patient found to have a creatinine of 3.89 BUN of 25 liver enzymes were elevated ALT 2631, AST 4533, high WBC count of 13.6, lactic acidosis was noted, hemoglobin 10.0, patient has decreased urine output, mild metabolic acidosis, due to hypotension patient  "was transferred from floor to ICU where patient was placed on IV Levophed.  Peck catheter has been in place urine output has been poor at this time.  Patient oral intake recently has been low.  Renal been consulted at this time.  Patient is being treated for sepsis IV vancomycin has been given accompanied with other antihypertensive medication.  Home medications include losartan furosemide other diuretics were stopped.  He was taken off the Mobic and Metformin due to acidosis and renal failure.  He denied any nausea or vomiting or diarrhea at this time no shortness of breath or chest pain.  He has a history of Bell palsy.    Subjective     Interval History:   Oliguric RINKU with nephrotic range proteinuria in the setting of diabetes longstanding, transferred to the floor has started on dialysis.  Still has significant edema.      Review of Systems:   Complains of oliguria, edema, denies, nausea vomiting dysuria hematuria no shortness of breath or chest pain.  All other systems are negative.    Objective     Vital Sign Min/Max for last 24 hours  Temp  Min: 97.6 °F (36.4 °C)  Max: 97.8 °F (36.6 °C)   BP  Min: 146/89  Max: 177/95   Pulse  Min: 62  Max: 90   Resp  Min: 18  Max: 20   SpO2  Min: 95 %  Max: 95 %   Flow (L/min)  Min: 2  Max: 2   Weight  Min: 148 kg (327 lb)  Max: 148 kg (327 lb)     Flowsheet Rows      First Filed Value   Admission Height  177.8 cm (70\") Documented at 04/07/2021 1041   Admission Weight  127 kg (280 lb) Documented at 04/07/2021 1041          No intake/output data recorded.  I/O last 3 completed shifts:  In: -   Out: 129 [Urine:129]    Physical Exam:  General Appearance: Morbid obesity alert oriented x4 no obvious distress.  -American male  Facial: Left-sided Bell's palsy noted.  Eyes: PER, EOMI.  Neck: Supple no JVD.  Lungs: Clear auscultation, no rales rhonchi's, equal chest movement, nonlabored.  Heart: No gallop, murmur, rub, RRR.  Abdomen: Obesity distended soft nontender, positive " bowel sounds, no organomegaly.  Extremities: Lateral lower extremity 2+ edema trace to 1+ upper extremity edema.  Neuro: No focal deficit, moving all extremities, alert oriented X 3  : Peck catheter in place.  Skin warm and dry.  WBC WBC   Date Value Ref Range Status   04/11/2021 12.08 (H) 3.40 - 10.80 10*3/mm3 Final   04/10/2021 11.93 (H) 3.40 - 10.80 10*3/mm3 Final   04/09/2021 11.34 (H) 3.40 - 10.80 10*3/mm3 Final      HGB Hemoglobin   Date Value Ref Range Status   04/11/2021 9.3 (L) 13.0 - 17.7 g/dL Final   04/10/2021 9.0 (L) 13.0 - 17.7 g/dL Final   04/09/2021 9.2 (L) 13.0 - 17.7 g/dL Final      HCT Hematocrit   Date Value Ref Range Status   04/11/2021 29.7 (L) 37.5 - 51.0 % Final   04/10/2021 28.0 (L) 37.5 - 51.0 % Final   04/09/2021 29.7 (L) 37.5 - 51.0 % Final      Platlets No results found for: LABPLAT   MCV MCV   Date Value Ref Range Status   04/11/2021 82.0 79.0 - 97.0 fL Final   04/10/2021 80.2 79.0 - 97.0 fL Final   04/09/2021 83.4 79.0 - 97.0 fL Final          Sodium Sodium   Date Value Ref Range Status   04/10/2021 135 (L) 136 - 145 mmol/L Final   04/09/2021 135 (L) 136 - 145 mmol/L Final      Potassium Potassium   Date Value Ref Range Status   04/10/2021 3.1 (L) 3.5 - 5.2 mmol/L Final   04/09/2021 3.7 3.5 - 5.2 mmol/L Final     Comment:     Slight hemolysis detected by analyzer. Results may be affected.      Chloride Chloride   Date Value Ref Range Status   04/10/2021 88 (L) 98 - 107 mmol/L Final   04/09/2021 89 (L) 98 - 107 mmol/L Final      CO2 CO2   Date Value Ref Range Status   04/10/2021 28.0 22.0 - 29.0 mmol/L Final   04/09/2021 27.0 22.0 - 29.0 mmol/L Final      BUN BUN   Date Value Ref Range Status   04/10/2021 50 (H) 8 - 23 mg/dL Final   04/09/2021 53 (H) 8 - 23 mg/dL Final      Creatinine Creatinine   Date Value Ref Range Status   04/10/2021 6.53 (H) 0.76 - 1.27 mg/dL Final   04/09/2021 6.62 (H) 0.76 - 1.27 mg/dL Final      Calcium Calcium   Date Value Ref Range Status   04/10/2021 6.5  (L) 8.6 - 10.5 mg/dL Final   04/09/2021 5.4 (C) 8.6 - 10.5 mg/dL Final      PO4 No results found for: CAPO4   Albumin Albumin   Date Value Ref Range Status   04/10/2021 3.60 3.50 - 5.20 g/dL Final   04/09/2021 3.30 (L) 3.50 - 5.20 g/dL Final      Magnesium Magnesium   Date Value Ref Range Status   04/11/2021 1.9 1.6 - 2.4 mg/dL Final   04/10/2021 1.7 1.6 - 2.4 mg/dL Final   04/09/2021 2.0 1.6 - 2.4 mg/dL Final      Uric Acid No results found for: URICACID     Right kidney measures 11.2 cm in length without apparent mass or   hydronephrosis. Normal color Doppler flow.       Left kidney measures 11.0 cm in length without apparent mass or   hydronephrosis. Normal color Doppler flow.       Unremarkable catheterized and contracted urinary bladder.       Impression:     Unremarkable sonographic appearance of both kidneys.         Results Review:     I reviewed the patient's new clinical results.    aspirin, 81 mg, Oral, Daily  carvedilol, 3.125 mg, Oral, BID With Meals  cetirizine, 10 mg, Oral, Daily  docusate sodium, 100 mg, Oral, BID  heparin (porcine), 5,000 Units, Subcutaneous, Q12H  hydrocortisone sodium succinate, 50 mg, Intravenous, Q8H  insulin detemir, 10 Units, Subcutaneous, Daily  insulin lispro, 0-7 Units, Subcutaneous, 4x Daily AC & at Bedtime  magnesium oxide, 1,000 mg, Oral, Daily  oxymetazoline, 2 spray, Each Nare, BID  piperacillin-tazobactam, 4.5 g, Intravenous, Q12H  polyethylene glycol, 17 g, Oral, Daily  sodium chloride, 10 mL, Intravenous, Q12H  vancomycin (dosing per levels), , Does not apply, Daily      norepinephrine, 0.02-0.1 mcg/kg/min, Last Rate: Stopped (04/08/21 1550)  Pharmacy to dose vancomycin,         Medication Review: As noted above    Assessment/Plan       RINKU    Hypertension    T2DM    Dyslipidemia    Sleep apnea    Elevated liver enzymes    Recent Rt Total Knee Replacement 4/5/21    Chronic pain w/pain pump    Lactic acidosis    Acute HFrEF (EF 30%)    Shock (CMS/HCC) -cardiogenic  versus septic  1.  ATN secondary to shock with deterioration renal function.  No previous history of kidney disease per patient no history of epistaxis hemoptysis gross hematuria or kidney stones no family history of kidney disease.    Protein creatinine ratio nephrotic range.Right kidney 11.2 cm, left kidney 11.0 cm normal color Doppler unremarkable bilateral kidneys  2.  Septic shock: On IV pressors.  3.  Nephrotic proteinuria greater than 6 g in the setting of diabetes.  4.  Altered mental status and hearing difficulty improved at this time.  5.  S/p recent right total knee replacement.  6.  Congestive heart failure: Ejection fraction 30%.  7.  Hypocalcemia.  Plan:  Trial of IV Bumex and Diuril will be done today.  Plan dialysis for tomorrow.  With ultrafiltration.  First dialysis 2021  Tunnel catheter clean  Keep mean arterial pressure greater than 70.  Hold all antihypertensive medications, Glucophage, nonsteroidals at this time.  Avoid nephrotoxic medications.   Check volume status.  Check labs in the morning.  Daily evaluation for renal replacement therapy will be done.  Discontinue the bicarb drip at this time.  Monitor bicarb level  Adjust medication for the new GFR.   High risk patient with multiple medical problems.  DC Peck catheter  Next week if no renal recovery may consider kidney biopsy discussed with the wife and patient.  Ionized calcium 1.01 will be checked again in a.m. replacement will be done.    Jimenez Daniels MD  21  10:47 EDT       Electronically signed by Jimenez Daniels MD at 21 1054     Kay Diaz MD at 21 0989              [facility] Medicine Services  PROGRESS NOTE    Patient Name: Elvis Hanson  : 1955  MRN: [id number]    Date of Admission: 2021  Primary Care Physician: April Chen DO    Subjective   Subjective     CC:  Loss of hearing    HPI:  Pt doing okay this am, but didn't sleep all night.  "Daughter is at bedside and has a list of complaints today- mainly upset that he \"sat in the chair all night and didn't sleep.\" Awaiting a new bed as apparently the current bed he is in is nonfunctioning?  Daughter also demands something for him to sleep today (pt is currently falling asleep as we are talking), states that she wants him to sleep now since he didn't all night.     ROS:  Gen- No fevers, chills  CV- No chest pain, palpitations  Resp- No cough, dyspnea  GI- No N/V/D, abd pain      Objective   Objective     Vital Signs:   Temp:  [97.6 °F (36.4 °C)-97.8 °F (36.6 °C)] 97.6 °F (36.4 °C)  Heart Rate:  [60-90] 64  Resp:  [18-20] 20  BP: (145-182)/() 153/82        Physical Exam:  Constitutional: No acute distress, sleeping, wakes easily, falls asleep easily  HENT: NCAT, mucous membranes moist, LIJ catheter in place  Respiratory: Clear to auscultation bilaterally, respiratory effort normal   Cardiovascular: RRR, no murmurs, rubs, or gallops  Gastrointestinal: Positive bowel sounds, soft, nontender, nondistended  Musculoskeletal: No bilateral ankle edema, right chest wall HD catheter in place  Psychiatric: Appropriate affect, cooperative  Neurologic: Oriented x 3, strength symmetric in all extremities, Cranial Nerves grossly intact to confrontation, speech clear  Skin: No rashes    Results Reviewed:  Results from last 7 days   Lab Units 04/10/21  0906 04/09/21  0205 04/08/21  0808 04/08/21  0517 04/07/21  1711   WBC 10*3/mm3 11.93* 11.34*  --  17.13*  --    HEMOGLOBIN g/dL 9.0* 9.2*  --  10.1*  --    HEMATOCRIT % 28.0* 29.7*  --  33.4*  --    PLATELETS 10*3/mm3 172 149  --  230  --    INR   --   --  1.87*  --   --    PROCALCITONIN ng/mL  --   --   --  4.86* 2.15*     Results from last 7 days   Lab Units 04/10/21  0905 04/09/21  0205 04/08/21  0517 04/07/21  1050   SODIUM mmol/L 135* 135* 133* 140   POTASSIUM mmol/L 3.1* 3.7 3.9 3.7   CHLORIDE mmol/L 88* 89* 94* 94*   CO2 mmol/L 28.0 27.0 21.0* 26.0   BUN " mg/dL 50* 53* 37* 25*   CREATININE mg/dL 6.53* 6.62* 5.10* 3.89*   GLUCOSE mg/dL 144* 174* 138* 116*   CALCIUM mg/dL 6.5* 5.4* 6.5* 7.9*   ALT (SGPT) U/L 2,171* 3,838* 5,392* 2,631*   AST (SGOT) U/L 1,376* 6,772* >7,000* 4,533*   TROPONIN T ng/mL  --   --  0.760* 0.290*     Estimated Creatinine Clearance: 16.6 mL/min (A) (by C-G formula based on SCr of 6.53 mg/dL (H)).    Microbiology Results Abnormal     Procedure Component Value - Date/Time    Blood Culture - Blood, Hand, Left [[id number]] Collected: 04/07/21 2057    Lab Status: Preliminary result Specimen: Blood from Hand, Left Updated: 04/10/21 2145     Blood Culture No growth at 3 days    Blood Culture - Blood, Hand, Right [[id number]] Collected: 04/07/21 2057    Lab Status: Preliminary result Specimen: Blood from Hand, Right Updated: 04/10/21 2145     Blood Culture No growth at 3 days    Urine Culture - Urine, Urine, Clean Catch [[id number]]  (Normal) Collected: 04/09/21 1325    Lab Status: Final result Specimen: Urine, Clean Catch Updated: 04/10/21 1742     Urine Culture No growth    MRSA Screen, PCR (Inpatient) - Swab, Nares [[id number]]  (Normal) Collected: 04/08/21 0341    Lab Status: Final result Specimen: Swab from Nares Updated: 04/08/21 0937     MRSA PCR Negative    Narrative:      MRSA Negative    COVID-19 and FLU A/B PCR - Swab, Nasopharynx [[id number]]  (Normal) Collected: 04/07/21 1357    Lab Status: Final result Specimen: Swab from Nasopharynx Updated: 04/07/21 1509     COVID19 Not Detected     Influenza A PCR Not Detected     Influenza B PCR Not Detected    Narrative:      Fact sheet for providers: https://www.fda.gov/media/553362/download    Fact sheet for patients: https://www.fda.gov/media/110779/download    Test performed by PCR.          Imaging Results (Last 24 Hours)     ** No results found for the last 24 hours. **          Results for orders placed during the hospital encounter of 04/07/21    Adult Transthoracic Echo Complete W/ Cont if  Necessary Per Protocol    Interpretation Summary  · Estimated left ventricular EF = 30% Left ventricular systolic function is severely decreased.  · There is global hypokinesis. Basal inferior and inferoseptal wall appears akinetic  · Left ventricular wall thickness is consistent with mild to moderate posterior asymmetric hypertrophy  · Moderately reduced right ventricular systolic function noted.  · Moderate tricuspid valve regurgitation is present. Estimated right ventricular systolic pressure from tricuspid regurgitation is normal (<35 mmHg).      I have reviewed the medications:  Scheduled Meds:aspirin, 81 mg, Oral, Daily  carvedilol, 3.125 mg, Oral, BID With Meals  cetirizine, 10 mg, Oral, Daily  docusate sodium, 100 mg, Oral, BID  heparin (porcine), 5,000 Units, Subcutaneous, Q12H  hydrocortisone sodium succinate, 100 mg, Intravenous, Q8H  insulin detemir, 10 Units, Subcutaneous, Daily  insulin lispro, 0-7 Units, Subcutaneous, 4x Daily AC & at Bedtime  magnesium oxide, 1,000 mg, Oral, Daily  oxymetazoline, 2 spray, Each Nare, BID  piperacillin-tazobactam, 4.5 g, Intravenous, Q12H  polyethylene glycol, 17 g, Oral, Daily  sodium chloride, 10 mL, Intravenous, Q12H  vancomycin (dosing per levels), , Does not apply, Daily      Continuous Infusions:norepinephrine, 0.02-0.1 mcg/kg/min, Last Rate: Stopped (04/08/21 1550)  Pharmacy to dose vancomycin,       PRN Meds:.calcium gluconate IVPB **AND** calcium gluconate IVPB **AND** Calcium, Ionized  •  heparin (porcine)  •  hydrALAZINE  •  oxyCODONE  •  Pharmacy to dose vancomycin  •  sodium chloride  •  sodium chloride    Assessment/Plan   Assessment & Plan     Active Hospital Problems    Diagnosis  POA   • **RINKU [N17.9]  Yes   • Lactic acidosis [E87.2]  Yes   • Acute HFrEF (EF 30%) [I50.21]  Yes   • Shock (CMS/HCC) -cardiogenic versus septic [R57.9]  Yes   • Hypertension [I10]  Yes   • T2DM [E11.9]  Yes   • Dyslipidemia [E78.5]  Yes   • Sleep apnea [G47.30]  Yes   •  Elevated liver enzymes [R74.8]  Yes   • Recent Rt Total Knee Replacement 4/5/21 [Z96.659]  Not Applicable   • Chronic pain w/pain pump [G89.29]  Yes      Resolved Hospital Problems   No resolved problems to display.        Brief Hospital Course to date:  Elvis Hanson is a 66 y.o. male with a history of recent right total knee replacement on 4/5, HTN, T2DM, SHYLA, and chronic pain with a pain pump who presented to the [facility] ED on 4/7 with complaints of hearing loss. Pt was found to have RINKU, elevated LFTs and was admitted to the ICU service on the PCU floor.  He became hypotensive requiring pressors and was transferred to the ICU. Pt was found to have an EF of 30% and Cardiology was consulted.  He was also initiated on HD during this admission.  He was transferred to our service on 4/10. Of note, his hearing loss has resolved and was thought to be due to Furosemide in setting of decreased renal function.     Plan:    Acute systolic HF  Elevated troponin  --Cardiology following, pt will eventually require ischemic evaluation to determine etiology of his HF unable to get this done currently due to renal function  --denies chest pain, suspect elevated troponin is due to volume overload  --minimal UOP. Trial of diuretics today per NAL.  Started on HD this admission  --continue ASA, not able to tolerate statin due to transaminitis    Oliguric RINKU   --now on HD, NAL following  --minimal UOP over last 24 hours. Peck catheter d/c'd 4/10.      Elevated LFTs  --AST/ALT significantly elevated  --likely congestive hepatopathy  --monitor, improving daily    Septic vs Cardiogenic shock  --taper hydrocortisone due to hypertension now- decrease to 50mg [id number] today.   --resumed antihypertensives but at much lower dose than home dose. Monitor closely  --continue Vanc/Zosyn  --cultures thus far unremarkable    Recent right knee replacement  --Dr. Avalos following    Chronic Pain  --pain pump present    T2DM  --low dose basal insulin  with SSI for now    Hearing loss  --resolved. Thought to be due to home dose diuretics.          DVT Prophylaxis:  BEBETO      Disposition: I expect the patient to be discharged TBD    CODE STATUS:   Code Status and Medical Interventions:   Ordered at: 04/07/21 1422     Code Status:    CPR     Medical Interventions (Level of Support Prior to Arrest):    Full       Kay Diaz MD  04/11/21        More than 50% of time spent counseling on current illness and plan of care. Case discussed with: patient, daughter and nurse  Total time spent face to face with the patient was 20 minutes.  Total time of the encounter was 35 minutes.          Electronically signed by Kay Diaz MD at 04/11/21 1118     Jimenez Daniels MD at 04/10/21 0865             LOS: 3 days    Patient Care Team:  April Chen DO as PCP - General (Internal Medicine)    Chief Complaint:    History of present illness:    66-year-old -American male with no previous history of kidney disease, no epistaxis, hemoptysis hematemesis kidney stones.  Patient had a right TKR on 4/4/2021, postop patient has received nonsteroidal Mobic for a few days, after going home he had a problem with hearing and altered mental status patient was brought into the hospital initially admitted to the floor.  Patient found to have a creatinine of 3.89 BUN of 25 liver enzymes were elevated ALT 2631, AST 4533, high WBC count of 13.6, lactic acidosis was noted, hemoglobin 10.0, patient has decreased urine output, mild metabolic acidosis, due to hypotension patient was transferred from floor to ICU where patient was placed on IV Levophed.  Peck catheter has been in place urine output has been poor at this time.  Patient oral intake recently has been low.  Renal been consulted at this time.  Patient is being treated for sepsis IV vancomycin has been given accompanied with other antihypertensive medication.  Home medications include losartan  "furosemide other diuretics were stopped.  He was taken off the Mobic and Metformin due to acidosis and renal failure.  He denied any nausea or vomiting or diarrhea at this time no shortness of breath or chest pain.  He has a history of Bell palsy.    Subjective     Interval History:   Oliguric acute renal failure with nephrotic range proteinuria.  Patient is transferred to the floor.  Still on bicarb drip.  Remain critically ill but stable complains of some chest discomfort nausea.     Review of Systems:   All other negative except for edema, nausea chest discomfort generalized weakness as noted above.    Objective     Vital Sign Min/Max for last 24 hours  Temp  Min: 97.7 °F (36.5 °C)  Max: 97.9 °F (36.6 °C)   BP  Min: 122/79  Max: 170/102   Pulse  Min: 62  Max: 85   Resp  Min: 14  Max: 22   SpO2  Min: 81 %  Max: 100 %   Flow (L/min)  Min: 2  Max: 4   Weight  Min: 150 kg (330 lb 1.6 oz)  Max: 150 kg (330 lb 1.6 oz)     Flowsheet Rows      First Filed Value   Admission Height  177.8 cm (70\") Documented at 04/07/2021 1041   Admission Weight  127 kg (280 lb) Documented at 04/07/2021 1041          No intake/output data recorded.  I/O last 3 completed shifts:  In: 4243.1 [I.V.:3326.2; IV Piggyback:916.9]  Out: 1370 [Urine:40; Other:1330]    Physical Exam:  General Appearance: Alert, oriented, no obvious distress.  Obesity -American male  Facial: Left-sided Bell's palsy noted.  Eyes: PER, EOMI.  Neck: Supple no JVD.  Lungs: Clear auscultation, no rales rhonchi's, equal chest movement, nonlabored.  Heart: No gallop, murmur, rub, RRR.  Abdomen: Soft, nontender, positive bowel sounds, no organomegaly.  Extremities: 2+ edema edema  , no cyanosis.  Neuro: No focal deficit, moving all extremities, alert oriented X 3  : Peck catheter in place.    WBC WBC   Date Value Ref Range Status   04/09/2021 11.34 (H) 3.40 - 10.80 10*3/mm3 Final   04/08/2021 17.13 (H) 3.40 - 10.80 10*3/mm3 Final   04/07/2021 13.63 (H) 3.40 - 10.80 " 10*3/mm3 Final      HGB Hemoglobin   Date Value Ref Range Status   04/09/2021 9.2 (L) 13.0 - 17.7 g/dL Final   04/08/2021 10.1 (L) 13.0 - 17.7 g/dL Final   04/07/2021 10.3 (L) 13.0 - 17.7 g/dL Final      HCT Hematocrit   Date Value Ref Range Status   04/09/2021 29.7 (L) 37.5 - 51.0 % Final   04/08/2021 33.4 (L) 37.5 - 51.0 % Final   04/07/2021 34.5 (L) 37.5 - 51.0 % Final      Platlets No results found for: LABPLAT   MCV MCV   Date Value Ref Range Status   04/09/2021 83.4 79.0 - 97.0 fL Final   04/08/2021 84.1 79.0 - 97.0 fL Final   04/07/2021 86.0 79.0 - 97.0 fL Final          Sodium Sodium   Date Value Ref Range Status   04/09/2021 135 (L) 136 - 145 mmol/L Final   04/08/2021 133 (L) 136 - 145 mmol/L Final   04/07/2021 140 136 - 145 mmol/L Final   04/07/2021 140 136 - 145 mmol/L Final      Potassium Potassium   Date Value Ref Range Status   04/09/2021 3.7 3.5 - 5.2 mmol/L Final     Comment:     Slight hemolysis detected by analyzer. Results may be affected.   04/08/2021 3.9 3.5 - 5.2 mmol/L Final   04/07/2021 3.9 3.5 - 5.2 mmol/L Final   04/07/2021 3.7 3.5 - 5.2 mmol/L Final      Chloride Chloride   Date Value Ref Range Status   04/09/2021 89 (L) 98 - 107 mmol/L Final   04/08/2021 94 (L) 98 - 107 mmol/L Final   04/07/2021 97 (L) 98 - 107 mmol/L Final   04/07/2021 94 (L) 98 - 107 mmol/L Final      CO2 CO2   Date Value Ref Range Status   04/09/2021 27.0 22.0 - 29.0 mmol/L Final   04/08/2021 21.0 (L) 22.0 - 29.0 mmol/L Final   04/07/2021 22.0 22.0 - 29.0 mmol/L Final   04/07/2021 26.0 22.0 - 29.0 mmol/L Final      BUN BUN   Date Value Ref Range Status   04/09/2021 53 (H) 8 - 23 mg/dL Final   04/08/2021 37 (H) 8 - 23 mg/dL Final   04/07/2021 28 (H) 8 - 23 mg/dL Final   04/07/2021 25 (H) 8 - 23 mg/dL Final      Creatinine Creatinine   Date Value Ref Range Status   04/09/2021 6.62 (H) 0.76 - 1.27 mg/dL Final   04/08/2021 5.10 (H) 0.76 - 1.27 mg/dL Final   04/07/2021 4.20 (H) 0.76 - 1.27 mg/dL Final   04/07/2021 3.89 (H)  0.76 - 1.27 mg/dL Final      Calcium Calcium   Date Value Ref Range Status   04/09/2021 5.4 (C) 8.6 - 10.5 mg/dL Final   04/08/2021 6.5 (L) 8.6 - 10.5 mg/dL Final   04/07/2021 6.7 (L) 8.6 - 10.5 mg/dL Final   04/07/2021 7.9 (L) 8.6 - 10.5 mg/dL Final      PO4 No results found for: CAPO4   Albumin Albumin   Date Value Ref Range Status   04/09/2021 3.30 (L) 3.50 - 5.20 g/dL Final   04/08/2021 3.40 (L) 3.50 - 5.20 g/dL Final   04/07/2021 3.50 3.50 - 5.20 g/dL Final   04/07/2021 4.10 3.50 - 5.20 g/dL Final      Magnesium Magnesium   Date Value Ref Range Status   04/09/2021 2.0 1.6 - 2.4 mg/dL Final   04/08/2021 1.6 1.6 - 2.4 mg/dL Final   04/07/2021 1.6 1.6 - 2.4 mg/dL Final      Uric Acid No results found for: URICACID     Right kidney measures 11.2 cm in length without apparent mass or   hydronephrosis. Normal color Doppler flow.       Left kidney measures 11.0 cm in length without apparent mass or   hydronephrosis. Normal color Doppler flow.       Unremarkable catheterized and contracted urinary bladder.       Impression:     Unremarkable sonographic appearance of both kidneys.         Results Review:     I reviewed the patient's new clinical results.    aspirin, 81 mg, Oral, Daily  cetirizine, 10 mg, Oral, Daily  docusate sodium, 100 mg, Oral, BID  heparin (porcine), 5,000 Units, Subcutaneous, Q12H  hydrocortisone sodium succinate, 100 mg, Intravenous, Q8H  insulin detemir, 10 Units, Subcutaneous, Daily  insulin lispro, 0-7 Units, Subcutaneous, 4x Daily AC & at Bedtime  oxymetazoline, 2 spray, Each Nare, BID  piperacillin-tazobactam, 4.5 g, Intravenous, Q12H  polyethylene glycol, 17 g, Oral, Daily  sodium chloride, 10 mL, Intravenous, Q12H  thiamine (VITAMIN B1) IVPB, 250 mg, Intravenous, BID  vancomycin (dosing per levels), , Does not apply, Daily      norepinephrine, 0.02-0.1 mcg/kg/min, Last Rate: Stopped (04/08/21 1550)  Pharmacy to dose vancomycin,   sodium bicarbonate drip (greater than 75 mEq/bag), 150 mEq,  Last Rate: 150 mEq (04/10/21 0237)        Medication Review: As noted above    Assessment/Plan       RINKU    Hypertension    T2DM    Dyslipidemia    Sleep apnea    Elevated liver enzymes    Recent Rt Total Knee Replacement 4/5/21    Chronic pain w/pain pump    Lactic acidosis    Acute HFrEF (EF 30%)    Shock (CMS/HCC) -cardiogenic versus septic  1.  ATN secondary to shock with deterioration renal function.  No previous history of kidney disease per patient no history of epistaxis hemoptysis gross hematuria or kidney stones no family history of kidney disease.    Protein creatinine ratio nephrotic range.Right kidney 11.2 cm, left kidney 11.0 cm normal color Doppler unremarkable bilateral kidneys  2.  Septic shock: On IV pressors.  3.  Nephrotic proteinuria greater than 6 g in the setting of diabetes.  4.  Altered mental status and hearing difficulty improved at this time.  5.  S/p recent right total knee replacement.  6.  Congestive heart failure: Ejection fraction 30%.  7.  Hypocalcemia.  Plan:  Ionized calcium low replacement ordered along with high calcium bath with dialysis.  First dialysis 4/9/2021  Tunnel catheter clean  Keep mean arterial pressure greater than 70.  Hold all antihypertensive medications, Glucophage, nonsteroidals at this time.  Avoid nephrotoxic medications.   Check volume status.  Check labs in the morning.  Daily evaluation for renal replacement therapy will be done.  Discontinue the bicarb drip at this time.  Monitor bicarb level  Adjust medication for the new GFR.   High risk patient with multiple medical problems.  DC Peck catheter  Next week may consider kidney biopsy.  Ionized calcium will be checked and replacement will be done.    Jimenez Daniels MD  04/10/21  08:57 EDT       Electronically signed by Jimenez Daniels MD at 04/10/21 0901     Axel Avalos MD at 04/10/21 0809            Orthopedic Progress Note      Patient: Elvis Hanson  YOB: 1955     Date of  Admission: 4/7/2021 10:40 AM Medical Record Number: [id number]     Attending Physician: Kay Diaz MD    Status Post:  R TKA  Post Operative Day Number: 6    Subjective : No new orthopaedic complaints     Pain Relief: some relief with present medication.     Systemic Complaints: No Complaints  Blood pressure improved   Vitals:    04/09/21 2315 04/10/21 0320 04/10/21 0325 04/10/21 0700   BP: 154/96 149/88  167/95   BP Location: Right arm Right arm  Right arm   Patient Position: Lying Lying  Lying   Pulse: 70 67     Resp: 20 20 22   Temp: 97.9 °F (36.6 °C) 97.8 °F (36.6 °C)  97.8 °F (36.6 °C)   TempSrc: Oral Oral  Oral   SpO2: 91% (!) 81%     Weight:   (!) 150 kg (330 lb 1.6 oz)    Height:           Physical Exam: 66 y.o. male    General Appearance:       Alert, cooperative, in no acute distress                  Extremities:    Dressing mild drainage              No clinical sign of DVT        Able to do good movements of digits    Pulses:   Pulses palpable and equal bilaterally           Diagnostic Tests:     Results from last 7 days   Lab Units 04/09/21  0205 04/08/21  0517 04/07/21  1050   WBC 10*3/mm3 11.34* 17.13* 13.63*   HEMOGLOBIN g/dL 9.2* 10.1* 10.3*   HEMATOCRIT % 29.7* 33.4* 34.5*   PLATELETS 10*3/mm3 149 230 274     Results from last 7 days   Lab Units 04/09/21  0205 04/08/21  0517 04/07/21  1711   SODIUM mmol/L 135* 133* 140   POTASSIUM mmol/L 3.7 3.9 3.9   CHLORIDE mmol/L 89* 94* 97*   CO2 mmol/L 27.0 21.0* 22.0   BUN mg/dL 53* 37* 28*   CREATININE mg/dL 6.62* 5.10* 4.20*   GLUCOSE mg/dL 174* 138* 89   CALCIUM mg/dL 5.4* 6.5* 6.7*     Results from last 7 days   Lab Units 04/08/21  0808   INR  1.87*   APTT seconds 29.4     No results found for: URICACID  No results found for: CRYSTAL  Microbiology Results (last 10 days)     Procedure Component Value - Date/Time    MRSA Screen, PCR (Inpatient) - Swab, Nares [[id number]]  (Normal) Collected: 04/08/21 0341    Lab Status: Final result Specimen:  Swab from Nares Updated: 04/08/21 0937     MRSA PCR Negative    Narrative:      MRSA Negative    Blood Culture - Blood, Hand, Left [[id number]] Collected: 04/07/21 2057    Lab Status: Preliminary result Specimen: Blood from Hand, Left Updated: 04/09/21 2145     Blood Culture No growth at 2 days    Blood Culture - Blood, Hand, Right [[id number]] Collected: 04/07/21 2057    Lab Status: Preliminary result Specimen: Blood from Hand, Right Updated: 04/09/21 2145     Blood Culture No growth at 2 days    COVID-19 and FLU A/B PCR - Swab, Nasopharynx [[id number]]  (Normal) Collected: 04/07/21 1357    Lab Status: Final result Specimen: Swab from Nasopharynx Updated: 04/07/21 1509     COVID19 Not Detected     Influenza A PCR Not Detected     Influenza B PCR Not Detected    Narrative:      Fact sheet for providers: https://www.fda.gov/media/511482/download    Fact sheet for patients: https://www.fda.gov/media/957871/download    Test performed by PCR.        CT Head Without Contrast    Result Date: 4/7/2021  Mild atrophy and chronic changes seen within the brain with no acute intracranial abnormality. Mild chronic ethmoid sinusitis.  D:  04/07/2021 E:  04/07/2021   This report was finalized on 4/7/2021 4:54 PM by Dr. Doreen Mcgee MD.      IR Tunneled Catheter    Result Date: 4/9/2021   Insertion of right-sided tunneled dialysis catheter, with tip in the expected location of the cavoatrial junction.  Plan:  The catheter may be used immediately. _______________________________________________________________  PROCEDURE SUMMARY: - Venous access with ultrasound guidance - Tunneled dialysis catheter insertion with fluoroscopic guidance - Additional procedure(s): None  PROCEDURE DETAILS:  Pre-procedure Consent: Informed consent for the procedure including risks, benefits and alternatives was obtained and time-out was performed prior to the procedure. Preparation (MIPS): The site was prepared and draped using all elements of maximal  sterile barrier technique including sterile gloves, sterile gown, cap, mask, large sterile sheet, sterile ultrasound probe cover, hand hygiene and cutaneous antisepsis with 2% chlorhexidine. Medical reason for site preparation exception (MIPS): Not applicable  Anesthesia/sedation Level of anesthesia/sedation: Moderate sedation (conscious sedation) Anesthesia/sedation administered by: Independent trained observer under attending supervision with continuous monitoring of the patient?s level of consciousness and physiologic status Total intra-service sedation time (minutes): 20  Access Local anesthesia was administered. The vessel was sonographically evaluated and determined to be patent. Real time ultrasound was used to visualize needle entry into the vessel and a permanent image obtained. Vein accessed (QCDR): Right internal jugular vein Internal jugular vein patency (QCDR): Patent Access technique: Micropuncture technique under ultrasound guidance  Venography Indication for venography: Not performed Catheter tip position for venography: Not applicable Findings: Not applicable  Catheter placement An incision was made near the venous access site and the catheter was tunneled subcutaneously to the venous access site. The catheter was advanced via a peel-away sheath into the vein under fluoroscopic guidance. Catheter tip location was fluoroscopically verified and a permanent image was stored. Catheter placed: 14.5 [place] [employer] Mediarome SI Silver Ion Catheter Catheter cuff-to-tip length (cm): 19 Catheter flush: Heparin (1000 units per mL)  Closure A sterile dressing was applied. Access site closure technique: Tissue adhesive Catheter securement technique: Non-absorbable suture  Contrast Contrast agent: None Contrast volume (mL): 0  Radiation Dose Fluoroscopy time: 0.2 minutes Dose: 2.1 mGy  Additional Details Additional description of procedure: None Equipment details: None Specimens removed: None Estimated blood  loss (mL): Less than 10 Standardized report: TunneledDialysisCatheter  Attestation I was present and scrubbed for the entire procedure. Imaging reviewed. Agree with final report as written.  This report was finalized on 4/9/2021 3:52 PM by Wil Franz.      US Renal Limited    Result Date: 4/9/2021  Unremarkable sonographic appearance of both kidneys.  This report was finalized on 4/9/2021 8:21 AM by Chaim Gomez.      XR Chest 1 View    Result Date: 4/8/2021  Line tip in the left brachiocephalic vein or SVC. No pneumothorax. Signer Name: Aniceto Clark MD  Signed: 4/8/2021 12:44 AM  Workstation Name: [facility]  [facility]    XR Chest 1 View    Result Date: 4/7/2021  No acute cardiopulmonary disease.  D:  04/07/2021 E:  04/07/2021  This report was finalized on 4/7/2021 4:54 PM by Dr. Doreen Mcgee MD.          Current Medications:  Scheduled Meds:aspirin, 81 mg, Oral, Daily  cetirizine, 10 mg, Oral, Daily  docusate sodium, 100 mg, Oral, BID  heparin (porcine), 5,000 Units, Subcutaneous, Q12H  hydrocortisone sodium succinate, 100 mg, Intravenous, Q8H  insulin detemir, 10 Units, Subcutaneous, Daily  insulin lispro, 0-7 Units, Subcutaneous, 4x Daily AC & at Bedtime  oxymetazoline, 2 spray, Each Nare, BID  piperacillin-tazobactam, 4.5 g, Intravenous, Q12H  polyethylene glycol, 17 g, Oral, Daily  sodium chloride, 10 mL, Intravenous, Q12H  thiamine (VITAMIN B1) IVPB, 250 mg, Intravenous, BID  vancomycin (dosing per levels), , Does not apply, Daily      Continuous Infusions:norepinephrine, 0.02-0.1 mcg/kg/min, Last Rate: Stopped (04/08/21 1550)  Pharmacy to dose vancomycin,   sodium bicarbonate drip (greater than 75 mEq/bag), 150 mEq, Last Rate: 150 mEq (04/10/21 0237)      PRN Meds:.•  albumin human  •  calcium gluconate IVPB **AND** calcium gluconate IVPB **AND** Calcium, Ionized  •  heparin (porcine)  •  oxyCODONE  •  Pharmacy to dose vancomycin  •  sodium chloride  •  sodium  chloride    Assessment: Status post  No admission procedures for hospital encounter.    Patient Active Problem List   Diagnosis   • Hypertension   • T2DM   • Dyslipidemia   • Sleep apnea   • Elevated liver enzymes   • RINKU   • Recent Rt Total Knee Replacement 21   • Chronic pain w/pain pump   • Lactic acidosis   • Acute HFrEF (EF 30%)   • Shock (CMS/HCC) -cardiogenic versus septic       PLAN:   Continues current post-op course  Mobilize with PT as tolerated per protocol  Asa 81 mg bid for dvt ppx when ok with medical team   Nursing to apply ace wrap to knee to add compression with mild drainage  Medical management of kidney function     Weight Bearing: WBAT  Discharge Plan: pending medical improvement     Axel Avalos MD    Date: 4/10/2021    Time: 08:09 EDT    Electronically signed by Axel Avalos MD at 04/10/21 0810     Kay Diaz MD at 04/10/21 0751              [facility] Medicine Services  PROGRESS NOTE    Patient Name: Elvis Hanson  : 1955  MRN: [id number]    Date of Admission: 2021  Primary Care Physician: April Chen DO    Subjective   Subjective     CC:  Loss of hearing    HPI:  Pt doing well this am, seen while in HD.  States that his hearing has returned to normal. Also mentions that he feels much better overall.  Hopes to get santana catheter removed.     ROS:  Gen- No fevers, chills  CV- No chest pain, palpitations  Resp- No cough, dyspnea  GI- No N/V/D, abd pain      Objective   Objective     Vital Signs:   Temp:  [97.7 °F (36.5 °C)-97.9 °F (36.6 °C)] 97.8 °F (36.6 °C)  Heart Rate:  [62-85] 67  Resp:  [12-22] 22  BP: (122-170)/() 167/95        Physical Exam:  Constitutional: No acute distress, awake, alert, seen while in HD  HENT: NCAT, mucous membranes moist  Respiratory: Clear to auscultation bilaterally, respiratory effort normal   Cardiovascular: RRR, no murmurs, rubs, or gallops  Gastrointestinal: Positive bowel sounds,  soft, nontender, nondistended  : Peck catheter in place with minimal dark urine in bag  Musculoskeletal: No bilateral ankle edema, right chest wall HD catheter in place  Psychiatric: Appropriate affect, cooperative  Neurologic: Oriented x 3, strength symmetric in all extremities, Cranial Nerves grossly intact to confrontation, speech clear  Skin: No rashes    Results Reviewed:  Results from last 7 days   Lab Units 04/09/21  0205 04/08/21  0808 04/08/21  0517 04/07/21  1711 04/07/21  1050   WBC 10*3/mm3 11.34*  --  17.13*  --  13.63*   HEMOGLOBIN g/dL 9.2*  --  10.1*  --  10.3*   HEMATOCRIT % 29.7*  --  33.4*  --  34.5*   PLATELETS 10*3/mm3 149  --  230  --  274   INR   --  1.87*  --   --   --    PROCALCITONIN ng/mL  --   --  4.86* 2.15*  --      Results from last 7 days   Lab Units 04/09/21  0205 04/08/21  0517 04/07/21  1711 04/07/21  1050   SODIUM mmol/L 135* 133* 140 140   POTASSIUM mmol/L 3.7 3.9 3.9 3.7   CHLORIDE mmol/L 89* 94* 97* 94*   CO2 mmol/L 27.0 21.0* 22.0 26.0   BUN mg/dL 53* 37* 28* 25*   CREATININE mg/dL 6.62* 5.10* 4.20* 3.89*   GLUCOSE mg/dL 174* 138* 89 116*   CALCIUM mg/dL 5.4* 6.5* 6.7* 7.9*   ALT (SGPT) U/L 3,838* 5,392* 2,553* 2,631*   AST (SGOT) U/L 6,772* >7,000* 6,207* 4,533*   TROPONIN T ng/mL  --  0.760*  --  0.290*     Estimated Creatinine Clearance: 16.6 mL/min (A) (by C-G formula based on SCr of 6.62 mg/dL (H)).    Microbiology Results Abnormal     Procedure Component Value - Date/Time    Blood Culture - Blood, Hand, Left [[id number]] Collected: 04/07/21 2057    Lab Status: Preliminary result Specimen: Blood from Hand, Left Updated: 04/09/21 2145     Blood Culture No growth at 2 days    Blood Culture - Blood, Hand, Right [[id number]] Collected: 04/07/21 2057    Lab Status: Preliminary result Specimen: Blood from Hand, Right Updated: 04/09/21 2145     Blood Culture No growth at 2 days    MRSA Screen, PCR (Inpatient) - Swab, Nares [[id number]]  (Normal) Collected: 04/08/21 0341    Lab  Status: Final result Specimen: Swab from Nares Updated: 04/08/21 0937     MRSA PCR Negative    Narrative:      MRSA Negative    COVID-19 and FLU A/B PCR - Swab, Nasopharynx [[id number]]  (Normal) Collected: 04/07/21 1357    Lab Status: Final result Specimen: Swab from Nasopharynx Updated: 04/07/21 1509     COVID19 Not Detected     Influenza A PCR Not Detected     Influenza B PCR Not Detected    Narrative:      Fact sheet for providers: https://www.fda.gov/media/116942/download    Fact sheet for patients: https://www.fda.gov/media/719160/download    Test performed by PCR.          Imaging Results (Last 24 Hours)     Procedure Component Value Units Date/Time    IR Tunneled Catheter [[id number]] Collected: 04/09/21 1549     Updated: 04/09/21 1600    Narrative:      PROCEDURE: Tunneled dialysis catheter placement      Procedural Personnel  Attending physician(s): MADYSON Franz M.D.  Fellow physician(s): None  Resident physician(s): None  Advanced practice provider(s): None     Pre-procedure diagnosis: ATN secondary to shock with deterioration renal  function. ?No previous history of kidney disease per patient no history  of epistaxis hemoptysis gross hematuria or kidney stones no family  history of kidney disease.????Protein creatinine ratio nephrotic range  (nephrotic proteinuria greater than 6 g in the setting of diabetes).  Septic shock: On IV pressors. Congestive heart failure: Ejection  fraction 30%.  Post-procedure diagnosis: Same  Indication (QCDR): Initiate dialysis  Additional clinical history: None     Complications: No immediate complications.       Impression:         Insertion of right-sided tunneled dialysis catheter, with tip in the  expected location of the cavoatrial junction.     Plan:      The catheter may be used immediately.  _______________________________________________________________     PROCEDURE SUMMARY:  - Venous access with ultrasound guidance  - Tunneled dialysis catheter insertion with  fluoroscopic guidance  - Additional procedure(s): None     PROCEDURE DETAILS:     Pre-procedure  Consent: Informed consent for the procedure including risks, benefits  and alternatives was obtained and time-out was performed prior to the  procedure.  Preparation (MIPS): The site was prepared and draped using all elements  of maximal sterile barrier technique including sterile gloves, sterile  gown, cap, mask, large sterile sheet, sterile ultrasound probe cover,  hand hygiene and cutaneous antisepsis with 2% chlorhexidine.   Medical reason for site preparation exception (MIPS): Not applicable     Anesthesia/sedation  Level of anesthesia/sedation: Moderate sedation (conscious sedation)  Anesthesia/sedation administered by: Independent trained observer under  attending supervision with continuous monitoring of the patient?s level  of consciousness and physiologic status  Total intra-service sedation time (minutes): 20     Access  Local anesthesia was administered. The vessel was sonographically  evaluated and determined to be patent. Real time ultrasound was used to  visualize needle entry into the vessel and a permanent image obtained.  Vein accessed (QCDR): Right internal jugular vein  Internal jugular vein patency (QCDR): Patent  Access technique: Micropuncture technique under ultrasound guidance     Venography  Indication for venography: Not performed  Catheter tip position for venography: Not applicable  Findings: Not applicable     Catheter placement  An incision was made near the venous access site and the catheter was  tunneled subcutaneously to the venous access site. The catheter was  advanced via a peel-away sheath into the vein under fluoroscopic  guidance. Catheter tip location was fluoroscopically verified and a  permanent image was stored.  Catheter placed: 14.5 [place] Torqeedo Palindrome SI Silver Ion Catheter  Catheter cuff-to-tip length (cm): 19  Catheter flush: Heparin (1000 units per mL)      Closure  A sterile dressing was applied.  Access site closure technique: Tissue adhesive  Catheter securement technique: Non-absorbable suture     Contrast  Contrast agent: None  Contrast volume (mL): 0     Radiation Dose  Fluoroscopy time: 0.2 minutes  Dose: 2.1 mGy     Additional Details  Additional description of procedure: None  Equipment details: None  Specimens removed: None  Estimated blood loss (mL): Less than 10  Standardized report: TunneledDialysisCatheter     Attestation  I was present and scrubbed for the entire procedure. Imaging reviewed.  Agree with final report as written.     This report was finalized on 4/9/2021 3:52 PM by Wil Franz.             Results for orders placed during the hospital encounter of 04/07/21    Adult Transthoracic Echo Complete W/ Cont if Necessary Per Protocol    Interpretation Summary  · Estimated left ventricular EF = 30% Left ventricular systolic function is severely decreased.  · There is global hypokinesis. Basal inferior and inferoseptal wall appears akinetic  · Left ventricular wall thickness is consistent with mild to moderate posterior asymmetric hypertrophy  · Moderately reduced right ventricular systolic function noted.  · Moderate tricuspid valve regurgitation is present. Estimated right ventricular systolic pressure from tricuspid regurgitation is normal (<35 mmHg).      I have reviewed the medications:  Scheduled Meds:aspirin, 81 mg, Oral, Daily  cetirizine, 10 mg, Oral, Daily  docusate sodium, 100 mg, Oral, BID  heparin (porcine), 5,000 Units, Subcutaneous, Q12H  hydrocortisone sodium succinate, 100 mg, Intravenous, Q8H  insulin detemir, 10 Units, Subcutaneous, Daily  insulin lispro, 0-7 Units, Subcutaneous, 4x Daily AC & at Bedtime  oxymetazoline, 2 spray, Each Nare, BID  piperacillin-tazobactam, 4.5 g, Intravenous, Q12H  polyethylene glycol, 17 g, Oral, Daily  sodium chloride, 10 mL, Intravenous, Q12H  thiamine (VITAMIN B1) IVPB, 250 mg, Intravenous,  BID  vancomycin (dosing per levels), , Does not apply, Daily      Continuous Infusions:norepinephrine, 0.02-0.1 mcg/kg/min, Last Rate: Stopped (04/08/21 1550)  Pharmacy to dose vancomycin,   sodium bicarbonate drip (greater than 75 mEq/bag), 150 mEq, Last Rate: 150 mEq (04/10/21 0237)      PRN Meds:.•  albumin human  •  calcium gluconate IVPB **AND** calcium gluconate IVPB **AND** Calcium, Ionized  •  heparin (porcine)  •  oxyCODONE  •  Pharmacy to dose vancomycin  •  sodium chloride  •  sodium chloride    Assessment/Plan   Assessment & Plan     Active Hospital Problems    Diagnosis  POA   • **RINKU [N17.9]  Yes   • Lactic acidosis [E87.2]  Yes   • Acute HFrEF (EF 30%) [I50.21]  Yes   • Shock (CMS/HCC) -cardiogenic versus septic [R57.9]  Yes   • Hypertension [I10]  Yes   • T2DM [E11.9]  Yes   • Dyslipidemia [E78.5]  Yes   • Sleep apnea [G47.30]  Yes   • Elevated liver enzymes [R74.8]  Yes   • Recent Rt Total Knee Replacement 4/5/21 [Z96.659]  Not Applicable   • Chronic pain w/pain pump [G89.29]  Yes      Resolved Hospital Problems   No resolved problems to display.        Brief Hospital Course to date:  Elvis Hanson is a 66 y.o. male with a history of recent right total knee replacement on 4/5, HTN, T2DM, SHYLA, and chronic pain with a pain pump who presented to the [facility] ED on 4/7 with complaints of hearing loss. Pt was found to have RINKU, elevated LFTs and was admitted to the ICU service on the PCU floor.  He became hypotensive requiring pressors and was transferred to the ICU. Pt was found to have an EF of 30% and Cardiology was consulted.  He was also initiated on HD during this admission.  He was transferred to our service on 4/10. Of note, his hearing loss has resolved and was thought to be due to Furosemide in setting of decreased renal function.     Plan:    Acute systolic HF  Elevated troponin  --Cardiology following, pt will eventually require ischemic evaluation to determine etiology of his HF unable to get  this done currently due to renal function  --denies chest pain, suspect elevated troponin is due to volume overload  --minimal UOP, so unable to diurese with Bumex/Lasix.  Started on HD this admission  --continue ASA, not able to tolerate statin due to transaminitis    Oliguric RINKU   --now on HD, NAL following  --minimal UOP over last 24 hours.  Per NAL, okay to d/c Peck catheter    Elevated LFTs  --AST/ALT significantly elevated  --likely congestive hepatopathy  --monitor    Septic vs Cardiogenic shock  --continue hydrocortisone IV, wean pressors over next few days  --hold antihypertensives  --continue Vanc/Zosyn  --cultures thus far unremarkable      Recent right knee replacement  --Dr. Avalos following    Chronic Pain  --pain pump present    T2DM  --low dose basal insulin with SSI for now    Hearing loss  --resolved. Thought to be due to home dose diuretics.          DVT Prophylaxis:  BEBETO      Disposition: I expect the patient to be discharged TBD    CODE STATUS:   Code Status and Medical Interventions:   Ordered at: 04/07/21 1429     Code Status:    CPR     Medical Interventions (Level of Support Prior to Arrest):    Full       Kay Diaz MD  04/10/21              Electronically signed by Kay Diaz MD at 04/10/21 1128

## 2021-04-12 NOTE — PROGRESS NOTES
"[facility] Progress Note     LOS: 5 days   Patient Care Team:  April Chen DO as PCP - General (Internal Medicine)  PCP:  April Chen DO    Chief Complaint: Follow-up systolic heart failure, shock liver, acute renal failure    Subjective: Patient seen in dialysis.  He denies chest pain or pressure or significant shortness of breath now or preceding hospitalization.  Reports has chronic lower extremity edema that has been treated with Lasix as an outpatient.  He also states he had a stress test done at [facility] prior to his knee replacement      Review of Systems:   All systems have been reviewed and are negative with the exception of those mentioned above.      Objective:    Vital Sign Min/Max for last 24 hours  Temp  Min: 97 °F (36.1 °C)  Max: 98.5 °F (36.9 °C)   BP  Min: 120/79  Max: 190/104   Pulse  Min: 64  Max: 75   Resp  Min: 16  Max: 20   SpO2  Min: 93 %  Max: 97 %   No data recorded   Weight  Min: 157 kg (345 lb 12.8 oz)  Max: 157 kg (345 lb 12.8 oz)     Flowsheet Rows      First Filed Value   Admission Height  177.8 cm (70\") Documented at 04/07/2021 1041   Admission Weight  127 kg (280 lb) Documented at 04/07/2021 1041          Telemetry: Sinus rhythm      Intake/Output Summary (Last 24 hours) at 4/12/2021 0946  Last data filed at 4/12/2021 0315  Gross per 24 hour   Intake 250 ml   Output 50 ml   Net 200 ml     Intake & Output (last 3 days)       04/09 0701 - 04/10 0700 04/10 0701 - 04/11 0700 04/11 0701 - 04/12 0700 04/12 0701 - 04/13 0700    P.O.   100     I.V. (mL/kg) 2073.6 (13.8)  50 (0.3)     IV Piggyback 582.1  100     Total Intake(mL/kg) 2655.7 (17.7)  250 (1.6)     Urine (mL/kg/hr) 20 (0) 129 (0) 50 (0)     Other 1330       Stool   0     Total Output 1350 129 50     Net +1305.7 -129 +200             Urine Unmeasured Occurrence  2 x      Stool Unmeasured Occurrence 1 x 2 x 2 x            Physical " Exam:  Constitutional:       Appearance: Not in distress.   Pulmonary:      Effort: Pulmonary effort is normal.      Comments: Decreased at bases  Cardiovascular:      PMI at left midclavicular line. Regular rhythm.      Comments: 1-2+ lower extremity edema  Abdominal:      Palpations: Abdomen is soft.   Neurological:      Mental Status: Alert and oriented to person, place and time.          LABS/DIAGNOSTIC DATA:  Results from last 7 days   Lab Units 04/12/21  0800 04/11/21  0849 04/10/21  0906   WBC 10*3/mm3 12.59* 12.08* 11.93*   HEMOGLOBIN g/dL 9.7* 9.3* 9.0*   HEMATOCRIT % 30.0* 29.7* 28.0*   PLATELETS 10*3/mm3 223 195 172     Lab Results   Lab Value Date/Time    TROPONINT 0.717 (C) 04/12/2021 0800    TROPONINT 0.760 (C) 04/08/2021 0517    TROPONINT 0.290 (C) 04/07/2021 1050     Results from last 7 days   Lab Units 04/08/21  0808   INR  1.87*   APTT seconds 29.4     Results from last 7 days   Lab Units 04/12/21  0800 04/11/21  0849 04/10/21  0905   SODIUM mmol/L 135* 135* 135*   POTASSIUM mmol/L 3.4* 3.2* 3.1*   CHLORIDE mmol/L 92* 92* 88*   CO2 mmol/L 24.0 26.0 28.0   BUN mg/dL 69* 48* 50*   CREATININE mg/dL 7.99* 6.71* 6.53*   CALCIUM mg/dL 7.6* 7.5* 6.5*   BILIRUBIN mg/dL 1.0 1.0 0.7   ALK PHOS U/L 155* 171* 166*   ALT (SGPT) U/L 1,197* 1,725* 2,171*   AST (SGOT) U/L 188* 496* 1,376*   GLUCOSE mg/dL 128* 132* 144*                       Medication Review:   aspirin, 81 mg, Oral, Daily  carvedilol, 12.5 mg, Oral, BID With Meals  cetirizine, 10 mg, Oral, Daily  docusate sodium, 100 mg, Oral, BID  heparin (porcine), 5,000 Units, Subcutaneous, Q12H  hydrocortisone sodium succinate, 50 mg, Intravenous, Q12H  insulin detemir, 10 Units, Subcutaneous, Daily  insulin lispro, 0-7 Units, Subcutaneous, 4x Daily AC & at Bedtime  magnesium oxide, 1,000 mg, Oral, Daily  piperacillin-tazobactam, 4.5 g, Intravenous, Q12H  polyethylene glycol, 17 g, Oral, Daily  sodium chloride, 10 mL, Intravenous, Q12H       norepinephrine,  0.02-0.1 mcg/kg/min, Last Rate: Stopped (04/08/21 1550)           RINKU    Hypertension    T2DM    Dyslipidemia    Sleep apnea    Elevated liver enzymes    Recent Rt Total Knee Replacement 4/5/21    Chronic pain w/pain pump    Lactic acidosis    Acute HFrEF (EF 30%)    Shock (CMS/HCC) -cardiogenic versus septic    Echo:  · Estimated left ventricular EF = 30% Left ventricular systolic function is severely decreased.  · There is global hypokinesis. Basal inferior and inferoseptal wall appears akinetic  · Left ventricular wall thickness is consistent with mild to moderate posterior asymmetric hypertrophy  · Moderately reduced right ventricular systolic function noted.  · Moderate tricuspid valve regurgitation is present. Estimated right ventricular systolic pressure from tricuspid regurgitation is normal (<35 mmHg).    Assessment/Plan:      1.  Acute systolic heart failure, biventricular involvement  -Etiology unclear, possible non-STEMI with RV involvement  -We will request the stress test results done prior to his knee surgery  -Continue aspirin and carvedilol at this time  -Holding off on statin due to recent shock liver persistently elevated LFTs  -Troponin repeated today, 0.717, stable compared with 4/8  -No current chest pain  -Would benefit from heart catheterization in the future if renal function allows  -Repeat EKG today    2.  Acute renal failure  -Nephrology following, work-up ongoing  -Possible biopsy next week  -Allowing some permissive hypertension to help with potential renal recovery    3.  Shock liver  -Improving, secondary to hypotension RV dysfunction    4.  Hypertension  -Hold nephrotoxic agents  -Nephrology recommends MAP greater than 100 for now while awaiting renal recovery  -Continue carvedilol 12.5 mg twice daily    5.  Recent knee surgery  -No evidence of DVT on duplex, there was elevated venous pressure  -No no current hypoxia    We will follow      Braden Hawley MD [facility]  04/12/21

## 2021-04-12 NOTE — PROGRESS NOTES
LOS: 5 days    Patient Care Team:  April Chen DO as PCP - General (Internal Medicine)    Chief Complaint:    History of present illness:    66-year-old -American male with no previous history of kidney disease, no epistaxis, hemoptysis hematemesis kidney stones.  Patient had a right TKR on 4/4/2021, postop patient has received nonsteroidal Mobic for a few days, after going home he had a problem with hearing and altered mental status patient was brought into the hospital initially admitted to the floor.  Patient found to have a creatinine of 3.89 BUN of 25 liver enzymes were elevated ALT 2631, AST 4533, high WBC count of 13.6, lactic acidosis was noted, hemoglobin 10.0, patient has decreased urine output, mild metabolic acidosis, due to hypotension patient was transferred from floor to ICU where patient was placed on IV Levophed.  Peck catheter has been in place urine output has been poor at this time.  Patient oral intake recently has been low.  Renal been consulted at this time.  Patient is being treated for sepsis IV vancomycin has been given accompanied with other antihypertensive medication.  Home medications include losartan furosemide other diuretics were stopped.  He was taken off the Mobic and Metformin due to acidosis and renal failure.  He denied any nausea or vomiting or diarrhea at this time no shortness of breath or chest pain.  He has a history of Bell palsy.    Subjective     Interval History:   Oliguric RINKU with nephrotic range proteinuria in the setting of diabetes longstanding, still have significant edema.  Review of Systems:   Complains of low urine output, edema, denies, nausea vomiting dysuria hematuria no shortness of breath or chest pain.  All other systems are negative.    Objective     Vital Sign Min/Max for last 24 hours  Temp  Min: 97 °F (36.1 °C)  Max: 98.5 °F (36.9 °C)   BP  Min: 120/79  Max: 190/104   Pulse  Min: 64  Max: 75   Resp  Min: 16  Max: 20   SpO2   Given no symptoms, no AV cristian blocking agents, continue to monitor. Consider referral to sleep medicine.  Office visit as scheduled 3/25   "Min: 93 %  Max: 97 %   No data recorded   Weight  Min: 157 kg (345 lb 12.8 oz)  Max: 157 kg (345 lb 12.8 oz)     Flowsheet Rows      First Filed Value   Admission Height  177.8 cm (70\") Documented at 04/07/2021 1041   Admission Weight  127 kg (280 lb) Documented at 04/07/2021 1041          No intake/output data recorded.  I/O last 3 completed shifts:  In: 250 [P.O.:100; I.V.:50; IV Piggyback:100]  Out: 50 [Urine:50]    Physical Exam:  General Appearance: -American male morbid obesity comfortable in bed -American male  Facial: Left-sided Bell's palsy noted chronic.  Eyes: PER, EOMI.  Neck: Supple no JVD.  Lungs: Clear auscultation, no rales rhonchi's, equal chest movement, nonlabored.  Heart: No gallop, murmur, rub, RRR.  Abdomen: Obesity distended soft nontender, positive bowel sounds, no organomegaly.  Extremities: Lateral lower extremity 3+ edema trace to 1+ upper extremity edema.  Neuro: No focal deficit, moving all extremities, alert oriented X 3  : No suprapubic fullness  Skin warm and dry.  WBC WBC   Date Value Ref Range Status   04/12/2021 12.59 (H) 3.40 - 10.80 10*3/mm3 Final   04/11/2021 12.08 (H) 3.40 - 10.80 10*3/mm3 Final   04/10/2021 11.93 (H) 3.40 - 10.80 10*3/mm3 Final      HGB Hemoglobin   Date Value Ref Range Status   04/12/2021 9.7 (L) 13.0 - 17.7 g/dL Final   04/11/2021 9.3 (L) 13.0 - 17.7 g/dL Final   04/10/2021 9.0 (L) 13.0 - 17.7 g/dL Final      HCT Hematocrit   Date Value Ref Range Status   04/12/2021 30.0 (L) 37.5 - 51.0 % Final   04/11/2021 29.7 (L) 37.5 - 51.0 % Final   04/10/2021 28.0 (L) 37.5 - 51.0 % Final      Platlets No results found for: LABPLAT   MCV MCV   Date Value Ref Range Status   04/12/2021 79.8 79.0 - 97.0 fL Final   04/11/2021 82.0 79.0 - 97.0 fL Final   04/10/2021 80.2 79.0 - 97.0 fL Final          Sodium Sodium   Date Value Ref Range Status   04/11/2021 135 (L) 136 - 145 mmol/L Final   04/10/2021 135 (L) 136 - 145 mmol/L Final      Potassium Potassium "   Date Value Ref Range Status   04/11/2021 3.2 (L) 3.5 - 5.2 mmol/L Final   04/10/2021 3.1 (L) 3.5 - 5.2 mmol/L Final      Chloride Chloride   Date Value Ref Range Status   04/11/2021 92 (L) 98 - 107 mmol/L Final   04/10/2021 88 (L) 98 - 107 mmol/L Final      CO2 CO2   Date Value Ref Range Status   04/11/2021 26.0 22.0 - 29.0 mmol/L Final   04/10/2021 28.0 22.0 - 29.0 mmol/L Final      BUN BUN   Date Value Ref Range Status   04/11/2021 48 (H) 8 - 23 mg/dL Final   04/10/2021 50 (H) 8 - 23 mg/dL Final      Creatinine Creatinine   Date Value Ref Range Status   04/11/2021 6.71 (H) 0.76 - 1.27 mg/dL Final   04/10/2021 6.53 (H) 0.76 - 1.27 mg/dL Final      Calcium Calcium   Date Value Ref Range Status   04/11/2021 7.5 (L) 8.6 - 10.5 mg/dL Final   04/10/2021 6.5 (L) 8.6 - 10.5 mg/dL Final      PO4 No results found for: CAPO4   Albumin Albumin   Date Value Ref Range Status   04/11/2021 3.60 3.50 - 5.20 g/dL Final   04/10/2021 3.60 3.50 - 5.20 g/dL Final      Magnesium Magnesium   Date Value Ref Range Status   04/11/2021 1.9 1.6 - 2.4 mg/dL Final   04/10/2021 1.7 1.6 - 2.4 mg/dL Final      Uric Acid No results found for: URICACID     Right kidney measures 11.2 cm in length without apparent mass or   hydronephrosis. Normal color Doppler flow.       Left kidney measures 11.0 cm in length without apparent mass or   hydronephrosis. Normal color Doppler flow.       Unremarkable catheterized and contracted urinary bladder.       Impression:     Unremarkable sonographic appearance of both kidneys.         Results Review:     I reviewed the patient's new clinical results.    aspirin, 81 mg, Oral, Daily  carvedilol, 12.5 mg, Oral, BID With Meals  cetirizine, 10 mg, Oral, Daily  docusate sodium, 100 mg, Oral, BID  heparin (porcine), 5,000 Units, Subcutaneous, Q12H  hydrocortisone sodium succinate, 50 mg, Intravenous, Q12H  insulin detemir, 10 Units, Subcutaneous, Daily  insulin lispro, 0-7 Units, Subcutaneous, 4x Daily AC & at  Bedtime  magnesium oxide, 1,000 mg, Oral, Daily  piperacillin-tazobactam, 4.5 g, Intravenous, Q12H  polyethylene glycol, 17 g, Oral, Daily  sodium chloride, 10 mL, Intravenous, Q12H      norepinephrine, 0.02-0.1 mcg/kg/min, Last Rate: Stopped (04/08/21 1550)        Medication Review: As noted above    Assessment/Plan       RINKU    Hypertension    T2DM    Dyslipidemia    Sleep apnea    Elevated liver enzymes    Recent Rt Total Knee Replacement 4/5/21    Chronic pain w/pain pump    Lactic acidosis    Acute HFrEF (EF 30%)    Shock (CMS/HCC) -cardiogenic versus septic  1.  ATN secondary to shock with deterioration renal function.  No previous history of kidney disease per patient no history of epistaxis hemoptysis gross hematuria or kidney stones no family history of kidney disease.    Protein creatinine ratio nephrotic range.Right kidney 11.2 cm, left kidney 11.0 cm normal color Doppler unremarkable bilateral kidneys.  Hepatitis negative, on IV vancomycin on admission  2.  Septic shock: On IV pressors.  3.  Nephrotic proteinuria greater than 6 g in the setting of diabetes.  4.  Altered mental status and hearing difficulty improved at this time.  5.  S/p recent right total knee replacement.  6.  Congestive heart failure: Ejection fraction 30%.  7.  Hypocalcemia.  Plan:  Trial of IV Bumex and metolazone failed.   Plan to filtration tomorrow, dialysis in progress at this time with ultrafiltration  First dialysis 4/9/2021  Tunnel catheter clean  Keep mean arterial pressure greater than 100  Hold  Glucophage, nonsteroidals at this time.  Avoid nephrotoxic medications.   Check volume status.  Check labs in the morning.    Adjust medication for the new GFR.   High risk patient with multiple medical problems.  Next week if no renal recovery may consider kidney biopsy discussed with the wife and patient.  To complete the work-up I am sending the serologies at this time.       Jimenez Daniels MD  04/12/21  08:29 EDT

## 2021-04-12 NOTE — PLAN OF CARE
Problem: Device-Related Complication Risk (Hemodialysis)  Goal: Safe, Effective Therapy Delivery  Outcome: Ongoing, Progressing  Intervention: Optimize Device Care and Function  Recent Flowsheet Documentation  Taken 4/12/2021 0751 by Axel Damon RN  Medication Review/Management: medications reviewed     Problem: Hemodynamic Instability (Hemodialysis)  Goal: Vital Signs Remain in Desired Range  Outcome: Ongoing, Progressing     Problem: Infection (Hemodialysis)  Goal: Absence of Infection Signs and Symptoms  Outcome: Ongoing, Progressing   Goal Outcome Evaluation:         HD in progress

## 2021-04-13 ENCOUNTER — APPOINTMENT (OUTPATIENT)
Dept: NEPHROLOGY | Facility: HOSPITAL | Age: 66
End: 2021-04-13

## 2021-04-13 LAB
ALBUMIN SERPL-MCNC: 3.5 G/DL (ref 3.5–5.2)
ALBUMIN/GLOB SERPL: 1.3 G/DL
ALP SERPL-CCNC: 146 U/L (ref 39–117)
ALT SERPL W P-5'-P-CCNC: 832 U/L (ref 1–41)
ANA TITR SER IF: NEGATIVE {TITER}
ANION GAP SERPL CALCULATED.3IONS-SCNC: 15 MMOL/L (ref 5–15)
AST SERPL-CCNC: 92 U/L (ref 1–40)
BASOPHILS # BLD AUTO: 0.01 10*3/MM3 (ref 0–0.2)
BASOPHILS NFR BLD AUTO: 0.1 % (ref 0–1.5)
BILIRUB SERPL-MCNC: 0.8 MG/DL (ref 0–1.2)
BUN SERPL-MCNC: 54 MG/DL (ref 8–23)
BUN/CREAT SERPL: 8.1 (ref 7–25)
BURR CELLS BLD QL SMEAR: NORMAL
CALCIUM SPEC-SCNC: 8.1 MG/DL (ref 8.6–10.5)
CHLORIDE SERPL-SCNC: 94 MMOL/L (ref 98–107)
CHOLEST SERPL-MCNC: 147 MG/DL (ref 0–200)
CO2 SERPL-SCNC: 23 MMOL/L (ref 22–29)
CREAT SERPL-MCNC: 6.7 MG/DL (ref 0.76–1.27)
DEPRECATED RDW RBC AUTO: 42.7 FL (ref 37–54)
DSDNA AB SER-ACNC: <1 IU/ML (ref 0–9)
EOSINOPHIL # BLD AUTO: 0.02 10*3/MM3 (ref 0–0.4)
EOSINOPHIL NFR BLD AUTO: 0.2 % (ref 0.3–6.2)
ERYTHROCYTE [DISTWIDTH] IN BLOOD BY AUTOMATED COUNT: 15.1 % (ref 12.3–15.4)
GFR SERPL CREATININE-BSD FRML MDRD: 10 ML/MIN/1.73
GFR SERPL CREATININE-BSD FRML MDRD: ABNORMAL ML/MIN/{1.73_M2}
GLOBULIN UR ELPH-MCNC: 2.7 GM/DL
GLUCOSE BLDC GLUCOMTR-MCNC: 126 MG/DL (ref 70–130)
GLUCOSE BLDC GLUCOMTR-MCNC: 139 MG/DL (ref 70–130)
GLUCOSE BLDC GLUCOMTR-MCNC: 148 MG/DL (ref 70–130)
GLUCOSE BLDC GLUCOMTR-MCNC: 177 MG/DL (ref 70–130)
GLUCOSE SERPL-MCNC: 135 MG/DL (ref 65–99)
HCT VFR BLD AUTO: 32.3 % (ref 37.5–51)
HDLC SERPL-MCNC: 24 MG/DL (ref 40–60)
HGB BLD-MCNC: 10.3 G/DL (ref 13–17.7)
IMM GRANULOCYTES # BLD AUTO: 0.11 10*3/MM3 (ref 0–0.05)
IMM GRANULOCYTES NFR BLD AUTO: 1 % (ref 0–0.5)
LDLC SERPL CALC-MCNC: 93 MG/DL (ref 0–100)
LDLC/HDLC SERPL: 3.68 {RATIO}
LYMPHOCYTES # BLD AUTO: 1.04 10*3/MM3 (ref 0.7–3.1)
LYMPHOCYTES NFR BLD AUTO: 9.2 % (ref 19.6–45.3)
MCH RBC QN AUTO: 25.5 PG (ref 26.6–33)
MCHC RBC AUTO-ENTMCNC: 31.9 G/DL (ref 31.5–35.7)
MCV RBC AUTO: 80 FL (ref 79–97)
MONOCYTES # BLD AUTO: 0.9 10*3/MM3 (ref 0.1–0.9)
MONOCYTES NFR BLD AUTO: 8 % (ref 5–12)
NEUTROPHILS NFR BLD AUTO: 81.5 % (ref 42.7–76)
NEUTROPHILS NFR BLD AUTO: 9.22 10*3/MM3 (ref 1.7–7)
NRBC BLD AUTO-RTO: 0 /100 WBC (ref 0–0.2)
PHOSPHATE SERPL-MCNC: 5.5 MG/DL (ref 2.5–4.5)
PLAT MORPH BLD: NORMAL
PLATELET # BLD AUTO: 222 10*3/MM3 (ref 140–450)
PMV BLD AUTO: 10.8 FL (ref 6–12)
POTASSIUM SERPL-SCNC: 3.4 MMOL/L (ref 3.5–5.2)
PROT SERPL-MCNC: 6.2 G/DL (ref 6–8.5)
RBC # BLD AUTO: 4.04 10*6/MM3 (ref 4.14–5.8)
SODIUM SERPL-SCNC: 132 MMOL/L (ref 136–145)
TRIGL SERPL-MCNC: 173 MG/DL (ref 0–150)
VLDLC SERPL-MCNC: 30 MG/DL (ref 5–40)
WBC # BLD AUTO: 11.3 10*3/MM3 (ref 3.4–10.8)
WBC MORPH BLD: NORMAL

## 2021-04-13 PROCEDURE — 25010000002 HEPARIN (PORCINE) PER 1000 UNITS: Performed by: INTERNAL MEDICINE

## 2021-04-13 PROCEDURE — 80061 LIPID PANEL: CPT | Performed by: INTERNAL MEDICINE

## 2021-04-13 PROCEDURE — 80053 COMPREHEN METABOLIC PANEL: CPT | Performed by: INTERNAL MEDICINE

## 2021-04-13 PROCEDURE — 97162 PT EVAL MOD COMPLEX 30 MIN: CPT

## 2021-04-13 PROCEDURE — B548ZZA ULTRASONOGRAPHY OF SUPERIOR VENA CAVA, GUIDANCE: ICD-10-PCS | Performed by: ORTHOPAEDIC SURGERY

## 2021-04-13 PROCEDURE — 85025 COMPLETE CBC W/AUTO DIFF WBC: CPT | Performed by: INTERNAL MEDICINE

## 2021-04-13 PROCEDURE — 99233 SBSQ HOSP IP/OBS HIGH 50: CPT | Performed by: INTERNAL MEDICINE

## 2021-04-13 PROCEDURE — 99231 SBSQ HOSP IP/OBS SF/LOW 25: CPT | Performed by: NURSE PRACTITIONER

## 2021-04-13 PROCEDURE — 84100 ASSAY OF PHOSPHORUS: CPT | Performed by: INTERNAL MEDICINE

## 2021-04-13 PROCEDURE — 25010000002 PIPERACILLIN SOD-TAZOBACTAM PER 1 G: Performed by: INTERNAL MEDICINE

## 2021-04-13 PROCEDURE — 0SJC3ZZ INSPECTION OF RIGHT KNEE JOINT, PERCUTANEOUS APPROACH: ICD-10-PCS | Performed by: ORTHOPAEDIC SURGERY

## 2021-04-13 PROCEDURE — 82962 GLUCOSE BLOOD TEST: CPT

## 2021-04-13 PROCEDURE — 85007 BL SMEAR W/DIFF WBC COUNT: CPT | Performed by: INTERNAL MEDICINE

## 2021-04-13 PROCEDURE — 63710000001 DIPHENHYDRAMINE PER 50 MG: Performed by: INTERNAL MEDICINE

## 2021-04-13 PROCEDURE — 63710000001 INSULIN LISPRO (HUMAN) PER 5 UNITS: Performed by: INTERNAL MEDICINE

## 2021-04-13 PROCEDURE — 25010000002 HYDROCORTISONE SODIUM SUCCINATE 100 MG RECONSTITUTED SOLUTION: Performed by: INTERNAL MEDICINE

## 2021-04-13 RX ORDER — DIPHENHYDRAMINE HCL 25 MG
25 CAPSULE ORAL NIGHTLY PRN
Status: DISCONTINUED | OUTPATIENT
Start: 2021-04-13 | End: 2021-04-20 | Stop reason: HOSPADM

## 2021-04-13 RX ORDER — CARVEDILOL 12.5 MG/1
25 TABLET ORAL 2 TIMES DAILY WITH MEALS
Status: DISCONTINUED | OUTPATIENT
Start: 2021-04-13 | End: 2021-04-20 | Stop reason: HOSPADM

## 2021-04-13 RX ORDER — ALBUMIN (HUMAN) 12.5 G/50ML
12.5 SOLUTION INTRAVENOUS AS NEEDED
Status: CANCELLED | OUTPATIENT
Start: 2021-04-14 | End: 2021-04-14

## 2021-04-13 RX ORDER — LIDOCAINE HYDROCHLORIDE 10 MG/ML
10 INJECTION, SOLUTION EPIDURAL; INFILTRATION; INTRACAUDAL; PERINEURAL ONCE
Status: COMPLETED | OUTPATIENT
Start: 2021-04-13 | End: 2021-04-13

## 2021-04-13 RX ORDER — ACETAMINOPHEN 650 MG
TABLET, EXTENDED RELEASE ORAL ONCE
Status: DISCONTINUED | OUTPATIENT
Start: 2021-04-13 | End: 2021-04-19

## 2021-04-13 RX ADMIN — TAZOBACTAM SODIUM AND PIPERACILLIN SODIUM 4.5 G: 500; 4 INJECTION, SOLUTION INTRAVENOUS at 00:23

## 2021-04-13 RX ADMIN — HYDROCORTISONE SODIUM SUCCINATE 50 MG: 100 INJECTION, POWDER, FOR SOLUTION INTRAMUSCULAR; INTRAVENOUS at 23:21

## 2021-04-13 RX ADMIN — HEPARIN SODIUM 5000 UNITS: 5000 INJECTION INTRAVENOUS; SUBCUTANEOUS at 21:52

## 2021-04-13 RX ADMIN — DOCUSATE SODIUM 100 MG: 100 CAPSULE, LIQUID FILLED ORAL at 21:52

## 2021-04-13 RX ADMIN — DOCUSATE SODIUM 100 MG: 100 CAPSULE, LIQUID FILLED ORAL at 11:39

## 2021-04-13 RX ADMIN — SODIUM CHLORIDE, PRESERVATIVE FREE 10 ML: 5 INJECTION INTRAVENOUS at 21:53

## 2021-04-13 RX ADMIN — HEPARIN SODIUM 5000 UNITS: 5000 INJECTION INTRAVENOUS; SUBCUTANEOUS at 11:39

## 2021-04-13 RX ADMIN — TAZOBACTAM SODIUM AND PIPERACILLIN SODIUM 4.5 G: 500; 4 INJECTION, SOLUTION INTRAVENOUS at 11:40

## 2021-04-13 RX ADMIN — HYDROCORTISONE SODIUM SUCCINATE 50 MG: 100 INJECTION, POWDER, FOR SOLUTION INTRAMUSCULAR; INTRAVENOUS at 00:23

## 2021-04-13 RX ADMIN — INSULIN LISPRO 2 UNITS: 100 INJECTION, SOLUTION INTRAVENOUS; SUBCUTANEOUS at 17:40

## 2021-04-13 RX ADMIN — DIPHENHYDRAMINE HYDROCHLORIDE 25 MG: 25 CAPSULE ORAL at 23:21

## 2021-04-13 RX ADMIN — CARVEDILOL 12.5 MG: 12.5 TABLET, FILM COATED ORAL at 11:39

## 2021-04-13 RX ADMIN — POLYETHYLENE GLYCOL 3350 17 G: 17 POWDER, FOR SOLUTION ORAL at 11:40

## 2021-04-13 RX ADMIN — LIDOCAINE HYDROCHLORIDE 10 ML: 10 INJECTION, SOLUTION EPIDURAL; INFILTRATION; INTRACAUDAL; PERINEURAL at 16:54

## 2021-04-13 RX ADMIN — CARVEDILOL 25 MG: 12.5 TABLET, FILM COATED ORAL at 17:41

## 2021-04-13 RX ADMIN — ANTACID TABLETS 2 TABLET: 500 TABLET, CHEWABLE ORAL at 11:54

## 2021-04-13 RX ADMIN — CETIRIZINE HYDROCHLORIDE 10 MG: 10 TABLET, FILM COATED ORAL at 11:39

## 2021-04-13 RX ADMIN — ASPIRIN 81 MG: 81 TABLET, COATED ORAL at 11:39

## 2021-04-13 RX ADMIN — MAGNESIUM OXIDE TAB 400 MG (241.3 MG ELEMENTAL MG) 1000 MG: 400 (241.3 MG) TAB at 11:39

## 2021-04-13 NOTE — PLAN OF CARE
Goal Outcome Evaluation:  Plan of Care Reviewed With: patient  Progress: improving  Outcome Summary: PT eval completed.  Patient presents w/ decreased strength and ROM RLE (R TKA 4/5), decreased activity tolerance, unsteady gait, and functional decline.  He completed bed mobility w/ supervision, transfers w/ CGA, and ambulated 45ft w/ wkr and CGA.  Skilled IPPT indicated BID to improve ROM/strength/function R knee and maximize pt's safety and independence w/ functional mobility.  Recommend home w/ assist and HHPT at D/C.

## 2021-04-13 NOTE — PROGRESS NOTES
Continued Stay Note  [facility]     Patient Name: Elvis Hanson  MRN: [id number]  Today's Date: 4/13/2021    Admit Date: 4/7/2021    Discharge Plan     Row Name 04/13/21 1443       Plan    Plan  update    Patient/Family in Agreement with Plan  yes    Plan Comments  Spoke with patient at bedside regarding discharge plan.  Paient has been to dialysis today, is drowsy.  Unsure at this point if patient will need OP HD arranged.  No discharge needs verbalized.  CM continues to follow.  Patient plan is to discharge home may benefit from HH.    Final Discharge Disposition Code  01 - home or self-care        Discharge Codes    No documentation.       Expected Discharge Date and Time     Expected Discharge Date Expected Discharge Time    Apr 16, 2021             Kristen Prado RN

## 2021-04-13 NOTE — PROGRESS NOTES
[facility] Medicine Services  PROGRESS NOTE    Patient Name: Elvis Hanson  : 1955  MRN: [id number]    Date of Admission: 2021  Primary Care Physician: April Chen DO    Subjective   Subjective     CC:  Loss of hearing    HPI:  Pt was seen while in HD. States that he didn't sleep well despite Melatonin being ordered.  Asking for something else.     ROS:  Gen- No fevers, chills  CV- No chest pain, palpitations  Resp- No cough, dyspnea  GI- No N/V/D, abd pain      Objective   Objective     Vital Signs:   Temp:  [97 °F (36.1 °C)-98.6 °F (37 °C)] 97 °F (36.1 °C)  Heart Rate:  [61-84] 67  Resp:  [16-18] 18  BP: (134-191)/() 177/97        Physical Exam:  Constitutional: No acute distress, seen while in HD  HENT: NCAT, mucous membranes moist, LIJ catheter in place  Respiratory: Clear to auscultation bilaterally, respiratory effort normal   Cardiovascular: RRR, no murmurs, rubs, or gallops  Gastrointestinal: Positive bowel sounds, soft, nontender, nondistended  Musculoskeletal: No bilateral ankle edema, right chest wall HD catheter in place  Psychiatric: Appropriate affect, cooperative  Neurologic: Oriented x 3, strength symmetric in all extremities, Cranial Nerves grossly intact to confrontation, speech clear  Skin: No rashes    Results Reviewed:  Results from last 7 days   Lab Units 21  0456 21  0800 21  0849 21  0808 21  0517 21  1711   WBC 10*3/mm3 11.30* 12.59* 12.08*  --  17.13*  --    HEMOGLOBIN g/dL 10.3* 9.7* 9.3*  --  10.1*  --    HEMATOCRIT % 32.3* 30.0* 29.7*  --  33.4*  --    PLATELETS 10*3/mm3 222 223 195  --  230  --    INR   --   --   --  1.87*  --   --    PROCALCITONIN ng/mL  --   --   --   --  4.86* 2.15*     Results from last 7 days   Lab Units 21  0456 21  0800 21  0849 21  0517 21  1050   SODIUM mmol/L 132* 135* 135* 133* 140   POTASSIUM mmol/L 3.4* 3.4* 3.2* 3.9 3.7   CHLORIDE  mmol/L 94* 92* 92* 94* 94*   CO2 mmol/L 23.0 24.0 26.0 21.0* 26.0   BUN mg/dL 54* 69* 48* 37* 25*   CREATININE mg/dL 6.70* 7.99* 6.71* 5.10* 3.89*   GLUCOSE mg/dL 135* 128* 132* 138* 116*   CALCIUM mg/dL 8.1* 7.6* 7.5* 6.5* 7.9*   ALT (SGPT) U/L 832* 1,197* 1,725* 5,392* 2,631*   AST (SGOT) U/L 92* 188* 496* >7,000* 4,533*   TROPONIN T ng/mL  --  0.717*  --  0.760* 0.290*     Estimated Creatinine Clearance: 16.7 mL/min (A) (by C-G formula based on SCr of 6.7 mg/dL (H)).    Microbiology Results Abnormal     Procedure Component Value - Date/Time    Blood Culture - Blood, Hand, Left [[id number]] Collected: 04/07/21 2057    Lab Status: Final result Specimen: Blood from Hand, Left Updated: 04/12/21 2145     Blood Culture No growth at 5 days    Blood Culture - Blood, Hand, Right [[id number]] Collected: 04/07/21 2057    Lab Status: Final result Specimen: Blood from Hand, Right Updated: 04/12/21 2145     Blood Culture No growth at 5 days    Urine Culture - Urine, Urine, Clean Catch [[id number]]  (Normal) Collected: 04/09/21 1325    Lab Status: Final result Specimen: Urine, Clean Catch Updated: 04/10/21 1742     Urine Culture No growth    MRSA Screen, PCR (Inpatient) - Swab, Nares [[id number]]  (Normal) Collected: 04/08/21 0341    Lab Status: Final result Specimen: Swab from Nares Updated: 04/08/21 0937     MRSA PCR Negative    Narrative:      MRSA Negative    COVID-19 and FLU A/B PCR - Swab, Nasopharynx [[id number]]  (Normal) Collected: 04/07/21 1357    Lab Status: Final result Specimen: Swab from Nasopharynx Updated: 04/07/21 1509     COVID19 Not Detected     Influenza A PCR Not Detected     Influenza B PCR Not Detected    Narrative:      Fact sheet for providers: https://www.fda.gov/media/664508/download    Fact sheet for patients: https://www.fda.gov/media/623998/download    Test performed by PCR.          Imaging Results (Last 24 Hours)     ** No results found for the last 24 hours. **          Results for orders placed  during the hospital encounter of 04/07/21    Adult Transthoracic Echo Complete W/ Cont if Necessary Per Protocol    Interpretation Summary  · Estimated left ventricular EF = 30% Left ventricular systolic function is severely decreased.  · There is global hypokinesis. Basal inferior and inferoseptal wall appears akinetic  · Left ventricular wall thickness is consistent with mild to moderate posterior asymmetric hypertrophy  · Moderately reduced right ventricular systolic function noted.  · Moderate tricuspid valve regurgitation is present. Estimated right ventricular systolic pressure from tricuspid regurgitation is normal (<35 mmHg).      I have reviewed the medications:  Scheduled Meds:aspirin, 81 mg, Oral, Daily  carvedilol, 12.5 mg, Oral, BID With Meals  cetirizine, 10 mg, Oral, Daily  docusate sodium, 100 mg, Oral, BID  heparin (porcine), 5,000 Units, Subcutaneous, Q12H  hydrocortisone sodium succinate, 50 mg, Intravenous, Q12H  insulin detemir, 10 Units, Subcutaneous, Daily  insulin lispro, 0-7 Units, Subcutaneous, 4x Daily AC & at Bedtime  magnesium oxide, 1,000 mg, Oral, Daily  piperacillin-tazobactam, 4.5 g, Intravenous, Q12H  polyethylene glycol, 17 g, Oral, Daily  sodium chloride, 10 mL, Intravenous, Q12H      Continuous Infusions:norepinephrine, 0.02-0.1 mcg/kg/min, Last Rate: Stopped (04/08/21 1550)      PRN Meds:.•  albumin human  •  calcium carbonate  •  calcium gluconate IVPB **AND** calcium gluconate IVPB **AND** Calcium, Ionized  •  heparin (porcine)  •  hydrALAZINE  •  magnesium sulfate **OR** magnesium sulfate in D5W 1g/100mL (PREMIX) **OR** magnesium sulfate  •  melatonin  •  oxyCODONE  •  sodium chloride  •  sodium chloride    Assessment/Plan   Assessment & Plan     Active Hospital Problems    Diagnosis  POA   • **RINKU [N17.9]  Yes   • Lactic acidosis [E87.2]  Yes   • Acute HFrEF (EF 30%) [I50.21]  Yes   • Shock (CMS/HCC) -cardiogenic versus septic [R57.9]  Yes   • Hypertension [I10]  Yes   •  T2DM [E11.9]  Yes   • Dyslipidemia [E78.5]  Yes   • Sleep apnea [G47.30]  Yes   • Elevated liver enzymes [R74.8]  Yes   • Recent Rt Total Knee Replacement 4/5/21 [Z96.659]  Not Applicable   • Chronic pain w/pain pump [G89.29]  Yes      Resolved Hospital Problems   No resolved problems to display.        Brief Hospital Course to date:  Elvis Hanson is a 66 y.o. male with a history of recent right total knee replacement on 4/5, HTN, T2DM, SHYLA, and chronic pain with a pain pump who presented to the [facility] ED on 4/7 with complaints of hearing loss. Pt was found to have RINKU, elevated LFTs and was admitted to the ICU service on the PCU floor.  He became hypotensive requiring pressors and was transferred to the ICU. Pt was found to have an EF of 30% and Cardiology was consulted.  He was also initiated on HD during this admission.  He was transferred to our service on 4/10. Of note, his hearing loss has resolved and was thought to be due to Furosemide in setting of decreased renal function.     Plan:    Acute systolic HF  Elevated troponin  --Cardiology following, pt will eventually require ischemic evaluation to determine etiology of his HF unable to get this done currently due to renal function  --denies chest pain, suspect elevated troponin is due to volume overload  --Troponin peaked at 0.76  --minimal UOP. Trial of diuretics 4/11 per NAL- no UOP noted nor any urine on bladder scan  --continue ASA, not able to tolerate statin due to transaminitis    Oliguric RINKU   --now on HD, NAL following.   --minimal UOP over last 24 hours. Peck catheter d/c'd 4/10.    -- likely to have renal bx this week     Elevated LFTs  --AST/ALT significantly elevated on admission  --likely congestive hepatopathy  --monitor, improving daily    Septic vs Cardiogenic shock  --taper hydrocortisone due to hypertension now- decrease to 50 mg Q24 hours today.  Would d/c this tomorrow, 4/14  --resumed antihypertensives but at much lower dose than home  dose. Will increase back to home dose today  --stopped Vanc on 4/12, will stop Zosyn today  --his leukocytosis is better today  --cultures thus far unremarkable    Recent right knee replacement  --Dr. Avalos following    Chronic Pain  --pain pump present    T2DM  --low dose basal insulin with SSI for now    Hearing loss  --resolved. Thought to be due to home dose diuretics.      Insomnia  --Melatonin did not help last pm  --will try low dose Benadryl tonight        DVT Prophylaxis:  BEBETO      Disposition: I expect the patient to be discharged TBD, still has more workup (ie cardiac) to be done until he is ready for discharge    CODE STATUS:   Code Status and Medical Interventions:   Ordered at: 04/07/21 1422     Code Status:    CPR     Medical Interventions (Level of Support Prior to Arrest):    Full       Kay Diaz MD  04/13/21

## 2021-04-13 NOTE — THERAPY EVALUATION
Patient Name: Elvis Hanson  : 1955    MRN: [id number]                              Today's Date: 2021       Admit Date: 2021    Visit Dx:     ICD-10-CM ICD-9-CM   1. Altered mental status, unspecified altered mental status type  R41.82 780.97   2. Hypotension, unspecified hypotension type  I95.9 458.9   3. Acute renal failure, unspecified acute renal failure type (CMS/HCC)  N17.9 584.9   4. Bilateral hearing loss, unspecified hearing loss type  H91.93 389.9   5. Elevated LFTs  R79.89 790.6   6. Elevated troponin  R77.8 790.6     Patient Active Problem List   Diagnosis   • Hypertension   • T2DM   • Dyslipidemia   • Sleep apnea   • Elevated liver enzymes   • RINKU   • Recent Rt Total Knee Replacement 21   • Chronic pain w/pain pump   • Lactic acidosis   • Acute HFrEF (EF 30%)   • Shock (CMS/HCC) -cardiogenic versus septic     Past Medical History:   Diagnosis Date   • Diabetes mellitus (CMS/HCC)    • Hyperlipidemia    • Hypertension    • Sleep apnea      Past Surgical History:   Procedure Laterality Date   • CHOLECYSTECTOMY     • JOINT REPLACEMENT Right     KNEE     General Information     Row Name 21 1310          Physical Therapy Time and Intention    Document Type  evaluation  -MB     Mode of Treatment  physical therapy  -MB     Row Name 21 1310          General Information    Patient Profile Reviewed  yes  -MB     Prior Level of Function  independent:;all household mobility;community mobility;driving;work Prior to recent R TKA.  -MB     Existing Precautions/Restrictions  fall WBAT RLE (R TKA )  -MB     Row Name 21 1310          Living Environment    Lives With  spouse  -MB     Row Name 21 1310          Home Main Entrance    Number of Stairs, Main Entrance  none  -MB     Row Name 21 1310          Stairs Within Home, Primary    Number of Stairs, Within Home, Primary  none  -MB     Row Name 21 1310          Cognition    Orientation Status (Cognition)   oriented x 4  -MB     Row Name 04/13/21 1310          Safety Issues, Functional Mobility    Safety Issues Affecting Function (Mobility)  safety precautions follow-through/compliance;sequencing abilities;positioning of assistive device  -MB     Impairments Affecting Function (Mobility)  endurance/activity tolerance;strength;range of motion (ROM)  -MB       User Key  (r) = Recorded By, (t) = Taken By, (c) = Cosigned By    Initials Name Provider Type    Carolyn Cheng, PT Physical Therapist        Mobility     Row Name 04/13/21 1310          Bed Mobility    Bed Mobility  supine-sit  -MB     Supine-Sit [place] (Bed Mobility)  supervision  -MB     Assistive Device (Bed Mobility)  bed rails;head of bed elevated;overhead trapeze  -MB     Comment (Bed Mobility)  Pt. advanced to EOB w/out assist, relying on bed rails and overhead trapeze.  -MB     Row Name 04/13/21 1310          Transfers    Comment (Transfers)  VCs for safe hand placement and chair approach.  -MB     Row Name 04/13/21 1310          Sit-Stand Transfer    Sit-Stand [place] (Transfers)  contact guard;verbal cues  -MB     Assistive Device (Sit-Stand Transfers)  walker, standard  -MB     Row Name 04/13/21 1310          Gait/Stairs (Locomotion)    [place] Level (Gait)  contact guard;verbal cues  -MB     Assistive Device (Gait)  walker, standard  -MB     Distance in Feet (Gait)  45  -MB     Deviations/Abnormal Patterns (Gait)  right sided deviations;base of support, wide;jeancarlos decreased;gait speed decreased;stride length decreased  -MB     Right Sided Gait Deviations  heel strike decreased;weight shift ability decreased  -MB     Comment (Gait/Stairs)  Pt. amb. w/ step through gait pattern w/ slower jeancarlos.  VCs for increased R LE heelstrike.  Gait distance limited by fatigue.  -MB       User Key  (r) = Recorded By, (t) = Taken By, (c) = Cosigned By    Initials Name Provider Type    Carolyn Cheng, PT Physical Therapist         Obj/Interventions     [facility] Name 04/13/21 1310          Range of Motion Comprehensive    General Range of Motion  lower extremity range of motion deficits identified  -MB     Comment, General Range of Motion  LLE WFL, R knee flex AROM -5 to 95 degrees  -[facility] Name 04/13/21 1310          Strength Comprehensive (MMT)    General Manual Muscle Testing (MMT) Assessment  lower extremity strength deficits identified  -MB     Comment, General Manual Muscle Testing (MMT) Assessment  LLE grossly 4+/5, R flex/knee ext 4-/5  -[facility] Name 04/13/21 1310          Motor Skills    Motor Skills  therapeutic exercise  -MB     Therapeutic Exercise  hip;knee;ankle  -[facility] Name 04/13/21 1310          Hip (Therapeutic Exercise)    Hip (Therapeutic Exercise)  strengthening exercise  -MB     Hip Strengthening (Therapeutic Exercise)  bilateral;marching while seated;5 repetitions;10 repetitions  -[facility] Name 04/13/21 1310          Knee (Therapeutic Exercise)    Knee (Therapeutic Exercise)  strengthening exercise;isometric exercises  -MB     Knee Isometrics (Therapeutic Exercise)  bilateral;quad sets;10 repetitions  -MB     Knee Strengthening (Therapeutic Exercise)  bilateral;LAQ (long arc quad);right;SLR (straight leg raise);heel slides;10 repetitions;5 repetitions  -[facility] Name 04/13/21 1310          Ankle (Therapeutic Exercise)    Ankle (Therapeutic Exercise)  strengthening exercise  -MB     Ankle Strengthening (Therapeutic Exercise)  bilateral;dorsiflexion;plantarflexion  -[facility] Name 04/13/21 1310          Balance    Balance Assessment  sitting static balance;sitting dynamic balance;standing static balance;standing dynamic balance  -MB     Static Sitting Balance  WNL  -MB     Dynamic Sitting Balance  WNL  -MB     Static Standing Balance  WFL;supported  -MB     Dynamic Standing Balance  WFL;supported  -MB     Balance Interventions  standing;sit to stand;weight shifting activity  -[facility] Name 04/13/21 1310           Sensory Assessment (Somatosensory)    Sensory Assessment (Somatosensory)  LE sensation intact  -MB       User Key  (r) = Recorded By, (t) = Taken By, (c) = Cosigned By    Initials Name Provider Type    Carolyn Cheng, PT Physical Therapist        Goals/Plan     Row Name 04/13/21 1310          Bed Mobility Goal 1 (PT)    Activity/Assistive Device (Bed Mobility Goal 1, PT)  bed mobility activities, all  -MB     [place] Level/Cues Needed (Bed Mobility Goal 1, PT)  independent  -MB     Time Frame (Bed Mobility Goal 1, PT)  10 days  -MB     Progress/Outcomes (Bed Mobility Goal 1, PT)  goal ongoing  -MB     Row Name 04/13/21 1310          Transfer Goal 1 (PT)    Activity/Assistive Device (Transfer Goal 1, PT)  transfers, all;walker, rolling  -MB     [place] Level/Cues Needed (Transfer Goal 1, PT)  modified independence  -MB     Time Frame (Transfer Goal 1, PT)  10 days  -MB     Progress/Outcome (Transfer Goal 1, PT)  goal ongoing  -MB     Row Name 04/13/21 1310          Gait Training Goal 1 (PT)    Activity/Assistive Device (Gait Training Goal 1, PT)  gait (walking locomotion);walker, rolling  -MB     [place] Level (Gait Training Goal 1, PT)  modified independence  -MB     Distance (Gait Training Goal 1, PT)  150  -MB     Time Frame (Gait Training Goal 1, PT)  10 days  -MB     Progress/Outcome (Gait Training Goal 1, PT)  goal ongoing  -MB     Row Name 04/13/21 1310          Patient Education Goal (PT)    Activity (Patient Education Goal, PT)  HEP  -MB     [place]/Cues/Accuracy (Memory Goal 2, PT)  demonstrates adequately;verbalizes understanding  -MB     Time Frame (Patient Education Goal, PT)  1 week  -MB     Progress/Outcome (Patient Education Goal, PT)  goal ongoing  -MB       User Key  (r) = Recorded By, (t) = Taken By, (c) = Cosigned By    Initials Name Provider Type    Carolyn Cheng, PT Physical Therapist        Clinical Impression     Row Name 04/13/21 1310          Pain     Additional Documentation  Pain Scale: Numbers Pre/Post-Treatment (Group)  -MB     Row Name 04/13/21 1310          Pain Scale: Numbers Pre/Post-Treatment    Pretreatment Pain Rating  0/10 - no pain  -MB     Posttreatment Pain Rating  0/10 - no pain  -MB     Row Name 04/13/21 1310          Plan of Care Review    Plan of Care Reviewed With  patient  -MB     Progress  improving  -MB     Outcome Summary  PT eval completed.  Patient presents w/ decreased strength and ROM RLE (R TKA 4/5), decreased activity tolerance, unsteady gait, and functional decline.  He completed bed mobility w/ supervision, transfers w/ CGA, and ambulated 45ft w/ wkr and CGA.  Skilled IPPT indicated BID to improve ROM/strength/function R knee and maximize pt's safety and independence w/ functional mobility.  Recommend home w/ assist and HHPT at D/C.  -MB     Row Name 04/13/21 1310          Therapy Assessment/Plan (PT)    Patient/Family Therapy Goals Statement (PT)  Return to home, HHPT, PLOF, work.  -MB     Rehab Potential (PT)  good, to achieve stated therapy goals  -MB     Criteria for Skilled Interventions Met (PT)  yes;meets criteria;skilled treatment is necessary  -MB     Row Name 04/13/21 1310          Vital Signs    Pre Systolic BP Rehab  178  -MB     Pre Treatment Diastolic BP  99  -MB     Post Systolic BP Rehab  148  -MB     Post Treatment Diastolic BP  84  -MB     Pre SpO2 (%)  95  -MB     O2 Delivery Pre Treatment  nasal cannula  -MB     O2 Delivery Intra Treatment  nasal cannula  -MB     Post SpO2 (%)  94  -MB     O2 Delivery Post Treatment  nasal cannula  -MB     Pre Patient Position  Supine  -MB     Intra Patient Position  Standing  -MB     Post Patient Position  Sitting  -MB     Row Name 04/13/21 1310          Positioning and Restraints    Pre-Treatment Position  in bed  -MB     Post Treatment Position  chair  -MB     In Chair  notified nsg;reclined;call light within reach;encouraged to call for assist;legs elevated RN deferred  exit alarm.  -MB       User Key  (r) = Recorded By, (t) = Taken By, (c) = Cosigned By    Initials Name Provider Type    Carolyn Cheng, PT Physical Therapist        Outcome Measures     Row Name 04/13/21 1310          How much help from another person do you currently need...    Turning from your back to your side while in flat bed without using bedrails?  4  -MB     Moving from lying on back to sitting on the side of a flat bed without bedrails?  3  -MB     Moving to and from a bed to a chair (including a wheelchair)?  3  -MB     Standing up from a chair using your arms (e.g., wheelchair, bedside chair)?  3  -MB     Climbing 3-5 steps with a railing?  3  -MB     To walk in hospital room?  3  -MB     AM-PAC 6 Clicks Score (PT)  19  -MB     Row Name 04/13/21 1310          PADD    Diagnosis  1  -MB     Gender  2  -MB     Age Group  2  -MB     Gait Distance  0  -MB     Assist Level  1  -MB     Home Support  3  -MB     PADD Score  9  -MB     Prediction by PADD Score  directly home (with home health or out-patient rehab)  -MB     Row Name 04/13/21 1310          Functional Assessment    Outcome Measure Options  PADD;AM-PAC 6 Clicks Basic Mobility (PT)  -MB       User Key  (r) = Recorded By, (t) = Taken By, (c) = Cosigned By    Initials Name Provider Type    Carolyn Cheng, PT Physical Therapist        Physical Therapy Education                 Title: PT OT SLP Therapies (Done)     Topic: Physical Therapy (Done)     Point: Mobility training (Done)     Learning Progress Summary           Patient Acceptance, E,D, VU,NR by MB at 4/13/2021 1512    Comment: HEP, transfer safety/mechanics, gait safety/mechanics, home safety                   Point: Home exercise program (Done)     Learning Progress Summary           Patient Acceptance, E,D, VU,NR by MB at 4/13/2021 1512    Comment: HEP, transfer safety/mechanics, gait safety/mechanics, home safety                   Point: Body mechanics (Done)     Learning  Progress Summary           Patient Acceptance, E,D, VU,NR by MB at 4/13/2021 1512    Comment: HEP, transfer safety/mechanics, gait safety/mechanics, home safety                   Point: Precautions (Done)     Learning Progress Summary           Patient Acceptance, E,D, VU,NR by MB at 4/13/2021 1512    Comment: HEP, transfer safety/mechanics, gait safety/mechanics, home safety                               User Key     Initials Effective Dates Name Provider Type Discipline    MB 03/14/16 -  Carolyn Perera, PT Physical Therapist PT              PT Recommendation and Plan  Planned Therapy Interventions (PT): balance training, bed mobility training, gait training, home exercise program, patient/family education, strengthening, transfer training  Plan of Care Reviewed With: patient  Progress: improving  Outcome Summary: PT eval completed.  Patient presents w/ decreased strength and ROM RLE (R TKA 4/5), decreased activity tolerance, unsteady gait, and functional decline.  He completed bed mobility w/ supervision, transfers w/ CGA, and ambulated 45ft w/ wkr and CGA.  Skilled IPPT indicated BID to improve ROM/strength/function R knee and maximize pt's safety and independence w/ functional mobility.  Recommend home w/ assist and HHPT at D/C.     Time Calculation:   PT Charges     Row Name 04/13/21 1514             Time Calculation    Start Time  1310  -MB      PT Received On  04/13/21  -MB      PT Goal Re-Cert Due Date  04/23/21  -MB        User Key  (r) = Recorded By, (t) = Taken By, (c) = Cosigned By    Initials Name Provider Type    Carolyn Cheng, PT Physical Therapist        Therapy Charges for Today     Code Description Service Date Service Provider Modifiers Qty    91997824423 HC PT EVAL MOD COMPLEXITY 4 4/13/2021 Carolyn Perera, PT GP 1          PT G-Codes  Outcome Measure Options: PADD, AM-PAC 6 Clicks Basic Mobility (PT)  AM-PAC 6 Clicks Score (PT): 19    Carolyn Perera, ULICES  4/13/2021

## 2021-04-13 NOTE — PLAN OF CARE
Goal Outcome Evaluation:  Plan of Care Reviewed With: patient  Progress: improving  Outcome Summary: Pt was much more alert this shift. No complaints. Pt remains on room air. PT should see pt today. TUMS given for indigestion relief. Melatonin given for sleep. Pt rested well overnight. Dressing changed this am. NSR. VSS. Will continue to monitor.

## 2021-04-13 NOTE — PLAN OF CARE
Goal Outcome Evaluation:  Plan of Care Reviewed With: patient, spouse  Progress: improving  Outcome Summary: GABBY MATHEW. dialysis completed today. tolerated well. dialysis planned for tomorrow. no other concerns at this time. will continue to monitor

## 2021-04-13 NOTE — PROGRESS NOTES
"   LOS: 6 days    Patient Care Team:  April Chen DO as PCP - General (Internal Medicine)    Chief Complaint:      Subjective     Interval History:   Seen on HD tolerating well so far. Goal UF 3 liter as tolerated. Bp stable. Good access pressure.     Review of Systems:   Complains of low urine output, edema, denies, nausea vomiting dysuria hematuria no shortness of breath or chest pain.  All other systems are negative.    Objective     Vital Sign Min/Max for last 24 hours  Temp  Min: 97 °F (36.1 °C)  Max: 98.6 °F (37 °C)   BP  Min: 134/89  Max: 191/106   Pulse  Min: 61  Max: 84   Resp  Min: 16  Max: 18   SpO2  Min: 93 %  Max: 98 %   No data recorded   Weight  Min: 155 kg (342 lb 1.6 oz)  Max: 155 kg (342 lb 1.6 oz)     Flowsheet Rows      First Filed Value   Admission Height  177.8 cm (70\") Documented at 04/07/2021 1041   Admission Weight  127 kg (280 lb) Documented at 04/07/2021 1041          No intake/output data recorded.  I/O last 3 completed shifts:  In: 100 [P.O.:100]  Out: 4070 [Urine:50; Other:4020]    Physical Exam:  General Appearance: -American male morbid obesity comfortable in bed -American male  Facial: Left-sided Bell's palsy noted chronic.  Eyes: PER, EOMI.  Neck: Supple no JVD.  Lungs: Clear auscultation, no rales rhonchi's, equal chest movement, nonlabored.  Heart: No gallop, murmur, rub, RRR.  Abdomen: Obesity distended soft nontender, positive bowel sounds, no organomegaly.  Extremities: Lateral lower extremity 3+ edema trace to 1+ upper extremity edema.  Neuro: No focal deficit, moving all extremities, alert oriented X 3  : No suprapubic fullness  Skin warm and dry.  WBC WBC   Date Value Ref Range Status   04/13/2021 11.30 (H) 3.40 - 10.80 10*3/mm3 Final   04/12/2021 12.59 (H) 3.40 - 10.80 10*3/mm3 Final   04/11/2021 12.08 (H) 3.40 - 10.80 10*3/mm3 Final      HGB Hemoglobin   Date Value Ref Range Status   04/13/2021 10.3 (L) 13.0 - 17.7 g/dL Final   04/12/2021 9.7 " (L) 13.0 - 17.7 g/dL Final   04/11/2021 9.3 (L) 13.0 - 17.7 g/dL Final      HCT Hematocrit   Date Value Ref Range Status   04/13/2021 32.3 (L) 37.5 - 51.0 % Final   04/12/2021 30.0 (L) 37.5 - 51.0 % Final   04/11/2021 29.7 (L) 37.5 - 51.0 % Final      Platlets No results found for: LABPLAT   MCV MCV   Date Value Ref Range Status   04/13/2021 80.0 79.0 - 97.0 fL Final   04/12/2021 79.8 79.0 - 97.0 fL Final   04/11/2021 82.0 79.0 - 97.0 fL Final          Sodium Sodium   Date Value Ref Range Status   04/13/2021 132 (L) 136 - 145 mmol/L Final   04/12/2021 135 (L) 136 - 145 mmol/L Final   04/11/2021 135 (L) 136 - 145 mmol/L Final      Potassium Potassium   Date Value Ref Range Status   04/13/2021 3.4 (L) 3.5 - 5.2 mmol/L Final   04/12/2021 3.4 (L) 3.5 - 5.2 mmol/L Final     Comment:     Slight hemolysis detected by analyzer. Results may be affected.   04/11/2021 3.2 (L) 3.5 - 5.2 mmol/L Final      Chloride Chloride   Date Value Ref Range Status   04/13/2021 94 (L) 98 - 107 mmol/L Final   04/12/2021 92 (L) 98 - 107 mmol/L Final   04/11/2021 92 (L) 98 - 107 mmol/L Final      CO2 CO2   Date Value Ref Range Status   04/13/2021 23.0 22.0 - 29.0 mmol/L Final   04/12/2021 24.0 22.0 - 29.0 mmol/L Final   04/11/2021 26.0 22.0 - 29.0 mmol/L Final      BUN BUN   Date Value Ref Range Status   04/13/2021 54 (H) 8 - 23 mg/dL Final   04/12/2021 69 (H) 8 - 23 mg/dL Final   04/11/2021 48 (H) 8 - 23 mg/dL Final      Creatinine Creatinine   Date Value Ref Range Status   04/13/2021 6.70 (H) 0.76 - 1.27 mg/dL Final   04/12/2021 7.99 (H) 0.76 - 1.27 mg/dL Final   04/11/2021 6.71 (H) 0.76 - 1.27 mg/dL Final      Calcium Calcium   Date Value Ref Range Status   04/13/2021 8.1 (L) 8.6 - 10.5 mg/dL Final   04/12/2021 7.6 (L) 8.6 - 10.5 mg/dL Final   04/11/2021 7.5 (L) 8.6 - 10.5 mg/dL Final      PO4 No results found for: CAPO4   Albumin Albumin   Date Value Ref Range Status   04/13/2021 3.50 3.50 - 5.20 g/dL Final   04/12/2021 3.50 3.50 - 5.20  g/dL Final   04/11/2021 3.60 3.50 - 5.20 g/dL Final      Magnesium Magnesium   Date Value Ref Range Status   04/12/2021 2.4 1.6 - 2.4 mg/dL Final   04/11/2021 1.9 1.6 - 2.4 mg/dL Final      Uric Acid No results found for: URICACID     Right kidney measures 11.2 cm in length without apparent mass or   hydronephrosis. Normal color Doppler flow.       Left kidney measures 11.0 cm in length without apparent mass or   hydronephrosis. Normal color Doppler flow.       Unremarkable catheterized and contracted urinary bladder.       Impression:     Unremarkable sonographic appearance of both kidneys.         Results Review:     I reviewed the patient's new clinical results.    aspirin, 81 mg, Oral, Daily  carvedilol, 12.5 mg, Oral, BID With Meals  cetirizine, 10 mg, Oral, Daily  docusate sodium, 100 mg, Oral, BID  heparin (porcine), 5,000 Units, Subcutaneous, Q12H  [START ON 4/14/2021] hydrocortisone sodium succinate, 50 mg, Intravenous, Q24H  insulin detemir, 10 Units, Subcutaneous, Daily  insulin lispro, 0-7 Units, Subcutaneous, 4x Daily AC & at Bedtime  magnesium oxide, 1,000 mg, Oral, Daily  piperacillin-tazobactam, 4.5 g, Intravenous, Q12H  polyethylene glycol, 17 g, Oral, Daily  sodium chloride, 10 mL, Intravenous, Q12H      norepinephrine, 0.02-0.1 mcg/kg/min, Last Rate: Stopped (04/08/21 1550)        Medication Review: As noted above    Assessment/Plan       RINKU    Hypertension    T2DM    Dyslipidemia    Sleep apnea    Elevated liver enzymes    Recent Rt Total Knee Replacement 4/5/21    Chronic pain w/pain pump    Lactic acidosis    Acute HFrEF (EF 30%)    Shock (CMS/HCC) -cardiogenic versus septic  1.  RINKU: Oliguirc now on HD. Presumed hemodynamic injury and ATN.    Nephrotic range proteinuria.   Right kidney 11.2 cm, left kidney 11.0 cm normal color Doppler unremarkable bilateral kidneys.  Hepatitis negative, on IV vancomycin on admission  2.  Septic shock: required pressors.   3.  Nephrotic proteinuria greater  than 6 g in the setting of diabetes.  4.  Altered mental status and hearing difficulty improved at this time.  5.  S/p recent right total knee replacement.  6.  Congestive heart failure: Ejection fraction 30%.  7.  Hypocalcemia.      Plan:  Daily eval for HD. Since he has acute kidney injury   Pending serological workup. May need renal biopsy. Will wait for serological testing   Monitor for renal recovery       Demetrio Crook MD  04/13/21  09:40 EDT

## 2021-04-13 NOTE — PROGRESS NOTES
Orthopedic Progress Note      Patient: Elvis Hanson  YOB: 1955     Date of Admission: 4/7/2021 10:40 AM Medical Record Number: [id number]     Attending Physician: Kay Diaz MD    Status Post:  R TKA  Post Operative Day Number: 8    Subjective : No new orthopaedic complaints     Pain Relief: some relief with present medication.     Systemic Complaints: No Complaints  Blood pressure improved patient encouraged that he was able to ambulate with physical therapy today reports minimal to no pain in the knee continues have some small amount of serous drainage from the distal aspect of the incision  Vitals:    04/13/21 1200 04/13/21 1459 04/13/21 1931 04/13/21 1940   BP:  148/84  123/76   BP Location:  Left arm  Right arm   Patient Position:  Lying  Lying   Pulse:   69 70   Resp:  18  20   Temp:  98.1 °F (36.7 °C)  99.1 °F (37.3 °C)   TempSrc:  Oral  Oral   SpO2: 94%   97%   Weight:       Height:           Physical Exam: 66 y.o. male    General Appearance:       Alert, cooperative, in no acute distress                  Extremities:    Dressing mild drainage              No clinical sign of DVT        Able to do good movements of digits    Pulses:   Pulses palpable and equal bilaterally           Diagnostic Tests:     Results from last 7 days   Lab Units 04/13/21  0456 04/12/21  0800 04/11/21  0849   WBC 10*3/mm3 11.30* 12.59* 12.08*   HEMOGLOBIN g/dL 10.3* 9.7* 9.3*   HEMATOCRIT % 32.3* 30.0* 29.7*   PLATELETS 10*3/mm3 222 223 195     Results from last 7 days   Lab Units 04/13/21  0456 04/12/21  0800 04/11/21  0849   SODIUM mmol/L 132* 135* 135*   POTASSIUM mmol/L 3.4* 3.4* 3.2*   CHLORIDE mmol/L 94* 92* 92*   CO2 mmol/L 23.0 24.0 26.0   BUN mg/dL 54* 69* 48*   CREATININE mg/dL 6.70* 7.99* 6.71*   GLUCOSE mg/dL 135* 128* 132*   CALCIUM mg/dL 8.1* 7.6* 7.5*     Results from last 7 days   Lab Units 04/08/21  0808   INR  1.87*   APTT seconds 29.4     No results found for: URICACID  No results  found for: CRYSTAL  Microbiology Results (last 10 days)     Procedure Component Value - Date/Time    Urine Culture - Urine, Urine, Clean Catch [[id number]]  (Normal) Collected: 04/09/21 1325    Lab Status: Final result Specimen: Urine, Clean Catch Updated: 04/10/21 1742     Urine Culture No growth    MRSA Screen, PCR (Inpatient) - Swab, Nares [[id number]]  (Normal) Collected: 04/08/21 0341    Lab Status: Final result Specimen: Swab from Nares Updated: 04/08/21 0937     MRSA PCR Negative    Narrative:      MRSA Negative    Blood Culture - Blood, Hand, Left [[id number]] Collected: 04/07/21 2057    Lab Status: Final result Specimen: Blood from Hand, Left Updated: 04/12/21 2145     Blood Culture No growth at 5 days    Blood Culture - Blood, Hand, Right [[id number]] Collected: 04/07/21 2057    Lab Status: Final result Specimen: Blood from Hand, Right Updated: 04/12/21 2145     Blood Culture No growth at 5 days    COVID-19 and FLU A/B PCR - Swab, Nasopharynx [[id number]]  (Normal) Collected: 04/07/21 1357    Lab Status: Final result Specimen: Swab from Nasopharynx Updated: 04/07/21 1509     COVID19 Not Detected     Influenza A PCR Not Detected     Influenza B PCR Not Detected    Narrative:      Fact sheet for providers: https://www.fda.gov/media/224229/download    Fact sheet for patients: https://www.fda.gov/media/548772/download    Test performed by PCR.        Adult Transthoracic Echo Complete W/ Cont if Necessary Per Protocol    Addendum Date: 4/12/2021    · Estimated left ventricular EF = 30% Left ventricular systolic function is severely decreased. · There is global hypokinesis. Basal inferior and inferoseptal wall appears akinetic · Left ventricular wall thickness is consistent with mild to moderate posterior asymmetric hypertrophy · Moderately reduced right ventricular systolic function noted. · Moderate tricuspid valve regurgitation is present. Estimated right ventricular systolic pressure from tricuspid regurgitation  is normal (<35 mmHg).      CT Head Without Contrast    Result Date: 4/7/2021  Mild atrophy and chronic changes seen within the brain with no acute intracranial abnormality. Mild chronic ethmoid sinusitis.  D:  04/07/2021 E:  04/07/2021   This report was finalized on 4/7/2021 4:54 PM by Dr. Doreen Mcgee MD.      IR Tunneled Catheter    Result Date: 4/9/2021   Insertion of right-sided tunneled dialysis catheter, with tip in the expected location of the cavoatrial junction.  Plan:  The catheter may be used immediately. _______________________________________________________________  PROCEDURE SUMMARY: - Venous access with ultrasound guidance - Tunneled dialysis catheter insertion with fluoroscopic guidance - Additional procedure(s): None  PROCEDURE DETAILS:  Pre-procedure Consent: Informed consent for the procedure including risks, benefits and alternatives was obtained and time-out was performed prior to the procedure. Preparation (MIPS): The site was prepared and draped using all elements of maximal sterile barrier technique including sterile gloves, sterile gown, cap, mask, large sterile sheet, sterile ultrasound probe cover, hand hygiene and cutaneous antisepsis with 2% chlorhexidine. Medical reason for site preparation exception (MIPS): Not applicable  Anesthesia/sedation Level of anesthesia/sedation: Moderate sedation (conscious sedation) Anesthesia/sedation administered by: Independent trained observer under attending supervision with continuous monitoring of the patient?s level of consciousness and physiologic status Total intra-service sedation time (minutes): 20  Access Local anesthesia was administered. The vessel was sonographically evaluated and determined to be patent. Real time ultrasound was used to visualize needle entry into the vessel and a permanent image obtained. Vein accessed (QCDR): Right internal jugular vein Internal jugular vein patency (QCDR): Patent Access technique: Micropuncture  technique under ultrasound guidance  Venography Indication for venography: Not performed Catheter tip position for venography: Not applicable Findings: Not applicable  Catheter placement An incision was made near the venous access site and the catheter was tunneled subcutaneously to the venous access site. The catheter was advanced via a peel-away sheath into the vein under fluoroscopic guidance. Catheter tip location was fluoroscopically verified and a permanent image was stored. Catheter placed: 14.5 [employer] SI Silver Ion Catheter Catheter cuff-to-tip length (cm): 19 Catheter flush: Heparin (1000 units per mL)  Closure A sterile dressing was applied. Access site closure technique: Tissue adhesive Catheter securement technique: Non-absorbable suture  Contrast Contrast agent: None Contrast volume (mL): 0  Radiation Dose Fluoroscopy time: 0.2 minutes Dose: 2.1 mGy  Additional Details Additional description of procedure: None Equipment details: None Specimens removed: None Estimated blood loss (mL): Less than 10 Standardized report: TunneledDialysisCatheter  Attestation I was present and scrubbed for the entire procedure. Imaging reviewed. Agree with final report as written.  This report was finalized on 4/9/2021 3:52 PM by Wil Franz.      US Renal Limited    Result Date: 4/9/2021  Unremarkable sonographic appearance of both kidneys.  This report was finalized on 4/9/2021 8:21 AM by Chaim Gomez.      XR Chest 1 View    Result Date: 4/8/2021  Line tip in the left brachiocephalic vein or SVC. No pneumothorax. Signer Name: Aniceto Clark MD  Signed: 4/8/2021 12:44 AM  Workstation Name: [facility]  [facility]    XR Chest 1 View    Result Date: 4/7/2021  No acute cardiopulmonary disease.  D:  04/07/2021 E:  04/07/2021  This report was finalized on 4/7/2021 4:54 PM by Dr. Doreen Mcgee MD.          Current Medications:  Scheduled Meds:aspirin, 81 mg, Oral,  Daily  carvedilol, 25 mg, Oral, BID With Meals  cetirizine, 10 mg, Oral, Daily  docusate sodium, 100 mg, Oral, BID  heparin (porcine), 5,000 Units, Subcutaneous, Q12H  [START ON 4/14/2021] hydrocortisone sodium succinate, 50 mg, Intravenous, Q24H  insulin detemir, 10 Units, Subcutaneous, Daily  insulin lispro, 0-7 Units, Subcutaneous, 4x Daily AC & at Bedtime  magnesium oxide, 1,000 mg, Oral, Daily  polyethylene glycol, 17 g, Oral, Daily  povidone-iodine, , Topical, Once  sodium chloride, 10 mL, Intravenous, Q12H      Continuous Infusions:norepinephrine, 0.02-0.1 mcg/kg/min, Last Rate: Stopped (04/08/21 1550)      PRN Meds:.•  albumin human  •  calcium carbonate  •  calcium gluconate IVPB **AND** calcium gluconate IVPB **AND** Calcium, Ionized  •  diphenhydrAMINE  •  heparin (porcine)  •  hydrALAZINE  •  magnesium sulfate **OR** magnesium sulfate in D5W 1g/100mL (PREMIX) **OR** magnesium sulfate  •  melatonin  •  oxyCODONE  •  sodium chloride  •  sodium chloride    Assessment: Status post  No admission procedures for hospital encounter.    Patient Active Problem List   Diagnosis   • Hypertension   • T2DM   • Dyslipidemia   • Sleep apnea   • Elevated liver enzymes   • RINKU   • Recent Rt Total Knee Replacement 4/5/21   • Chronic pain w/pain pump   • Lactic acidosis   • Acute HFrEF (EF 30%)   • Shock (CMS/HCC) -cardiogenic versus septic       PLAN:   Continues current post-op course  Mobilize with PT as tolerated per protocol  Asa 81 mg bid for dvt ppx when ok with medical team   Nursing to apply ace wrap to knee to add compression with mild drainage  Medical management of kidney function kidney function does appear to be improving with decreasing creatinine following 2 rounds of dialysis  Attempted aspiration today due to increased knee swelling and drainage but no fluid was able to be aspirated likely secondary to third spacing due to his kidney issues the increase following the knee  I did reinforce the incision  appears at the glue over the incision had been pulled off when they were doing bandage changes and so we placed a new layer of extreme glue over the incision to help seal it and placed a new dry dressing over asked nursing to apply Ace wrap for compression.    Weight Bearing: WBAT  Discharge Plan: pending medical improvement     Axel Avalos MD    Date: 4/13/2021    Time: 19:58 EDT

## 2021-04-13 NOTE — PLAN OF CARE
Problem: Device-Related Complication Risk (Hemodialysis)  Goal: Safe, Effective Therapy Delivery  Outcome: Ongoing, Progressing  Intervention: Optimize Device Care and Function  Recent Flowsheet Documentation  Taken 4/13/2021 0800 by Axel Damon RN  Medication Review/Management: medications reviewed     Problem: Hemodynamic Instability (Hemodialysis)  Goal: Vital Signs Remain in Desired Range  Outcome: Ongoing, Progressing     Problem: Infection (Hemodialysis)  Goal: Absence of Infection Signs and Symptoms  Outcome: Ongoing, Progressing   Goal Outcome Evaluation:      HD continued

## 2021-04-13 NOTE — PROGRESS NOTES
Elvis [facility]  [id number]  1955   LOS: 6 days   Patient Care Team:  April Chen DO as PCP - General (Internal Medicine)    Chief Complaint:  Follow-up systolic heart failure, shock liver, acute renal failure    Subjective The patient denies any chest pain, shortness of breath, palpitations, dizziness, or abdominal discomfort.  He states that he feels better than when he first arrived in the hospital.  The patient currently just arrived in dialysis.    Objective     Vital Sign Min/Max for last 24 hours  Temp  Min: 97 °F (36.1 °C)  Max: 98.6 °F (37 °C)   BP  Min: 134/89  Max: 191/106   Pulse  Min: 61  Max: 84   Resp  Min: 16  Max: 18   SpO2  Min: 93 %  Max: 97 %   No data recorded   Weight  Min: 155 kg (342 lb 1.6 oz)  Max: 155 kg (342 lb 1.6 oz)         04/12/21  0559 04/13/21  0557   Weight: (!) 157 kg (345 lb 12.8 oz) (!) 155 kg (342 lb 1.6 oz)         Intake/Output Summary (Last 24 hours) at 4/13/2021 0743  Last data filed at 4/12/2021 1153  Gross per 24 hour   Intake --   Output 4020 ml   Net -4020 ml       Physical Exam:     General Appearance:    Alert, cooperative, in no acute distress, LIJ CVL, HD cath   Lungs:    Decreased breath sounds to auscultation,respirations regular, even and                   unlabored    Heart:    Regular and normal rate with occasional PVCs, normal S1 and S2, no            murmur, no gallop, no rub, no click   Abdomen:  Extremities:   Soft, non-tender, bowel sounds audible x4    1+ edema, normal range of motion   Pulses:   Pulses palpable and equal bilaterally      Results Review:   Results from last 7 days   Lab Units 04/13/21  0456 04/12/21  0800 04/11/21  0849   SODIUM mmol/L 132* 135* 135*   POTASSIUM mmol/L 3.4* 3.4* 3.2*   CHLORIDE mmol/L 94* 92* 92*   CO2 mmol/L 23.0 24.0 26.0   BUN mg/dL 54* 69* 48*   CREATININE mg/dL 6.70* 7.99* 6.71*   GLUCOSE mg/dL 135* 128* 132*   CALCIUM mg/dL 8.1* 7.6* 7.5*     Results from last 7 days   Lab Units 04/13/21  6976  04/12/21  0800 04/11/21  0849   WBC 10*3/mm3 11.30* 12.59* 12.08*   HEMOGLOBIN g/dL 10.3* 9.7* 9.3*   HEMATOCRIT % 32.3* 30.0* 29.7*   PLATELETS 10*3/mm3 222 223 195     Results from last 7 days   Lab Units 04/13/21  0456   CHOLESTEROL mg/dL 147   TRIGLYCERIDES mg/dL 173*   HDL CHOL mg/dL 24*   LDL CHOL mg/dL 93         Results from last 7 days   Lab Units 04/12/21  0800 04/08/21  0517 04/07/21  1050   TROPONIN T ng/mL 0.717* 0.760* 0.290*   No new chest x-ray or ECG to review    Medication Review: Reviewed    Assessment/Plan   Patient with systolic heart failure exacerbation (EF 30% with global hypokinesis) in the setting of acute renal failure and liver shock.  The patient had a recent stress test at [facility] prior to his knee surgery and we requested results of this but there are no results to review yet.  The patient would benefit from a heart catheterization when renal function allows.  Nephrology with recommendations to have MAP greater than 100 to help with renal recovery.  We will have to hold statin for now.  The patient is currently in dialysis.      RINKU    Hypertension    T2DM    Dyslipidemia    Sleep apnea    Elevated liver enzymes    Recent Rt Total Knee Replacement 4/5/21    Chronic pain w/pain pump    Lactic acidosis    Acute HFrEF (EF 30%)    Shock (CMS/HCC) -cardiogenic versus septic    Electronically signed by ANGUS Busch, 04/13/21, 8:07 AM EDT.    04/13/21  07:43 EDT

## 2021-04-14 ENCOUNTER — APPOINTMENT (OUTPATIENT)
Dept: NEPHROLOGY | Facility: HOSPITAL | Age: 66
End: 2021-04-14

## 2021-04-14 LAB
ALBUMIN SERPL-MCNC: 3.3 G/DL (ref 3.5–5.2)
ALBUMIN/GLOB SERPL: 1.2 G/DL
ALP SERPL-CCNC: 119 U/L (ref 39–117)
ALT SERPL W P-5'-P-CCNC: 540 U/L (ref 1–41)
ANION GAP SERPL CALCULATED.3IONS-SCNC: 15 MMOL/L (ref 5–15)
AST SERPL-CCNC: 58 U/L (ref 1–40)
BILIRUB SERPL-MCNC: 0.5 MG/DL (ref 0–1.2)
BUN SERPL-MCNC: 71 MG/DL (ref 8–23)
BUN/CREAT SERPL: 8.2 (ref 7–25)
C-ANCA TITR SER IF: NORMAL TITER
CALCIUM SPEC-SCNC: 7.6 MG/DL (ref 8.6–10.5)
CHLORIDE SERPL-SCNC: 91 MMOL/L (ref 98–107)
CO2 SERPL-SCNC: 23 MMOL/L (ref 22–29)
CREAT SERPL-MCNC: 8.66 MG/DL (ref 0.76–1.27)
DEPRECATED RDW RBC AUTO: 43.8 FL (ref 37–54)
ERYTHROCYTE [DISTWIDTH] IN BLOOD BY AUTOMATED COUNT: 15.3 % (ref 12.3–15.4)
GBM IGG SER-ACNC: 6 UNITS (ref 0–20)
GFR SERPL CREATININE-BSD FRML MDRD: 8 ML/MIN/1.73
GFR SERPL CREATININE-BSD FRML MDRD: ABNORMAL ML/MIN/{1.73_M2}
GLOBULIN UR ELPH-MCNC: 2.7 GM/DL
GLUCOSE BLDC GLUCOMTR-MCNC: 102 MG/DL (ref 70–130)
GLUCOSE BLDC GLUCOMTR-MCNC: 118 MG/DL (ref 70–130)
GLUCOSE BLDC GLUCOMTR-MCNC: 146 MG/DL (ref 70–130)
GLUCOSE BLDC GLUCOMTR-MCNC: 228 MG/DL (ref 70–130)
GLUCOSE SERPL-MCNC: 127 MG/DL (ref 65–99)
HCT VFR BLD AUTO: 32.5 % (ref 37.5–51)
HGB BLD-MCNC: 10.2 G/DL (ref 13–17.7)
MAGNESIUM SERPL-MCNC: 2.4 MG/DL (ref 1.6–2.4)
MCH RBC QN AUTO: 25.8 PG (ref 26.6–33)
MCHC RBC AUTO-ENTMCNC: 31.4 G/DL (ref 31.5–35.7)
MCV RBC AUTO: 82.1 FL (ref 79–97)
MYELOPEROXIDASE AB SER IA-ACNC: <9 U/ML (ref 0–9)
P-ANCA ATYPICAL TITR SER IF: NORMAL TITER
P-ANCA TITR SER IF: NORMAL TITER
PLATELET # BLD AUTO: 217 10*3/MM3 (ref 140–450)
PMV BLD AUTO: 10.9 FL (ref 6–12)
POTASSIUM SERPL-SCNC: 3.6 MMOL/L (ref 3.5–5.2)
PROT SERPL-MCNC: 6 G/DL (ref 6–8.5)
PROTEINASE3 AB SER IA-ACNC: <3.5 U/ML (ref 0–3.5)
RBC # BLD AUTO: 3.96 10*6/MM3 (ref 4.14–5.8)
SODIUM SERPL-SCNC: 129 MMOL/L (ref 136–145)
WBC # BLD AUTO: 10.15 10*3/MM3 (ref 3.4–10.8)

## 2021-04-14 PROCEDURE — 63710000001 DIPHENHYDRAMINE PER 50 MG: Performed by: INTERNAL MEDICINE

## 2021-04-14 PROCEDURE — 63710000001 INSULIN LISPRO (HUMAN) PER 5 UNITS: Performed by: INTERNAL MEDICINE

## 2021-04-14 PROCEDURE — 99231 SBSQ HOSP IP/OBS SF/LOW 25: CPT | Performed by: INTERNAL MEDICINE

## 2021-04-14 PROCEDURE — 85027 COMPLETE CBC AUTOMATED: CPT | Performed by: INTERNAL MEDICINE

## 2021-04-14 PROCEDURE — 80053 COMPREHEN METABOLIC PANEL: CPT | Performed by: INTERNAL MEDICINE

## 2021-04-14 PROCEDURE — 97116 GAIT TRAINING THERAPY: CPT

## 2021-04-14 PROCEDURE — 99232 SBSQ HOSP IP/OBS MODERATE 35: CPT | Performed by: INTERNAL MEDICINE

## 2021-04-14 PROCEDURE — 63710000001 INSULIN DETEMIR PER 5 UNITS: Performed by: INTERNAL MEDICINE

## 2021-04-14 PROCEDURE — 82962 GLUCOSE BLOOD TEST: CPT

## 2021-04-14 PROCEDURE — 25010000002 HEPARIN (PORCINE) PER 1000 UNITS: Performed by: INTERNAL MEDICINE

## 2021-04-14 PROCEDURE — 83735 ASSAY OF MAGNESIUM: CPT

## 2021-04-14 PROCEDURE — 97110 THERAPEUTIC EXERCISES: CPT

## 2021-04-14 PROCEDURE — 25010000002 HYDROCORTISONE SODIUM SUCCINATE 100 MG RECONSTITUTED SOLUTION: Performed by: INTERNAL MEDICINE

## 2021-04-14 RX ORDER — TRAZODONE HYDROCHLORIDE 50 MG/1
50 TABLET ORAL NIGHTLY
Status: DISCONTINUED | OUTPATIENT
Start: 2021-04-14 | End: 2021-04-15

## 2021-04-14 RX ORDER — FLUTICASONE PROPIONATE 50 MCG
2 SPRAY, SUSPENSION (ML) NASAL DAILY
Status: DISCONTINUED | OUTPATIENT
Start: 2021-04-14 | End: 2021-04-20 | Stop reason: HOSPADM

## 2021-04-14 RX ORDER — OXYMETAZOLINE HYDROCHLORIDE 0.05 G/100ML
2 SPRAY NASAL DAILY PRN
Status: DISPENSED | OUTPATIENT
Start: 2021-04-14 | End: 2021-04-17

## 2021-04-14 RX ADMIN — CARVEDILOL 25 MG: 12.5 TABLET, FILM COATED ORAL at 18:30

## 2021-04-14 RX ADMIN — SODIUM CHLORIDE, PRESERVATIVE FREE 10 ML: 5 INJECTION INTRAVENOUS at 22:20

## 2021-04-14 RX ADMIN — TRAZODONE HYDROCHLORIDE 50 MG: 50 TABLET ORAL at 22:19

## 2021-04-14 RX ADMIN — INSULIN LISPRO 3 UNITS: 100 INJECTION, SOLUTION INTRAVENOUS; SUBCUTANEOUS at 18:30

## 2021-04-14 RX ADMIN — POLYETHYLENE GLYCOL 3350 17 G: 17 POWDER, FOR SOLUTION ORAL at 12:58

## 2021-04-14 RX ADMIN — MAGNESIUM OXIDE TAB 400 MG (241.3 MG ELEMENTAL MG) 1000 MG: 400 (241.3 MG) TAB at 13:00

## 2021-04-14 RX ADMIN — DIPHENHYDRAMINE HYDROCHLORIDE 25 MG: 25 CAPSULE ORAL at 23:35

## 2021-04-14 RX ADMIN — HEPARIN SODIUM 5000 UNITS: 5000 INJECTION INTRAVENOUS; SUBCUTANEOUS at 12:56

## 2021-04-14 RX ADMIN — DOCUSATE SODIUM 100 MG: 100 CAPSULE, LIQUID FILLED ORAL at 13:00

## 2021-04-14 RX ADMIN — ASPIRIN 81 MG: 81 TABLET, COATED ORAL at 12:56

## 2021-04-14 RX ADMIN — INSULIN DETEMIR 10 UNITS: 100 INJECTION, SOLUTION SUBCUTANEOUS at 13:16

## 2021-04-14 RX ADMIN — ANTACID TABLETS 2 TABLET: 500 TABLET, CHEWABLE ORAL at 18:29

## 2021-04-14 RX ADMIN — SODIUM CHLORIDE, PRESERVATIVE FREE 10 ML: 5 INJECTION INTRAVENOUS at 13:03

## 2021-04-14 RX ADMIN — HEPARIN SODIUM 5000 UNITS: 5000 INJECTION INTRAVENOUS; SUBCUTANEOUS at 22:20

## 2021-04-14 RX ADMIN — CETIRIZINE HYDROCHLORIDE 10 MG: 10 TABLET, FILM COATED ORAL at 12:58

## 2021-04-14 RX ADMIN — DOCUSATE SODIUM 100 MG: 100 CAPSULE, LIQUID FILLED ORAL at 22:20

## 2021-04-14 RX ADMIN — CARVEDILOL 25 MG: 12.5 TABLET, FILM COATED ORAL at 12:55

## 2021-04-14 NOTE — PLAN OF CARE
"Goal Outcome Evaluation:  Plan of Care Reviewed With: patient  Progress: improving  Outcome Summary: Pt very tired this shift stating he was \"worn completely out from dialysis and PT\". Remained on room air until sleeping. Refused his bipap so placed 2L NC. Last /79. Pt requested his PRN Benedryl for sleep. Right knee was wrapped in ace bandage x2 for compression. No drainage noted on existing bandage. VSS. Will continue to monitor.  "

## 2021-04-14 NOTE — PROGRESS NOTES
Orthopedic Progress Note      Patient: Elvis Hanson  YOB: 1955     Date of Admission: 4/7/2021 10:40 AM Medical Record Number: [id number]     Attending Physician: Tiff Hess DO    Status Post:  R TKA  Post Operative Day Number: 9    Subjective : No new orthopaedic complaints     Pain Relief: some relief with present medication.     Systemic Complaints: No Complaints    Vitals:    04/14/21 1100 04/14/21 1115 04/14/21 1130 04/14/21 1255   BP: 115/72 106/60 109/67 118/68   BP Location:   Right arm    Patient Position:   Lying    Pulse: 65 66 79 79   Resp:   16    Temp:   96.8 °F (36 °C)    TempSrc:   Temporal    SpO2: 95% 95% 96% 96%   Weight:       Height:           Physical Exam: 66 y.o. male    General Appearance:       Alert, cooperative, in no acute distress                  Extremities:    Dressing mild drainage              No clinical sign of DVT        Able to do good movements of digits    Pulses:   Pulses palpable and equal bilaterally           Diagnostic Tests:     Results from last 7 days   Lab Units 04/14/21  0502 04/13/21  0456 04/12/21  0800   WBC 10*3/mm3 10.15 11.30* 12.59*   HEMOGLOBIN g/dL 10.2* 10.3* 9.7*   HEMATOCRIT % 32.5* 32.3* 30.0*   PLATELETS 10*3/mm3 217 222 223     Results from last 7 days   Lab Units 04/14/21  0502 04/13/21  0456 04/12/21  0800   SODIUM mmol/L 129* 132* 135*   POTASSIUM mmol/L 3.6 3.4* 3.4*   CHLORIDE mmol/L 91* 94* 92*   CO2 mmol/L 23.0 23.0 24.0   BUN mg/dL 71* 54* 69*   CREATININE mg/dL 8.66* 6.70* 7.99*   GLUCOSE mg/dL 127* 135* 128*   CALCIUM mg/dL 7.6* 8.1* 7.6*     Results from last 7 days   Lab Units 04/08/21  0808   INR  1.87*   APTT seconds 29.4     No results found for: URICACID  No results found for: CRYSTAL  Microbiology Results (last 10 days)     Procedure Component Value - Date/Time    Urine Culture - Urine, Urine, Clean Catch [[id number]]  (Normal) Collected: 04/09/21 1325    Lab Status: Final result Specimen: Urine, Clean Catch  Updated: 04/10/21 1742     Urine Culture No growth    MRSA Screen, PCR (Inpatient) - Swab, Nares [[id number]]  (Normal) Collected: 04/08/21 0341    Lab Status: Final result Specimen: Swab from Nares Updated: 04/08/21 0937     MRSA PCR Negative    Narrative:      MRSA Negative    Blood Culture - Blood, Hand, Left [[id number]] Collected: 04/07/21 2057    Lab Status: Final result Specimen: Blood from Hand, Left Updated: 04/12/21 2145     Blood Culture No growth at 5 days    Blood Culture - Blood, Hand, Right [[id number]] Collected: 04/07/21 2057    Lab Status: Final result Specimen: Blood from Hand, Right Updated: 04/12/21 2145     Blood Culture No growth at 5 days    COVID-19 and FLU A/B PCR - Swab, Nasopharynx [[id number]]  (Normal) Collected: 04/07/21 1357    Lab Status: Final result Specimen: Swab from Nasopharynx Updated: 04/07/21 1509     COVID19 Not Detected     Influenza A PCR Not Detected     Influenza B PCR Not Detected    Narrative:      Fact sheet for providers: https://www.fda.gov/media/023184/download    Fact sheet for patients: https://www.fda.gov/media/987633/download    Test performed by PCR.        Adult Transthoracic Echo Complete W/ Cont if Necessary Per Protocol    Addendum Date: 4/12/2021    · Estimated left ventricular EF = 30% Left ventricular systolic function is severely decreased. · There is global hypokinesis. Basal inferior and inferoseptal wall appears akinetic · Left ventricular wall thickness is consistent with mild to moderate posterior asymmetric hypertrophy · Moderately reduced right ventricular systolic function noted. · Moderate tricuspid valve regurgitation is present. Estimated right ventricular systolic pressure from tricuspid regurgitation is normal (<35 mmHg).      CT Head Without Contrast    Result Date: 4/7/2021  Mild atrophy and chronic changes seen within the brain with no acute intracranial abnormality. Mild chronic ethmoid sinusitis.  D:  04/07/2021 E:  04/07/2021   This  report was finalized on 4/7/2021 4:54 PM by Dr. Doreen Mcgee MD.      IR Tunneled Catheter    Result Date: 4/9/2021   Insertion of right-sided tunneled dialysis catheter, with tip in the expected location of the cavoatrial junction.  Plan:  The catheter may be used immediately. _______________________________________________________________  PROCEDURE SUMMARY: - Venous access with ultrasound guidance - Tunneled dialysis catheter insertion with fluoroscopic guidance - Additional procedure(s): None  PROCEDURE DETAILS:  Pre-procedure Consent: Informed consent for the procedure including risks, benefits and alternatives was obtained and time-out was performed prior to the procedure. Preparation (MIPS): The site was prepared and draped using all elements of maximal sterile barrier technique including sterile gloves, sterile gown, cap, mask, large sterile sheet, sterile ultrasound probe cover, hand hygiene and cutaneous antisepsis with 2% chlorhexidine. Medical reason for site preparation exception (MIPS): Not applicable  Anesthesia/sedation Level of anesthesia/sedation: Moderate sedation (conscious sedation) Anesthesia/sedation administered by: Independent trained observer under attending supervision with continuous monitoring of the patient?s level of consciousness and physiologic status Total intra-service sedation time (minutes): 20  Access Local anesthesia was administered. The vessel was sonographically evaluated and determined to be patent. Real time ultrasound was used to visualize needle entry into the vessel and a permanent image obtained. Vein accessed (QCDR): Right internal jugular vein Internal jugular vein patency (QCDR): Patent Access technique: Micropuncture technique under ultrasound guidance  Venography Indication for venography: Not performed Catheter tip position for venography: Not applicable Findings: Not applicable  Catheter placement An incision was made near the venous access site and the  catheter was tunneled subcutaneously to the venous access site. The catheter was advanced via a peel-away sheath into the vein under fluoroscopic guidance. Catheter tip location was fluoroscopically verified and a permanent image was stored. Catheter placed: 14.5 [employer] [facility] Palindrome SI Silver Ion Catheter Catheter cuff-to-tip length (cm): 19 Catheter flush: Heparin (1000 units per mL)  Closure A sterile dressing was applied. Access site closure technique: Tissue adhesive Catheter securement technique: Non-absorbable suture  Contrast Contrast agent: None Contrast volume (mL): 0  Radiation Dose Fluoroscopy time: 0.2 minutes Dose: 2.1 mGy  Additional Details Additional description of procedure: None Equipment details: None Specimens removed: None Estimated blood loss (mL): Less than 10 Standardized report: TunneledDialysisCatheter  Attestation I was present and scrubbed for the entire procedure. Imaging reviewed. Agree with final report as written.  This report was finalized on 4/9/2021 3:52 PM by Wil Franz.      US Renal Limited    Result Date: 4/9/2021  Unremarkable sonographic appearance of both kidneys.  This report was finalized on 4/9/2021 8:21 AM by Chaim Gomez.      XR Chest 1 View    Result Date: 4/8/2021  Line tip in the left brachiocephalic vein or SVC. No pneumothorax. Signer Name: Aniceto Clark MD  Signed: 4/8/2021 12:44 AM  Workstation Name: [facility]  [facility]    XR Chest 1 View    Result Date: 4/7/2021  No acute cardiopulmonary disease.  D:  04/07/2021 E:  04/07/2021  This report was finalized on 4/7/2021 4:54 PM by Dr. Doreen Mcgee MD.          Current Medications:  Scheduled Meds:aspirin, 81 mg, Oral, Daily  carvedilol, 25 mg, Oral, BID With Meals  cetirizine, 10 mg, Oral, Daily  docusate sodium, 100 mg, Oral, BID  fluticasone, 2 spray, Each Nare, Daily  heparin (porcine), 5,000 Units, Subcutaneous, Q12H  insulin detemir, 10 Units, Subcutaneous,  Daily  insulin lispro, 0-7 Units, Subcutaneous, 4x Daily AC & at Bedtime  magnesium oxide, 1,000 mg, Oral, Daily  polyethylene glycol, 17 g, Oral, Daily  povidone-iodine, , Topical, Once  sodium chloride, 10 mL, Intravenous, Q12H  traZODone, 50 mg, Oral, Nightly      Continuous Infusions:norepinephrine, 0.02-0.1 mcg/kg/min, Last Rate: Stopped (04/08/21 1550)      PRN Meds:.calcium carbonate  •  calcium gluconate IVPB **AND** calcium gluconate IVPB **AND** Calcium, Ionized  •  diphenhydrAMINE  •  heparin (porcine)  •  hydrALAZINE  •  magnesium sulfate **OR** magnesium sulfate in D5W 1g/100mL (PREMIX) **OR** magnesium sulfate  •  melatonin  •  oxyCODONE  •  oxymetazoline  •  sodium chloride  •  sodium chloride    Assessment: Status post  No admission procedures for hospital encounter.    Patient Active Problem List   Diagnosis   • Hypertension   • T2DM   • Dyslipidemia   • Sleep apnea   • Elevated liver enzymes   • RINKU   • Recent Rt Total Knee Replacement 4/5/21   • Chronic pain w/pain pump   • Lactic acidosis   • Acute HFrEF (EF 30%)   • Shock (CMS/HCC) -cardiogenic versus septic       PLAN:   Continues current post-op course  Mobilize with PT as tolerated per protocol  Asa 81 mg bid for dvt ppx when ok with medical team   Medical management of kidney function kidney function       Weight Bearing: WBAT  Discharge Plan: pending medical improvement     Axel Avalos MD    Date: 4/14/2021    Time: 17:43 EDT

## 2021-04-14 NOTE — PROGRESS NOTES
Continued Stay Note  [facility]     Patient Name: Elvis Hanson  MRN: [id number]  Today's Date: 4/14/2021    Admit Date: 4/7/2021    Discharge Plan     Row Name 04/14/21 1545       Plan    Plan  update    Patient/Family in Agreement with Plan  yes    Plan Comments  Spoke with patient at bedside regarding discharge plan.  Patient unsure at this point whether he will need OP HD, he is hoping not.  No immediate needs verbalized.  CM will continue to follow for discharge needs.  Patient plan is to discharge home via car with family to transport, may benefit from  services.    Final Discharge Disposition Code  01 - home or self-care        Discharge Codes    No documentation.       Expected Discharge Date and Time     Expected Discharge Date Expected Discharge Time    Apr 16, 2021             Kristen Prado RN

## 2021-04-14 NOTE — PLAN OF CARE
Goal Outcome Evaluation:  Plan of Care Reviewed With: patient, spouse  Progress: improving  Outcome Summary: Patient somnolent, although agreeable to PT; demonstrates improving independence w/ mobility.  He transferred STS w/ RW and CGA and progressed forward ambulation to 90ft w/ RW and CGA.  Pt. demonstrates improved safety and increased activity tolerance w/ use of RW vs. standard wkr (issued at outside hospital s/p TKA).  Spoke w/ CM re: RW wheels for D/C.  PT continues to recommend home w/ assist and HHPT at D/C.

## 2021-04-14 NOTE — PAYOR COMM NOTE
"Ref # [id number]  Ashley Hardy RN, BSN  Phone # [phone]  Fax # [phone]  Elvis Marks (66 y.o. Male)     Date of Birth Social Security Number Address Home Phone MRN    1955  [address]  [facility] [address] [phone] [id number]    Gnosticist Marital Status          None        Admission Date Admission Type Admitting Provider Attending Provider Department, Room/Bed    4/7/21 Emergency Tiff Hess DO Roesch, Lyndsey, DO [facility] 6B, N646/1    Discharge Date Discharge Disposition Discharge Destination                       Attending Provider: Tiff Hess DO    Allergies: No Known Allergies    Isolation: None   Infection: None   Code Status: CPR    Ht: 177.8 cm (70\")   Wt: 154 kg (339 lb 9.6 oz)    Admission Cmt: None   Principal Problem: RINKU [N17.9]                 Active Insurance as of 4/7/2021     Primary Coverage     Payor Plan Insurance Group Employer/Plan Group    [employer]CIAL AETNA [id number]     Payor Plan Address Payor Plan Phone Number Payor Plan Fax Number Effective Dates    [address] [phone]  1/1/2018 - None Entered    [facility] [phone]       Subscriber Name Subscriber Birth Date Member ID       ELVIS MARKS 1955 [id number]                   Current Facility-Administered Medications   Medication Dose Route Frequency Provider Last Rate Last Admin   • aspirin EC tablet 81 mg  81 mg Oral Daily Julio Montaño MD   81 mg at 04/13/21 1139   • calcium carbonate (TUMS) chewable tablet 500 mg (200 mg elemental)  2 tablet Oral TID PRN La Chung DO   2 tablet at 04/13/21 1154   • calcium gluconate 1 g in sodium chloride 0.9 % 100 mL IVPB  1 g Intravenous PRN Julio Montaño  mL/hr at 04/10/21 1731 1 g at 04/10/21 1731    And   • calcium gluconate 6 g in sodium chloride 0.9 % 500 mL IVPB  6 g Intravenous PRN Julio Montaño MD 83.3 mL/hr at 04/09/21 1317 6 g at 04/09/21 1317   • carvedilol (COREG) " tablet 25 mg  25 mg Oral BID With Meals Kay Diaz MD   25 mg at 04/13/21 1741   • cetirizine (zyrTEC) tablet 10 mg  10 mg Oral Daily Julio Montaño MD   10 mg at 04/13/21 1139   • diphenhydrAMINE (BENADRYL) capsule 25 mg  25 mg Oral Nightly PRN Kay Diaz MD   25 mg at 04/13/21 2321   • docusate sodium (COLACE) capsule 100 mg  100 mg Oral BID Julio Montaño MD   100 mg at 04/13/21 2152   • heparin (porcine) 5000 UNIT/ML injection 5,000 Units  5,000 Units Subcutaneous Q12H Julio Montaño MD   5,000 Units at 04/13/21 2152   • heparin (porcine) injection 1,600 Units  1,600 Units Intracatheter PRN Julio Montaño MD   1,600 Units at 04/12/21 1119   • hydrALAZINE (APRESOLINE) injection 10 mg  10 mg Intravenous Q6H PRN Kay Diaz MD   10 mg at 04/12/21 0312   • hydrocortisone sodium succinate (Solu-CORTEF) injection 50 mg  50 mg Intravenous Q24H Kay Diaz MD   50 mg at 04/13/21 2321   • insulin detemir (LEVEMIR) injection 10 Units  10 Units Subcutaneous Daily Juilo Montaño MD   10 Units at 04/12/21 1200   • insulin lispro (humaLOG) injection 0-7 Units  0-7 Units Subcutaneous 4x Daily AC & at Bedtime Julio Montaño MD   2 Units at 04/13/21 1740   • magnesium oxide (MAG-OX) tablet 1,000 mg  1,000 mg Oral Daily Jimenez Daniels MD   1,000 mg at 04/13/21 1139   • magnesium sulfate 4 gram infusion - Mg less than or equal to 1mg/dL  4 g Intravenous PRN Keagan Garcia MD        Or   • magnesium sulfate 3 gram infusion (1gm x 3) - Mg 1.1 - 1.5 mg/dL  1 g Intravenous PRN Keagan Garcia MD        Or   • Magnesium Sulfate 2 gram infusion- Mg 1.6 - 1.9 mg/dL  2 g Intravenous PRN Keagan Garcia MD 25 mL/hr at 04/11/21 1611 2 g at 04/11/21 1611   • melatonin tablet 5 mg  5 mg Oral Nightly PRN Nayely [place] APRN   5 mg at 04/12/21 0201   • norepinephrine (LEVOPHED) 8 mg in 250 mL NS infusion (premix)  0.02-0.1 mcg/kg/min  Intravenous Titrated Julio Montaño MD   Stopped at 04/08/21 1550   • oxyCODONE (ROXICODONE) immediate release tablet 5 mg  5 mg Oral Q6H PRN Julio Montaño MD   5 mg at 04/11/21 0902   • polyethylene glycol (MIRALAX) packet 17 g  17 g Oral Daily Julio Montaño MD   17 g at 04/13/21 1140   • povidone-iodine (BETADINE) external solution   Topical Once Axel Avalos MD       • sodium chloride 0.9 % flush 10 mL  10 mL Intravenous Q12H Julio Montaño MD   10 mL at 04/13/21 2153   • sodium chloride 0.9 % flush 10 mL  10 mL Intravenous PRN Julio Montaño MD       • sodium chloride nasal spray 2 spray  2 spray Each Nare PRN Julio Montaño MD   2 spray at 04/10/21 1346     Lab Results (last 48 hours)     Procedure Component Value Units Date/Time    Glomerular Basement Membrane Antibodies [[id number]] Collected: 04/12/21 1404    Specimen: Blood Updated: 04/14/21 1111     GBM Ab 6 units      Comment:                    Negative                   0 - 20                     Weak Positive             21 - 30                     Moderate to Strong Positive   >30       Narrative:      Performed at:  [address] [place] [address]  [id number]  : Chente Gamble MD, Phone:  [phone]    POC Glucose Once [[id number]]  (Normal) Collected: 04/14/21 0920    Specimen: Blood Updated: 04/14/21 0921     Glucose 102 mg/dL     Comprehensive Metabolic Panel [[id number]]  (Abnormal) Collected: 04/14/21 0502    Specimen: Blood Updated: 04/14/21 0725     Glucose 127 mg/dL      BUN 71 mg/dL      Creatinine 8.66 mg/dL      Sodium 129 mmol/L      Potassium 3.6 mmol/L      Comment: Slight hemolysis detected by analyzer. Results may be affected.        Chloride 91 mmol/L      CO2 23.0 mmol/L      Calcium 7.6 mg/dL      Total Protein 6.0 g/dL      Albumin 3.30 g/dL      ALT (SGPT) 540 U/L      AST (SGOT) 58 U/L      Alkaline Phosphatase 119 U/L      Total Bilirubin 0.5 mg/dL      eGFR Non   Amer --     Comment: <15 Indicative of kidney failure.        eGFR  African Amer 8 mL/min/1.73      Comment: <15 Indicative of kidney failure.        Globulin 2.7 gm/dL      A/G Ratio 1.2 g/dL      BUN/Creatinine Ratio 8.2     Anion Gap 15.0 mmol/L     Narrative:      GFR Normal >60  Chronic Kidney Disease <60  Kidney Failure <15      Magnesium [[id number]]  (Normal) Collected: 04/14/21 0502    Specimen: Blood Updated: 04/14/21 0725     Magnesium 2.4 mg/dL     CBC (No Diff) [[id number]]  (Abnormal) Collected: 04/14/21 0502    Specimen: Blood Updated: 04/14/21 0646     WBC 10.15 10*3/mm3      RBC 3.96 10*6/mm3      Hemoglobin 10.2 g/dL      Hematocrit 32.5 %      MCV 82.1 fL      MCH 25.8 pg      MCHC 31.4 g/dL      RDW 15.3 %      RDW-SD 43.8 fl      MPV 10.9 fL      Platelets 217 10*3/mm3     POC Glucose Once [[id number]]  (Abnormal) Collected: 04/13/21 2005    Specimen: Blood Updated: 04/13/21 2039     Glucose 148 mg/dL     Nuclear Antigen Antibody, IFA [[id number]] Collected: 04/12/21 1404    Specimen: Blood Updated: 04/13/21 1709     MARIANA Negative     Comment:                                      Negative   <1:80                                       Borderline  1:80                                       Positive   >1:80       Narrative:      Performed at:  [address] [place] [address]  [id number]  : Domenic Marie PhD, Phone:  [phone]    POC Glucose Once [[id number]]  (Abnormal) Collected: 04/13/21 1615    Specimen: Blood Updated: 04/13/21 1618     Glucose 177 mg/dL     Anti-DNA Antibody, Double-stranded [[id number]] Collected: 04/12/21 1404    Specimen: Blood Updated: 04/13/21 1411     Anti-DNA (DS) Ab Qn <1 IU/mL      Comment:                                    Negative      <5                                     Equivocal  5 - 9                                     Positive      >9       Narrative:      Performed at:  [address] [place] [address]   [id number]  : Domenic Marie PhD, Phone:  [phone]    POC Glucose Once [[id number]]  (Abnormal) Collected: 04/13/21 1138    Specimen: Blood Updated: 04/13/21 1155     Glucose 139 mg/dL     POC Glucose Once [[id number]]  (Normal) Collected: 04/13/21 0722    Specimen: Blood Updated: 04/13/21 0724     Glucose 126 mg/dL     Comprehensive Metabolic Panel [[id number]]  (Abnormal) Collected: 04/13/21 0456    Specimen: Blood Updated: 04/13/21 0720     Glucose 135 mg/dL      BUN 54 mg/dL      Creatinine 6.70 mg/dL      Sodium 132 mmol/L      Potassium 3.4 mmol/L      Chloride 94 mmol/L      CO2 23.0 mmol/L      Calcium 8.1 mg/dL      Total Protein 6.2 g/dL      Albumin 3.50 g/dL      ALT (SGPT) 832 U/L      AST (SGOT) 92 U/L      Alkaline Phosphatase 146 U/L      Total Bilirubin 0.8 mg/dL      eGFR Non  Amer --     Comment: <15 Indicative of kidney failure.        eGFR  African Amer 10 mL/min/1.73      Comment: <15 Indicative of kidney failure.        Globulin 2.7 gm/dL      A/G Ratio 1.3 g/dL      BUN/Creatinine Ratio 8.1     Anion Gap 15.0 mmol/L     Narrative:      GFR Normal >60  Chronic Kidney Disease <60  Kidney Failure <15      CBC & Differential [[id number]]  (Abnormal) Collected: 04/13/21 0456    Specimen: Blood Updated: 04/13/21 0713    Narrative:      The following orders were created for panel order CBC & Differential.  Procedure                               Abnormality         Status                     ---------                               -----------         ------                     Scan Slide[[id number]]                                       Final result               CBC Auto Differential[[id number]]        Abnormal            Final result                 Please view results for these tests on the individual orders.    Scan Slide [[id number]] Collected: 04/13/21 0456    Specimen: Blood Updated: 04/13/21 0713     Crenated RBC's Slight/1+     WBC Morphology Normal     Platelet Morphology  Normal    CBC Auto Differential [[id number]]  (Abnormal) Collected: 04/13/21 0456    Specimen: Blood Updated: 04/13/21 0713     WBC 11.30 10*3/mm3      RBC 4.04 10*6/mm3      Hemoglobin 10.3 g/dL      Hematocrit 32.3 %      MCV 80.0 fL      MCH 25.5 pg      MCHC 31.9 g/dL      RDW 15.1 %      RDW-SD 42.7 fl      MPV 10.8 fL      Platelets 222 10*3/mm3      Neutrophil % 81.5 %      Lymphocyte % 9.2 %      Monocyte % 8.0 %      Eosinophil % 0.2 %      Basophil % 0.1 %      Immature Grans % 1.0 %      Neutrophils, Absolute 9.22 10*3/mm3      Lymphocytes, Absolute 1.04 10*3/mm3      Monocytes, Absolute 0.90 10*3/mm3      Eosinophils, Absolute 0.02 10*3/mm3      Basophils, Absolute 0.01 10*3/mm3      Immature Grans, Absolute 0.11 10*3/mm3      nRBC 0.0 /100 WBC     Narrative:      Appended report. These results have been appended to a previously verified report.    Lipid Panel [[id number]]  (Abnormal) Collected: 04/13/21 0456    Specimen: Blood Updated: 04/13/21 0649     Total Cholesterol 147 mg/dL      Triglycerides 173 mg/dL      HDL Cholesterol 24 mg/dL      LDL Cholesterol  93 mg/dL      VLDL Cholesterol 30 mg/dL      LDL/HDL Ratio 3.68    Narrative:      Cholesterol Reference Ranges  (U.S. Department of Health and Human Services ATP III Classifications)    Desirable          <200 mg/dL  Borderline High    200-239 mg/dL  High Risk          >240 mg/dL      Triglyceride Reference Ranges  (U.S. Department of Health and Human Services ATP III Classifications)    Normal           <150 mg/dL  Borderline High  150-199 mg/dL  High             200-499 mg/dL  Very High        >500 mg/dL    HDL Reference Ranges  (U.S. Department of Health and Human Services ATP III Classifcations)    Low     <40 mg/dl (major risk factor for CHD)  High    >60 mg/dl ('negative' risk factor for CHD)        LDL Reference Ranges  (U.S. Department of Health and Human Services ATP III Classifcations)    Optimal          <100 mg/dL  Near Optimal      100-129 mg/dL  Borderline High  130-159 mg/dL  High             160-189 mg/dL  Very High        >189 mg/dL    Phosphorus [[id number]]  (Abnormal) Collected: 04/13/21 0456    Specimen: Blood Updated: 04/13/21 0649     Phosphorus 5.5 mg/dL     Blood Culture - Blood, Hand, Left [[id number]] Collected: 04/07/21 2057    Specimen: Blood from Hand, Left Updated: 04/12/21 2145     Blood Culture No growth at 5 days    Blood Culture - Blood, Hand, Right [[id number]] Collected: 04/07/21 2057    Specimen: Blood from Hand, Right Updated: 04/12/21 2145     Blood Culture No growth at 5 days    POC Glucose Once [[id number]]  (Abnormal) Collected: 04/12/21 2037    Specimen: Blood Updated: 04/12/21 2039     Glucose 139 mg/dL     POC Glucose Once [[id number]]  (Abnormal) Collected: 04/12/21 1743    Specimen: Blood Updated: 04/12/21 1811     Glucose 144 mg/dL            Physician Progress Notes (last 48 hours) (Notes from 04/12/21 1219 through 04/14/21 1219)      Chaim Treadwell MD at 04/14/21 0812             LOS: 7 days    Patient Care Team:  April Chen DO as PCP - General (Internal Medicine)    Reason For Visit:  F/U RINKU. SEEN ON DIALYSIS.  Subjective           Review of Systems:    Pulm: No soa   CV:  No CP      Objective     aspirin, 81 mg, Oral, Daily  carvedilol, 25 mg, Oral, BID With Meals  cetirizine, 10 mg, Oral, Daily  docusate sodium, 100 mg, Oral, BID  heparin (porcine), 5,000 Units, Subcutaneous, Q12H  hydrocortisone sodium succinate, 50 mg, Intravenous, Q24H  insulin detemir, 10 Units, Subcutaneous, Daily  insulin lispro, 0-7 Units, Subcutaneous, 4x Daily AC & at Bedtime  magnesium oxide, 1,000 mg, Oral, Daily  polyethylene glycol, 17 g, Oral, Daily  povidone-iodine, , Topical, Once  sodium chloride, 10 mL, Intravenous, Q12H      norepinephrine, 0.02-0.1 mcg/kg/min, Last Rate: Stopped (04/08/21 1550)          Vital Signs:  Blood pressure 157/96, pulse 71, temperature 97.1 °F (36.2 °C), temperature  "source Temporal, resp. rate 16, height 177.8 cm (70\"), weight (!) 154 kg (339 lb 9.6 oz), SpO2 95 %.    Flowsheet Rows      First Filed Value   Admission Height  177.8 cm (70\") Documented at 04/07/2021 1041   Admission Weight  127 kg (280 lb) Documented at 04/07/2021 1041          04/13 0701 - 04/14 0700  In: 828 [P.O.:828]  Out: 4030     Physical Exam:    General Appearance: NAD, alert and cooperative, Ox3  Eyes: PER, conjunctivae and sclerae normal, no icterus  Lungs: respirations regular and unlabored, no crepitus, clear to auscultation  Heart/CV: regular rhythm & normal rate, no murmur, no gallop, no rub and TR edema  Abdomen: not distended, soft, non-tender, no masses,  bowel sounds present  Skin: No rash, Warm and dry. THD CATH    Radiology:            Labs:  Results from last 7 days   Lab Units 04/14/21  0502 04/13/21  0456 04/12/21  0800   WBC 10*3/mm3 10.15 11.30* 12.59*   HEMOGLOBIN g/dL 10.2* 10.3* 9.7*   HEMATOCRIT % 32.5* 32.3* 30.0*   PLATELETS 10*3/mm3 217 222 223     Results from last 7 days   Lab Units 04/14/21  0502 04/13/21  0456 04/12/21  0800 04/11/21  0849 04/10/21  0905   SODIUM mmol/L 129* 132* 135* 135* 135*   POTASSIUM mmol/L 3.6 3.4* 3.4* 3.2* 3.1*   CHLORIDE mmol/L 91* 94* 92* 92* 88*   CO2 mmol/L 23.0 23.0 24.0 26.0 28.0   BUN mg/dL 71* 54* 69* 48* 50*   CREATININE mg/dL 8.66* 6.70* 7.99* 6.71* 6.53*   CALCIUM mg/dL 7.6* 8.1* 7.6* 7.5* 6.5*   PHOSPHORUS mg/dL  --  5.5* 7.3* 6.0* 6.8*   MAGNESIUM mg/dL 2.4  --  2.4 1.9 1.7   ALBUMIN g/dL 3.30* 3.50 3.50 3.60 3.60     Results from last 7 days   Lab Units 04/14/21  0502   GLUCOSE mg/dL 127*       Results from last 7 days   Lab Units 04/14/21  0502   ALK PHOS U/L 119*   BILIRUBIN mg/dL 0.5   ALT (SGPT) U/L 540*   AST (SGOT) U/L 58*     Results from last 7 days   Lab Units 04/08/21  0933   PH, ARTERIAL pH units 7.271*   PO2 ART mm Hg 96.7   PCO2, ARTERIAL mm Hg 52.1*   HCO3 ART mmol/L 23.9       Results from last 7 days   Lab Units " 04/07/21  1325   COLOR UA  Yellow   CLARITY UA  Cloudy*   PH, URINE  5.5   SPECIFIC GRAVITY, URINE  1.022   GLUCOSE UA  Negative   KETONES UA  Negative   BILIRUBIN UA  Negative   PROTEIN UA  100 mg/dL (2+)*   BLOOD UA  Negative   LEUKOCYTES UA  Small (1+)*   NITRITE UA  Negative       Estimated Creatinine Clearance: 12.8 mL/min (A) (by C-G formula based on SCr of 8.66 mg/dL (H)).      Assessment       RINKU    Hypertension    T2DM    Dyslipidemia    Sleep apnea    Elevated liver enzymes    Recent Rt Total Knee Replacement 4/5/21    Chronic pain w/pain pump    Lactic acidosis    Acute HFrEF (EF 30%)    Shock (CMS/HCC) -cardiogenic versus septic            Impression: RINKU. ? ATN. ANEMIA.            Recommendations: HD TODAY.      Chaim Treadwell MD  04/14/21  08:12 EDT          Electronically signed by Chaim Treadwell MD at 04/14/21 0817     Axel Avalos MD at 04/13/21 1958            Orthopedic Progress Note      Patient: Elvis Hanson  YOB: 1955     Date of Admission: 4/7/2021 10:40 AM Medical Record Number: [id number]     Attending Physician: Kay Diaz MD    Status Post:  R TKA  Post Operative Day Number: 8    Subjective : No new orthopaedic complaints     Pain Relief: some relief with present medication.     Systemic Complaints: No Complaints  Blood pressure improved patient encouraged that he was able to ambulate with physical therapy today reports minimal to no pain in the knee continues have some small amount of serous drainage from the distal aspect of the incision  Vitals:    04/13/21 1200 04/13/21 1459 04/13/21 1931 04/13/21 1940   BP:  148/84  123/76   BP Location:  Left arm  Right arm   Patient Position:  Lying  Lying   Pulse:   69 70   Resp:  18  20   Temp:  98.1 °F (36.7 °C)  99.1 °F (37.3 °C)   TempSrc:  Oral  Oral   SpO2: 94%   97%   Weight:       Height:           Physical Exam: 66 y.o. male    General Appearance:       Alert, cooperative, in no acute  distress                  Extremities:    Dressing mild drainage              No clinical sign of DVT        Able to do good movements of digits    Pulses:   Pulses palpable and equal bilaterally           Diagnostic Tests:     Results from last 7 days   Lab Units 04/13/21  0456 04/12/21  0800 04/11/21  0849   WBC 10*3/mm3 11.30* 12.59* 12.08*   HEMOGLOBIN g/dL 10.3* 9.7* 9.3*   HEMATOCRIT % 32.3* 30.0* 29.7*   PLATELETS 10*3/mm3 222 223 195     Results from last 7 days   Lab Units 04/13/21  0456 04/12/21  0800 04/11/21  0849   SODIUM mmol/L 132* 135* 135*   POTASSIUM mmol/L 3.4* 3.4* 3.2*   CHLORIDE mmol/L 94* 92* 92*   CO2 mmol/L 23.0 24.0 26.0   BUN mg/dL 54* 69* 48*   CREATININE mg/dL 6.70* 7.99* 6.71*   GLUCOSE mg/dL 135* 128* 132*   CALCIUM mg/dL 8.1* 7.6* 7.5*     Results from last 7 days   Lab Units 04/08/21  0808   INR  1.87*   APTT seconds 29.4     No results found for: URICACID  No results found for: CRYSTAL  Microbiology Results (last 10 days)     Procedure Component Value - Date/Time    Urine Culture - Urine, Urine, Clean Catch [[id number]]  (Normal) Collected: 04/09/21 1325    Lab Status: Final result Specimen: Urine, Clean Catch Updated: 04/10/21 1742     Urine Culture No growth    MRSA Screen, PCR (Inpatient) - Swab, Nares [[id number]]  (Normal) Collected: 04/08/21 0341    Lab Status: Final result Specimen: Swab from Nares Updated: 04/08/21 0937     MRSA PCR Negative    Narrative:      MRSA Negative    Blood Culture - Blood, Hand, Left [[id number]] Collected: 04/07/21 2057    Lab Status: Final result Specimen: Blood from Hand, Left Updated: 04/12/21 2145     Blood Culture No growth at 5 days    Blood Culture - Blood, Hand, Right [[id number]] Collected: 04/07/21 2057    Lab Status: Final result Specimen: Blood from Hand, Right Updated: 04/12/21 2145     Blood Culture No growth at 5 days    COVID-19 and FLU A/B PCR - Swab, Nasopharynx [[id number]]  (Normal) Collected: 04/07/21 5167    Lab Status: Final  result Specimen: Swab from Nasopharynx Updated: 04/07/21 1509     COVID19 Not Detected     Influenza A PCR Not Detected     Influenza B PCR Not Detected    Narrative:      Fact sheet for providers: https://www.fda.gov/media/811788/download    Fact sheet for patients: https://www.fda.gov/media/975287/download    Test performed by PCR.        Adult Transthoracic Echo Complete W/ Cont if Necessary Per Protocol    Addendum Date: 4/12/2021    · Estimated left ventricular EF = 30% Left ventricular systolic function is severely decreased. · There is global hypokinesis. Basal inferior and inferoseptal wall appears akinetic · Left ventricular wall thickness is consistent with mild to moderate posterior asymmetric hypertrophy · Moderately reduced right ventricular systolic function noted. · Moderate tricuspid valve regurgitation is present. Estimated right ventricular systolic pressure from tricuspid regurgitation is normal (<35 mmHg).      CT Head Without Contrast    Result Date: 4/7/2021  Mild atrophy and chronic changes seen within the brain with no acute intracranial abnormality. Mild chronic ethmoid sinusitis.  D:  04/07/2021 E:  04/07/2021   This report was finalized on 4/7/2021 4:54 PM by Dr. Doreen Mcgee MD.      IR Tunneled Catheter    Result Date: 4/9/2021   Insertion of right-sided tunneled dialysis catheter, with tip in the expected location of the cavoatrial junction.  Plan:  The catheter may be used immediately. _______________________________________________________________  PROCEDURE SUMMARY: - Venous access with ultrasound guidance - Tunneled dialysis catheter insertion with fluoroscopic guidance - Additional procedure(s): None  PROCEDURE DETAILS:  Pre-procedure Consent: Informed consent for the procedure including risks, benefits and alternatives was obtained and time-out was performed prior to the procedure. Preparation (MIPS): The site was prepared and draped using all elements of maximal sterile  barrier technique including sterile gloves, sterile gown, cap, mask, large sterile sheet, sterile ultrasound probe cover, hand hygiene and cutaneous antisepsis with 2% chlorhexidine. Medical reason for site preparation exception (MIPS): Not applicable  Anesthesia/sedation Level of anesthesia/sedation: Moderate sedation (conscious sedation) Anesthesia/sedation administered by: Independent trained observer under attending supervision with continuous monitoring of the patient?s level of consciousness and physiologic status Total intra-service sedation time (minutes): 20  Access Local anesthesia was administered. The vessel was sonographically evaluated and determined to be patent. Real time ultrasound was used to visualize needle entry into the vessel and a permanent image obtained. Vein accessed (QCDR): Right internal jugular vein Internal jugular vein patency (QCDR): Patent Access technique: Micropuncture technique under ultrasound guidance  Venography Indication for venography: Not performed Catheter tip position for venography: Not applicable Findings: Not applicable  Catheter placement An incision was made near the venous access site and the catheter was tunneled subcutaneously to the venous access site. The catheter was advanced via a peel-away sheath into the vein under fluoroscopic guidance. Catheter tip location was fluoroscopically verified and a permanent image was stored. Catheter placed: 14.5 [place] StarsVurome SI Silver Ion Catheter Catheter cuff-to-tip length (cm): 19 Catheter flush: Heparin (1000 units per mL)  Closure A sterile dressing was applied. Access site closure technique: Tissue adhesive Catheter securement technique: Non-absorbable suture  Contrast Contrast agent: None Contrast volume (mL): 0  Radiation Dose Fluoroscopy time: 0.2 minutes Dose: 2.1 mGy  Additional Details Additional description of procedure: None Equipment details: None Specimens removed: None Estimated blood loss (mL):  Less than 10 Standardized report: TunneledDialysisCatheter  Attestation I was present and scrubbed for the entire procedure. Imaging reviewed. Agree with final report as written.  This report was finalized on 4/9/2021 3:52 PM by Wil Franz.      US Renal Limited    Result Date: 4/9/2021  Unremarkable sonographic appearance of both kidneys.  This report was finalized on 4/9/2021 8:21 AM by Chaim Gomez.      XR Chest 1 View    Result Date: 4/8/2021  Line tip in the left brachiocephalic vein or SVC. No pneumothorax. Signer Name: Aniceto Clark MD  Signed: 4/8/2021 12:44 AM  Workstation Name: [facility]    XR Chest 1 View    Result Date: 4/7/2021  No acute cardiopulmonary disease.  D:  04/07/2021 E:  04/07/2021  This report was finalized on 4/7/2021 4:54 PM by Dr. Doreen Mcgee MD.          Current Medications:  Scheduled Meds:aspirin, 81 mg, Oral, Daily  carvedilol, 25 mg, Oral, BID With Meals  cetirizine, 10 mg, Oral, Daily  docusate sodium, 100 mg, Oral, BID  heparin (porcine), 5,000 Units, Subcutaneous, Q12H  [START ON 4/14/2021] hydrocortisone sodium succinate, 50 mg, Intravenous, Q24H  insulin detemir, 10 Units, Subcutaneous, Daily  insulin lispro, 0-7 Units, Subcutaneous, 4x Daily AC & at Bedtime  magnesium oxide, 1,000 mg, Oral, Daily  polyethylene glycol, 17 g, Oral, Daily  povidone-iodine, , Topical, Once  sodium chloride, 10 mL, Intravenous, Q12H      Continuous Infusions:norepinephrine, 0.02-0.1 mcg/kg/min, Last Rate: Stopped (04/08/21 1550)      PRN Meds:.•  albumin human  •  calcium carbonate  •  calcium gluconate IVPB **AND** calcium gluconate IVPB **AND** Calcium, Ionized  •  diphenhydrAMINE  •  heparin (porcine)  •  hydrALAZINE  •  magnesium sulfate **OR** magnesium sulfate in D5W 1g/100mL (PREMIX) **OR** magnesium sulfate  •  melatonin  •  oxyCODONE  •  sodium chloride  •  sodium chloride    Assessment: Status post  No admission procedures for  hospital encounter.    Patient Active Problem List   Diagnosis   • Hypertension   • T2DM   • Dyslipidemia   • Sleep apnea   • Elevated liver enzymes   • RINKU   • Recent Rt Total Knee Replacement 4/5/21   • Chronic pain w/pain pump   • Lactic acidosis   • Acute HFrEF (EF 30%)   • Shock (CMS/HCC) -cardiogenic versus septic       PLAN:   Continues current post-op course  Mobilize with PT as tolerated per protocol  Asa 81 mg bid for dvt ppx when ok with medical team   Nursing to apply ace wrap to knee to add compression with mild drainage  Medical management of kidney function kidney function does appear to be improving with decreasing creatinine following 2 rounds of dialysis  Attempted aspiration today due to increased knee swelling and drainage but no fluid was able to be aspirated likely secondary to third spacing due to his kidney issues the increase following the knee  I did reinforce the incision appears at the glue over the incision had been pulled off when they were doing bandage changes and so we placed a new layer of extreme glue over the incision to help seal it and placed a new dry dressing over asked nursing to apply Ace wrap for compression.    Weight Bearing: WBAT  Discharge Plan: pending medical improvement     Axel Avalos MD    Date: 4/13/2021    Time: 19:58 EDT    Electronically signed by Axel Avalos MD at 04/13/21 2001     Demetrio Crook MD at 04/13/21 0940             LOS: 6 days    Patient Care Team:  April Chen DO as PCP - General (Internal Medicine)    Chief Complaint:      Subjective     Interval History:   Seen on HD tolerating well so far. Goal UF 3 liter as tolerated. Bp stable. Good access pressure.     Review of Systems:   Complains of low urine output, edema, denies, nausea vomiting dysuria hematuria no shortness of breath or chest pain.  All other systems are negative.    Objective     Vital Sign Min/Max for last 24 hours  Temp  Min: 97 °F (36.1 °C)  Max: 98.6 °F (37  "°C)   BP  Min: 134/89  Max: 191/106   Pulse  Min: 61  Max: 84   Resp  Min: 16  Max: 18   SpO2  Min: 93 %  Max: 98 %   No data recorded   Weight  Min: 155 kg (342 lb 1.6 oz)  Max: 155 kg (342 lb 1.6 oz)     Flowsheet Rows      First Filed Value   Admission Height  177.8 cm (70\") Documented at 04/07/2021 1041   Admission Weight  127 kg (280 lb) Documented at 04/07/2021 1041          No intake/output data recorded.  I/O last 3 completed shifts:  In: 100 [P.O.:100]  Out: 4070 [Urine:50; Other:4020]    Physical Exam:  General Appearance: -American male morbid obesity comfortable in bed -American male  Facial: Left-sided Bell's palsy noted chronic.  Eyes: PER, EOMI.  Neck: Supple no JVD.  Lungs: Clear auscultation, no rales rhonchi's, equal chest movement, nonlabored.  Heart: No gallop, murmur, rub, RRR.  Abdomen: Obesity distended soft nontender, positive bowel sounds, no organomegaly.  Extremities: Lateral lower extremity 3+ edema trace to 1+ upper extremity edema.  Neuro: No focal deficit, moving all extremities, alert oriented X 3  : No suprapubic fullness  Skin warm and dry.  WBC WBC   Date Value Ref Range Status   04/13/2021 11.30 (H) 3.40 - 10.80 10*3/mm3 Final   04/12/2021 12.59 (H) 3.40 - 10.80 10*3/mm3 Final   04/11/2021 12.08 (H) 3.40 - 10.80 10*3/mm3 Final      HGB Hemoglobin   Date Value Ref Range Status   04/13/2021 10.3 (L) 13.0 - 17.7 g/dL Final   04/12/2021 9.7 (L) 13.0 - 17.7 g/dL Final   04/11/2021 9.3 (L) 13.0 - 17.7 g/dL Final      HCT Hematocrit   Date Value Ref Range Status   04/13/2021 32.3 (L) 37.5 - 51.0 % Final   04/12/2021 30.0 (L) 37.5 - 51.0 % Final   04/11/2021 29.7 (L) 37.5 - 51.0 % Final      Platlets No results found for: LABPLAT   MCV MCV   Date Value Ref Range Status   04/13/2021 80.0 79.0 - 97.0 fL Final   04/12/2021 79.8 79.0 - 97.0 fL Final   04/11/2021 82.0 79.0 - 97.0 fL Final          Sodium Sodium   Date Value Ref Range Status   04/13/2021 132 (L) 136 - 145 mmol/L " Final   04/12/2021 135 (L) 136 - 145 mmol/L Final   04/11/2021 135 (L) 136 - 145 mmol/L Final      Potassium Potassium   Date Value Ref Range Status   04/13/2021 3.4 (L) 3.5 - 5.2 mmol/L Final   04/12/2021 3.4 (L) 3.5 - 5.2 mmol/L Final     Comment:     Slight hemolysis detected by analyzer. Results may be affected.   04/11/2021 3.2 (L) 3.5 - 5.2 mmol/L Final      Chloride Chloride   Date Value Ref Range Status   04/13/2021 94 (L) 98 - 107 mmol/L Final   04/12/2021 92 (L) 98 - 107 mmol/L Final   04/11/2021 92 (L) 98 - 107 mmol/L Final      CO2 CO2   Date Value Ref Range Status   04/13/2021 23.0 22.0 - 29.0 mmol/L Final   04/12/2021 24.0 22.0 - 29.0 mmol/L Final   04/11/2021 26.0 22.0 - 29.0 mmol/L Final      BUN BUN   Date Value Ref Range Status   04/13/2021 54 (H) 8 - 23 mg/dL Final   04/12/2021 69 (H) 8 - 23 mg/dL Final   04/11/2021 48 (H) 8 - 23 mg/dL Final      Creatinine Creatinine   Date Value Ref Range Status   04/13/2021 6.70 (H) 0.76 - 1.27 mg/dL Final   04/12/2021 7.99 (H) 0.76 - 1.27 mg/dL Final   04/11/2021 6.71 (H) 0.76 - 1.27 mg/dL Final      Calcium Calcium   Date Value Ref Range Status   04/13/2021 8.1 (L) 8.6 - 10.5 mg/dL Final   04/12/2021 7.6 (L) 8.6 - 10.5 mg/dL Final   04/11/2021 7.5 (L) 8.6 - 10.5 mg/dL Final      PO4 No results found for: CAPO4   Albumin Albumin   Date Value Ref Range Status   04/13/2021 3.50 3.50 - 5.20 g/dL Final   04/12/2021 3.50 3.50 - 5.20 g/dL Final   04/11/2021 3.60 3.50 - 5.20 g/dL Final      Magnesium Magnesium   Date Value Ref Range Status   04/12/2021 2.4 1.6 - 2.4 mg/dL Final   04/11/2021 1.9 1.6 - 2.4 mg/dL Final      Uric Acid No results found for: URICACID     Right kidney measures 11.2 cm in length without apparent mass or   hydronephrosis. Normal color Doppler flow.       Left kidney measures 11.0 cm in length without apparent mass or   hydronephrosis. Normal color Doppler flow.       Unremarkable catheterized and contracted urinary bladder.       Impression:      Unremarkable sonographic appearance of both kidneys.         Results Review:     I reviewed the patient's new clinical results.    aspirin, 81 mg, Oral, Daily  carvedilol, 12.5 mg, Oral, BID With Meals  cetirizine, 10 mg, Oral, Daily  docusate sodium, 100 mg, Oral, BID  heparin (porcine), 5,000 Units, Subcutaneous, Q12H  [START ON 4/14/2021] hydrocortisone sodium succinate, 50 mg, Intravenous, Q24H  insulin detemir, 10 Units, Subcutaneous, Daily  insulin lispro, 0-7 Units, Subcutaneous, 4x Daily AC & at Bedtime  magnesium oxide, 1,000 mg, Oral, Daily  piperacillin-tazobactam, 4.5 g, Intravenous, Q12H  polyethylene glycol, 17 g, Oral, Daily  sodium chloride, 10 mL, Intravenous, Q12H      norepinephrine, 0.02-0.1 mcg/kg/min, Last Rate: Stopped (04/08/21 1550)        Medication Review: As noted above    Assessment/Plan       RINKU    Hypertension    T2DM    Dyslipidemia    Sleep apnea    Elevated liver enzymes    Recent Rt Total Knee Replacement 4/5/21    Chronic pain w/pain pump    Lactic acidosis    Acute HFrEF (EF 30%)    Shock (CMS/HCC) -cardiogenic versus septic  1.  RINKU: Oliguirc now on HD. Presumed hemodynamic injury and ATN.    Nephrotic range proteinuria.   Right kidney 11.2 cm, left kidney 11.0 cm normal color Doppler unremarkable bilateral kidneys.  Hepatitis negative, on IV vancomycin on admission  2.  Septic shock: required pressors.   3.  Nephrotic proteinuria greater than 6 g in the setting of diabetes.  4.  Altered mental status and hearing difficulty improved at this time.  5.  S/p recent right total knee replacement.  6.  Congestive heart failure: Ejection fraction 30%.  7.  Hypocalcemia.      Plan:  Daily eval for HD. Since he has acute kidney injury   Pending serological workup. May need renal biopsy. Will wait for serological testing   Monitor for renal recovery       Demetrio Crook MD  04/13/21  09:40 EDT       Electronically signed by Demetrio Crook MD at 04/13/21 0943     Kay Diaz,  MD at 21 0911              [facility] Medicine Services  PROGRESS NOTE    Patient Name: Elvis Hanson  : 1955  MRN: [id number]    Date of Admission: 2021  Primary Care Physician: April Chen DO    Subjective   Subjective     CC:  Loss of hearing    HPI:  Pt was seen while in HD. States that he didn't sleep well despite Melatonin being ordered.  Asking for something else.     ROS:  Gen- No fevers, chills  CV- No chest pain, palpitations  Resp- No cough, dyspnea  GI- No N/V/D, abd pain      Objective   Objective     Vital Signs:   Temp:  [97 °F (36.1 °C)-98.6 °F (37 °C)] 97 °F (36.1 °C)  Heart Rate:  [61-84] 67  Resp:  [16-18] 18  BP: (134-191)/() 177/97        Physical Exam:  Constitutional: No acute distress, seen while in HD  HENT: NCAT, mucous membranes moist, LIJ catheter in place  Respiratory: Clear to auscultation bilaterally, respiratory effort normal   Cardiovascular: RRR, no murmurs, rubs, or gallops  Gastrointestinal: Positive bowel sounds, soft, nontender, nondistended  Musculoskeletal: No bilateral ankle edema, right chest wall HD catheter in place  Psychiatric: Appropriate affect, cooperative  Neurologic: Oriented x 3, strength symmetric in all extremities, Cranial Nerves grossly intact to confrontation, speech clear  Skin: No rashes    Results Reviewed:  Results from last 7 days   Lab Units 21  0456 21  0800 21  0849 21  0808 21  0517 21  1711   WBC 10*3/mm3 11.30* 12.59* 12.08*  --  17.13*  --    HEMOGLOBIN g/dL 10.3* 9.7* 9.3*  --  10.1*  --    HEMATOCRIT % 32.3* 30.0* 29.7*  --  33.4*  --    PLATELETS 10*3/mm3 222 223 195  --  230  --    INR   --   --   --  1.87*  --   --    PROCALCITONIN ng/mL  --   --   --   --  4.86* 2.15*     Results from last 7 days   Lab Units 21  0456 21  0800 21  0849 21  0517 21  1050   SODIUM mmol/L 132* 135* 135* 133* 140   POTASSIUM mmol/L 3.4*  3.4* 3.2* 3.9 3.7   CHLORIDE mmol/L 94* 92* 92* 94* 94*   CO2 mmol/L 23.0 24.0 26.0 21.0* 26.0   BUN mg/dL 54* 69* 48* 37* 25*   CREATININE mg/dL 6.70* 7.99* 6.71* 5.10* 3.89*   GLUCOSE mg/dL 135* 128* 132* 138* 116*   CALCIUM mg/dL 8.1* 7.6* 7.5* 6.5* 7.9*   ALT (SGPT) U/L 832* 1,197* 1,725* 5,392* 2,631*   AST (SGOT) U/L 92* 188* 496* >7,000* 4,533*   TROPONIN T ng/mL  --  0.717*  --  0.760* 0.290*     Estimated Creatinine Clearance: 16.7 mL/min (A) (by C-G formula based on SCr of 6.7 mg/dL (H)).    Microbiology Results Abnormal     Procedure Component Value - Date/Time    Blood Culture - Blood, Hand, Left [[id number]] Collected: 04/07/21 2057    Lab Status: Final result Specimen: Blood from Hand, Left Updated: 04/12/21 2145     Blood Culture No growth at 5 days    Blood Culture - Blood, Hand, Right [[id number]] Collected: 04/07/21 2057    Lab Status: Final result Specimen: Blood from Hand, Right Updated: 04/12/21 2145     Blood Culture No growth at 5 days    Urine Culture - Urine, Urine, Clean Catch [[id number]]  (Normal) Collected: 04/09/21 1325    Lab Status: Final result Specimen: Urine, Clean Catch Updated: 04/10/21 1742     Urine Culture No growth    MRSA Screen, PCR (Inpatient) - Swab, Nares [[id number]]  (Normal) Collected: 04/08/21 0341    Lab Status: Final result Specimen: Swab from Nares Updated: 04/08/21 0937     MRSA PCR Negative    Narrative:      MRSA Negative    COVID-19 and FLU A/B PCR - Swab, Nasopharynx [[id number]]  (Normal) Collected: 04/07/21 1357    Lab Status: Final result Specimen: Swab from Nasopharynx Updated: 04/07/21 1509     COVID19 Not Detected     Influenza A PCR Not Detected     Influenza B PCR Not Detected    Narrative:      Fact sheet for providers: https://www.fda.gov/media/212591/download    Fact sheet for patients: https://www.fda.gov/media/869242/download    Test performed by PCR.          Imaging Results (Last 24 Hours)     ** No results found for the last 24 hours. **           Results for orders placed during the hospital encounter of 04/07/21    Adult Transthoracic Echo Complete W/ Cont if Necessary Per Protocol    Interpretation Summary  · Estimated left ventricular EF = 30% Left ventricular systolic function is severely decreased.  · There is global hypokinesis. Basal inferior and inferoseptal wall appears akinetic  · Left ventricular wall thickness is consistent with mild to moderate posterior asymmetric hypertrophy  · Moderately reduced right ventricular systolic function noted.  · Moderate tricuspid valve regurgitation is present. Estimated right ventricular systolic pressure from tricuspid regurgitation is normal (<35 mmHg).      I have reviewed the medications:  Scheduled Meds:aspirin, 81 mg, Oral, Daily  carvedilol, 12.5 mg, Oral, BID With Meals  cetirizine, 10 mg, Oral, Daily  docusate sodium, 100 mg, Oral, BID  heparin (porcine), 5,000 Units, Subcutaneous, Q12H  hydrocortisone sodium succinate, 50 mg, Intravenous, Q12H  insulin detemir, 10 Units, Subcutaneous, Daily  insulin lispro, 0-7 Units, Subcutaneous, 4x Daily AC & at Bedtime  magnesium oxide, 1,000 mg, Oral, Daily  piperacillin-tazobactam, 4.5 g, Intravenous, Q12H  polyethylene glycol, 17 g, Oral, Daily  sodium chloride, 10 mL, Intravenous, Q12H      Continuous Infusions:norepinephrine, 0.02-0.1 mcg/kg/min, Last Rate: Stopped (04/08/21 1550)      PRN Meds:.•  albumin human  •  calcium carbonate  •  calcium gluconate IVPB **AND** calcium gluconate IVPB **AND** Calcium, Ionized  •  heparin (porcine)  •  hydrALAZINE  •  magnesium sulfate **OR** magnesium sulfate in D5W 1g/100mL (PREMIX) **OR** magnesium sulfate  •  melatonin  •  oxyCODONE  •  sodium chloride  •  sodium chloride    Assessment/Plan   Assessment & Plan     Active Hospital Problems    Diagnosis  POA   • **RINKU [N17.9]  Yes   • Lactic acidosis [E87.2]  Yes   • Acute HFrEF (EF 30%) [I50.21]  Yes   • Shock (CMS/HCC) -cardiogenic versus septic [R57.9]  Yes   •  Hypertension [I10]  Yes   • T2DM [E11.9]  Yes   • Dyslipidemia [E78.5]  Yes   • Sleep apnea [G47.30]  Yes   • Elevated liver enzymes [R74.8]  Yes   • Recent Rt Total Knee Replacement 4/5/21 [Z96.659]  Not Applicable   • Chronic pain w/pain pump [G89.29]  Yes      Resolved Hospital Problems   No resolved problems to display.        Brief Hospital Course to date:  Elvis Hanson is a 66 y.o. male with a history of recent right total knee replacement on 4/5, HTN, T2DM, SHYLA, and chronic pain with a pain pump who presented to the [facility] ED on 4/7 with complaints of hearing loss. Pt was found to have RINKU, elevated LFTs and was admitted to the ICU service on the PCU floor.  He became hypotensive requiring pressors and was transferred to the ICU. Pt was found to have an EF of 30% and Cardiology was consulted.  He was also initiated on HD during this admission.  He was transferred to our service on 4/10. Of note, his hearing loss has resolved and was thought to be due to Furosemide in setting of decreased renal function.     Plan:    Acute systolic HF  Elevated troponin  --Cardiology following, pt will eventually require ischemic evaluation to determine etiology of his HF unable to get this done currently due to renal function  --denies chest pain, suspect elevated troponin is due to volume overload  --Troponin peaked at 0.76  --minimal UOP. Trial of diuretics 4/11 per NAL- no UOP noted nor any urine on bladder scan  --continue ASA, not able to tolerate statin due to transaminitis    Oliguric RINKU   --now on HD, NAL following.   --minimal UOP over last 24 hours. Peck catheter d/c'd 4/10.    -- likely to have renal bx this week     Elevated LFTs  --AST/ALT significantly elevated on admission  --likely congestive hepatopathy  --monitor, improving daily    Septic vs Cardiogenic shock  --taper hydrocortisone due to hypertension now- decrease to 50 mg Q24 hours today.  Would d/c this tomorrow, 4/14  --resumed antihypertensives but  at much lower dose than home dose. Will increase back to home dose today  --stopped Vanc on 4/12, will stop Zosyn today  --his leukocytosis is better today  --cultures thus far unremarkable    Recent right knee replacement  --Dr. Avalos following    Chronic Pain  --pain pump present    T2DM  --low dose basal insulin with SSI for now    Hearing loss  --resolved. Thought to be due to home dose diuretics.      Insomnia  --Melatonin did not help last pm  --will try low dose Benadryl tonight        DVT Prophylaxis:  BEBETO      Disposition: I expect the patient to be discharged TBD, still has more workup (ie cardiac) to be done until he is ready for discharge    CODE STATUS:   Code Status and Medical Interventions:   Ordered at: 04/07/21 1422     Code Status:    CPR     Medical Interventions (Level of Support Prior to Arrest):    Full       Kay Diaz MD  04/13/21            Electronically signed by Kay Diaz MD at 04/13/21 1250     Amber Zeng APRN at 04/13/21 0742     Attestation signed by Nick Gonzalez MD at 04/13/21 1145    I have reviewed this documentation and agree.                    Elvis Hanson  [id number]  1955   LOS: 6 days   Patient Care Team:  April Chen DO as PCP - General (Internal Medicine)    Chief Complaint:  Follow-up systolic heart failure, shock liver, acute renal failure    Subjective The patient denies any chest pain, shortness of breath, palpitations, dizziness, or abdominal discomfort.  He states that he feels better than when he first arrived in the hospital.  The patient currently just arrived in dialysis.    Objective     Vital Sign Min/Max for last 24 hours  Temp  Min: 97 °F (36.1 °C)  Max: 98.6 °F (37 °C)   BP  Min: 134/89  Max: 191/106   Pulse  Min: 61  Max: 84   Resp  Min: 16  Max: 18   SpO2  Min: 93 %  Max: 97 %   No data recorded   Weight  Min: 155 kg (342 lb 1.6 oz)  Max: 155 kg (342 lb 1.6 oz)         04/12/21  0559 04/13/21  0557    Weight: (!) 157 kg (345 lb 12.8 oz) (!) 155 kg (342 lb 1.6 oz)         Intake/Output Summary (Last 24 hours) at 4/13/2021 0743  Last data filed at 4/12/2021 1153  Gross per 24 hour   Intake --   Output 4020 ml   Net -4020 ml       Physical Exam:     General Appearance:    Alert, cooperative, in no acute distress, LIJ CVL, HD cath   Lungs:    Decreased breath sounds to auscultation,respirations regular, even and                   unlabored    Heart:    Regular and normal rate with occasional PVCs, normal S1 and S2, no            murmur, no gallop, no rub, no click   Abdomen:  Extremities:   Soft, non-tender, bowel sounds audible x4    1+ edema, normal range of motion   Pulses:   Pulses palpable and equal bilaterally      Results Review:   Results from last 7 days   Lab Units 04/13/21  0456 04/12/21  0800 04/11/21  0849   SODIUM mmol/L 132* 135* 135*   POTASSIUM mmol/L 3.4* 3.4* 3.2*   CHLORIDE mmol/L 94* 92* 92*   CO2 mmol/L 23.0 24.0 26.0   BUN mg/dL 54* 69* 48*   CREATININE mg/dL 6.70* 7.99* 6.71*   GLUCOSE mg/dL 135* 128* 132*   CALCIUM mg/dL 8.1* 7.6* 7.5*     Results from last 7 days   Lab Units 04/13/21  0456 04/12/21  0800 04/11/21  0849   WBC 10*3/mm3 11.30* 12.59* 12.08*   HEMOGLOBIN g/dL 10.3* 9.7* 9.3*   HEMATOCRIT % 32.3* 30.0* 29.7*   PLATELETS 10*3/mm3 222 223 195     Results from last 7 days   Lab Units 04/13/21  0456   CHOLESTEROL mg/dL 147   TRIGLYCERIDES mg/dL 173*   HDL CHOL mg/dL 24*   LDL CHOL mg/dL 93         Results from last 7 days   Lab Units 04/12/21  0800 04/08/21  0517 04/07/21  1050   TROPONIN T ng/mL 0.717* 0.760* 0.290*   No new chest x-ray or ECG to review    Medication Review: Reviewed    Assessment/Plan   Patient with systolic heart failure exacerbation (EF 30% with global hypokinesis) in the setting of acute renal failure and liver shock.  The patient had a recent stress test at [facility] prior to his knee surgery and we requested results of this but there are no results to review  yet.  The patient would benefit from a heart catheterization when renal function allows.  Nephrology with recommendations to have MAP greater than 100 to help with renal recovery.  We will have to hold statin for now.  The patient is currently in dialysis.      RINKU    Hypertension    T2DM    Dyslipidemia    Sleep apnea    Elevated liver enzymes    Recent Rt Total Knee Replacement 4/5/21    Chronic pain w/pain pump    Lactic acidosis    Acute HFrEF (EF 30%)    Shock (CMS/HCC) -cardiogenic versus septic    Electronically signed by ANGUS Busch, 04/13/21, 8:07 AM EDT.    04/13/21  07:43 EDT    Electronically signed by Nick Gonzalez MD at 04/13/21 5981

## 2021-04-14 NOTE — PROGRESS NOTES
[facility]  IP Progress Note      Chief Complaint/Reason for service: LV systolic dysfunction #2 elevated troponins    Subjective   Subjective: The patient is awake and alert preparing to eat lunch.  He states he is feeling better today and his breathing is improved.  He ambulated some yesterday and did pretty well with that.  He denies tightness in his chest.  We still do not have the nuclear stress test done at [facility]    Past medical, surgical, social and family history reviewed in the patient's electronic medical record.    Objective     Vital Sign Min/Max for last 24 hours  Temp  Min: 97.1 °F (36.2 °C)  Max: 99.1 °F (37.3 °C)   BP  Min: 123/76  Max: 183/101   Pulse  Min: 62  Max: 77   Resp  Min: 16  Max: 20   SpO2  Min: 93 %  Max: 99 %   Flow (L/min)  Min: 2  Max: 2      Intake/Output Summary (Last 24 hours) at 4/14/2021 0842  Last data filed at 4/14/2021 0604  Gross per 24 hour   Intake 828 ml   Output 4030 ml   Net -3202 ml             Current Facility-Administered Medications:   •  aspirin EC tablet 81 mg, 81 mg, Oral, Daily, Julio Montaño MD, 81 mg at 04/13/21 1139  •  calcium carbonate (TUMS) chewable tablet 500 mg (200 mg elemental), 2 tablet, Oral, TID PRN, La Chung DO, 2 tablet at 04/13/21 1154  •  calcium gluconate 1 g in sodium chloride 0.9 % 100 mL IVPB, 1 g, Intravenous, PRN, Last Rate: 100 mL/hr at 04/10/21 1731, 1 g at 04/10/21 1731 **AND** calcium gluconate 6 g in sodium chloride 0.9 % 500 mL IVPB, 6 g, Intravenous, PRN, Last Rate: 83.3 mL/hr at 04/09/21 1317, 6 g at 04/09/21 1317 **AND** Calcium, Ionized, , , PRN, Julio Montaño MD  •  carvedilol (COREG) tablet 25 mg, 25 mg, Oral, BID With Meals, Kay Diaz MD, 25 mg at 04/13/21 1741  •  cetirizine (zyrTEC) tablet 10 mg, 10 mg, Oral, Daily, Julio Montaño MD, 10 mg at 04/13/21 1139  •  diphenhydrAMINE (BENADRYL) capsule 25 mg, 25 mg, Oral, Nightly PRN, Kay Diaz  MD Fabian, 25 mg at 04/13/21 2321  •  docusate sodium (COLACE) capsule 100 mg, 100 mg, Oral, BID, Julio Montaño MD, 100 mg at 04/13/21 2152  •  heparin (porcine) 5000 UNIT/ML injection 5,000 Units, 5,000 Units, Subcutaneous, Q12H, Julio Montaño MD, 5,000 Units at 04/13/21 2152  •  heparin (porcine) injection 1,600 Units, 1,600 Units, Intracatheter, PRN, Julio Montaño MD, 1,600 Units at 04/12/21 1119  •  hydrALAZINE (APRESOLINE) injection 10 mg, 10 mg, Intravenous, Q6H PRN, Kay Diaz MD, 10 mg at 04/12/21 0312  •  hydrocortisone sodium succinate (Solu-CORTEF) injection 50 mg, 50 mg, Intravenous, Q24H, Kay Diaz MD, 50 mg at 04/13/21 2321  •  insulin detemir (LEVEMIR) injection 10 Units, 10 Units, Subcutaneous, Daily, Julio Montaño MD, 10 Units at 04/12/21 1200  •  insulin lispro (humaLOG) injection 0-7 Units, 0-7 Units, Subcutaneous, 4x Daily AC & at Bedtime, Julio Montaño MD, 2 Units at 04/13/21 1740  •  magnesium oxide (MAG-OX) tablet 1,000 mg, 1,000 mg, Oral, Daily, Jimenez Daniels MD, 1,000 mg at 04/13/21 1139  •  magnesium sulfate 4 gram infusion - Mg less than or equal to 1mg/dL, 4 g, Intravenous, PRN **OR** magnesium sulfate 3 gram infusion (1gm x 3) - Mg 1.1 - 1.5 mg/dL, 1 g, Intravenous, PRN **OR** Magnesium Sulfate 2 gram infusion- Mg 1.6 - 1.9 mg/dL, 2 g, Intravenous, PRN, Keagan Garcia MD, Last Rate: 25 mL/hr at 04/11/21 1611, 2 g at 04/11/21 1611  •  melatonin tablet 5 mg, 5 mg, Oral, Nightly PRN, Nayely Cleveland, APRN, 5 mg at 04/12/21 2244  •  norepinephrine (LEVOPHED) 8 mg in 250 mL NS infusion (premix), 0.02-0.1 mcg/kg/min, Intravenous, Titrated, Julio Montaño MD, Stopped at 04/08/21 1550  •  oxyCODONE (ROXICODONE) immediate release tablet 5 mg, 5 mg, Oral, Q6H PRN, Julio Montaño MD, 5 mg at 04/11/21 0902  •  polyethylene glycol (MIRALAX) packet 17 g, 17 g, Oral, Daily, Julio Montaño MD, 17 g at 04/13/21 1140  •   povidone-iodine (BETADINE) external solution, , Topical, Once, Axel Avalos MD  •  sodium chloride 0.9 % flush 10 mL, 10 mL, Intravenous, Q12H, Julio Montaño MD, 10 mL at 04/13/21 0287  •  sodium chloride 0.9 % flush 10 mL, 10 mL, Intravenous, PRN, Julio Montaño MD  •  sodium chloride nasal spray 2 spray, 2 spray, Each Nare, PRN, Julio Montaño MD, 2 spray at 04/10/21 1346    Physical Exam: General Pleasant obese male sitting up in bed not dyspneic not tachypneic        HEENT: No JVP       Respiratory: Equal bilateral symmetrical expansion       Cardiovascular: Regular rate and rhythm with a soft murmur and 2+ pitting edema       GI: Obese       Lower Extremities: No lesions       Neuro: Moves all 4 extremities       Skin: Warm and dry with pitting edema to palpation       Psych: Pleasant affect    Results Review: The creatinine remains elevated was 8.6.    Radiology Results:  Imaging Results (Last 72 Hours)     ** No results found for the last 72 hours. **          EKG: Sinus rhythm    ECHO: EF 30%    Tele: Sinus rhythm    Assessment   Assessment/Plan: 1 heart failure with reduced EF-we will reassess EF prior to discharge.  Will request again the nuclear stress test from [facility].  He may need an ischemic evaluation prior to discharge  1a- hypertension once okay with renal will reinstitute standard of care heart failure medications  2 shock liver-LFTs are improving daily  3 acute kidney injury currently on dialysis    Aniceto Newby MD  04/14/21  08:42 EDT

## 2021-04-14 NOTE — NURSING NOTE
Cardiology Navigator Note      Patient Name:  Elvis Hanson  :  1955  DOS:  21    Active Hospital Problems    Diagnosis    • **RINKU    • Lactic acidosis    • Acute HFrEF (EF 30%)    • Shock (CMS/HCC) -cardiogenic versus septic    • Hypertension    • T2DM    • Dyslipidemia    • Sleep apnea    • Elevated liver enzymes    • Recent Rt Total Knee Replacement 21    • Chronic pain w/pain pump        Consult has been received.  Medical records have been reviewed. Patient currently admitted with RINKU, currently on dialysis. Fluid management per nephrology, patient to follow up with PCP in one week and cardiology as recommended      Echo Results:  · Estimated left ventricular EF = 30% Left ventricular systolic function is severely decreased.  · There is global hypokinesis. Basal inferior and inferoseptal wall appears akinetic  · Left ventricular wall thickness is consistent with mild to moderate posterior asymmetric hypertrophy  · Moderately reduced right ventricular systolic function noted.  · Moderate tricuspid valve regurgitation is present. Estimated right ventricular systolic pressure from tricuspid regurgitation is normal (<35 mmHg).    Heart Failure Quality Measures    ACE I, ARB, ARNI (if EF <40%)     If no contraindications:  RINKU / CKD / Renal Stenosis    Evidence-based Beta Blocker (EF<40%)    (Bisoprolol, Carvedilol, Metoprolol Succinate)  carvedilol    Aldosterone Antagonist (EF <40%)  If no contraindications:  RINKU / CKD / Renal Stenosis

## 2021-04-14 NOTE — PLAN OF CARE
Goal Outcome Evaluation:      Pt was in dialysis until 1300, they took 4L off.  Pt sat in chair for most of afternoon, returned to bed for dinner, eating on edge of bed. Pt with no complaints at this time.  Pt requested tums for heartburn and afrin for when needed and flonase for congestion.

## 2021-04-14 NOTE — THERAPY TREATMENT NOTE
Patient Name: Elvis Hanson  : 1955    MRN: [id number]                              Today's Date: 2021       Admit Date: 2021    Visit Dx:     ICD-10-CM ICD-9-CM   1. Altered mental status, unspecified altered mental status type  R41.82 780.97   2. Hypotension, unspecified hypotension type  I95.9 458.9   3. Acute renal failure, unspecified acute renal failure type (CMS/HCC)  N17.9 584.9   4. Bilateral hearing loss, unspecified hearing loss type  H91.93 389.9   5. Elevated LFTs  R79.89 790.6   6. Elevated troponin  R77.8 790.6     Patient Active Problem List   Diagnosis   • Hypertension   • T2DM   • Dyslipidemia   • Sleep apnea   • Elevated liver enzymes   • RINKU   • Recent Rt Total Knee Replacement 21   • Chronic pain w/pain pump   • Lactic acidosis   • Acute HFrEF (EF 30%)   • Shock (CMS/HCC) -cardiogenic versus septic     Past Medical History:   Diagnosis Date   • Diabetes mellitus (CMS/HCC)    • Hyperlipidemia    • Hypertension    • Sleep apnea      Past Surgical History:   Procedure Laterality Date   • CHOLECYSTECTOMY     • JOINT REPLACEMENT Right     KNEE     General Information     Row Name 21 1500          Physical Therapy Time and Intention    Document Type  therapy note (daily note)  -MB     Mode of Treatment  physical therapy  -MB     Row Name 21 1500          General Information    Patient Profile Reviewed  yes  -MB     Existing Precautions/Restrictions  fall WBAT RLE (TKA 4/5)  -MB     Barriers to Rehab  medically complex  -MB     Row Name 21 1500          Cognition    Orientation Status (Cognition)  oriented x 4  -MB     Row Name 21 1500          Safety Issues, Functional Mobility    Safety Issues Affecting Function (Mobility)  safety precautions follow-through/compliance;sequencing abilities  -MB     Impairments Affecting Function (Mobility)  endurance/activity tolerance;strength;range of motion (ROM)  -MB       User Key  (r) = Recorded By, (t) = Taken By,  (c) = Cosigned By    Initials Name Provider Type    Carolyn Cheng, PT Physical Therapist        Mobility     Row Name 04/14/21 1500          Bed Mobility    Comment (Bed Mobility)  UIC  -MB     Row Name 04/14/21 1500          Transfers    Comment (Transfers)  VCs for safe hand placement and chair approach.  -MB     Row Name 04/14/21 1500          Sit-Stand Transfer    Sit-Stand [place] (Transfers)  contact guard;verbal cues  -MB     Assistive Device (Sit-Stand Transfers)  walker, front-wheeled  -MB     Row Name 04/14/21 1500          Gait/Stairs (Locomotion)    [place] Level (Gait)  contact guard;verbal cues  -MB     Assistive Device (Gait)  walker, front-wheeled  -MB     Distance in Feet (Gait)  90  -MB     Deviations/Abnormal Patterns (Gait)  base of support, wide;jeancarlos decreased;gait speed decreased;stride length decreased  -MB     Bilateral Gait Deviations  heel strike decreased  -MB     Right Sided Gait Deviations  heel strike decreased  -MB     Comment (Gait/Stairs)  Pt. amb. w/ step through gait pattern and slower jeancarlos.  VCs for increased RLE heelstrike.  Improvement noted w/ cues.  -MB       User Key  (r) = Recorded By, (t) = Taken By, (c) = Cosigned By    Initials Name Provider Type    Carolyn Cheng, PT Physical Therapist        Obj/Interventions     Row Name 04/14/21 1500          Motor Skills    Therapeutic Exercise  hip;knee;ankle  -MB     Row Name 04/14/21 1500          Hip (Therapeutic Exercise)    Hip Strengthening (Therapeutic Exercise)  bilateral;marching while seated;5 repetitions;10 repetitions  -MB     Row Name 04/14/21 1500          Knee (Therapeutic Exercise)    Knee Isometrics (Therapeutic Exercise)  bilateral;quad sets;10 repetitions;5 repetitions  -MB     Knee Strengthening (Therapeutic Exercise)  bilateral;LAQ (long arc quad);right;SLR (straight leg raise);heel slides;10 repetitions;5 repetitions  -MB     Row Name 04/14/21 1500          Ankle (Therapeutic  Exercise)    Ankle Strengthening (Therapeutic Exercise)  bilateral;dorsiflexion;plantarflexion;5 repetitions;10 repetitions  -MB     Row Name 04/14/21 1500          Balance    Dynamic Standing Balance  WFL;supported  -MB       User Key  (r) = Recorded By, (t) = Taken By, (c) = Cosigned By    Initials Name Provider Type    Carolyn Cheng, PT Physical Therapist        Goals/Plan    No documentation.       Clinical Impression     Row Name 04/14/21 1500          Pain Scale: Numbers Pre/Post-Treatment    Pretreatment Pain Rating  0/10 - no pain  -MB     Posttreatment Pain Rating  0/10 - no pain  -MB     Row Name 04/14/21 1500          Plan of Care Review    Plan of Care Reviewed With  patient;spouse  -MB     Progress  improving  -MB     Outcome Summary  Patient somnolent, although agreeable to PT; demonstrates improving independence w/ mobility.  He transferred STS w/ RW and CGA and progressed forward ambulation to 90ft w/ RW and CGA.  Pt. demonstrates improved safety and increased activity tolerance w/ use of RW vs. standard wkr (issued at outside hospital s/p TKA).  Spoke w/ CM re: RW wheels for D/C.  PT continues to recommend home w/ assist and HHPT at D/C.  -MB     Row Name 04/14/21 1500          Vital Signs    Pre SpO2 (%)  95  -MB     O2 Delivery Pre Treatment  room air  -MB     O2 Delivery Intra Treatment  room air  -MB     Post SpO2 (%)  95  -MB     O2 Delivery Post Treatment  room air  -MB     Pre Patient Position  Sitting  -MB     Intra Patient Position  Standing  -MB     Post Patient Position  Sitting  -MB     Row Name 04/14/21 1500          Positioning and Restraints    Pre-Treatment Position  sitting in chair/recliner  -MB     Post Treatment Position  chair  -MB     In Chair  notified nsg;reclined;call light within reach;encouraged to call for assist;with family/caregiver;legs elevated  -MB       User Key  (r) = Recorded By, (t) = Taken By, (c) = Cosigned By    Initials Name Provider Type    MB  Carolyn Perera, PT Physical Therapist        Outcome Measures     Row Name 04/14/21 1500          How much help from another person do you currently need...    Turning from your back to your side while in flat bed without using bedrails?  4  -MB     Moving from lying on back to sitting on the side of a flat bed without bedrails?  3  -MB     Moving to and from a bed to a chair (including a wheelchair)?  3  -MB     Standing up from a chair using your arms (e.g., wheelchair, bedside chair)?  3  -MB     Climbing 3-5 steps with a railing?  3  -MB     To walk in hospital room?  3  -MB     AM-PAC 6 Clicks Score (PT)  19  -MB     Row Name 04/14/21 1500          Functional Assessment    Outcome Measure Options  AM-PAC 6 Clicks Basic Mobility (PT)  -MB       User Key  (r) = Recorded By, (t) = Taken By, (c) = Cosigned By    Initials Name Provider Type    Carolyn Cheng, PT Physical Therapist        Physical Therapy Education                 Title: PT OT SLP Therapies (Done)     Topic: Physical Therapy (Done)     Point: Mobility training (Done)     Learning Progress Summary           Patient Acceptance, E,D, VU,NR by MB at 4/13/2021 1512    Comment: HEP, transfer safety/mechanics, gait safety/mechanics, home safety                   Point: Home exercise program (Done)     Learning Progress Summary           Patient Acceptance, E,D, VU,NR by MB at 4/13/2021 1512    Comment: HEP, transfer safety/mechanics, gait safety/mechanics, home safety                   Point: Body mechanics (Done)     Learning Progress Summary           Patient Acceptance, E,D, VU,NR by MB at 4/13/2021 1512    Comment: HEP, transfer safety/mechanics, gait safety/mechanics, home safety                   Point: Precautions (Done)     Learning Progress Summary           Patient Acceptance, E,D, VU,NR by MB at 4/13/2021 1512    Comment: HEP, transfer safety/mechanics, gait safety/mechanics, home safety                               User Key      Initials Effective Dates Name Provider Type Discipline    MB 03/14/16 -  Carolyn Perera, PT Physical Therapist PT              PT Recommendation and Plan  Planned Therapy Interventions (PT): balance training, bed mobility training, gait training, home exercise program, patient/family education, strengthening, transfer training  Plan of Care Reviewed With: patient, spouse  Progress: improving  Outcome Summary: Patient somnolent, although agreeable to PT; demonstrates improving independence w/ mobility.  He transferred STS w/ RW and CGA and progressed forward ambulation to 90ft w/ RW and CGA.  Pt. demonstrates improved safety and increased activity tolerance w/ use of RW vs. standard wkr (issued at outside hospital s/p TKA).  Spoke w/ CM re: RW wheels for D/C.  PT continues to recommend home w/ assist and HHPT at D/C.     Time Calculation:   PT Charges     Row Name 04/14/21 1601             Time Calculation    Start Time  1500  -MB      PT Received On  04/14/21  -MB      PT Goal Re-Cert Due Date  04/23/21  -MB         Time Calculation- PT    Total Timed Code Minutes- PT  34 minute(s)  -MB         Timed Charges    31772 - PT Therapeutic Exercise Minutes  14  -MB      89069 - Gait Training Minutes   20  -MB        User Key  (r) = Recorded By, (t) = Taken By, (c) = Cosigned By    Initials Name Provider Type    Carolyn Cheng, PT Physical Therapist        Therapy Charges for Today     Code Description Service Date Service Provider Modifiers Qty    86994338675 HC PT EVAL MOD COMPLEXITY 4 4/13/2021 Carolyn Perera, PT GP 1    41201753509 HC PT THER PROC EA 15 MIN 4/14/2021 Carolyn Perera, PT GP 1    66688523475 HC GAIT TRAINING EA 15 MIN 4/14/2021 Carolyn Perera, PT GP 1          PT G-Codes  Outcome Measure Options: AM-PAC 6 Clicks Basic Mobility (PT)  AM-PAC 6 Clicks Score (PT): 19    Carolyn Perera, ULICES  4/14/2021

## 2021-04-14 NOTE — PROGRESS NOTES
"   LOS: 7 days    Patient Care Team:  April Chen DO as PCP - General (Internal Medicine)    Reason For Visit:  F/U RINKU. SEEN ON DIALYSIS.  Subjective           Review of Systems:    Pulm: No soa   CV:  No CP      Objective     aspirin, 81 mg, Oral, Daily  carvedilol, 25 mg, Oral, BID With Meals  cetirizine, 10 mg, Oral, Daily  docusate sodium, 100 mg, Oral, BID  heparin (porcine), 5,000 Units, Subcutaneous, Q12H  hydrocortisone sodium succinate, 50 mg, Intravenous, Q24H  insulin detemir, 10 Units, Subcutaneous, Daily  insulin lispro, 0-7 Units, Subcutaneous, 4x Daily AC & at Bedtime  magnesium oxide, 1,000 mg, Oral, Daily  polyethylene glycol, 17 g, Oral, Daily  povidone-iodine, , Topical, Once  sodium chloride, 10 mL, Intravenous, Q12H      norepinephrine, 0.02-0.1 mcg/kg/min, Last Rate: Stopped (04/08/21 1550)          Vital Signs:  Blood pressure 157/96, pulse 71, temperature 97.1 °F (36.2 °C), temperature source Temporal, resp. rate 16, height 177.8 cm (70\"), weight (!) 154 kg (339 lb 9.6 oz), SpO2 95 %.    Flowsheet Rows      First Filed Value   Admission Height  177.8 cm (70\") Documented at 04/07/2021 1041   Admission Weight  127 kg (280 lb) Documented at 04/07/2021 1041          04/13 0701 - 04/14 0700  In: 828 [P.O.:828]  Out: 4030     Physical Exam:    General Appearance: NAD, alert and cooperative, Ox3  Eyes: PER, conjunctivae and sclerae normal, no icterus  Lungs: respirations regular and unlabored, no crepitus, clear to auscultation  Heart/CV: regular rhythm & normal rate, no murmur, no gallop, no rub and TR edema  Abdomen: not distended, soft, non-tender, no masses,  bowel sounds present  Skin: No rash, Warm and dry. THD CATH    Radiology:            Labs:  Results from last 7 days   Lab Units 04/14/21  0502 04/13/21  0456 04/12/21  0800   WBC 10*3/mm3 10.15 11.30* 12.59*   HEMOGLOBIN g/dL 10.2* 10.3* 9.7*   HEMATOCRIT % 32.5* 32.3* 30.0*   PLATELETS 10*3/mm3 217 222 223     Results from " last 7 days   Lab Units 04/14/21  0502 04/13/21  0456 04/12/21  0800 04/11/21  0849 04/10/21  0905   SODIUM mmol/L 129* 132* 135* 135* 135*   POTASSIUM mmol/L 3.6 3.4* 3.4* 3.2* 3.1*   CHLORIDE mmol/L 91* 94* 92* 92* 88*   CO2 mmol/L 23.0 23.0 24.0 26.0 28.0   BUN mg/dL 71* 54* 69* 48* 50*   CREATININE mg/dL 8.66* 6.70* 7.99* 6.71* 6.53*   CALCIUM mg/dL 7.6* 8.1* 7.6* 7.5* 6.5*   PHOSPHORUS mg/dL  --  5.5* 7.3* 6.0* 6.8*   MAGNESIUM mg/dL 2.4  --  2.4 1.9 1.7   ALBUMIN g/dL 3.30* 3.50 3.50 3.60 3.60     Results from last 7 days   Lab Units 04/14/21  0502   GLUCOSE mg/dL 127*       Results from last 7 days   Lab Units 04/14/21  0502   ALK PHOS U/L 119*   BILIRUBIN mg/dL 0.5   ALT (SGPT) U/L 540*   AST (SGOT) U/L 58*     Results from last 7 days   Lab Units 04/08/21  0933   PH, ARTERIAL pH units 7.271*   PO2 ART mm Hg 96.7   PCO2, ARTERIAL mm Hg 52.1*   HCO3 ART mmol/L 23.9       Results from last 7 days   Lab Units 04/07/21  1325   COLOR UA  Yellow   CLARITY UA  Cloudy*   PH, URINE  5.5   SPECIFIC GRAVITY, URINE  1.022   GLUCOSE UA  Negative   KETONES UA  Negative   BILIRUBIN UA  Negative   PROTEIN UA  100 mg/dL (2+)*   BLOOD UA  Negative   LEUKOCYTES UA  Small (1+)*   NITRITE UA  Negative       Estimated Creatinine Clearance: 12.8 mL/min (A) (by C-G formula based on SCr of 8.66 mg/dL (H)).      Assessment       RINKU    Hypertension    T2DM    Dyslipidemia    Sleep apnea    Elevated liver enzymes    Recent Rt Total Knee Replacement 4/5/21    Chronic pain w/pain pump    Lactic acidosis    Acute HFrEF (EF 30%)    Shock (CMS/HCC) -cardiogenic versus septic            Impression: RINKU. ? ATN. ANEMIA.            Recommendations: HD TODAY.      Chaim Treadwell MD  04/14/21  08:12 EDT

## 2021-04-14 NOTE — PROGRESS NOTES
[facility] Medicine Services  PROGRESS NOTE    Patient Name: Elvis Hanson  : 1955  MRN: [id number]    Date of Admission: 2021  Primary Care Physician: April Chen DO    Subjective   Subjective     CC:  Hearing loss     HPI:  States he is feeling ok. Breathing is stable. Denies CP.  Reviewed pending results from serological workup for renal failure.     ROS:  Gen- No fevers, chills  CV- No chest pain, palpitations  Resp- No cough, dyspnea  GI- No N/V/D, abd pain     Objective   Objective     Vital Signs:   Temp:  [96.8 °F (36 °C)-99.1 °F (37.3 °C)] 96.8 °F (36 °C)  Heart Rate:  [63-88] 79  Resp:  [16-20] 16  BP: (106-185)/() 109/67        Physical Exam:  Constitutional: No acute distress, awake, alert; evaluated on dialysis   HENT: NCAT, mucous membranes moist  Respiratory: Clear to auscultation bilaterally, respiratory effort normal   Cardiovascular: RRR, no murmurs, rubs, or gallops  Gastrointestinal: Positive bowel sounds, soft, nontender, nondistended  Musculoskeletal: +1-2 bilateral ankle edema  Psychiatric: Appropriate affect, cooperative  Neurologic: Oriented x 3, strength symmetric in all extremities, Cranial Nerves grossly intact to confrontation, speech clear  Skin: No rashes    Results Reviewed:  Results from last 7 days   Lab Units 21  0502 21  0456 21  0800 21  0808 21  0517 21  1711   WBC 10*3/mm3 10.15 11.30* 12.59*  --  17.13*  --    HEMOGLOBIN g/dL 10.2* 10.3* 9.7*  --  10.1*  --    HEMATOCRIT % 32.5* 32.3* 30.0*  --  33.4*  --    PLATELETS 10*3/mm3 217 222 223  --  230  --    INR   --   --   --  1.87*  --   --    PROCALCITONIN ng/mL  --   --   --   --  4.86* 2.15*     Results from last 7 days   Lab Units 21  0502 21  0456 21  0800 21  0517   SODIUM mmol/L 129* 132* 135* 133*   POTASSIUM mmol/L 3.6 3.4* 3.4* 3.9   CHLORIDE mmol/L 91* 94* 92* 94*   CO2 mmol/L 23.0 23.0 24.0 21.0*    BUN mg/dL 71* 54* 69* 37*   CREATININE mg/dL 8.66* 6.70* 7.99* 5.10*   GLUCOSE mg/dL 127* 135* 128* 138*   CALCIUM mg/dL 7.6* 8.1* 7.6* 6.5*   ALT (SGPT) U/L 540* 832* 1,197* 5,392*   AST (SGOT) U/L 58* 92* 188* >7,000*   TROPONIN T ng/mL  --   --  0.717* 0.760*     Estimated Creatinine Clearance: 12.8 mL/min (A) (by C-G formula based on SCr of 8.66 mg/dL (H)).    Microbiology Results Abnormal     Procedure Component Value - Date/Time    Blood Culture - Blood, Hand, Left [[id number]] Collected: 04/07/21 2057    Lab Status: Final result Specimen: Blood from Hand, Left Updated: 04/12/21 2145     Blood Culture No growth at 5 days    Blood Culture - Blood, Hand, Right [[id number]] Collected: 04/07/21 2057    Lab Status: Final result Specimen: Blood from Hand, Right Updated: 04/12/21 2145     Blood Culture No growth at 5 days    Urine Culture - Urine, Urine, Clean Catch [[id number]]  (Normal) Collected: 04/09/21 1325    Lab Status: Final result Specimen: Urine, Clean Catch Updated: 04/10/21 1742     Urine Culture No growth    MRSA Screen, PCR (Inpatient) - Swab, Nares [[id number]]  (Normal) Collected: 04/08/21 0341    Lab Status: Final result Specimen: Swab from Nares Updated: 04/08/21 0937     MRSA PCR Negative    Narrative:      MRSA Negative    COVID-19 and FLU A/B PCR - Swab, Nasopharynx [[id number]]  (Normal) Collected: 04/07/21 1357    Lab Status: Final result Specimen: Swab from Nasopharynx Updated: 04/07/21 1509     COVID19 Not Detected     Influenza A PCR Not Detected     Influenza B PCR Not Detected    Narrative:      Fact sheet for providers: https://www.fda.gov/media/240539/download    Fact sheet for patients: https://www.fda.gov/media/758330/download    Test performed by PCR.          Imaging Results (Last 24 Hours)     ** No results found for the last 24 hours. **          Results for orders placed during the hospital encounter of 04/07/21    Adult Transthoracic Echo Complete W/ Cont if Necessary Per  Protocol    Interpretation Summary  · Estimated left ventricular EF = 30% Left ventricular systolic function is severely decreased.  · There is global hypokinesis. Basal inferior and inferoseptal wall appears akinetic  · Left ventricular wall thickness is consistent with mild to moderate posterior asymmetric hypertrophy  · Moderately reduced right ventricular systolic function noted.  · Moderate tricuspid valve regurgitation is present. Estimated right ventricular systolic pressure from tricuspid regurgitation is normal (<35 mmHg).      I have reviewed the medications:  Scheduled Meds:aspirin, 81 mg, Oral, Daily  carvedilol, 25 mg, Oral, BID With Meals  cetirizine, 10 mg, Oral, Daily  docusate sodium, 100 mg, Oral, BID  heparin (porcine), 5,000 Units, Subcutaneous, Q12H  hydrocortisone sodium succinate, 50 mg, Intravenous, Q24H  insulin detemir, 10 Units, Subcutaneous, Daily  insulin lispro, 0-7 Units, Subcutaneous, 4x Daily AC & at Bedtime  magnesium oxide, 1,000 mg, Oral, Daily  polyethylene glycol, 17 g, Oral, Daily  povidone-iodine, , Topical, Once  sodium chloride, 10 mL, Intravenous, Q12H      Continuous Infusions:norepinephrine, 0.02-0.1 mcg/kg/min, Last Rate: Stopped (04/08/21 1550)      PRN Meds:.calcium carbonate  •  calcium gluconate IVPB **AND** calcium gluconate IVPB **AND** Calcium, Ionized  •  diphenhydrAMINE  •  heparin (porcine)  •  hydrALAZINE  •  magnesium sulfate **OR** magnesium sulfate in D5W 1g/100mL (PREMIX) **OR** magnesium sulfate  •  melatonin  •  oxyCODONE  •  sodium chloride  •  sodium chloride    Assessment/Plan   Assessment & Plan     Active Hospital Problems    Diagnosis  POA   • **RINKU [N17.9]  Yes   • Lactic acidosis [E87.2]  Yes   • Acute HFrEF (EF 30%) [I50.21]  Yes   • Shock (CMS/HCC) -cardiogenic versus septic [R57.9]  Yes   • Hypertension [I10]  Yes   • T2DM [E11.9]  Yes   • Dyslipidemia [E78.5]  Yes   • Sleep apnea [G47.30]  Yes   • Elevated liver enzymes [R74.8]  Yes   •  Recent Rt Total Knee Replacement 4/5/21 [Z96.659]  Not Applicable   • Chronic pain w/pain pump [G89.29]  Yes      Resolved Hospital Problems   No resolved problems to display.        Brief Hospital Course to date:  Elvis Hanson is a 66 y.o. male with a history of recent right total knee replacement on 4/5, HTN, T2DM, SHYLA, and chronic pain with a pain pump who presented to the [facility] ED on 4/7 with complaints of hearing loss. Pt was found to have RINKU, elevated LFTs and was admitted to the ICU service on the PCU floor.  He became hypotensive requiring pressors and was transferred to the ICU. Pt was found to have an EF of 30% and Cardiology was consulted.  He was also initiated on HD during this admission.  He was transferred to our service on 4/10. Of note, his hearing loss has resolved and was thought to be due to Furosemide in setting of decreased renal function.      Plan:     Acute systolic HF  Elevated troponin  --Cardiology following, pt will eventually require ischemic evaluation to determine etiology of his HF unable to get this done currently due to renal function  --Troponin peaked at 0.76  --minimal UOP. Trial of diuretics 4/11 per NAL- no UOP noted nor any urine on bladder scan  --continue ASA, not able to tolerate statin due to transaminitis  - No ACE/ARB due to renal function      Oliguric RINKU   --now on HD, NAL following.   - serological workup pending   -- likely to have renal bx this week - plan per nephrology      Elevated LFTs  --AST/ALT significantly elevated on admission  --likely congestive hepatopathy  --improving; AM labs      Septic vs Cardiogenic shock  -- patient was started on steroids which have been weaned -- will DC hydrocortisone 4/14 and monitor   - Anti-hypertensive resumed  - Antibiotics discontinued 4/13   - Continue to monitor with the above changes and off antibiotics      Recent right knee replacement  --Dr. Avalos following; WBAT  - Attempted aspiration 4/13 due to knee swelling  but no fluid aspirated and likely to be related to third spacing      Chronic Pain  --pain pump present     T2DM  --low dose basal insulin with SSI for now     Hearing loss  --resolved. Thought to be due to home dose diuretics.       Insomnia  -- no improvement with melatonin or benadryl; trial trazodone      DVT Prophylaxis:  BEBETO     Disposition: I expect the patient to be discharged TBD, still has more workup (ie cardiac) to be done until he is ready for discharge    CODE STATUS:   Code Status and Medical Interventions:   Ordered at: 04/07/21 1422     Code Status:    CPR     Medical Interventions (Level of Support Prior to Arrest):    Full       Tiff Hess,   04/14/21

## 2021-04-15 ENCOUNTER — APPOINTMENT (OUTPATIENT)
Dept: CARDIOLOGY | Facility: HOSPITAL | Age: 66
End: 2021-04-15

## 2021-04-15 LAB
ALBUMIN SERPL-MCNC: 3.4 G/DL (ref 3.5–5.2)
ALBUMIN/GLOB SERPL: 1.4 G/DL
ALP SERPL-CCNC: 115 U/L (ref 39–117)
ALT SERPL W P-5'-P-CCNC: 374 U/L (ref 1–41)
ANION GAP SERPL CALCULATED.3IONS-SCNC: 13 MMOL/L (ref 5–15)
AST SERPL-CCNC: 45 U/L (ref 1–40)
BASOPHILS # BLD AUTO: 0.01 10*3/MM3 (ref 0–0.2)
BASOPHILS NFR BLD AUTO: 0.1 % (ref 0–1.5)
BH CV ECHO MEAS - AO ROOT AREA (BSA CORRECTED): 1.3
BH CV ECHO MEAS - AO ROOT AREA: 10.7 CM^2
BH CV ECHO MEAS - AO ROOT DIAM: 3.7 CM
BH CV ECHO MEAS - BSA(HAYCOCK): 3.1 M^2
BH CV ECHO MEAS - BSA: 2.8 M^2
BH CV ECHO MEAS - BZI_BMI: 57.8 KILOGRAMS/M^2
BH CV ECHO MEAS - BZI_METRIC_HEIGHT: 177.8 CM
BH CV ECHO MEAS - BZI_METRIC_WEIGHT: 182.8 KG
BH CV ECHO MEAS - EDV(CUBED): 153.4 ML
BH CV ECHO MEAS - EDV(MOD-SP4): 119 ML
BH CV ECHO MEAS - EDV(TEICH): 138.5 ML
BH CV ECHO MEAS - EF(CUBED): 51.8 %
BH CV ECHO MEAS - EF(MOD-BP): 55 %
BH CV ECHO MEAS - EF(MOD-SP4): 43.7 %
BH CV ECHO MEAS - EF(TEICH): 43.4 %
BH CV ECHO MEAS - ESV(CUBED): 73.9 ML
BH CV ECHO MEAS - ESV(MOD-SP4): 67 ML
BH CV ECHO MEAS - ESV(TEICH): 78.4 ML
BH CV ECHO MEAS - FS: 21.6 %
BH CV ECHO MEAS - IVS/LVPW: 1.1
BH CV ECHO MEAS - IVSD: 0.83 CM
BH CV ECHO MEAS - LA DIMENSION: 3.9 CM
BH CV ECHO MEAS - LA/AO: 1.1
BH CV ECHO MEAS - LV DIASTOLIC VOL/BSA (35-75): 42.3 ML/M^2
BH CV ECHO MEAS - LV MASS(C)D: 150.9 GRAMS
BH CV ECHO MEAS - LV MASS(C)DI: 53.7 GRAMS/M^2
BH CV ECHO MEAS - LV SYSTOLIC VOL/BSA (12-30): 23.8 ML/M^2
BH CV ECHO MEAS - LVIDD: 5.4 CM
BH CV ECHO MEAS - LVIDS: 4.2 CM
BH CV ECHO MEAS - LVLD AP4: 7.8 CM
BH CV ECHO MEAS - LVLS AP4: 6.8 CM
BH CV ECHO MEAS - LVOT AREA (M): 3.8 CM^2
BH CV ECHO MEAS - LVOT AREA: 3.7 CM^2
BH CV ECHO MEAS - LVOT DIAM: 2.2 CM
BH CV ECHO MEAS - LVPWD: 0.76 CM
BH CV ECHO MEAS - RAP SYSTOLE: 3 MMHG
BH CV ECHO MEAS - RVSP: 56 MMHG
BH CV ECHO MEAS - SI(CUBED): 28.3 ML/M^2
BH CV ECHO MEAS - SI(MOD-SP4): 18.5 ML/M^2
BH CV ECHO MEAS - SI(TEICH): 21.4 ML/M^2
BH CV ECHO MEAS - SV(CUBED): 79.5 ML
BH CV ECHO MEAS - SV(MOD-SP4): 52 ML
BH CV ECHO MEAS - SV(TEICH): 60.1 ML
BH CV ECHO MEAS - TR MAX PG: 53 MMHG
BH CV ECHO MEAS - TR MAX VEL: 363.8 CM/SEC
BILIRUB SERPL-MCNC: 0.5 MG/DL (ref 0–1.2)
BUN SERPL-MCNC: 49 MG/DL (ref 8–23)
BUN/CREAT SERPL: 6.1 (ref 7–25)
CALCIUM SPEC-SCNC: 7.4 MG/DL (ref 8.6–10.5)
CHLORIDE SERPL-SCNC: 95 MMOL/L (ref 98–107)
CO2 SERPL-SCNC: 23 MMOL/L (ref 22–29)
CREAT SERPL-MCNC: 7.97 MG/DL (ref 0.76–1.27)
DEPRECATED RDW RBC AUTO: 45.8 FL (ref 37–54)
EOSINOPHIL # BLD AUTO: 0.21 10*3/MM3 (ref 0–0.4)
EOSINOPHIL NFR BLD AUTO: 2.3 % (ref 0.3–6.2)
ERYTHROCYTE [DISTWIDTH] IN BLOOD BY AUTOMATED COUNT: 15.6 % (ref 12.3–15.4)
GFR SERPL CREATININE-BSD FRML MDRD: 8 ML/MIN/1.73
GFR SERPL CREATININE-BSD FRML MDRD: ABNORMAL ML/MIN/{1.73_M2}
GLOBULIN UR ELPH-MCNC: 2.5 GM/DL
GLUCOSE BLDC GLUCOMTR-MCNC: 107 MG/DL (ref 70–130)
GLUCOSE BLDC GLUCOMTR-MCNC: 118 MG/DL (ref 70–130)
GLUCOSE BLDC GLUCOMTR-MCNC: 158 MG/DL (ref 70–130)
GLUCOSE BLDC GLUCOMTR-MCNC: 181 MG/DL (ref 70–130)
GLUCOSE SERPL-MCNC: 108 MG/DL (ref 65–99)
HCT VFR BLD AUTO: 32.3 % (ref 37.5–51)
HGB BLD-MCNC: 10 G/DL (ref 13–17.7)
IMM GRANULOCYTES # BLD AUTO: 0.07 10*3/MM3 (ref 0–0.05)
IMM GRANULOCYTES NFR BLD AUTO: 0.8 % (ref 0–0.5)
LV EF 2D ECHO EST: 50 %
LYMPHOCYTES # BLD AUTO: 1.49 10*3/MM3 (ref 0.7–3.1)
LYMPHOCYTES NFR BLD AUTO: 16.1 % (ref 19.6–45.3)
MCH RBC QN AUTO: 25.8 PG (ref 26.6–33)
MCHC RBC AUTO-ENTMCNC: 31 G/DL (ref 31.5–35.7)
MCV RBC AUTO: 83.5 FL (ref 79–97)
MONOCYTES # BLD AUTO: 1 10*3/MM3 (ref 0.1–0.9)
MONOCYTES NFR BLD AUTO: 10.8 % (ref 5–12)
NEUTROPHILS NFR BLD AUTO: 6.45 10*3/MM3 (ref 1.7–7)
NEUTROPHILS NFR BLD AUTO: 69.9 % (ref 42.7–76)
NRBC BLD AUTO-RTO: 0 /100 WBC (ref 0–0.2)
PLATELET # BLD AUTO: 205 10*3/MM3 (ref 140–450)
PMV BLD AUTO: 10.3 FL (ref 6–12)
POTASSIUM SERPL-SCNC: 3.4 MMOL/L (ref 3.5–5.2)
PROT SERPL-MCNC: 5.9 G/DL (ref 6–8.5)
RBC # BLD AUTO: 3.87 10*6/MM3 (ref 4.14–5.8)
SODIUM SERPL-SCNC: 131 MMOL/L (ref 136–145)
WBC # BLD AUTO: 9.23 10*3/MM3 (ref 3.4–10.8)

## 2021-04-15 PROCEDURE — 99231 SBSQ HOSP IP/OBS SF/LOW 25: CPT | Performed by: INTERNAL MEDICINE

## 2021-04-15 PROCEDURE — 80053 COMPREHEN METABOLIC PANEL: CPT | Performed by: INTERNAL MEDICINE

## 2021-04-15 PROCEDURE — 97116 GAIT TRAINING THERAPY: CPT

## 2021-04-15 PROCEDURE — 99232 SBSQ HOSP IP/OBS MODERATE 35: CPT | Performed by: INTERNAL MEDICINE

## 2021-04-15 PROCEDURE — 93308 TTE F-UP OR LMTD: CPT | Performed by: INTERNAL MEDICINE

## 2021-04-15 PROCEDURE — 93325 DOPPLER ECHO COLOR FLOW MAPG: CPT

## 2021-04-15 PROCEDURE — 97110 THERAPEUTIC EXERCISES: CPT

## 2021-04-15 PROCEDURE — 63710000001 INSULIN LISPRO (HUMAN) PER 5 UNITS: Performed by: INTERNAL MEDICINE

## 2021-04-15 PROCEDURE — 93321 DOPPLER ECHO F-UP/LMTD STD: CPT

## 2021-04-15 PROCEDURE — 82962 GLUCOSE BLOOD TEST: CPT

## 2021-04-15 PROCEDURE — 25010000002 HEPARIN (PORCINE) PER 1000 UNITS: Performed by: INTERNAL MEDICINE

## 2021-04-15 PROCEDURE — 85025 COMPLETE CBC W/AUTO DIFF WBC: CPT | Performed by: INTERNAL MEDICINE

## 2021-04-15 PROCEDURE — 93325 DOPPLER ECHO COLOR FLOW MAPG: CPT | Performed by: INTERNAL MEDICINE

## 2021-04-15 PROCEDURE — 93321 DOPPLER ECHO F-UP/LMTD STD: CPT | Performed by: INTERNAL MEDICINE

## 2021-04-15 PROCEDURE — 63710000001 INSULIN DETEMIR PER 5 UNITS: Performed by: INTERNAL MEDICINE

## 2021-04-15 PROCEDURE — 93308 TTE F-UP OR LMTD: CPT

## 2021-04-15 RX ORDER — ALBUMIN (HUMAN) 12.5 G/50ML
12.5 SOLUTION INTRAVENOUS AS NEEDED
Status: CANCELLED | OUTPATIENT
Start: 2021-04-16 | End: 2021-04-16

## 2021-04-15 RX ORDER — TRAZODONE HYDROCHLORIDE 50 MG/1
25 TABLET ORAL NIGHTLY
Status: DISCONTINUED | OUTPATIENT
Start: 2021-04-15 | End: 2021-04-20 | Stop reason: HOSPADM

## 2021-04-15 RX ADMIN — CETIRIZINE HYDROCHLORIDE 10 MG: 10 TABLET, FILM COATED ORAL at 08:02

## 2021-04-15 RX ADMIN — TRAZODONE HYDROCHLORIDE 25 MG: 50 TABLET ORAL at 23:13

## 2021-04-15 RX ADMIN — HEPARIN SODIUM 5000 UNITS: 5000 INJECTION INTRAVENOUS; SUBCUTANEOUS at 20:48

## 2021-04-15 RX ADMIN — CARVEDILOL 25 MG: 12.5 TABLET, FILM COATED ORAL at 17:39

## 2021-04-15 RX ADMIN — INSULIN LISPRO 2 UNITS: 100 INJECTION, SOLUTION INTRAVENOUS; SUBCUTANEOUS at 20:47

## 2021-04-15 RX ADMIN — DOCUSATE SODIUM 100 MG: 100 CAPSULE, LIQUID FILLED ORAL at 08:03

## 2021-04-15 RX ADMIN — MAGNESIUM OXIDE TAB 400 MG (241.3 MG ELEMENTAL MG) 1000 MG: 400 (241.3 MG) TAB at 08:02

## 2021-04-15 RX ADMIN — INSULIN DETEMIR 10 UNITS: 100 INJECTION, SOLUTION SUBCUTANEOUS at 08:09

## 2021-04-15 RX ADMIN — CARVEDILOL 25 MG: 12.5 TABLET, FILM COATED ORAL at 08:02

## 2021-04-15 RX ADMIN — SODIUM CHLORIDE, PRESERVATIVE FREE 10 ML: 5 INJECTION INTRAVENOUS at 08:03

## 2021-04-15 RX ADMIN — HEPARIN SODIUM 5000 UNITS: 5000 INJECTION INTRAVENOUS; SUBCUTANEOUS at 08:02

## 2021-04-15 RX ADMIN — DOCUSATE SODIUM 100 MG: 100 CAPSULE, LIQUID FILLED ORAL at 20:48

## 2021-04-15 RX ADMIN — ASPIRIN 81 MG: 81 TABLET, COATED ORAL at 08:02

## 2021-04-15 RX ADMIN — POLYETHYLENE GLYCOL 3350 17 G: 17 POWDER, FOR SOLUTION ORAL at 08:03

## 2021-04-15 RX ADMIN — FLUTICASONE PROPIONATE 2 SPRAY: 50 SPRAY, METERED NASAL at 08:10

## 2021-04-15 RX ADMIN — INSULIN LISPRO 2 UNITS: 100 INJECTION, SOLUTION INTRAVENOUS; SUBCUTANEOUS at 11:27

## 2021-04-15 NOTE — PROGRESS NOTES
Clinical Nutrition   Reason For Visit: Follow-up protocol, LOS    Patient Name: Elvis Hanson  YOB: 1955  MRN: [id number]  Date of Encounter: 04/15/21 14:22 EDT  Admission date: 4/7/2021      Comments:       Nutrition Assessment     Admission Problem List:    RINKU - initiated on HD during admission    Hypertension    T2DM    Dyslipidemia    Sleep apnea    Elevated liver enzymes    Recent Rt Total Knee Replacement 4/5/21    Chronic pain w/pain pump    Lactic acidosis    Acute HFrEF (EF 30%)    Shock (CMS/HCC) -cardiogenic versus septic         Applicable PMH:    Recent Rt Total Knee Replacement 4/5/21    T2DM    Sleep apnea    HTN    HLD    Applicable Nutrition-Related Information:        Applicable medical tests/procedures since admission:         Reported/Observed/Food/Nutrition Related History     Multiple attempts made to see patient over last few days, have been out of room. Boost GC previously initiated by RD while in ICU. Patient with significant weight increase during admission.       Anthropometrics   Height: 70 in   Weight: 403lbs  BMI: 57.92  BMI classification: Obese Class III extreme obesity: > or equal to 40kg/m2   IBW:  166lbs      Weight change:     Weight Weight (kg) Weight (lbs) Weight Method   4/15/2021 183.117 kg 403 lb 11.2 oz Bed scale   4/14/2021 154.042 kg 339 lb 9.6 oz Bed scale   4/13/2021 155.176 kg 342 lb 1.6 oz Estimated   4/12/2021 156.854 kg 345 lb 12.8 oz -   4/12/2021 156.854 kg 345 lb 12.8 oz Bed scale   4/11/2021 148.326 kg 327 lb Bed scale   4/10/2021 149.732 kg 330 lb 1.6 oz Bed scale   4/9/2021 145.2 kg 320 lb 1.7 oz Bed scale   4/8/2021 129.683 kg 285 lb 14.4 oz Bed scale   4/7/2021 127 kg 279 lb 15.8 oz -   4/7/2021 127.007 kg 280 lb Stated         Labs reviewed   Labs reviewed: Yes    Results from last 7 days   Lab Units 04/15/21  0719 04/14/21  0502 04/13/21  0456 04/12/21  0800 04/11/21  0849   SODIUM mmol/L 131* 129* 132* 135* 135*   POTASSIUM mmol/L 3.4* 3.6  3.4* 3.4* 3.2*   CHLORIDE mmol/L 95* 91* 94* 92* 92*   CO2 mmol/L 23.0 23.0 23.0 24.0 26.0   BUN mg/dL 49* 71* 54* 69* 48*   CREATININE mg/dL 7.97* 8.66* 6.70* 7.99* 6.71*   GLUCOSE mg/dL 108* 127* 135* 128* 132*   CALCIUM mg/dL 7.4* 7.6* 8.1* 7.6* 7.5*   PHOSPHORUS mg/dL  --   --  5.5* 7.3* 6.0*   MAGNESIUM mg/dL  --  2.4  --  2.4 1.9   CHOLESTEROL mg/dL  --   --  147  --   --    TRIGLYCERIDES mg/dL  --   --  173*  --   --      Results from last 7 days   Lab Units 04/15/21  0719 04/14/21  0502 04/13/21  0456   WBC 10*3/mm3 9.23 10.15 11.30*   ALBUMIN g/dL 3.40* 3.30* 3.50   CHOLESTEROL mg/dL  --   --  147   TRIGLYCERIDES mg/dL  --   --  173*     Results from last 7 days   Lab Units 04/15/21  1102 04/15/21  0734 04/14/21  2021 04/14/21  1609 04/14/21  1309 04/14/21  0920   GLUCOSE mg/dL 181* 107 146* 228* 118 102     No results found for: HGBA1C  Medications reviewed   Medications reviewed: Yes  Pertinent: Colace, Insulin, Mag-ox, miralax  GTT:  PRN:      Current Nutrition Prescription   PO: Diet Regular; Consistent Carbohydrate; Low Phosphorus  Oral Nutrition Supplement: Boost GC 3x/day with meals (note that Boost Glucose Control is low in K+ and phos).       Evaluation of Received Nutrient/Fluid Intake:  Insufficient data       Nutrition Diagnosis     4/15  Problem Increased nutrient needs   Etiology Increased demand for nutrient   Signs/Symptoms Patient on hemodialysis     Intervention   Intervention: Follow treatment progress, Care plan reviewed, Supplement provided   - Continue Boost glucose control  - Will continue to monitor and f/u per protocol      Goal:   General: Nutrition support treatment  PO: Tolerate PO       Monitoring/Evaluation:   Monitoring/Evaluation: Per protocol, I&O, PO intake, Supplement intake, Pertinent labs, Symptoms    Belen Johnston RD  Time Spent: 20 min

## 2021-04-15 NOTE — PROGRESS NOTES
[facility]  IP Progress Note      Chief Complaint/Reason for service: Heart failure with reduced EF #2 elevated troponin    Subjective   Subjective: The patient's wife came to the door because he was getting a central line area clean I told her the results of his echocardiogram.  She states his urine output is picking up and his breathing is improved.  She states he wants to go home    Past medical, surgical, social and family history reviewed in the patient's electronic medical record.    Objective     Vital Sign Min/Max for last 24 hours  Temp  Min: 96.8 °F (36 °C)  Max: 98.2 °F (36.8 °C)   BP  Min: 106/60  Max: 185/113   Pulse  Min: 63  Max: 88   Resp  Min: 16  Max: 18   SpO2  Min: 91 %  Max: 100 %   Flow (L/min)  Min: 2  Max: 2      Intake/Output Summary (Last 24 hours) at 4/15/2021 0827  Last data filed at 4/14/2021 1836  Gross per 24 hour   Intake --   Output 4110 ml   Net -4110 ml             Current Facility-Administered Medications:   •  aspirin EC tablet 81 mg, 81 mg, Oral, Daily, Julio Montaño MD, 81 mg at 04/14/21 1256  •  calcium carbonate (TUMS) chewable tablet 500 mg (200 mg elemental), 2 tablet, Oral, TID PRN, La Chung DO, 2 tablet at 04/14/21 1829  •  calcium gluconate 1 g in sodium chloride 0.9 % 100 mL IVPB, 1 g, Intravenous, PRN, Last Rate: 100 mL/hr at 04/10/21 1731, 1 g at 04/10/21 1731 **AND** calcium gluconate 6 g in sodium chloride 0.9 % 500 mL IVPB, 6 g, Intravenous, PRN, Last Rate: 83.3 mL/hr at 04/09/21 1317, 6 g at 04/09/21 1317 **AND** Calcium, Ionized, , , PRN, Julio Montaño MD  •  carvedilol (COREG) tablet 25 mg, 25 mg, Oral, BID With Meals, Kay Diaz MD, 25 mg at 04/14/21 1830  •  cetirizine (zyrTEC) tablet 10 mg, 10 mg, Oral, Daily, Julio Montaño MD, 10 mg at 04/14/21 1258  •  diphenhydrAMINE (BENADRYL) capsule 25 mg, 25 mg, Oral, Nightly PRN, Kay Diaz MD, 25 mg at 04/14/21 2339  •  docusate  sodium (COLACE) capsule 100 mg, 100 mg, Oral, BID, Julio Montaño MD, 100 mg at 04/14/21 2220  •  fluticasone (FLONASE) 50 MCG/ACT nasal spray 2 spray, 2 spray, Each Nare, Daily, Tiff Hess DO, 2 spray at 04/15/21 0810  •  heparin (porcine) 5000 UNIT/ML injection 5,000 Units, 5,000 Units, Subcutaneous, Q12H, Julio Montaño MD, 5,000 Units at 04/14/21 2220  •  heparin (porcine) injection 1,600 Units, 1,600 Units, Intracatheter, PRN, Julio Montaño MD, 1,600 Units at 04/12/21 1119  •  hydrALAZINE (APRESOLINE) injection 10 mg, 10 mg, Intravenous, Q6H PRN, Kay Diaz MD, 10 mg at 04/12/21 0312  •  insulin detemir (LEVEMIR) injection 10 Units, 10 Units, Subcutaneous, Daily, Julio Montaño MD, 10 Units at 04/15/21 0809  •  insulin lispro (humaLOG) injection 0-7 Units, 0-7 Units, Subcutaneous, 4x Daily AC & at Bedtime, Julio Montaño MD, 3 Units at 04/14/21 1830  •  magnesium oxide (MAG-OX) tablet 1,000 mg, 1,000 mg, Oral, Daily, Jimenez Daniels MD, 1,000 mg at 04/14/21 1300  •  magnesium sulfate 4 gram infusion - Mg less than or equal to 1mg/dL, 4 g, Intravenous, PRN **OR** magnesium sulfate 3 gram infusion (1gm x 3) - Mg 1.1 - 1.5 mg/dL, 1 g, Intravenous, PRN **OR** Magnesium Sulfate 2 gram infusion- Mg 1.6 - 1.9 mg/dL, 2 g, Intravenous, PRN, Jimenez Daniels MD, Last Rate: 25 mL/hr at 04/11/21 1611, 2 g at 04/11/21 1611  •  melatonin tablet 5 mg, 5 mg, Oral, Nightly PRN, Nayely Cleveland, APRN, 5 mg at 04/12/21 2244  •  norepinephrine (LEVOPHED) 8 mg in 250 mL NS infusion (premix), 0.02-0.1 mcg/kg/min, Intravenous, Titrated, Julio Montaño MD, Stopped at 04/08/21 1550  •  oxyCODONE (ROXICODONE) immediate release tablet 5 mg, 5 mg, Oral, Q6H PRN, Julio Montaño MD, 5 mg at 04/11/21 0902  •  oxymetazoline (AFRIN) nasal spray 2 spray, 2 spray, Each Nare, Daily PRN, Tiff Hess, DO  •  polyethylene glycol (MIRALAX) packet 17 g, 17 g, Oral, Daily, Julio Montaño MD, 17  g at 04/14/21 1258  •  povidone-iodine (BETADINE) external solution, , Topical, Once, Axel Avalos MD  •  sodium chloride 0.9 % flush 10 mL, 10 mL, Intravenous, Q12H, Julio Montaño MD, 10 mL at 04/14/21 2220  •  sodium chloride 0.9 % flush 10 mL, 10 mL, Intravenous, PRN, Julio Montaño MD  •  sodium chloride nasal spray 2 spray, 2 spray, Each Nare, PRN, Julio Mnotaño MD, 2 spray at 04/10/21 1346  •  traZODone (DESYREL) tablet 50 mg, 50 mg, Oral, Nightly, Tiff Hess DO, 50 mg at 04/14/21 2219    Physical Exam: General Pleasant overweight male in a recliner                  Results Review: Hemoglobin is 10.0.  Systolic blood pressures 1 11-1 62.    Radiology Results:  Imaging Results (Last 72 Hours)     ** No results found for the last 72 hours. **          EKG: Sinus rhythm    ECHO: EF 30%.  I just read the new echocardiogram which shows lower limits of normal LV systolic function and wall motion with an ejection fraction 46 to 50%.  The RV also is functioning better with increase systolic function    Tele: No ventricular arrhythmias    Assessment   Assessment/Plan: 1 new onset systolic heart failure-patient's heart failure has improved on dialysis.  His repeat echocardiogram shows EF now 46 to 50%.  Global wall motion appears to be normal RV function is improved as well.  He does not need a LifeVest now.  2 elevated troponin-secondary to demand ischemia.  Since the patient has no symptoms ischemic evaluation not necessary at this time.  The patient can be discharged from cardiology point of view whenever ready.  I would keep him on Coreg and what ever other blood pressure medicines necessary and approved by nephrology service    Aniceto Newby MD  04/15/21  08:27 EDT

## 2021-04-15 NOTE — THERAPY TREATMENT NOTE
Patient Name: Elvis Hanson  : 1955    MRN: [id number]                              Today's Date: 4/15/2021       Admit Date: 2021    Visit Dx:     ICD-10-CM ICD-9-CM   1. Altered mental status, unspecified altered mental status type  R41.82 780.97   2. Hypotension, unspecified hypotension type  I95.9 458.9   3. Acute renal failure, unspecified acute renal failure type (CMS/HCC)  N17.9 584.9   4. Bilateral hearing loss, unspecified hearing loss type  H91.93 389.9   5. Elevated LFTs  R79.89 790.6   6. Elevated troponin  R77.8 790.6     Patient Active Problem List   Diagnosis   • Hypertension   • T2DM   • Dyslipidemia   • Sleep apnea   • Elevated liver enzymes   • RINKU   • Recent Rt Total Knee Replacement 21   • Chronic pain w/pain pump   • Lactic acidosis   • Acute HFrEF (EF 30%)   • Shock (CMS/HCC) -cardiogenic versus septic     Past Medical History:   Diagnosis Date   • Diabetes mellitus (CMS/HCC)    • Hyperlipidemia    • Hypertension    • Sleep apnea      Past Surgical History:   Procedure Laterality Date   • CHOLECYSTECTOMY     • JOINT REPLACEMENT Right     KNEE     General Information     Row Name 04/15/21 0835          Physical Therapy Time and Intention    Document Type  therapy note (daily note)  -     Mode of Treatment  physical therapy  -     Row Name 04/15/21 0835          General Information    Existing Precautions/Restrictions  no known precautions/restrictions  -     Row Name 04/15/21 0835          Cognition    Orientation Status (Cognition)  oriented x 4  -     Row Name 04/15/21 0835          Safety Issues, Functional Mobility    Safety Issues Affecting Function (Mobility)  insight into deficits/self-awareness;sequencing abilities  -     Impairments Affecting Function (Mobility)  endurance/activity tolerance;strength;range of motion (ROM)  -       User Key  (r) = Recorded By, (t) = Taken By, (c) = Cosigned By    Initials Name Provider Type    Ila Ryder PT Physical  Therapist        Mobility     [facility] Name 04/15/21 0921          Bed Mobility    Comment (Bed Mobility)  seated EOB upon arrival  -[facility] Name 04/15/21 0921          Transfers    Comment (Transfers)  cues for sequencing and hand placement  -[facility] Name 04/15/21 0921          Sit-Stand Transfer    Sit-Stand [place] (Transfers)  supervision  -     Assistive Device (Sit-Stand Transfers)  walker, front-wheeled  -[facility] Name 04/15/21 0921          Gait/Stairs (Locomotion)    [place] Level (Gait)  standby assist  -     Assistive Device (Gait)  walker, front-wheeled  -     Distance in Feet (Gait)  50  -SJ     Deviations/Abnormal Patterns (Gait)  base of support, wide;jeancarlos decreased;gait speed decreased;stride length decreased  -SJ     Bilateral Gait Deviations  heel strike decreased  -SJ     Right Sided Gait Deviations  heel strike decreased  -SJ     Comment (Gait/Stairs)  distance limited by fatigue. Pt dem step-through pattern, no LOB. VSS.  -       User Key  (r) = Recorded By, (t) = Taken By, (c) = Cosigned By    Initials Name Provider Type     Ila Bearden, PT Physical Therapist        Obj/Interventions     Row Name 04/15/21 0922          Motor Skills    Motor Skills  therapeutic exercise  -[facility] Name 04/15/21 0922          Knee (Therapeutic Exercise)    Knee Isometrics (Therapeutic Exercise)  quad sets;right  -     Knee Strengthening (Therapeutic Exercise)  right;LAQ (long arc quad);SLR (straight leg raise);10 repetitions;sitting;heel slides  -[facility] Name 04/15/21 0922          Ankle (Therapeutic Exercise)    Ankle Strengthening (Therapeutic Exercise)  bilateral;dorsiflexion;plantarflexion;10 repetitions;sitting  -[facility] Name 04/15/21 0922          Balance    Static Sitting Balance  WNL  -SJ     Dynamic Sitting Balance  WNL  -SJ     Static Standing Balance  WFL;supported  -       User Key  (r) = Recorded By, (t) = Taken By, (c) = Cosigned By    Initials Name  Provider Type    Ila Ryder PT Physical Therapist        Goals/Plan    No documentation.       Clinical Impression     Row Name 04/15/21 0923          Pain    Additional Documentation  Pain Scale: Numbers Pre/Post-Treatment (Group)  -     Row Name 04/15/21 0923          Pain Scale: Numbers Pre/Post-Treatment    Pretreatment Pain Rating  0/10 - no pain  -     Posttreatment Pain Rating  0/10 - no pain  -SJ     Row Name 04/15/21 0923          Plan of Care Review    Plan of Care Reviewed With  patient  -     Progress  no change  -     Outcome Summary  Pt states he is very fatigued today, however gave good effort. Pt dem increasing independence with t/f's and improving gait quality, distance limited by fatigue. Unable to perform SLR without assistance. VSS. Possible HD today.  -     Row Name 04/15/21 0923          Vital Signs    Pre Systolic BP Rehab  162  -SJ     Pre Treatment Diastolic BP  96  -SJ     Pretreatment Heart Rate (beats/min)  71  -SJ     Pre SpO2 (%)  100  -SJ     O2 Delivery Pre Treatment  room air  -SJ     Pre Patient Position  Sitting  -SJ     Intra Patient Position  Standing  -SJ     Post Patient Position  Sitting  -SJ     Row Name 04/15/21 0923          Positioning and Restraints    Pre-Treatment Position  in bed  -SJ     Post Treatment Position  chair  -SJ     In Chair  reclined;call light within reach;encouraged to call for assist;legs elevated  -       User Key  (r) = Recorded By, (t) = Taken By, (c) = Cosigned By    Initials Name Provider Type    Ila Ryder, PT Physical Therapist        Outcome Measures     Row Name 04/15/21 0926          How much help from another person do you currently need...    Turning from your back to your side while in flat bed without using bedrails?  4  -SJ     Moving from lying on back to sitting on the side of a flat bed without bedrails?  3  -SJ     Moving to and from a bed to a chair (including a wheelchair)?  4  -SJ     Standing up from  a chair using your arms (e.g., wheelchair, bedside chair)?  4  -SJ     Climbing 3-5 steps with a railing?  3  -SJ     To walk in hospital room?  4  -SJ     AM-PAC 6 Clicks Score (PT)  22  -SJ     Row Name 04/15/21 0926          Functional Assessment    Outcome Measure Options  AM-PAC 6 Clicks Basic Mobility (PT)  -       User Key  (r) = Recorded By, (t) = Taken By, (c) = Cosigned By    Initials Name Provider Type     Ila Bearden, [facility] Physical Therapist        Physical Therapy Education                 Title: PT OT SLP Therapies (Done)     Topic: Physical Therapy (Done)     Point: Mobility training (Done)     Learning Progress Summary           Patient Acceptance, E, VU,DU by  at 4/15/2021 0926    Acceptance, E,D, VU,NR by MB at 4/13/2021 1512    Comment: HEP, transfer safety/mechanics, gait safety/mechanics, home safety                   Point: Home exercise program (Done)     Learning Progress Summary           Patient Acceptance, E, VU,DU by  at 4/15/2021 0926    Acceptance, E,D, VU,NR by MB at 4/13/2021 1512    Comment: HEP, transfer safety/mechanics, gait safety/mechanics, home safety                   Point: Body mechanics (Done)     Learning Progress Summary           Patient Acceptance, E, VU,DU by  at 4/15/2021 0926    Acceptance, E,D, VU,NR by MB at 4/13/2021 1512    Comment: HEP, transfer safety/mechanics, gait safety/mechanics, home safety                   Point: Precautions (Done)     Learning Progress Summary           Patient Acceptance, E, VU,DU by  at 4/15/2021 0926    Acceptance, E,D, VU,NR by MB at 4/13/2021 1512    Comment: HEP, transfer safety/mechanics, gait safety/mechanics, home safety                               User Key     Initials Effective Dates Name Provider Type [facility] 06/19/15 -  Ila Bearden PT Physical Therapist PT    MB 03/14/16 -  Carolyn Perera PT Physical Therapist PT              PT Recommendation and Plan     Plan of Care Reviewed With:  patient  Progress: no change  Outcome Summary: Pt states he is very fatigued today, however gave good effort. Pt dem increasing independence with t/f's and improving gait quality, distance limited by fatigue. Unable to perform SLR without assistance. VSS. Possible HD today.     Time Calculation:   PT Charges     Row Name 04/15/21 0927             Time Calculation    Start Time  0835  -      PT Received On  04/15/21  -      PT Goal Re-Cert Due Date  04/23/21  -         Time Calculation- PT    Total Timed Code Minutes- PT  33 minute(s)  -         Timed Charges    73837 - PT Therapeutic Exercise Minutes  15  -SJ      66859 - Gait Training Minutes   18  -SJ        User Key  (r) = Recorded By, (t) = Taken By, (c) = Cosigned By    Initials Name Provider Type     Ila Bearden PT Physical Therapist        Therapy Charges for Today     Code Description Service Date Service Provider Modifiers Qty    02447594634 HC PT THER PROC EA 15 MIN 4/15/2021 Ila Bearden, PT GP 1    58392044350 HC GAIT TRAINING EA 15 MIN 4/15/2021 Ila Bearden, PT GP 1          PT G-Codes  Outcome Measure Options: AM-PAC 6 Clicks Basic Mobility (PT)  AM-PAC 6 Clicks Score (PT): 22    Ila Bearden PT  4/15/2021

## 2021-04-15 NOTE — PLAN OF CARE
Goal Outcome Evaluation:  Plan of Care Reviewed With: patient  Progress: no change  Outcome Summary: Pt states he is very fatigued today, however gave good effort. Pt dem increasing independence with t/f's and improving gait quality, distance limited by fatigue. Unable to perform SLR without assistance. VSS. Possible HD today.

## 2021-04-15 NOTE — PLAN OF CARE
Goal Outcome Evaluation:  Plan of Care Reviewed With: patient, spouse  Progress: improving  Outcome Summary: Pt able to increase gait distanceto 60ft, improved gait quality. Pt able to perform SLR with moderate quad lag. VSS. Continue POC.

## 2021-04-15 NOTE — PROGRESS NOTES
"   LOS: 8 days    Patient Care Team:  April Chen DO as PCP - General (Internal Medicine)    Chief Complaint:      Subjective     Interval History:   Sitting in chair doing fairly stable. Generalize edema      Review of Systems:   Complains of low urine output, edema, denies, nausea vomiting dysuria hematuria no shortness of breath or chest pain.  All other systems are negative.    Objective     Vital Sign Min/Max for last 24 hours  Temp  Min: 97.3 °F (36.3 °C)  Max: 98.2 °F (36.8 °C)   BP  Min: 127/67  Max: 162/96   Pulse  Min: 69  Max: 84   Resp  Min: 16  Max: 18   SpO2  Min: 97 %  Max: 100 %   No data recorded   Weight  Min: 183 kg (403 lb 11.2 oz)  Max: 183 kg (403 lb 11.2 oz)     Flowsheet Rows      First Filed Value   Admission Height  177.8 cm (70\") Documented at 04/07/2021 1041   Admission Weight  127 kg (280 lb) Documented at 04/07/2021 1041          No intake/output data recorded.  I/O last 3 completed shifts:  In: 828 [P.O.:828]  Out: 4210 [Urine:200; Other:4010]    Physical Exam:  General Appearance: -American male morbid obesity comfortable in bed -American male  Facial: Left-sided Bell's palsy noted chronic.  Eyes: PER, EOMI.  Neck: Supple no JVD.  Lungs: Clear auscultation, no rales rhonchi's, equal chest movement, nonlabored.  Heart: No gallop, murmur, rub, RRR.  Abdomen: Obesity distended soft nontender, positive bowel sounds, no organomegaly.  Extremities: Lateral lower extremity 3+ edema trace to 1+ upper extremity edema.  Neuro: No focal deficit, moving all extremities, alert oriented X 3  : No suprapubic fullness  Skin warm and dry.  WBC WBC   Date Value Ref Range Status   04/15/2021 9.23 3.40 - 10.80 10*3/mm3 Final   04/14/2021 10.15 3.40 - 10.80 10*3/mm3 Final   04/13/2021 11.30 (H) 3.40 - 10.80 10*3/mm3 Final      HGB Hemoglobin   Date Value Ref Range Status   04/15/2021 10.0 (L) 13.0 - 17.7 g/dL Final   04/14/2021 10.2 (L) 13.0 - 17.7 g/dL Final   04/13/2021 10.3 " (L) 13.0 - 17.7 g/dL Final      HCT Hematocrit   Date Value Ref Range Status   04/15/2021 32.3 (L) 37.5 - 51.0 % Final   04/14/2021 32.5 (L) 37.5 - 51.0 % Final   04/13/2021 32.3 (L) 37.5 - 51.0 % Final      Platlets No results found for: LABPLAT   MCV MCV   Date Value Ref Range Status   04/15/2021 83.5 79.0 - 97.0 fL Final   04/14/2021 82.1 79.0 - 97.0 fL Final   04/13/2021 80.0 79.0 - 97.0 fL Final          Sodium Sodium   Date Value Ref Range Status   04/15/2021 131 (L) 136 - 145 mmol/L Final   04/14/2021 129 (L) 136 - 145 mmol/L Final   04/13/2021 132 (L) 136 - 145 mmol/L Final      Potassium Potassium   Date Value Ref Range Status   04/15/2021 3.4 (L) 3.5 - 5.2 mmol/L Final   04/14/2021 3.6 3.5 - 5.2 mmol/L Final     Comment:     Slight hemolysis detected by analyzer. Results may be affected.   04/13/2021 3.4 (L) 3.5 - 5.2 mmol/L Final      Chloride Chloride   Date Value Ref Range Status   04/15/2021 95 (L) 98 - 107 mmol/L Final   04/14/2021 91 (L) 98 - 107 mmol/L Final   04/13/2021 94 (L) 98 - 107 mmol/L Final      CO2 CO2   Date Value Ref Range Status   04/15/2021 23.0 22.0 - 29.0 mmol/L Final   04/14/2021 23.0 22.0 - 29.0 mmol/L Final   04/13/2021 23.0 22.0 - 29.0 mmol/L Final      BUN BUN   Date Value Ref Range Status   04/15/2021 49 (H) 8 - 23 mg/dL Final   04/14/2021 71 (H) 8 - 23 mg/dL Final   04/13/2021 54 (H) 8 - 23 mg/dL Final      Creatinine Creatinine   Date Value Ref Range Status   04/15/2021 7.97 (H) 0.76 - 1.27 mg/dL Final   04/14/2021 8.66 (H) 0.76 - 1.27 mg/dL Final   04/13/2021 6.70 (H) 0.76 - 1.27 mg/dL Final      Calcium Calcium   Date Value Ref Range Status   04/15/2021 7.4 (L) 8.6 - 10.5 mg/dL Final   04/14/2021 7.6 (L) 8.6 - 10.5 mg/dL Final   04/13/2021 8.1 (L) 8.6 - 10.5 mg/dL Final      PO4 No results found for: CAPO4   Albumin Albumin   Date Value Ref Range Status   04/15/2021 3.40 (L) 3.50 - 5.20 g/dL Final   04/14/2021 3.30 (L) 3.50 - 5.20 g/dL Final   04/13/2021 3.50 3.50 - 5.20  g/dL Final      Magnesium Magnesium   Date Value Ref Range Status   04/14/2021 2.4 1.6 - 2.4 mg/dL Final      Uric Acid No results found for: URICACID     Right kidney measures 11.2 cm in length without apparent mass or   hydronephrosis. Normal color Doppler flow.       Left kidney measures 11.0 cm in length without apparent mass or   hydronephrosis. Normal color Doppler flow.       Unremarkable catheterized and contracted urinary bladder.       Impression:     Unremarkable sonographic appearance of both kidneys.         Results Review:     I reviewed the patient's new clinical results.    aspirin, 81 mg, Oral, Daily  carvedilol, 25 mg, Oral, BID With Meals  cetirizine, 10 mg, Oral, Daily  docusate sodium, 100 mg, Oral, BID  fluticasone, 2 spray, Each Nare, Daily  heparin (porcine), 5,000 Units, Subcutaneous, Q12H  insulin detemir, 10 Units, Subcutaneous, Daily  insulin lispro, 0-7 Units, Subcutaneous, 4x Daily AC & at Bedtime  magnesium oxide, 1,000 mg, Oral, Daily  polyethylene glycol, 17 g, Oral, Daily  povidone-iodine, , Topical, Once  sodium chloride, 10 mL, Intravenous, Q12H  traZODone, 25 mg, Oral, Nightly      norepinephrine, 0.02-0.1 mcg/kg/min, Last Rate: Stopped (04/08/21 1550)        Medication Review: As noted above    Assessment/Plan       RINKU    Hypertension    T2DM    Dyslipidemia    Sleep apnea    Elevated liver enzymes    Recent Rt Total Knee Replacement 4/5/21    Chronic pain w/pain pump    Lactic acidosis    Acute HFrEF (EF 30%)    Shock (CMS/HCC) -cardiogenic versus septic  1.  RINKU: Oliguirc now on HD. Presumed hemodynamic injury and ATN.    Nephrotic range proteinuria. Serological testing negative. MARIANA, ANCA, AntiDsDNA  Right kidney 11.2 cm, left kidney 11.0 cm normal color Doppler unremarkable bilateral kidneys.  Hepatitis negative, on IV vancomycin on admission  2.  Septic shock: required pressors.   3.  Nephrotic proteinuria greater than 6 g in the setting of diabetes.  4.  Altered mental  status and hearing difficulty improved at this time.  5.  S/p recent right total knee replacement.  6.  Congestive heart failure: Ejection fraction 30%.  7.  Hypocalcemia.      Plan:  Daily eval for HD. Since he has acute kidney injury   Monitor for renal recovery   Plan for HD tomorrow. Need optimization of volume status       Demetrio Crook MD  04/15/21  13:34 EDT

## 2021-04-15 NOTE — THERAPY TREATMENT NOTE
Patient Name: Elvis Hanson  : 1955    MRN: [id number]                              Today's Date: 4/15/2021       Admit Date: 2021    Visit Dx:     ICD-10-CM ICD-9-CM   1. Altered mental status, unspecified altered mental status type  R41.82 780.97   2. Hypotension, unspecified hypotension type  I95.9 458.9   3. Acute renal failure, unspecified acute renal failure type (CMS/HCC)  N17.9 584.9   4. Bilateral hearing loss, unspecified hearing loss type  H91.93 389.9   5. Elevated LFTs  R79.89 790.6   6. Elevated troponin  R77.8 790.6     Patient Active Problem List   Diagnosis   • Hypertension   • T2DM   • Dyslipidemia   • Sleep apnea   • Elevated liver enzymes   • RINKU   • Recent Rt Total Knee Replacement 21   • Chronic pain w/pain pump   • Lactic acidosis   • Acute HFrEF (EF 30%)   • Shock (CMS/HCC) -cardiogenic versus septic     Past Medical History:   Diagnosis Date   • Diabetes mellitus (CMS/HCC)    • Hyperlipidemia    • Hypertension    • Sleep apnea      Past Surgical History:   Procedure Laterality Date   • CHOLECYSTECTOMY     • JOINT REPLACEMENT Right     KNEE     General Information     Row Name 04/15/21 145 04/15/21 0835       Physical Therapy Time and Intention    Document Type  therapy note (daily note)  -  therapy note (daily note)  -    Mode of Treatment  physical therapy  -  physical therapy  -    Row Name 04/15/21 145 04/15/21 0835       General Information    Existing Precautions/Restrictions  no known precautions/restrictions  -  no known precautions/restrictions  -    Row Name 04/15/21 145 04/15/21 0835       Cognition    Orientation Status (Cognition)  oriented x 4  -SJ  oriented x 4  -SJ    Row Name 04/15/21 145 04/15/21 0835       Safety Issues, Functional Mobility    Safety Issues Affecting Function (Mobility)  --  insight into deficits/self-awareness;sequencing abilities  -SJ    Impairments Affecting Function (Mobility)  endurance/activity tolerance;strength;range  of motion (ROM)  -SJ  endurance/activity tolerance;strength;range of motion (ROM)  -SJ      User Key  (r) = Recorded By, (t) = Taken By, (c) = Cosigned By    Initials Name Provider Type    Ila Ryder PT Physical Therapist        Mobility     Row Name 04/15/21 1518 04/15/21 0921       Bed Mobility    Comment (Bed Mobility)  UIC  -SJ  seated EOB upon arrival  -    Row Name 04/15/21 1518 04/15/21 0921       Transfers    Comment (Transfers)  pt able to complete without difficulty  -  cues for sequencing and hand placement  -    Row Name 04/15/21 1518 04/15/21 0921       Sit-Stand Transfer    Sit-Stand [place] (Transfers)  supervision  -  supervision  -    Assistive Device (Sit-Stand Transfers)  walker, standard  -SJ  walker, front-wheeled  -SJ    Row Name 04/15/21 1518 04/15/21 0921       Gait/Stairs (Locomotion)    [place] Level (Gait)  standby assist  -SJ  standby assist  -SJ    Assistive Device (Gait)  walker, standard  -SJ  walker, front-wheeled  -SJ    Distance in Feet (Gait)  60  -SJ  50  -SJ    Deviations/Abnormal Patterns (Gait)  base of support, wide;jeancarlos decreased;gait speed decreased;stride length decreased  -SJ  base of support, wide;jeancarlos decreased;gait speed decreased;stride length decreased  -SJ    Bilateral Gait Deviations  heel strike decreased  -SJ  heel strike decreased  -SJ    Right Sided Gait Deviations  heel strike decreased  -SJ  heel strike decreased  -SJ    Comment (Gait/Stairs)  distance limited by fatigue  -SJ  distance limited by fatigue. Pt dem step-through pattern, no LOB. VSS.  -SJ      User Key  (r) = Recorded By, (t) = Taken By, (c) = Cosigned By    Initials Name Provider Type    Ila Ryder PT Physical Therapist        Obj/Interventions     Row Name 04/15/21 0922          Motor Skills    Motor Skills  therapeutic exercise  -     Row Name 04/15/21 1518          Hip (Therapeutic Exercise)    Hip Strengthening (Therapeutic Exercise)   right;marching while seated;sitting;10 repetitions  -SJ     Row Name 04/15/21 1518 04/15/21 0922       Knee (Therapeutic Exercise)    Knee Isometrics (Therapeutic Exercise)  right;quad sets;10 repetitions  -SJ  quad sets;right  -SJ    Knee Strengthening (Therapeutic Exercise)  right;SLR (straight leg raise);LAQ (long arc quad);heel slides;sitting;10 repetitions  -SJ  right;LAQ (long arc quad);SLR (straight leg raise);10 repetitions;sitting;heel slides  -    Row Name 04/15/21 1518 04/15/21 0922       Ankle (Therapeutic Exercise)    Ankle Strengthening (Therapeutic Exercise)  bilateral;dorsiflexion;plantarflexion;10 repetitions  -SJ  bilateral;dorsiflexion;plantarflexion;10 repetitions;sitting  -    Row Name 04/15/21 0922          Balance    Static Sitting Balance  WNL  -SJ     Dynamic Sitting Balance  WNL  -SJ     Static Standing Balance  WFL;supported  -SJ       User Key  (r) = Recorded By, (t) = Taken By, (c) = Cosigned By    Initials Name Provider Type    Ila Ryder PT Physical Therapist        Goals/Plan    No documentation.       Clinical Impression     Row Name 04/15/21 1519 04/15/21 0923       Pain    Additional Documentation  Pain Scale: Numbers Pre/Post-Treatment (Group)  -  Pain Scale: Numbers Pre/Post-Treatment (Group)  -[facility] Name 04/15/21 1519 04/15/21 0923       Pain Scale: Numbers Pre/Post-Treatment    Pretreatment Pain Rating  0/10 - no pain  -  0/10 - no pain  -SJ    Posttreatment Pain Rating  0/10 - no pain  -SJ  0/10 - no pain  -    Row Name 04/15/21 1519 04/15/21 0923       Plan of Care Review    Plan of Care Reviewed With  patient;spouse  -  patient  -    Progress  improving  -SJ  no change  -    Outcome Summary  Pt able to increase gait distanceto 60ft, improved gait quality. Pt able to perform SLR with moderate quad lag. VSS. Continue POC.  -SJ  Pt states he is very fatigued today, however gave good effort. Pt dem increasing independence with t/f's and improving  gait quality, distance limited by fatigue. Unable to perform SLR without assistance. VSS. Possible HD today.  -SJ    Row Name 04/15/21 1519 04/15/21 0923       Vital Signs    Pre Systolic BP Rehab  141  -SJ  162  -SJ    Pre Treatment Diastolic BP  82  -SJ  96  -SJ    Pretreatment Heart Rate (beats/min)  --  71  -SJ    Pre SpO2 (%)  98  -SJ  100  -SJ    O2 Delivery Pre Treatment  room air  -SJ  room air  -SJ    Pre Patient Position  Sitting  -SJ  Sitting  -SJ    Intra Patient Position  Standing  -SJ  Standing  -SJ    Post Patient Position  Sitting  -SJ  Sitting  -SJ    Row Name 04/15/21 1519 04/15/21 0923       Positioning and Restraints    Pre-Treatment Position  sitting in chair/recliner  -SJ  in bed  -SJ    Post Treatment Position  chair  -SJ  chair  -SJ    In Chair  reclined;call light within reach;encouraged to call for assist;with family/caregiver;waffle cushion;legs elevated  -SJ  reclined;call light within reach;encouraged to call for assist;legs elevated  -SJ      User Key  (r) = Recorded By, (t) = Taken By, (c) = Cosigned By    Initials Name Provider Type    Ila Ryder, PT Physical Therapist        Outcome Measures     Row Name 04/15/21 1520 04/15/21 0926       How much help from another person do you currently need...    Turning from your back to your side while in flat bed without using bedrails?  4  -SJ  4  -SJ    Moving from lying on back to sitting on the side of a flat bed without bedrails?  3  -SJ  3  -SJ    Moving to and from a bed to a chair (including a wheelchair)?  4  -SJ  4  -SJ    Standing up from a chair using your arms (e.g., wheelchair, bedside chair)?  4  -SJ  4  -SJ    Climbing 3-5 steps with a railing?  3  -SJ  3  -SJ    To walk in hospital room?  4  -SJ  4  -SJ    AM-PAC 6 Clicks Score (PT)  22  -SJ  22  -SJ    Row Name 04/15/21 1520 04/15/21 0926       Functional Assessment    Outcome Measure Options  AM-PAC 6 Clicks Basic Mobility (PT)  -SJ  AM-PAC 6 Clicks Basic Mobility  (PT)  -      User Key  (r) = Recorded By, (t) = Taken By, (c) = Cosigned By    Initials Name Provider Type     Ila Bearden PT Physical Therapist        Physical Therapy Education                 Title: PT OT SLP Therapies (Done)     Topic: Physical Therapy (Done)     Point: Mobility training (Done)     Learning Progress Summary           Patient Acceptance, E,D, VU,DU by  at 4/15/2021 1521    Acceptance, E, VU,DU by  at 4/15/2021 0926    Acceptance, E,D, VU,NR by MB at 4/13/2021 1512    Comment: HEP, transfer safety/mechanics, gait safety/mechanics, home safety                   Point: Home exercise program (Done)     Learning Progress Summary           Patient Acceptance, E,D, VU,DU by  at 4/15/2021 1521    Acceptance, E, VU,DU by  at 4/15/2021 0926    Acceptance, E,D, VU,NR by MB at 4/13/2021 1512    Comment: HEP, transfer safety/mechanics, gait safety/mechanics, home safety                   Point: Body mechanics (Done)     Learning Progress Summary           Patient Acceptance, E,D, VU,DU by  at 4/15/2021 1521    Acceptance, E, VU,DU by  at 4/15/2021 0926    Acceptance, E,D, VU,NR by MB at 4/13/2021 1512    Comment: HEP, transfer safety/mechanics, gait safety/mechanics, home safety                   Point: Precautions (Done)     Learning Progress Summary           Patient Acceptance, E,D, VU,DU by  at 4/15/2021 1521    Acceptance, E, VU,DU by  at 4/15/2021 0926    Acceptance, E,D, VU,NR by MB at 4/13/2021 1512    Comment: HEP, transfer safety/mechanics, gait safety/mechanics, home safety                               User Key     Initials Effective Dates Name Provider Type Discipline     06/19/15 -  Ila Bearden PT Physical Therapist PT    MB 03/14/16 -  Carolyn Perera PT Physical Therapist PT              PT Recommendation and Plan     Plan of Care Reviewed With: patient, spouse  Progress: improving  Outcome Summary: Pt able to increase gait distanceto 60ft, improved gait  quality. Pt able to perform SLR with moderate quad lag. VSS. Continue POC.     Time Calculation:   PT Charges     Row Name 04/15/21 1521 04/15/21 0927          Time Calculation    Start Time  1457  -  0835  -     PT Received On  04/15/21  -  04/15/21  -     PT Goal Re-Cert Due Date  04/23/21  -  04/23/21  -        Time Calculation- PT    Total Timed Code Minutes- PT  23 minute(s)  -SJ  33 minute(s)  -SJ        Timed Charges    47668 - PT Therapeutic Exercise Minutes  8  -SJ  15  -SJ     81423 - Gait Training Minutes   15  -SJ  18  -SJ       User Key  (r) = Recorded By, (t) = Taken By, (c) = Cosigned By    Initials Name Provider Type     Ila Bearden, PT Physical Therapist        Therapy Charges for Today     Code Description Service Date Service Provider Modifiers Qty    62164671689 HC PT THER PROC EA 15 MIN 4/15/2021 Ila Bearden, PT GP 1    85057820799 HC GAIT TRAINING EA 15 MIN 4/15/2021 Ila Bearden, PT GP 1    61489442364 HC PT THER PROC EA 15 MIN 4/15/2021 Ila Bearden, PT GP 1    84722312034 HC GAIT TRAINING EA 15 MIN 4/15/2021 Ila Bearden, PT GP 1          PT G-Codes  Outcome Measure Options: AM-PAC 6 Clicks Basic Mobility (PT)  AM-PAC 6 Clicks Score (PT): 22    Ila Bearden PT  4/15/2021

## 2021-04-15 NOTE — PROGRESS NOTES
[facility] Medicine Services  PROGRESS NOTE    Patient Name: Elvis Hanson  : 1955  MRN: [id number]    Date of Admission: 2021  Primary Care Physician: April Chen DO    Subjective   Subjective     CC:  Hearing loss; renal failure; heart failure    HPI:  States he is doing ok. Feeling tired. Reviewed labs and workup and pending plan.     ROS:  Gen- No fevers, chills  CV- No chest pain, palpitations  Resp- No cough, dyspnea  GI- No N/V/D, abd pain     Objective   Objective     Vital Signs:   Temp:  [97.3 °F (36.3 °C)-98.2 °F (36.8 °C)] 97.3 °F (36.3 °C)  Heart Rate:  [69-84] 71  Resp:  [16-18] 18  BP: (127-162)/(67-96) 141/82        Physical Exam:  Constitutional: No acute distress, awake, alert  HENT: NCAT, mucous membranes moist  Respiratory: Clear to auscultation bilaterally, respiratory effort normal   Cardiovascular: RRR, no murmurs, rubs, or gallops  Gastrointestinal: Positive bowel sounds, soft, nontender, nondistended  Musculoskeletal: trace bilateral ankle edema  Psychiatric: Appropriate affect, cooperative  Neurologic: Oriented x 3, strength symmetric in all extremities, Cranial Nerves grossly intact to confrontation, speech clear  Skin: No rashes    Results Reviewed:  Results from last 7 days   Lab Units 04/15/21  0721  050216   WBC 10*3/mm3 9.23 10.15 11.30*   HEMOGLOBIN g/dL 10.0* 10.2* 10.3*   HEMATOCRIT % 32.3* 32.5* 32.3*   PLATELETS 10*3/mm3 205 217 222     Results from last 7 days   Lab Units 04/15/21  0719 21  0502 21  0456 21  0800   SODIUM mmol/L 131* 129* 132* 135*   POTASSIUM mmol/L 3.4* 3.6 3.4* 3.4*   CHLORIDE mmol/L 95* 91* 94* 92*   CO2 mmol/L 23.0 23.0 23.0 24.0   BUN mg/dL 49* 71* 54* 69*   CREATININE mg/dL 7.97* 8.66* 6.70* 7.99*   GLUCOSE mg/dL 108* 127* 135* 128*   CALCIUM mg/dL 7.4* 7.6* 8.1* 7.6*   ALT (SGPT) U/L 374* 540* 832* 1,197*   AST (SGOT) U/L 45* 58* 92* 188*   TROPONIN T ng/mL  --    --   --  0.717*     Estimated Creatinine Clearance: 15.5 mL/min (A) (by C-G formula based on SCr of 7.97 mg/dL (H)).    Microbiology Results Abnormal     Procedure Component Value - Date/Time    Blood Culture - Blood, Hand, Left [[id number]] Collected: 04/07/21 2057    Lab Status: Final result Specimen: Blood from Hand, Left Updated: 04/12/21 2145     Blood Culture No growth at 5 days    Blood Culture - Blood, Hand, Right [[id number]] Collected: 04/07/21 2057    Lab Status: Final result Specimen: Blood from Hand, Right Updated: 04/12/21 2145     Blood Culture No growth at 5 days    Urine Culture - Urine, Urine, Clean Catch [[id number]]  (Normal) Collected: 04/09/21 1325    Lab Status: Final result Specimen: Urine, Clean Catch Updated: 04/10/21 1742     Urine Culture No growth    MRSA Screen, PCR (Inpatient) - Swab, Nares [[id number]]  (Normal) Collected: 04/08/21 0341    Lab Status: Final result Specimen: Swab from Nares Updated: 04/08/21 0937     MRSA PCR Negative    Narrative:      MRSA Negative    COVID-19 and FLU A/B PCR - Swab, Nasopharynx [[id number]]  (Normal) Collected: 04/07/21 1357    Lab Status: Final result Specimen: Swab from Nasopharynx Updated: 04/07/21 1509     COVID19 Not Detected     Influenza A PCR Not Detected     Influenza B PCR Not Detected    Narrative:      Fact sheet for providers: https://www.fda.gov/media/610663/download    Fact sheet for patients: https://www.fda.gov/media/990286/download    Test performed by PCR.          Imaging Results (Last 24 Hours)     ** No results found for the last 24 hours. **          Results for orders placed during the hospital encounter of 04/07/21    Adult Transthoracic Echo Complete W/ Cont if Necessary Per Protocol    Interpretation Summary  · Estimated left ventricular EF = 30% Left ventricular systolic function is severely decreased.  · There is global hypokinesis. Basal inferior and inferoseptal wall appears akinetic  · Left ventricular wall thickness is  consistent with mild to moderate posterior asymmetric hypertrophy  · Moderately reduced right ventricular systolic function noted.  · Moderate tricuspid valve regurgitation is present. Estimated right ventricular systolic pressure from tricuspid regurgitation is normal (<35 mmHg).      I have reviewed the medications:  Scheduled Meds:aspirin, 81 mg, Oral, Daily  carvedilol, 25 mg, Oral, BID With Meals  cetirizine, 10 mg, Oral, Daily  docusate sodium, 100 mg, Oral, BID  fluticasone, 2 spray, Each Nare, Daily  heparin (porcine), 5,000 Units, Subcutaneous, Q12H  insulin detemir, 10 Units, Subcutaneous, Daily  insulin lispro, 0-7 Units, Subcutaneous, 4x Daily AC & at Bedtime  magnesium oxide, 1,000 mg, Oral, Daily  polyethylene glycol, 17 g, Oral, Daily  povidone-iodine, , Topical, Once  sodium chloride, 10 mL, Intravenous, Q12H  traZODone, 50 mg, Oral, Nightly      Continuous Infusions:norepinephrine, 0.02-0.1 mcg/kg/min, Last Rate: Stopped (04/08/21 1550)      PRN Meds:.calcium carbonate  •  calcium gluconate IVPB **AND** calcium gluconate IVPB **AND** Calcium, Ionized  •  diphenhydrAMINE  •  heparin (porcine)  •  hydrALAZINE  •  magnesium sulfate **OR** magnesium sulfate in D5W 1g/100mL (PREMIX) **OR** magnesium sulfate  •  melatonin  •  oxyCODONE  •  oxymetazoline  •  sodium chloride  •  sodium chloride    Assessment/Plan   Assessment & Plan     Active Hospital Problems    Diagnosis  POA   • **RINKU [N17.9]  Yes   • Lactic acidosis [E87.2]  Yes   • Acute HFrEF (EF 30%) [I50.21]  Yes   • Shock (CMS/HCC) -cardiogenic versus septic [R57.9]  Yes   • Hypertension [I10]  Yes   • T2DM [E11.9]  Yes   • Dyslipidemia [E78.5]  Yes   • Sleep apnea [G47.30]  Yes   • Elevated liver enzymes [R74.8]  Yes   • Recent Rt Total Knee Replacement 4/5/21 [Z96.659]  Not Applicable   • Chronic pain w/pain pump [G89.29]  Yes      Resolved Hospital Problems   No resolved problems to display.        Brief Hospital Course to date:  Elvis  Valentin is a 66 y.o. male with a history of recent right total knee replacement on 4/5, HTN, T2DM, SHYLA, and chronic pain with a pain pump who presented to the [facility] ED on 4/7 with complaints of hearing loss. Pt was found to have RINKU, elevated LFTs and was admitted to the ICU service on the PCU floor.  He became hypotensive requiring pressors and was transferred to the ICU. Pt was found to have an EF of 30% and Cardiology was consulted.  He was also initiated on HD during this admission.  He was transferred to our service on 4/10. Of note, his hearing loss has resolved and was thought to be due to Furosemide in setting of decreased renal function.      Plan:  Acute systolic HF  Elevated troponin  --Cardiology following, pt will eventually require ischemic evaluation to determine etiology of his HF unable to get this done currently due to renal function  --Troponin peaked at 0.76  - volume control with dialysis currently   --continue ASA, not able to tolerate statin due to transaminitis  - No ACE/ARB due to renal function  - Repeat ECHO 4/15 with improvement in EF       Oliguric RINKU   --now on HD, NAL following; urine output improving per patient   - serological workup unremarkable  -- possible upcoming renal biopsy - plan per nephrology      Elevated LFTs  --AST/ALT significantly elevated on admission  --likely congestive hepatopathy  -- continues to improve     Septic vs Cardiogenic shock - improved   -- patient was started on steroids which have been weaned -- will DC hydrocortisone 4/14 and monitor   - Anti-hypertensive resumed  - Antibiotics discontinued 4/13   - Continue to monitor with the above changes and off antibiotics      Recent right knee replacement  --Dr. Avalos following; WBAT  - Attempted aspiration 4/13 due to knee swelling but no fluid aspirated and likely to be related to third spacing   - ASA for PPX on discharge -- currently on heparin      Chronic Pain  --pain pump present     T2DM  --low dose  basal insulin with SSI for now     Hearing loss  --resolved. Thought to be due to home dose diuretics.       Insomnia  -- no improvement with melatonin or benadryl; trial trazodone -- decreased to 25 mg qHS      DVT Prophylaxis:  BEBETO     Disposition: I expect the patient to be discharged TBD, still has more workup (ie cardiac) and determination on need for dialysis prior to discharge      CODE STATUS:   Code Status and Medical Interventions:   Ordered at: 04/07/21 1422     Code Status:    CPR     Medical Interventions (Level of Support Prior to Arrest):    Full       Tiff Hess DO  04/15/21

## 2021-04-15 NOTE — PROGRESS NOTES
Continued Stay Note  [facility]     Patient Name: Elvis Hanson  MRN: [id number]  Today's Date: 4/15/2021    Admit Date: 4/7/2021    Discharge Plan     Row Name 04/15/21 1031       Plan    Plan  update    Patient/Family in Agreement with Plan  yes    Plan Comments  Spoke with patient at bedside regarding discharge plan.  Patient reports feeling tired today, still does not know if he will need OP HD.  RN to bedside indicates patient to have ECHO soon.  No new discharge needs verbalized.  CM continues to follow.  Patient plan is to discharge home, may need OP HD arrangments and/or HH.    Final Discharge Disposition Code  06 - home with home health care        Discharge Codes    No documentation.       Expected Discharge Date and Time     Expected Discharge Date Expected Discharge Time    Apr 16, 2021             Kristen Prado RN

## 2021-04-16 ENCOUNTER — APPOINTMENT (OUTPATIENT)
Dept: NEPHROLOGY | Facility: HOSPITAL | Age: 66
End: 2021-04-16

## 2021-04-16 LAB
ALBUMIN SERPL-MCNC: 3.3 G/DL (ref 3.5–5.2)
ALBUMIN/GLOB SERPL: 1.1 G/DL
ALP SERPL-CCNC: 115 U/L (ref 39–117)
ALT SERPL W P-5'-P-CCNC: 301 U/L (ref 1–41)
ANION GAP SERPL CALCULATED.3IONS-SCNC: 18 MMOL/L (ref 5–15)
AST SERPL-CCNC: 46 U/L (ref 1–40)
BASOPHILS # BLD AUTO: 0.01 10*3/MM3 (ref 0–0.2)
BASOPHILS NFR BLD AUTO: 0.1 % (ref 0–1.5)
BILIRUB SERPL-MCNC: 0.5 MG/DL (ref 0–1.2)
BUN SERPL-MCNC: 59 MG/DL (ref 8–23)
BUN/CREAT SERPL: 6.4 (ref 7–25)
CALCIUM SPEC-SCNC: 7.6 MG/DL (ref 8.6–10.5)
CHLORIDE SERPL-SCNC: 94 MMOL/L (ref 98–107)
CO2 SERPL-SCNC: 18 MMOL/L (ref 22–29)
CREAT SERPL-MCNC: 9.17 MG/DL (ref 0.76–1.27)
DEPRECATED RDW RBC AUTO: 46.4 FL (ref 37–54)
EOSINOPHIL # BLD AUTO: 0.27 10*3/MM3 (ref 0–0.4)
EOSINOPHIL NFR BLD AUTO: 2.9 % (ref 0.3–6.2)
ERYTHROCYTE [DISTWIDTH] IN BLOOD BY AUTOMATED COUNT: 15.8 % (ref 12.3–15.4)
GFR SERPL CREATININE-BSD FRML MDRD: 7 ML/MIN/1.73
GFR SERPL CREATININE-BSD FRML MDRD: ABNORMAL ML/MIN/{1.73_M2}
GLOBULIN UR ELPH-MCNC: 3 GM/DL
GLUCOSE BLDC GLUCOMTR-MCNC: 127 MG/DL (ref 70–130)
GLUCOSE BLDC GLUCOMTR-MCNC: 135 MG/DL (ref 70–130)
GLUCOSE BLDC GLUCOMTR-MCNC: 158 MG/DL (ref 70–130)
GLUCOSE BLDC GLUCOMTR-MCNC: 95 MG/DL (ref 70–130)
GLUCOSE SERPL-MCNC: 106 MG/DL (ref 65–99)
HCT VFR BLD AUTO: 32.4 % (ref 37.5–51)
HGB BLD-MCNC: 10.2 G/DL (ref 13–17.7)
IMM GRANULOCYTES # BLD AUTO: 0.07 10*3/MM3 (ref 0–0.05)
IMM GRANULOCYTES NFR BLD AUTO: 0.7 % (ref 0–0.5)
LYMPHOCYTES # BLD AUTO: 1.04 10*3/MM3 (ref 0.7–3.1)
LYMPHOCYTES NFR BLD AUTO: 11 % (ref 19.6–45.3)
MAGNESIUM SERPL-MCNC: 2.2 MG/DL (ref 1.6–2.4)
MCH RBC QN AUTO: 26.2 PG (ref 26.6–33)
MCHC RBC AUTO-ENTMCNC: 31.5 G/DL (ref 31.5–35.7)
MCV RBC AUTO: 83.3 FL (ref 79–97)
MONOCYTES # BLD AUTO: 0.81 10*3/MM3 (ref 0.1–0.9)
MONOCYTES NFR BLD AUTO: 8.6 % (ref 5–12)
NEUTROPHILS NFR BLD AUTO: 7.24 10*3/MM3 (ref 1.7–7)
NEUTROPHILS NFR BLD AUTO: 76.7 % (ref 42.7–76)
NRBC BLD AUTO-RTO: 0 /100 WBC (ref 0–0.2)
PLATELET # BLD AUTO: 205 10*3/MM3 (ref 140–450)
PMV BLD AUTO: 9.9 FL (ref 6–12)
POTASSIUM SERPL-SCNC: 4 MMOL/L (ref 3.5–5.2)
PROT SERPL-MCNC: 6.3 G/DL (ref 6–8.5)
RBC # BLD AUTO: 3.89 10*6/MM3 (ref 4.14–5.8)
SODIUM SERPL-SCNC: 130 MMOL/L (ref 136–145)
WBC # BLD AUTO: 9.44 10*3/MM3 (ref 3.4–10.8)

## 2021-04-16 PROCEDURE — 63710000001 INSULIN LISPRO (HUMAN) PER 5 UNITS: Performed by: INTERNAL MEDICINE

## 2021-04-16 PROCEDURE — 99232 SBSQ HOSP IP/OBS MODERATE 35: CPT | Performed by: INTERNAL MEDICINE

## 2021-04-16 PROCEDURE — 80053 COMPREHEN METABOLIC PANEL: CPT | Performed by: INTERNAL MEDICINE

## 2021-04-16 PROCEDURE — 83735 ASSAY OF MAGNESIUM: CPT

## 2021-04-16 PROCEDURE — 25010000002 HEPARIN (PORCINE) PER 1000 UNITS: Performed by: INTERNAL MEDICINE

## 2021-04-16 PROCEDURE — 63710000001 DIPHENHYDRAMINE PER 50 MG: Performed by: INTERNAL MEDICINE

## 2021-04-16 PROCEDURE — 63710000001 INSULIN DETEMIR PER 5 UNITS: Performed by: INTERNAL MEDICINE

## 2021-04-16 PROCEDURE — 85025 COMPLETE CBC W/AUTO DIFF WBC: CPT | Performed by: INTERNAL MEDICINE

## 2021-04-16 PROCEDURE — 82962 GLUCOSE BLOOD TEST: CPT

## 2021-04-16 PROCEDURE — 99231 SBSQ HOSP IP/OBS SF/LOW 25: CPT | Performed by: INTERNAL MEDICINE

## 2021-04-16 RX ORDER — SODIUM CHLORIDE 0.9 % (FLUSH) 0.9 %
10 SYRINGE (ML) INJECTION EVERY 12 HOURS SCHEDULED
Status: DISCONTINUED | OUTPATIENT
Start: 2021-04-16 | End: 2021-04-20 | Stop reason: HOSPADM

## 2021-04-16 RX ORDER — SODIUM CHLORIDE 0.9 % (FLUSH) 0.9 %
10 SYRINGE (ML) INJECTION AS NEEDED
Status: DISCONTINUED | OUTPATIENT
Start: 2021-04-16 | End: 2021-04-20 | Stop reason: HOSPADM

## 2021-04-16 RX ADMIN — INSULIN LISPRO 2 UNITS: 100 INJECTION, SOLUTION INTRAVENOUS; SUBCUTANEOUS at 21:52

## 2021-04-16 RX ADMIN — HEPARIN SODIUM 5000 UNITS: 5000 INJECTION INTRAVENOUS; SUBCUTANEOUS at 21:52

## 2021-04-16 RX ADMIN — DIPHENHYDRAMINE HYDROCHLORIDE 25 MG: 25 CAPSULE ORAL at 03:13

## 2021-04-16 RX ADMIN — ANTACID TABLETS 2 TABLET: 500 TABLET, CHEWABLE ORAL at 17:24

## 2021-04-16 RX ADMIN — DOCUSATE SODIUM 100 MG: 100 CAPSULE, LIQUID FILLED ORAL at 12:46

## 2021-04-16 RX ADMIN — MAGNESIUM OXIDE TAB 400 MG (241.3 MG ELEMENTAL MG) 1000 MG: 400 (241.3 MG) TAB at 12:47

## 2021-04-16 RX ADMIN — CETIRIZINE HYDROCHLORIDE 10 MG: 10 TABLET, FILM COATED ORAL at 12:47

## 2021-04-16 RX ADMIN — SODIUM CHLORIDE, PRESERVATIVE FREE 10 ML: 5 INJECTION INTRAVENOUS at 21:54

## 2021-04-16 RX ADMIN — TRAZODONE HYDROCHLORIDE 25 MG: 50 TABLET ORAL at 23:35

## 2021-04-16 RX ADMIN — SODIUM CHLORIDE, PRESERVATIVE FREE 10 ML: 5 INJECTION INTRAVENOUS at 12:56

## 2021-04-16 RX ADMIN — ASPIRIN 81 MG: 81 TABLET, COATED ORAL at 12:45

## 2021-04-16 RX ADMIN — CARVEDILOL 25 MG: 12.5 TABLET, FILM COATED ORAL at 17:24

## 2021-04-16 RX ADMIN — INSULIN DETEMIR 10 UNITS: 100 INJECTION, SOLUTION SUBCUTANEOUS at 12:48

## 2021-04-16 RX ADMIN — ANTACID TABLETS 2 TABLET: 500 TABLET, CHEWABLE ORAL at 22:30

## 2021-04-16 RX ADMIN — HEPARIN SODIUM 1600 UNITS: 1000 INJECTION INTRAVENOUS; SUBCUTANEOUS at 12:05

## 2021-04-16 RX ADMIN — FLUTICASONE PROPIONATE 2 SPRAY: 50 SPRAY, METERED NASAL at 12:52

## 2021-04-16 RX ADMIN — SODIUM CHLORIDE, PRESERVATIVE FREE 10 ML: 5 INJECTION INTRAVENOUS at 15:21

## 2021-04-16 RX ADMIN — CARVEDILOL 25 MG: 12.5 TABLET, FILM COATED ORAL at 12:48

## 2021-04-16 RX ADMIN — DOCUSATE SODIUM 100 MG: 100 CAPSULE, LIQUID FILLED ORAL at 21:52

## 2021-04-16 NOTE — PROGRESS NOTES
"   LOS: 9 days    Patient Care Team:  April Chen DO as PCP - General (Internal Medicine)    Chief Complaint:      Subjective     Interval History:   Seen on HD tolerating well so far. Goal UF 3.5 liter as tolerated. Bp stable. Good access pressure.     Review of Systems:   Complains of low urine output, edema, denies, nausea vomiting dysuria hematuria no shortness of breath or chest pain.  All other systems are negative.    Objective     Vital Sign Min/Max for last 24 hours  Temp  Min: 97.8 °F (36.6 °C)  Max: 98.6 °F (37 °C)   BP  Min: 139/76  Max: 179/100   Pulse  Min: 67  Max: 86   Resp  Min: 18  Max: 18   SpO2  Min: 100 %  Max: 100 %   No data recorded   Weight  Min: 138 kg (304 lb 12.8 oz)  Max: 140 kg (308 lb 12.8 oz)     Flowsheet Rows      First Filed Value   Admission Height  177.8 cm (70\") Documented at 04/07/2021 1041   Admission Weight  127 kg (280 lb) Documented at 04/07/2021 1041          No intake/output data recorded.  I/O last 3 completed shifts:  In: 480 [P.O.:480]  Out: 600 [Urine:600]    Physical Exam:  General Appearance: -American male morbid obesity comfortable in bed -American male  Facial: Left-sided Bell's palsy noted chronic.  Eyes: PER, EOMI.  Neck: Supple no JVD.  Lungs: Clear auscultation, no rales rhonchi's, equal chest movement, nonlabored.  Heart: No gallop, murmur, rub, RRR.  Abdomen: Obesity distended soft nontender, positive bowel sounds, no organomegaly.  Extremities: Lateral lower extremity 3+ edema trace to 1+ upper extremity edema.  Neuro: No focal deficit, moving all extremities, alert oriented X 3  : No suprapubic fullness  Skin warm and dry.  WBC WBC   Date Value Ref Range Status   04/15/2021 9.23 3.40 - 10.80 10*3/mm3 Final   04/14/2021 10.15 3.40 - 10.80 10*3/mm3 Final      HGB Hemoglobin   Date Value Ref Range Status   04/15/2021 10.0 (L) 13.0 - 17.7 g/dL Final   04/14/2021 10.2 (L) 13.0 - 17.7 g/dL Final      HCT Hematocrit   Date Value Ref " Range Status   04/15/2021 32.3 (L) 37.5 - 51.0 % Final   04/14/2021 32.5 (L) 37.5 - 51.0 % Final      Platlets No results found for: LABPLAT   MCV MCV   Date Value Ref Range Status   04/15/2021 83.5 79.0 - 97.0 fL Final   04/14/2021 82.1 79.0 - 97.0 fL Final          Sodium Sodium   Date Value Ref Range Status   04/16/2021 130 (L) 136 - 145 mmol/L Final   04/15/2021 131 (L) 136 - 145 mmol/L Final   04/14/2021 129 (L) 136 - 145 mmol/L Final      Potassium Potassium   Date Value Ref Range Status   04/16/2021 4.0 3.5 - 5.2 mmol/L Final     Comment:     Slight hemolysis detected by analyzer. Results may be affected.   04/15/2021 3.4 (L) 3.5 - 5.2 mmol/L Final   04/14/2021 3.6 3.5 - 5.2 mmol/L Final     Comment:     Slight hemolysis detected by analyzer. Results may be affected.      Chloride Chloride   Date Value Ref Range Status   04/16/2021 94 (L) 98 - 107 mmol/L Final   04/15/2021 95 (L) 98 - 107 mmol/L Final   04/14/2021 91 (L) 98 - 107 mmol/L Final      CO2 CO2   Date Value Ref Range Status   04/16/2021 18.0 (L) 22.0 - 29.0 mmol/L Final   04/15/2021 23.0 22.0 - 29.0 mmol/L Final   04/14/2021 23.0 22.0 - 29.0 mmol/L Final      BUN BUN   Date Value Ref Range Status   04/16/2021 59 (H) 8 - 23 mg/dL Final   04/15/2021 49 (H) 8 - 23 mg/dL Final   04/14/2021 71 (H) 8 - 23 mg/dL Final      Creatinine Creatinine   Date Value Ref Range Status   04/16/2021 9.17 (H) 0.76 - 1.27 mg/dL Final   04/15/2021 7.97 (H) 0.76 - 1.27 mg/dL Final   04/14/2021 8.66 (H) 0.76 - 1.27 mg/dL Final      Calcium Calcium   Date Value Ref Range Status   04/16/2021 7.6 (L) 8.6 - 10.5 mg/dL Final   04/15/2021 7.4 (L) 8.6 - 10.5 mg/dL Final   04/14/2021 7.6 (L) 8.6 - 10.5 mg/dL Final      PO4 No results found for: CAPO4   Albumin Albumin   Date Value Ref Range Status   04/16/2021 3.30 (L) 3.50 - 5.20 g/dL Final   04/15/2021 3.40 (L) 3.50 - 5.20 g/dL Final   04/14/2021 3.30 (L) 3.50 - 5.20 g/dL Final      Magnesium Magnesium   Date Value Ref Range  Status   04/16/2021 2.2 1.6 - 2.4 mg/dL Final   04/14/2021 2.4 1.6 - 2.4 mg/dL Final      Uric Acid No results found for: URICACID     Right kidney measures 11.2 cm in length without apparent mass or   hydronephrosis. Normal color Doppler flow.       Left kidney measures 11.0 cm in length without apparent mass or   hydronephrosis. Normal color Doppler flow.       Unremarkable catheterized and contracted urinary bladder.       Impression:     Unremarkable sonographic appearance of both kidneys.         Results Review:     I reviewed the patient's new clinical results.    aspirin, 81 mg, Oral, Daily  carvedilol, 25 mg, Oral, BID With Meals  cetirizine, 10 mg, Oral, Daily  docusate sodium, 100 mg, Oral, BID  fluticasone, 2 spray, Each Nare, Daily  heparin (porcine), 5,000 Units, Subcutaneous, Q12H  insulin detemir, 10 Units, Subcutaneous, Daily  insulin lispro, 0-7 Units, Subcutaneous, 4x Daily AC & at Bedtime  magnesium oxide, 1,000 mg, Oral, Daily  polyethylene glycol, 17 g, Oral, Daily  povidone-iodine, , Topical, Once  sodium chloride, 10 mL, Intravenous, Q12H  traZODone, 25 mg, Oral, Nightly      norepinephrine, 0.02-0.1 mcg/kg/min, Last Rate: Stopped (04/08/21 1550)        Medication Review: As noted above    Assessment/Plan       RINKU    Hypertension    T2DM    Dyslipidemia    Sleep apnea    Elevated liver enzymes    Recent Rt Total Knee Replacement 4/5/21    Chronic pain w/pain pump    Lactic acidosis    Acute HFrEF (EF 30%)    Shock (CMS/HCC) -cardiogenic versus septic  1.  RINKU: Oliguirc now on HD. Presumed hemodynamic injury and ATN.    Nephrotic range proteinuria. Serological testing negative. MARIANA, ANCA, AntiDsDNA  Right kidney 11.2 cm, left kidney 11.0 cm normal color Doppler unremarkable bilateral kidneys.  Hepatitis negative, on IV vancomycin on admission  2.  Septic shock: required pressors.   3.  Nephrotic proteinuria greater than 6 g in the setting of diabetes.  4.  Altered mental status and hearing  difficulty improved at this time.  5.  S/p recent right total knee replacement.  6.  Congestive heart failure: Ejection fraction 30% repeat ECHO showed EF 50%  7.  Hypocalcemia.      Plan:  Daily eval for HD. Since he has acute kidney injury   Monitor for renal recovery   Need optimization of volume status with HD       Demetrio Crook MD  04/16/21  10:11 EDT

## 2021-04-16 NOTE — PLAN OF CARE
Goal Outcome Evaluation:        Outcome Summary: VSS on room air, no complaints of pain. Triple IJ dressing changed, fair intake. Labs stable, anticipate hemodialysis in AM. Patient requires reeducation about appropriate meal choices, will continue to monitor.

## 2021-04-16 NOTE — PLAN OF CARE
Goal Outcome Evaluation:  Plan of Care Reviewed With: patient  Progress: no change  Outcome Summary: Patient Vitals stable. Remains on Room air throughout the night. Patient refused CPAP this shift. 200mL urine out this shift. Scheduled trazadone given at around 2300 patient able to sleep until 0300 when awakened for vitals.

## 2021-04-16 NOTE — PROGRESS NOTES
[facility]  IP Progress Note      Chief Complaint/Reason for service: Systolic heart failure with reduced EF #2 hypertension    Subjective   Subjective: The patient is awake and alert.  He denies being short of breath.  Again he denies any history of tightness heaviness squeezing pressure chest jaw throat or arms.  No nausea or vomiting.    Past medical, surgical, social and family history reviewed in the patient's electronic medical record.    Objective     Vital Sign Min/Max for last 24 hours  Temp  Min: 97.8 °F (36.6 °C)  Max: 98.6 °F (37 °C)   BP  Min: 139/76  Max: 162/90   Pulse  Min: 71  Max: 86   Resp  Min: 18  Max: 18   SpO2  Min: 100 %  Max: 100 %   No data recorded      Intake/Output Summary (Last 24 hours) at 4/16/2021 0816  Last data filed at 4/16/2021 0500  Gross per 24 hour   Intake 480 ml   Output 600 ml   Net -120 ml             Current Facility-Administered Medications:   •  aspirin EC tablet 81 mg, 81 mg, Oral, Daily, Julio Montaño MD, 81 mg at 04/15/21 0802  •  calcium carbonate (TUMS) chewable tablet 500 mg (200 mg elemental), 2 tablet, Oral, TID PRN, La Chung DO, 2 tablet at 04/14/21 1829  •  calcium gluconate 1 g in sodium chloride 0.9 % 100 mL IVPB, 1 g, Intravenous, PRN, Last Rate: 100 mL/hr at 04/10/21 1731, 1 g at 04/10/21 1731 **AND** calcium gluconate 6 g in sodium chloride 0.9 % 500 mL IVPB, 6 g, Intravenous, PRN, Last Rate: 83.3 mL/hr at 04/09/21 1317, 6 g at 04/09/21 1317 **AND** Calcium, Ionized, , , PRN, Julio Montaño MD  •  carvedilol (COREG) tablet 25 mg, 25 mg, Oral, BID With Meals, Kay Diaz MD, 25 mg at 04/15/21 1739  •  cetirizine (zyrTEC) tablet 10 mg, 10 mg, Oral, Daily, Julio Montaño MD, 10 mg at 04/15/21 0802  •  diphenhydrAMINE (BENADRYL) capsule 25 mg, 25 mg, Oral, Nightly PRN, Kay Diaz MD, 25 mg at 04/16/21 0313  •  docusate sodium (COLACE) capsule 100 mg, 100 mg, Oral, BID, Sabra  Julio GARNICA MD, 100 mg at 04/15/21 2048  •  fluticasone (FLONASE) 50 MCG/ACT nasal spray 2 spray, 2 spray, Each Nare, Daily, Tiff Hess DO, 2 spray at 04/15/21 0810  •  heparin (porcine) 5000 UNIT/ML injection 5,000 Units, 5,000 Units, Subcutaneous, Q12H, Julio Montaño MD, 5,000 Units at 04/15/21 2048  •  heparin (porcine) injection 1,600 Units, 1,600 Units, Intracatheter, PRN, Julio Montaño MD, 1,600 Units at 04/12/21 1119  •  hydrALAZINE (APRESOLINE) injection 10 mg, 10 mg, Intravenous, Q6H PRN, Kay Diaz MD, 10 mg at 04/12/21 0312  •  insulin detemir (LEVEMIR) injection 10 Units, 10 Units, Subcutaneous, Daily, Julio Montaño MD, 10 Units at 04/15/21 0809  •  insulin lispro (humaLOG) injection 0-7 Units, 0-7 Units, Subcutaneous, 4x Daily AC & at Bedtime, Julio Montaño MD, 2 Units at 04/15/21 2047  •  magnesium oxide (MAG-OX) tablet 1,000 mg, 1,000 mg, Oral, Daily, Jimenez Daniels MD, 1,000 mg at 04/15/21 0802  •  magnesium sulfate 4 gram infusion - Mg less than or equal to 1mg/dL, 4 g, Intravenous, PRN **OR** magnesium sulfate 3 gram infusion (1gm x 3) - Mg 1.1 - 1.5 mg/dL, 1 g, Intravenous, PRN **OR** Magnesium Sulfate 2 gram infusion- Mg 1.6 - 1.9 mg/dL, 2 g, Intravenous, PRN, Jimenez Daniels MD, Last Rate: 25 mL/hr at 04/11/21 1611, 2 g at 04/11/21 1611  •  melatonin tablet 5 mg, 5 mg, Oral, Nightly PRN, Nayely Cleveland, APRN, 5 mg at 04/12/21 2244  •  norepinephrine (LEVOPHED) 8 mg in 250 mL NS infusion (premix), 0.02-0.1 mcg/kg/min, Intravenous, Titrated, Julio Montaño MD, Stopped at 04/08/21 1550  •  oxyCODONE (ROXICODONE) immediate release tablet 5 mg, 5 mg, Oral, Q6H PRN, Julio Montaño MD, 5 mg at 04/11/21 0902  •  oxymetazoline (AFRIN) nasal spray 2 spray, 2 spray, Each Nare, Daily PRN, Tiff Hess, DO  •  polyethylene glycol (MIRALAX) packet 17 g, 17 g, Oral, Daily, Julio Montaño MD, 17 g at 04/15/21 0803  •  povidone-iodine (BETADINE) external  solution, , Topical, Once, Axel Avalos MD  •  sodium chloride 0.9 % flush 10 mL, 10 mL, Intravenous, Q12H, Julio Montaño MD, 10 mL at 04/15/21 0803  •  sodium chloride 0.9 % flush 10 mL, 10 mL, Intravenous, PRN, Julio Montaño MD  •  sodium chloride nasal spray 2 spray, 2 spray, Each Nare, PRN, Julio Montaño MD, 2 spray at 04/10/21 1346  •  traZODone (DESYREL) tablet 25 mg, 25 mg, Oral, Nightly, Tiff Hess, , 25 mg at 04/15/21 2313    Physical Exam: General obese male in bed in the dialysis unit with current heart rate 68        HEENT: No JVP is present, no icterus       Respiratory: Equal bilateral symmetrical expansion overall clear auscultation bilaterally       Cardiovascular: Regular rate and rhythm no obvious murmur       GI: Soft       Lower Extremities: No obvious lesions       Neuro moves all 4 extremities       Skin: Warm and dry       Psych: Pleasant affect    Results Review: The patient's urine output was 600 mL for the last 24 hours.  Heart rate 70-80.  Blood pressures 1 39-1 62 creatinine 9.8 potassium 4.0    Radiology Results:  Imaging Results (Last 72 Hours)     ** No results found for the last 72 hours. **          EKG: Sinus rhythm    ECHO: Repeat echo shows EF 50% which is significantly improved from 30% from EF at presentation    Tele: Sinus rhythm    Assessment   Assessment/Plan: 1 patient was admitted with heart failure with reduced EF.  After volume removal with HD and follow-up echo shows EF is now normal at 50%.  The patient denies any history of exertional chest pain or dyspnea.  No need for ischemic evaluation is necessary at this time.  The patient can be discharged from our point of view and follow-up with cardiology in 6 weeks  We will see as needed for the remainder of this hospitalization    Aniceto Newby MD  04/16/21  08:16 EDT

## 2021-04-16 NOTE — PLAN OF CARE
Goal Outcome Evaluation:        Outcome Summary: VSS, went to dialysis and tolerated well.  remains on room air.  ambulates with standby help and his walker to chair and bedside commode.  200 mL out this shift.

## 2021-04-16 NOTE — PROGRESS NOTES
Continued Stay Note  [facility]     Patient Name: Elvis Hanson  MRN: [id number]  Today's Date: 4/16/2021    Admit Date: 4/7/2021    Discharge Plan     Row Name 04/16/21 1534       Plan    Plan  update    Patient/Family in Agreement with Plan  yes    Plan Comments  Spoke with patient at bedside regarding discharge plan.  Patient reports he still does not know if he will need OP HD or not but indicates he is urinating more and is hopful he will not need to.  No discharge needs noted.  CM following.  Patient plan is to discharge home via car with family to transport.    Final Discharge Disposition Code  01 - home or self-care        Discharge Codes    No documentation.       Expected Discharge Date and Time     Expected Discharge Date Expected Discharge Time    Apr 16, 2021             Kristen Prado RN

## 2021-04-16 NOTE — PROGRESS NOTES
[facility]  PROGRESS NOTE    Patient Name: Elvis Hanson  : 1955  MRN: [id number]    Date of Admission: 2021  Primary Care Physician: April Chen DO    Subjective   Subjective     CC:  Hearing loss; resp failure; renal failure     HPI:  States he is feeling ok. Slept better. Evaluated on dialysis. Reviewed cards recs/ECHO.     ROS:  Gen- No fevers, chills  CV- No chest pain, palpitations  Resp- No cough, dyspnea  GI- No N/V/D, abd pain     Objective   Objective     Vital Signs:   Temp:  [97.8 °F (36.6 °C)-98.6 °F (37 °C)] 98 °F (36.7 °C)  Heart Rate:  [71-86] 86  Resp:  [18] 18  BP: (139-162)/(76-90) 150/85        Physical Exam:  Constitutional: No acute distress, awake, alert  HENT: NCAT, mucous membranes moist  Respiratory: Clear to auscultation bilaterally, respiratory effort normal   Cardiovascular: RRR, no murmurs, rubs, or gallops  Gastrointestinal: Positive bowel sounds, soft, nontender, nondistended  Musculoskeletal: +1 lower ext edema; right knee bandaged   Psychiatric: Appropriate affect, cooperative  Neurologic: Oriented x 3, strength symmetric in all extremities, Cranial Nerves grossly intact to confrontation, speech clear  Skin: No rashes    Results Reviewed:  Results from last 7 days   Lab Units 04/15/21  0719 21  0502 21  0456   WBC 10*3/mm3 9.23 10.15 11.30*   HEMOGLOBIN g/dL 10.0* 10.2* 10.3*   HEMATOCRIT % 32.3* 32.5* 32.3*   PLATELETS 10*3/mm3 205 217 222     Results from last 7 days   Lab Units 21  0527 04/15/21  0719 21  0502 21  0800   SODIUM mmol/L 130* 131* 129* 135*   POTASSIUM mmol/L 4.0 3.4* 3.6 3.4*   CHLORIDE mmol/L 94* 95* 91* 92*   CO2 mmol/L 18.0* 23.0 23.0 24.0   BUN mg/dL 59* 49* 71* 69*   CREATININE mg/dL 9.17* 7.97* 8.66* 7.99*   GLUCOSE mg/dL 106* 108* 127* 128*   CALCIUM mg/dL 7.6* 7.4* 7.6* 7.6*   ALT (SGPT) U/L 301* 374* 540* 1,197*   AST (SGOT) U/L 46* 45* 58* 188*   TROPONIN T  ng/mL  --   --   --  0.717*     Estimated Creatinine Clearance: 11.4 mL/min (A) (by C-G formula based on SCr of 9.17 mg/dL (H)).    Microbiology Results Abnormal     Procedure Component Value - Date/Time    Blood Culture - Blood, Hand, Left [[id number]] Collected: 04/07/21 2057    Lab Status: Final result Specimen: Blood from Hand, Left Updated: 04/12/21 2145     Blood Culture No growth at 5 days    Blood Culture - Blood, Hand, Right [[id number]] Collected: 04/07/21 2057    Lab Status: Final result Specimen: Blood from Hand, Right Updated: 04/12/21 2145     Blood Culture No growth at 5 days    Urine Culture - Urine, Urine, Clean Catch [[id number]]  (Normal) Collected: 04/09/21 1325    Lab Status: Final result Specimen: Urine, Clean Catch Updated: 04/10/21 1742     Urine Culture No growth    MRSA Screen, PCR (Inpatient) - Swab, Nares [[id number]]  (Normal) Collected: 04/08/21 0341    Lab Status: Final result Specimen: Swab from Nares Updated: 04/08/21 0937     MRSA PCR Negative    Narrative:      MRSA Negative    COVID-19 and FLU A/B PCR - Swab, Nasopharynx [[id number]]  (Normal) Collected: 04/07/21 1357    Lab Status: Final result Specimen: Swab from Nasopharynx Updated: 04/07/21 1509     COVID19 Not Detected     Influenza A PCR Not Detected     Influenza B PCR Not Detected    Narrative:      Fact sheet for providers: https://www.fda.gov/media/873674/download    Fact sheet for patients: https://www.fda.gov/media/737899/download    Test performed by PCR.          Imaging Results (Last 24 Hours)     ** No results found for the last 24 hours. **          Results for orders placed during the hospital encounter of 04/07/21    Adult Transthoracic Echo Limited W/ Cont if Necessary Per Protocol    Interpretation Summary  · Mildly reduced right ventricular systolic function noted.  · Estimated left ventricular EF = 50% Left ventricular ejection fraction appears to be 46 - 50%. Left ventricular systolic function is normal.  · The  left ventricular ejection fraction is significantly improved from previous echo with EF lower limits of normal      I have reviewed the medications:  Scheduled Meds:aspirin, 81 mg, Oral, Daily  carvedilol, 25 mg, Oral, BID With Meals  cetirizine, 10 mg, Oral, Daily  docusate sodium, 100 mg, Oral, BID  fluticasone, 2 spray, Each Nare, Daily  heparin (porcine), 5,000 Units, Subcutaneous, Q12H  insulin detemir, 10 Units, Subcutaneous, Daily  insulin lispro, 0-7 Units, Subcutaneous, 4x Daily AC & at Bedtime  magnesium oxide, 1,000 mg, Oral, Daily  polyethylene glycol, 17 g, Oral, Daily  povidone-iodine, , Topical, Once  sodium chloride, 10 mL, Intravenous, Q12H  traZODone, 25 mg, Oral, Nightly      Continuous Infusions:norepinephrine, 0.02-0.1 mcg/kg/min, Last Rate: Stopped (04/08/21 1550)      PRN Meds:.calcium carbonate  •  calcium gluconate IVPB **AND** calcium gluconate IVPB **AND** Calcium, Ionized  •  diphenhydrAMINE  •  heparin (porcine)  •  hydrALAZINE  •  magnesium sulfate **OR** magnesium sulfate in D5W 1g/100mL (PREMIX) **OR** magnesium sulfate  •  melatonin  •  oxyCODONE  •  oxymetazoline  •  sodium chloride  •  sodium chloride    Assessment/Plan   Assessment & Plan     Active Hospital Problems    Diagnosis  POA   • **RINKU [N17.9]  Yes   • Lactic acidosis [E87.2]  Yes   • Acute HFrEF (EF 30%) [I50.21]  Yes   • Shock (CMS/HCC) -cardiogenic versus septic [R57.9]  Yes   • Hypertension [I10]  Yes   • T2DM [E11.9]  Yes   • Dyslipidemia [E78.5]  Yes   • Sleep apnea [G47.30]  Yes   • Elevated liver enzymes [R74.8]  Yes   • Recent Rt Total Knee Replacement 4/5/21 [Z96.659]  Not Applicable   • Chronic pain w/pain pump [G89.29]  Yes      Resolved Hospital Problems   No resolved problems to display.        Brief Hospital Course to date:  Elvis Hanson is a 66 y.o. male with a history of recent right total knee replacement on 4/5, HTN, T2DM, SHYLA, and chronic pain with a pain pump who presented to the [facility] ED on 4/7 with  complaints of hearing loss. Pt was found to have RINKU, elevated LFTs and was admitted to the ICU service on the PCU floor.  He became hypotensive requiring pressors and was transferred to the ICU. Pt was found to have an EF of 30% and Cardiology was consulted.  He was also initiated on HD during this admission.  He was transferred to our service on 4/10. Of note, his hearing loss has resolved and was thought to be due to Furosemide in setting of decreased renal function.      Plan:  Acute systolic HF  Elevated troponin  --Cardiology followed; repeat ECHO with normal EF now with volume removal - currently holding on any need for ischemic evaluation at this time   - volume control with dialysis currently   --continue ASA, not able to tolerate statin due to transaminitis  - No ACE/ARB due to renal function  - follow up cards in 6 weeks      Oliguric RINKU   --now on HD, NAL following; urine output improving per patient   - serological workup unremarkable  - Continue intermittent assessment for dialysis   - AM labs      Elevated LFTs  --AST/ALT significantly elevated on admission  --likely congestive hepatopathy  - improving      Septic vs Cardiogenic shock - improved   -- patient was started on steroids which have been weaned -- will DC hydrocortisone 4/14   - Anti-hypertensive resumed  - Antibiotics discontinued 4/13   - hemodynamics are stable off antibiotics and steroids   -- Obtain peripherals to remove central line      Recent right knee replacement  --Dr. Avalos following; WBAT  - Attempted aspiration 4/13 due to knee swelling but no fluid aspirated and likely to be related to third spacing   - ASA for PPX on discharge -- currently on heparin for admission      Chronic Pain  --pain pump present     T2DM  --low dose basal insulin with SSI for now  - glucose remain stable      Hearing loss  --resolved. Thought to be due to home dose diuretics.       Insomnia  -- no improvement with melatonin or benadryl; trial  trazodone -- decreased to 25 mg qHS      DVT Prophylaxis:  BEBETO     Disposition: I expect the patient to be discharged TBD,  determination on need for dialysis prior to discharge and renal function improvement        CODE STATUS:   Code Status and Medical Interventions:   Ordered at: 04/07/21 1422     Code Status:    CPR     Medical Interventions (Level of Support Prior to Arrest):    Full       Tiff Hess,   04/16/21

## 2021-04-17 LAB
ANION GAP SERPL CALCULATED.3IONS-SCNC: 13 MMOL/L (ref 5–15)
BUN SERPL-MCNC: 38 MG/DL (ref 8–23)
BUN/CREAT SERPL: 5.5 (ref 7–25)
CALCIUM SPEC-SCNC: 8.6 MG/DL (ref 8.6–10.5)
CHLORIDE SERPL-SCNC: 95 MMOL/L (ref 98–107)
CO2 SERPL-SCNC: 24 MMOL/L (ref 22–29)
CREAT SERPL-MCNC: 6.97 MG/DL (ref 0.76–1.27)
GFR SERPL CREATININE-BSD FRML MDRD: 10 ML/MIN/1.73
GFR SERPL CREATININE-BSD FRML MDRD: ABNORMAL ML/MIN/{1.73_M2}
GLUCOSE BLDC GLUCOMTR-MCNC: 110 MG/DL (ref 70–130)
GLUCOSE BLDC GLUCOMTR-MCNC: 117 MG/DL (ref 70–130)
GLUCOSE BLDC GLUCOMTR-MCNC: 136 MG/DL (ref 70–130)
GLUCOSE BLDC GLUCOMTR-MCNC: 171 MG/DL (ref 70–130)
GLUCOSE SERPL-MCNC: 104 MG/DL (ref 65–99)
POTASSIUM SERPL-SCNC: 3.8 MMOL/L (ref 3.5–5.2)
SODIUM SERPL-SCNC: 132 MMOL/L (ref 136–145)

## 2021-04-17 PROCEDURE — 80048 BASIC METABOLIC PNL TOTAL CA: CPT | Performed by: INTERNAL MEDICINE

## 2021-04-17 PROCEDURE — 25010000002 HEPARIN (PORCINE) PER 1000 UNITS: Performed by: INTERNAL MEDICINE

## 2021-04-17 PROCEDURE — 82962 GLUCOSE BLOOD TEST: CPT

## 2021-04-17 PROCEDURE — 63710000001 INSULIN DETEMIR PER 5 UNITS: Performed by: INTERNAL MEDICINE

## 2021-04-17 PROCEDURE — 99232 SBSQ HOSP IP/OBS MODERATE 35: CPT | Performed by: INTERNAL MEDICINE

## 2021-04-17 PROCEDURE — 63710000001 INSULIN LISPRO (HUMAN) PER 5 UNITS: Performed by: INTERNAL MEDICINE

## 2021-04-17 RX ADMIN — SODIUM CHLORIDE, PRESERVATIVE FREE 10 ML: 5 INJECTION INTRAVENOUS at 08:58

## 2021-04-17 RX ADMIN — ASPIRIN 81 MG: 81 TABLET, COATED ORAL at 08:58

## 2021-04-17 RX ADMIN — CARVEDILOL 25 MG: 12.5 TABLET, FILM COATED ORAL at 17:12

## 2021-04-17 RX ADMIN — CARVEDILOL 25 MG: 12.5 TABLET, FILM COATED ORAL at 08:58

## 2021-04-17 RX ADMIN — DOCUSATE SODIUM 100 MG: 100 CAPSULE, LIQUID FILLED ORAL at 20:00

## 2021-04-17 RX ADMIN — DOCUSATE SODIUM 100 MG: 100 CAPSULE, LIQUID FILLED ORAL at 08:59

## 2021-04-17 RX ADMIN — MAGNESIUM OXIDE TAB 400 MG (241.3 MG ELEMENTAL MG) 1000 MG: 400 (241.3 MG) TAB at 08:58

## 2021-04-17 RX ADMIN — SODIUM CHLORIDE, PRESERVATIVE FREE 10 ML: 5 INJECTION INTRAVENOUS at 08:59

## 2021-04-17 RX ADMIN — HEPARIN SODIUM 5000 UNITS: 5000 INJECTION INTRAVENOUS; SUBCUTANEOUS at 08:59

## 2021-04-17 RX ADMIN — HEPARIN SODIUM 5000 UNITS: 5000 INJECTION INTRAVENOUS; SUBCUTANEOUS at 20:00

## 2021-04-17 RX ADMIN — FLUTICASONE PROPIONATE 2 SPRAY: 50 SPRAY, METERED NASAL at 08:58

## 2021-04-17 RX ADMIN — TRAZODONE HYDROCHLORIDE 25 MG: 50 TABLET ORAL at 23:40

## 2021-04-17 RX ADMIN — INSULIN DETEMIR 10 UNITS: 100 INJECTION, SOLUTION SUBCUTANEOUS at 08:58

## 2021-04-17 RX ADMIN — INSULIN LISPRO 2 UNITS: 100 INJECTION, SOLUTION INTRAVENOUS; SUBCUTANEOUS at 20:00

## 2021-04-17 RX ADMIN — CETIRIZINE HYDROCHLORIDE 10 MG: 10 TABLET, FILM COATED ORAL at 08:58

## 2021-04-17 NOTE — PLAN OF CARE
Goal Outcome Evaluation:  Plan of Care Reviewed With: patient  Progress: improving  Outcome Summary: PT up to chair most of shift. VSS. Voiding. Hopes to go home soon.

## 2021-04-17 NOTE — PROGRESS NOTES
Orthopedic Progress Note      Patient: Elvis Hanson  YOB: 1955     Date of Admission: 4/7/2021 10:40 AM Medical Record Number: [id number]     Attending Physician: Tiff Hess DO    Status Post:  R TKA  Post Operative Day Number:12    Subjective : No new orthopaedic complaints     Pain Relief: some relief with present medication.     Systemic Complaints: No Complaints    Vitals:    04/16/21 2339 04/17/21 0427 04/17/21 0500 04/17/21 0755   BP: 162/91 177/100  175/100   BP Location: Left arm Right arm  Right arm   Patient Position: Lying Lying  Lying   Pulse: 74 84  73   Resp: 18 18  18   Temp: 98 °F (36.7 °C) 99.7 °F (37.6 °C)  97.9 °F (36.6 °C)   TempSrc: Oral Oral  Oral   SpO2: 98% 93%     Weight:   (!) 139 kg (306 lb 12.8 oz)    Height:           Physical Exam: 66 y.o. male    General Appearance:       Alert, cooperative, in no acute distress                  Extremities:    Dressing mild drainage              No clinical sign of DVT        Able to do good movements of digits    Pulses:   Pulses palpable and equal bilaterally           Diagnostic Tests:     Results from last 7 days   Lab Units 04/16/21  1238 04/15/21  0719 04/14/21  0502   WBC 10*3/mm3 9.44 9.23 10.15   HEMOGLOBIN g/dL 10.2* 10.0* 10.2*   HEMATOCRIT % 32.4* 32.3* 32.5*   PLATELETS 10*3/mm3 205 205 217     Results from last 7 days   Lab Units 04/17/21  0635 04/16/21  0527 04/15/21  0719   SODIUM mmol/L 132* 130* 131*   POTASSIUM mmol/L 3.8 4.0 3.4*   CHLORIDE mmol/L 95* 94* 95*   CO2 mmol/L 24.0 18.0* 23.0   BUN mg/dL 38* 59* 49*   CREATININE mg/dL 6.97* 9.17* 7.97*   GLUCOSE mg/dL 104* 106* 108*   CALCIUM mg/dL 8.6 7.6* 7.4*         No results found for: URICACID  No results found for: CRYSTAL  Microbiology Results (last 10 days)     Procedure Component Value - Date/Time    Urine Culture - Urine, Urine, Clean Catch [[id number]]  (Normal) Collected: 04/09/21 1325    Lab Status: Final result Specimen: Urine, Clean Catch Updated:  04/10/21 1742     Urine Culture No growth    MRSA Screen, PCR (Inpatient) - Swab, Nares [[id number]]  (Normal) Collected: 04/08/21 0341    Lab Status: Final result Specimen: Swab from Nares Updated: 04/08/21 0937     MRSA PCR Negative    Narrative:      MRSA Negative    Blood Culture - Blood, Hand, Left [[id number]] Collected: 04/07/21 2057    Lab Status: Final result Specimen: Blood from Hand, Left Updated: 04/12/21 2145     Blood Culture No growth at 5 days    Blood Culture - Blood, Hand, Right [[id number]] Collected: 04/07/21 2057    Lab Status: Final result Specimen: Blood from Hand, Right Updated: 04/12/21 2145     Blood Culture No growth at 5 days    COVID-19 and FLU A/B PCR - Swab, Nasopharynx [[id number]]  (Normal) Collected: 04/07/21 1357    Lab Status: Final result Specimen: Swab from Nasopharynx Updated: 04/07/21 1509     COVID19 Not Detected     Influenza A PCR Not Detected     Influenza B PCR Not Detected    Narrative:      Fact sheet for providers: https://www.fda.gov/media/215840/download    Fact sheet for patients: https://www.fda.gov/media/619715/download    Test performed by PCR.        IR Tunneled Catheter    Result Date: 4/9/2021   Insertion of right-sided tunneled dialysis catheter, with tip in the expected location of the cavoatrial junction.  Plan:  The catheter may be used immediately. _______________________________________________________________  PROCEDURE SUMMARY: - Venous access with ultrasound guidance - Tunneled dialysis catheter insertion with fluoroscopic guidance - Additional procedure(s): None  PROCEDURE DETAILS:  Pre-procedure Consent: Informed consent for the procedure including risks, benefits and alternatives was obtained and time-out was performed prior to the procedure. Preparation (MIPS): The site was prepared and draped using all elements of maximal sterile barrier technique including sterile gloves, sterile gown, cap, mask, large sterile sheet, sterile ultrasound probe cover,  hand hygiene and cutaneous antisepsis with 2% chlorhexidine. Medical reason for site preparation exception (MIPS): Not applicable  Anesthesia/sedation Level of anesthesia/sedation: Moderate sedation (conscious sedation) Anesthesia/sedation administered by: Independent trained observer under attending supervision with continuous monitoring of the patient?s level of consciousness and physiologic status Total intra-service sedation time (minutes): 20  Access Local anesthesia was administered. The vessel was sonographically evaluated and determined to be patent. Real time ultrasound was used to visualize needle entry into the vessel and a permanent image obtained. Vein accessed (QCDR): Right internal jugular vein Internal jugular vein patency (QCDR): Patent Access technique: Micropuncture technique under ultrasound guidance  Venography Indication for venography: Not performed Catheter tip position for venography: Not applicable Findings: Not applicable  Catheter placement An incision was made near the venous access site and the catheter was tunneled subcutaneously to the venous access site. The catheter was advanced via a peel-away sheath into the vein under fluoroscopic guidance. Catheter tip location was fluoroscopically verified and a permanent image was stored. Catheter placed: 14.5 Sami ColibrÃ­rome SI Silver Ion Catheter Catheter cuff-to-tip length (cm): 19 Catheter flush: Heparin (1000 units per mL)  Closure A sterile dressing was applied. Access site closure technique: Tissue adhesive Catheter securement technique: Non-absorbable suture  Contrast Contrast agent: None Contrast volume (mL): 0  Radiation Dose Fluoroscopy time: 0.2 minutes Dose: 2.1 mGy  Additional Details Additional description of procedure: None Equipment details: None Specimens removed: None Estimated blood loss (mL): Less than 10 Standardized report: TunneledDialysisCatheter  Attestation I was present and scrubbed for the entire  procedure. Imaging reviewed. Agree with final report as written.  This report was finalized on 4/9/2021 3:52 PM by Wil Franz.          Current Medications:  Scheduled Meds:aspirin, 81 mg, Oral, Daily  carvedilol, 25 mg, Oral, BID With Meals  cetirizine, 10 mg, Oral, Daily  docusate sodium, 100 mg, Oral, BID  fluticasone, 2 spray, Each Nare, Daily  heparin (porcine), 5,000 Units, Subcutaneous, Q12H  insulin detemir, 10 Units, Subcutaneous, Daily  insulin lispro, 0-7 Units, Subcutaneous, 4x Daily AC & at Bedtime  magnesium oxide, 1,000 mg, Oral, Daily  polyethylene glycol, 17 g, Oral, Daily  povidone-iodine, , Topical, Once  sodium chloride, 10 mL, Intravenous, Q12H  sodium chloride, 10 mL, Intravenous, Q12H  traZODone, 25 mg, Oral, Nightly      Continuous Infusions:   PRN Meds:.calcium carbonate  •  calcium gluconate IVPB **AND** calcium gluconate IVPB **AND** Calcium, Ionized  •  diphenhydrAMINE  •  heparin (porcine)  •  hydrALAZINE  •  magnesium sulfate **OR** magnesium sulfate in D5W 1g/100mL (PREMIX) **OR** magnesium sulfate  •  melatonin  •  oxyCODONE  •  oxymetazoline  •  sodium chloride  •  sodium chloride  •  sodium chloride    Assessment: Status post  No admission procedures for hospital encounter.    Patient Active Problem List   Diagnosis   • Hypertension   • T2DM   • Dyslipidemia   • Sleep apnea   • Elevated liver enzymes   • RINKU   • Recent Rt Total Knee Replacement 4/5/21   • Chronic pain w/pain pump   • Lactic acidosis   • Acute HFrEF (EF 30%)   • Shock (CMS/HCC) -cardiogenic versus septic       PLAN:   Continues current post-op course  Mobilize with PT as tolerated per protocol  Asa 81 mg bid for dvt ppx when ok with medical team   Medical management of kidney function kidney function       Weight Bearing: WBAT  Discharge Plan: pending medical improvement     Axel Avalos MD    Date: 4/17/2021    Time: 11:06 EDT

## 2021-04-17 NOTE — PLAN OF CARE
Goal Outcome Evaluation:  Plan of Care Reviewed With: patient  Progress: no change  Outcome Summary: Vitals stable. Urine output 350 this shift. Denies  pain. Remains on Room air.

## 2021-04-17 NOTE — PROGRESS NOTES
[facility] Medicine Services  PROGRESS NOTE    Patient Name: Elvis Hanson  : 1955  MRN: [id number]    Date of Admission: 2021  Primary Care Physician: April Chen DO    Subjective   Subjective     CC:  Hearing loss, renal failure     HPI:  States he is feeling fine. Anxious to get out of the hospital. Removing central line today.     ROS:  Gen- No fevers, chills  CV- No chest pain, palpitations  Resp- No cough, dyspnea  GI- No N/V/D, abd pain     Objective   Objective     Vital Signs:   Temp:  [97.9 °F (36.6 °C)-99.7 °F (37.6 °C)] 97.9 °F (36.6 °C)  Heart Rate:  [67-84] 67  Resp:  [18] 18  BP: (146-179)/() 171/104        Physical Exam:  Constitutional: No acute distress, awake, alert; sitting up in the chair   HENT: NCAT, mucous membranes moist  Respiratory: Clear to auscultation bilaterally, respiratory effort normal   Cardiovascular: RRR, no murmurs, rubs, or gallops  Gastrointestinal: Positive bowel sounds, soft, nontender, nondistended  Musculoskeletal: +1-2 bilateral ankle edema; right need with bandage   Psychiatric: Appropriate affect, cooperative  Neurologic: Oriented x 3, strength symmetric in all extremities, Cranial Nerves grossly intact to confrontation, speech clear  Skin: No rashes    Results Reviewed:  Results from last 7 days   Lab Units 21  1238 04/15/21  0719 21  0502   WBC 10*3/mm3 9.44 9.23 10.15   HEMOGLOBIN g/dL 10.2* 10.0* 10.2*   HEMATOCRIT % 32.4* 32.3* 32.5*   PLATELETS 10*3/mm3 205 205 217     Results from last 7 days   Lab Units 21  0635 21  0527 04/15/21  0719 21  0502 21  0800   SODIUM mmol/L 132* 130* 131* 129* 135*   POTASSIUM mmol/L 3.8 4.0 3.4* 3.6 3.4*   CHLORIDE mmol/L 95* 94* 95* 91* 92*   CO2 mmol/L 24.0 18.0* 23.0 23.0 24.0   BUN mg/dL 38* 59* 49* 71* 69*   CREATININE mg/dL 6.97* 9.17* 7.97* 8.66* 7.99*   GLUCOSE mg/dL 104* 106* 108* 127* 128*   CALCIUM mg/dL 8.6 7.6* 7.4* 7.6* 7.6*    ALT (SGPT) U/L  --  301* 374* 540* 1,197*   AST (SGOT) U/L  --  46* 45* 58* 188*   TROPONIN T ng/mL  --   --   --   --  0.717*     Estimated Creatinine Clearance: 15.1 mL/min (A) (by C-G formula based on SCr of 6.97 mg/dL (H)).    Microbiology Results Abnormal     Procedure Component Value - Date/Time    Blood Culture - Blood, Hand, Left [[id number]] Collected: 04/07/21 2057    Lab Status: Final result Specimen: Blood from Hand, Left Updated: 04/12/21 2145     Blood Culture No growth at 5 days    Blood Culture - Blood, Hand, Right [[id number]] Collected: 04/07/21 2057    Lab Status: Final result Specimen: Blood from Hand, Right Updated: 04/12/21 2145     Blood Culture No growth at 5 days    Urine Culture - Urine, Urine, Clean Catch [[id number]]  (Normal) Collected: 04/09/21 1325    Lab Status: Final result Specimen: Urine, Clean Catch Updated: 04/10/21 1742     Urine Culture No growth    MRSA Screen, PCR (Inpatient) - Swab, Nares [[id number]]  (Normal) Collected: 04/08/21 0341    Lab Status: Final result Specimen: Swab from Nares Updated: 04/08/21 0937     MRSA PCR Negative    Narrative:      MRSA Negative    COVID-19 and FLU A/B PCR - Swab, Nasopharynx [[id number]]  (Normal) Collected: 04/07/21 1357    Lab Status: Final result Specimen: Swab from Nasopharynx Updated: 04/07/21 1509     COVID19 Not Detected     Influenza A PCR Not Detected     Influenza B PCR Not Detected    Narrative:      Fact sheet for providers: https://www.fda.gov/media/795703/download    Fact sheet for patients: https://www.fda.gov/media/353157/download    Test performed by PCR.          Imaging Results (Last 24 Hours)     ** No results found for the last 24 hours. **          Results for orders placed during the hospital encounter of 04/07/21    Adult Transthoracic Echo Limited W/ Cont if Necessary Per Protocol    Interpretation Summary  · Mildly reduced right ventricular systolic function noted.  · Estimated left ventricular EF = 50% Left  ventricular ejection fraction appears to be 46 - 50%. Left ventricular systolic function is normal.  · The left ventricular ejection fraction is significantly improved from previous echo with EF lower limits of normal      I have reviewed the medications:  Scheduled Meds:aspirin, 81 mg, Oral, Daily  carvedilol, 25 mg, Oral, BID With Meals  cetirizine, 10 mg, Oral, Daily  docusate sodium, 100 mg, Oral, BID  fluticasone, 2 spray, Each Nare, Daily  heparin (porcine), 5,000 Units, Subcutaneous, Q12H  insulin detemir, 10 Units, Subcutaneous, Daily  insulin lispro, 0-7 Units, Subcutaneous, 4x Daily AC & at Bedtime  magnesium oxide, 1,000 mg, Oral, Daily  polyethylene glycol, 17 g, Oral, Daily  povidone-iodine, , Topical, Once  sodium chloride, 10 mL, Intravenous, Q12H  sodium chloride, 10 mL, Intravenous, Q12H  traZODone, 25 mg, Oral, Nightly      Continuous Infusions:   PRN Meds:.calcium carbonate  •  calcium gluconate IVPB **AND** calcium gluconate IVPB **AND** Calcium, Ionized  •  diphenhydrAMINE  •  heparin (porcine)  •  hydrALAZINE  •  magnesium sulfate **OR** magnesium sulfate in D5W 1g/100mL (PREMIX) **OR** magnesium sulfate  •  melatonin  •  oxyCODONE  •  oxymetazoline  •  sodium chloride  •  sodium chloride  •  sodium chloride    Assessment/Plan   Assessment & Plan     Active Hospital Problems    Diagnosis  POA   • **RINKU [N17.9]  Yes   • Lactic acidosis [E87.2]  Yes   • Acute HFrEF (EF 30%) [I50.21]  Yes   • Shock (CMS/HCC) -cardiogenic versus septic [R57.9]  Yes   • Hypertension [I10]  Yes   • T2DM [E11.9]  Yes   • Dyslipidemia [E78.5]  Yes   • Sleep apnea [G47.30]  Yes   • Elevated liver enzymes [R74.8]  Yes   • Recent Rt Total Knee Replacement 4/5/21 [Z96.659]  Not Applicable   • Chronic pain w/pain pump [G89.29]  Yes      Resolved Hospital Problems   No resolved problems to display.        Brief Hospital Course to date:  Elvis Hanson is a 66 y.o. male with a history of recent right total knee replacement on  4/5, HTN, T2DM, SHYLA, and chronic pain with a pain pump who presented to the [facility] ED on 4/7 with complaints of hearing loss. Pt was found to have RINKU, elevated LFTs and was admitted to the ICU service on the PCU floor.  He became hypotensive requiring pressors and was transferred to the ICU. Pt was found to have an EF of 30% and Cardiology was consulted.  He was also initiated on HD during this admission.  He was transferred to our service on 4/10. Of note, his hearing loss has resolved and was thought to be due to Furosemide in setting of decreased renal function.      Plan:  Acute systolic HF  Elevated troponin  --Cardiology followed; repeat ECHO with normal EF now with volume removal - currently holding on any need for ischemic evaluation at this time due to improvement   - continue volume removal with dialysis   --continue ASA, not able to tolerate statin due to transaminitis  - No ACE/ARB due to renal function  - follow up cards in 6 weeks      Oliguric RINKU   --now on HD, NAL following; urine output is improving    - serological workup unremarkable  - Continue intermittent assessment for dialysis   - Monitor AM labs      Elevated LFTs  --AST/ALT significantly elevated on admission  --likely congestive hepatopathy  - improving - trend      Septic vs Cardiogenic shock - improved   -- patient was started on steroids which have been weaned and discontinued 4/14  - Anti-hypertensive resumed  - Antibiotics discontinued 4/13   - hemodynamics are stable off antibiotics and steroids   -- Remove central line 4/17      Recent right knee replacement  --Dr. Avalos following; WBAT  - Attempted aspiration 4/13 due to knee swelling but no fluid aspirated and likely to be related to third spacing   - ASA for PPX on discharge -- currently on heparin for admission   - Continue PT/OT      Chronic Pain  --pain pump present     T2DM  --low dose basal insulin with SSI for now  - glucose stable     Hearing loss  --resolved. Thought to  be due to home dose diuretics.       Insomnia  -- no improvement with melatonin or benadryl; improved on trazodone 25 mg daily     DVT Prophylaxis:  BEBETO     Disposition: I expect the patient to be discharged TBD,  determination on need for dialysis prior to discharge and renal function improvement        CODE STATUS:   Code Status and Medical Interventions:   Ordered at: 04/07/21 1422     Code Status:    CPR     Medical Interventions (Level of Support Prior to Arrest):    Full       Tiff Hess,   04/17/21

## 2021-04-17 NOTE — PROGRESS NOTES
"   LOS: 10 days    Patient Care Team:  April Chen DO as PCP - General (Internal Medicine)    Chief Complaint:      Subjective     Interval History:   HD yesterday 3.5 liter removed. Bp still high. Otherwise doing well. .     Review of Systems:   Complains of low urine output, edema, denies, nausea vomiting dysuria hematuria no shortness of breath or chest pain.  All other systems are negative.    Objective     Vital Sign Min/Max for last 24 hours  Temp  Min: 97.9 °F (36.6 °C)  Max: 99.7 °F (37.6 °C)   BP  Min: 162/91  Max: 177/100   Pulse  Min: 67  Max: 84   Resp  Min: 18  Max: 18   SpO2  Min: 93 %  Max: 98 %   No data recorded   Weight  Min: 139 kg (306 lb 12.8 oz)  Max: 139 kg (306 lb 12.8 oz)     Flowsheet Rows      First Filed Value   Admission Height  177.8 cm (70\") Documented at 04/07/2021 1041   Admission Weight  127 kg (280 lb) Documented at 04/07/2021 1041          I/O this shift:  In: 118 [P.O.:118]  Out: 200 [Urine:200]  I/O last 3 completed shifts:  In: -   Out: 4800 [Urine:950; Other:3850]    Physical Exam:  General Appearance: -American male morbid obesity comfortable in bed -American male  Facial: Left-sided Bell's palsy noted chronic.  Eyes: PER, EOMI.  Neck: Supple no JVD.  Lungs: Clear auscultation, no rales rhonchi's, equal chest movement, nonlabored.  Heart: No gallop, murmur, rub, RRR.  Abdomen: Obesity distended soft nontender, positive bowel sounds, no organomegaly.  Extremities: Lateral lower extremity 3+ edema trace to 1+ upper extremity edema.  Neuro: No focal deficit, moving all extremities, alert oriented X 3  : No suprapubic fullness  Skin warm and dry.  WBC WBC   Date Value Ref Range Status   04/16/2021 9.44 3.40 - 10.80 10*3/mm3 Final   04/15/2021 9.23 3.40 - 10.80 10*3/mm3 Final      HGB Hemoglobin   Date Value Ref Range Status   04/16/2021 10.2 (L) 13.0 - 17.7 g/dL Final   04/15/2021 10.0 (L) 13.0 - 17.7 g/dL Final      HCT Hematocrit   Date Value Ref " Range Status   04/16/2021 32.4 (L) 37.5 - 51.0 % Final   04/15/2021 32.3 (L) 37.5 - 51.0 % Final      Platlets No results found for: LABPLAT   MCV MCV   Date Value Ref Range Status   04/16/2021 83.3 79.0 - 97.0 fL Final   04/15/2021 83.5 79.0 - 97.0 fL Final          Sodium Sodium   Date Value Ref Range Status   04/17/2021 132 (L) 136 - 145 mmol/L Final   04/16/2021 130 (L) 136 - 145 mmol/L Final   04/15/2021 131 (L) 136 - 145 mmol/L Final      Potassium Potassium   Date Value Ref Range Status   04/17/2021 3.8 3.5 - 5.2 mmol/L Final   04/16/2021 4.0 3.5 - 5.2 mmol/L Final     Comment:     Slight hemolysis detected by analyzer. Results may be affected.   04/15/2021 3.4 (L) 3.5 - 5.2 mmol/L Final      Chloride Chloride   Date Value Ref Range Status   04/17/2021 95 (L) 98 - 107 mmol/L Final   04/16/2021 94 (L) 98 - 107 mmol/L Final   04/15/2021 95 (L) 98 - 107 mmol/L Final      CO2 CO2   Date Value Ref Range Status   04/17/2021 24.0 22.0 - 29.0 mmol/L Final   04/16/2021 18.0 (L) 22.0 - 29.0 mmol/L Final   04/15/2021 23.0 22.0 - 29.0 mmol/L Final      BUN BUN   Date Value Ref Range Status   04/17/2021 38 (H) 8 - 23 mg/dL Final   04/16/2021 59 (H) 8 - 23 mg/dL Final   04/15/2021 49 (H) 8 - 23 mg/dL Final      Creatinine Creatinine   Date Value Ref Range Status   04/17/2021 6.97 (H) 0.76 - 1.27 mg/dL Final   04/16/2021 9.17 (H) 0.76 - 1.27 mg/dL Final   04/15/2021 7.97 (H) 0.76 - 1.27 mg/dL Final      Calcium Calcium   Date Value Ref Range Status   04/17/2021 8.6 8.6 - 10.5 mg/dL Final   04/16/2021 7.6 (L) 8.6 - 10.5 mg/dL Final   04/15/2021 7.4 (L) 8.6 - 10.5 mg/dL Final      PO4 No results found for: CAPO4   Albumin Albumin   Date Value Ref Range Status   04/16/2021 3.30 (L) 3.50 - 5.20 g/dL Final   04/15/2021 3.40 (L) 3.50 - 5.20 g/dL Final      Magnesium Magnesium   Date Value Ref Range Status   04/16/2021 2.2 1.6 - 2.4 mg/dL Final      Uric Acid No results found for: URICACID     Right kidney measures 11.2 cm in  length without apparent mass or   hydronephrosis. Normal color Doppler flow.       Left kidney measures 11.0 cm in length without apparent mass or   hydronephrosis. Normal color Doppler flow.       Unremarkable catheterized and contracted urinary bladder.       Impression:     Unremarkable sonographic appearance of both kidneys.         Results Review:     I reviewed the patient's new clinical results.    aspirin, 81 mg, Oral, Daily  carvedilol, 25 mg, Oral, BID With Meals  cetirizine, 10 mg, Oral, Daily  docusate sodium, 100 mg, Oral, BID  fluticasone, 2 spray, Each Nare, Daily  heparin (porcine), 5,000 Units, Subcutaneous, Q12H  insulin detemir, 10 Units, Subcutaneous, Daily  insulin lispro, 0-7 Units, Subcutaneous, 4x Daily AC & at Bedtime  magnesium oxide, 1,000 mg, Oral, Daily  polyethylene glycol, 17 g, Oral, Daily  povidone-iodine, , Topical, Once  sodium chloride, 10 mL, Intravenous, Q12H  sodium chloride, 10 mL, Intravenous, Q12H  traZODone, 25 mg, Oral, Nightly           Medication Review: As noted above    Assessment/Plan       RINKU    Hypertension    T2DM    Dyslipidemia    Sleep apnea    Elevated liver enzymes    Recent Rt Total Knee Replacement 4/5/21    Chronic pain w/pain pump    Lactic acidosis    Acute HFrEF (EF 30%)    Shock (CMS/HCC) -cardiogenic versus septic  1.  RINKU: Oliguirc now on HD. Presumed hemodynamic injury and ATN.    Nephrotic range proteinuria. Serological testing negative. MARIANA, ANCA, AntiDsDNA  Right kidney 11.2 cm, left kidney 11.0 cm normal color Doppler unremarkable bilateral kidneys.  Hepatitis negative, on IV vancomycin on admission  2.  Septic shock: required pressors.   3.  Nephrotic proteinuria greater than 6 g in the setting of diabetes.  4.  Altered mental status and hearing difficulty improved at this time.  5.  S/p recent right total knee replacement.  6.  Congestive heart failure: Ejection fraction 30% repeat ECHO showed EF 50%  7.  Hypocalcemia.      Plan:  Daily eval  for HD. Since he has acute kidney injury. Next HD on Monday .   Monitor for renal recovery   Need optimization of volume status with HD       Demetrio Crook MD  04/17/21  13:44 EDT

## 2021-04-18 LAB
ANION GAP SERPL CALCULATED.3IONS-SCNC: 13 MMOL/L (ref 5–15)
BUN SERPL-MCNC: 47 MG/DL (ref 8–23)
BUN/CREAT SERPL: 5.4 (ref 7–25)
CALCIUM SPEC-SCNC: 8.5 MG/DL (ref 8.6–10.5)
CHLORIDE SERPL-SCNC: 96 MMOL/L (ref 98–107)
CO2 SERPL-SCNC: 24 MMOL/L (ref 22–29)
CREAT SERPL-MCNC: 8.63 MG/DL (ref 0.76–1.27)
GFR SERPL CREATININE-BSD FRML MDRD: 8 ML/MIN/1.73
GFR SERPL CREATININE-BSD FRML MDRD: ABNORMAL ML/MIN/{1.73_M2}
GLUCOSE BLDC GLUCOMTR-MCNC: 108 MG/DL (ref 70–130)
GLUCOSE BLDC GLUCOMTR-MCNC: 126 MG/DL (ref 70–130)
GLUCOSE BLDC GLUCOMTR-MCNC: 134 MG/DL (ref 70–130)
GLUCOSE BLDC GLUCOMTR-MCNC: 136 MG/DL (ref 70–130)
GLUCOSE SERPL-MCNC: 102 MG/DL (ref 65–99)
POTASSIUM SERPL-SCNC: 4.2 MMOL/L (ref 3.5–5.2)
SODIUM SERPL-SCNC: 133 MMOL/L (ref 136–145)

## 2021-04-18 PROCEDURE — 82962 GLUCOSE BLOOD TEST: CPT

## 2021-04-18 PROCEDURE — 80048 BASIC METABOLIC PNL TOTAL CA: CPT | Performed by: INTERNAL MEDICINE

## 2021-04-18 PROCEDURE — 63710000001 INSULIN DETEMIR PER 5 UNITS: Performed by: INTERNAL MEDICINE

## 2021-04-18 PROCEDURE — 99232 SBSQ HOSP IP/OBS MODERATE 35: CPT | Performed by: NURSE PRACTITIONER

## 2021-04-18 PROCEDURE — 25010000002 HEPARIN (PORCINE) PER 1000 UNITS: Performed by: INTERNAL MEDICINE

## 2021-04-18 RX ORDER — ALBUMIN (HUMAN) 12.5 G/50ML
12.5 SOLUTION INTRAVENOUS AS NEEDED
Status: CANCELLED | OUTPATIENT
Start: 2021-04-19 | End: 2021-04-19

## 2021-04-18 RX ADMIN — MAGNESIUM OXIDE TAB 400 MG (241.3 MG ELEMENTAL MG) 1000 MG: 400 (241.3 MG) TAB at 09:26

## 2021-04-18 RX ADMIN — HEPARIN SODIUM 5000 UNITS: 5000 INJECTION INTRAVENOUS; SUBCUTANEOUS at 20:02

## 2021-04-18 RX ADMIN — CARVEDILOL 25 MG: 12.5 TABLET, FILM COATED ORAL at 09:25

## 2021-04-18 RX ADMIN — TRAZODONE HYDROCHLORIDE 25 MG: 50 TABLET ORAL at 20:01

## 2021-04-18 RX ADMIN — DOCUSATE SODIUM 100 MG: 100 CAPSULE, LIQUID FILLED ORAL at 20:01

## 2021-04-18 RX ADMIN — DOCUSATE SODIUM 100 MG: 100 CAPSULE, LIQUID FILLED ORAL at 09:26

## 2021-04-18 RX ADMIN — CETIRIZINE HYDROCHLORIDE 10 MG: 10 TABLET, FILM COATED ORAL at 09:26

## 2021-04-18 RX ADMIN — SODIUM CHLORIDE, PRESERVATIVE FREE 10 ML: 5 INJECTION INTRAVENOUS at 09:26

## 2021-04-18 RX ADMIN — INSULIN DETEMIR 10 UNITS: 100 INJECTION, SOLUTION SUBCUTANEOUS at 09:25

## 2021-04-18 RX ADMIN — CARVEDILOL 25 MG: 12.5 TABLET, FILM COATED ORAL at 17:21

## 2021-04-18 RX ADMIN — ASPIRIN 81 MG: 81 TABLET, COATED ORAL at 09:26

## 2021-04-18 RX ADMIN — HEPARIN SODIUM 5000 UNITS: 5000 INJECTION INTRAVENOUS; SUBCUTANEOUS at 09:25

## 2021-04-18 RX ADMIN — FLUTICASONE PROPIONATE 2 SPRAY: 50 SPRAY, METERED NASAL at 09:26

## 2021-04-18 RX ADMIN — SODIUM CHLORIDE, PRESERVATIVE FREE 10 ML: 5 INJECTION INTRAVENOUS at 20:02

## 2021-04-18 NOTE — PROGRESS NOTES
"   LOS: 11 days    Patient Care Team:  April Chen DO as PCP - General (Internal Medicine)    Chief Complaint:      Subjective     Interval History:   Plan for dialysis tomorrow     Review of Systems:   Complains of low urine output, edema, denies, nausea vomiting dysuria hematuria no shortness of breath or chest pain.  All other systems are negative.    Objective     Vital Sign Min/Max for last 24 hours  Temp  Min: 96.9 °F (36.1 °C)  Max: 98.2 °F (36.8 °C)   BP  Min: 141/90  Max: 169/99   Pulse  Min: 65  Max: 80   Resp  Min: 18  Max: 18   SpO2  Min: 100 %  Max: 100 %   No data recorded   Weight  Min: 133 kg (292 lb 8 oz)  Max: 133 kg (292 lb 8 oz)     Flowsheet Rows      First Filed Value   Admission Height  177.8 cm (70\") Documented at 04/07/2021 1041   Admission Weight  127 kg (280 lb) Documented at 04/07/2021 1041          I/O this shift:  In: -   Out: 400 [Urine:400]  I/O last 3 completed shifts:  In: 358 [P.O.:358]  Out: 1350 [Urine:1350]    Physical Exam:  General Appearance: -American male morbid obesity comfortable in bed -American male  Facial: Left-sided Bell's palsy noted chronic.  Eyes: PER, EOMI.  Neck: Supple no JVD.  Lungs: Clear auscultation, no rales rhonchi's, equal chest movement, nonlabored.  Heart: No gallop, murmur, rub, RRR.  Abdomen: Obesity distended soft nontender, positive bowel sounds, no organomegaly.  Extremities: Lateral lower extremity 3+ edema trace to 1+ upper extremity edema.  Neuro: No focal deficit, moving all extremities, alert oriented X 3  : No suprapubic fullness  Skin warm and dry.  WBC WBC   Date Value Ref Range Status   04/16/2021 9.44 3.40 - 10.80 10*3/mm3 Final      HGB Hemoglobin   Date Value Ref Range Status   04/16/2021 10.2 (L) 13.0 - 17.7 g/dL Final      HCT Hematocrit   Date Value Ref Range Status   04/16/2021 32.4 (L) 37.5 - 51.0 % Final      Platlets No results found for: LABPLAT   MCV MCV   Date Value Ref Range Status   04/16/2021 " 83.3 79.0 - 97.0 fL Final          Sodium Sodium   Date Value Ref Range Status   04/18/2021 133 (L) 136 - 145 mmol/L Final   04/17/2021 132 (L) 136 - 145 mmol/L Final   04/16/2021 130 (L) 136 - 145 mmol/L Final      Potassium Potassium   Date Value Ref Range Status   04/18/2021 4.2 3.5 - 5.2 mmol/L Final   04/17/2021 3.8 3.5 - 5.2 mmol/L Final   04/16/2021 4.0 3.5 - 5.2 mmol/L Final     Comment:     Slight hemolysis detected by analyzer. Results may be affected.      Chloride Chloride   Date Value Ref Range Status   04/18/2021 96 (L) 98 - 107 mmol/L Final   04/17/2021 95 (L) 98 - 107 mmol/L Final   04/16/2021 94 (L) 98 - 107 mmol/L Final      CO2 CO2   Date Value Ref Range Status   04/18/2021 24.0 22.0 - 29.0 mmol/L Final   04/17/2021 24.0 22.0 - 29.0 mmol/L Final   04/16/2021 18.0 (L) 22.0 - 29.0 mmol/L Final      BUN BUN   Date Value Ref Range Status   04/18/2021 47 (H) 8 - 23 mg/dL Final   04/17/2021 38 (H) 8 - 23 mg/dL Final   04/16/2021 59 (H) 8 - 23 mg/dL Final      Creatinine Creatinine   Date Value Ref Range Status   04/18/2021 8.63 (H) 0.76 - 1.27 mg/dL Final   04/17/2021 6.97 (H) 0.76 - 1.27 mg/dL Final   04/16/2021 9.17 (H) 0.76 - 1.27 mg/dL Final      Calcium Calcium   Date Value Ref Range Status   04/18/2021 8.5 (L) 8.6 - 10.5 mg/dL Final   04/17/2021 8.6 8.6 - 10.5 mg/dL Final   04/16/2021 7.6 (L) 8.6 - 10.5 mg/dL Final      PO4 No results found for: CAPO4   Albumin Albumin   Date Value Ref Range Status   04/16/2021 3.30 (L) 3.50 - 5.20 g/dL Final      Magnesium Magnesium   Date Value Ref Range Status   04/16/2021 2.2 1.6 - 2.4 mg/dL Final      Uric Acid No results found for: URICACID     Right kidney measures 11.2 cm in length without apparent mass or   hydronephrosis. Normal color Doppler flow.       Left kidney measures 11.0 cm in length without apparent mass or   hydronephrosis. Normal color Doppler flow.       Unremarkable catheterized and contracted urinary bladder.       Impression:      Unremarkable sonographic appearance of both kidneys.         Results Review:     I reviewed the patient's new clinical results.    aspirin, 81 mg, Oral, Daily  carvedilol, 25 mg, Oral, BID With Meals  cetirizine, 10 mg, Oral, Daily  docusate sodium, 100 mg, Oral, BID  fluticasone, 2 spray, Each Nare, Daily  heparin (porcine), 5,000 Units, Subcutaneous, Q12H  insulin detemir, 10 Units, Subcutaneous, Daily  insulin lispro, 0-7 Units, Subcutaneous, 4x Daily AC & at Bedtime  magnesium oxide, 1,000 mg, Oral, Daily  polyethylene glycol, 17 g, Oral, Daily  povidone-iodine, , Topical, Once  sodium chloride, 10 mL, Intravenous, Q12H  sodium chloride, 10 mL, Intravenous, Q12H  traZODone, 25 mg, Oral, Nightly           Medication Review: As noted above    Assessment/Plan       RINKU    Hypertension    T2DM    Dyslipidemia    Sleep apnea    Elevated liver enzymes    Recent Rt Total Knee Replacement 4/5/21    Chronic pain w/pain pump    Lactic acidosis    Acute HFrEF (EF 30%)    Shock (CMS/HCC) -cardiogenic versus septic    1.  RINKU: Oliguirc now on HD. Presumed hemodynamic injury and ATN.    Nephrotic range proteinuria. Serological testing negative. MARIANA, ANCA, AntiDsDNA  Right kidney 11.2 cm, left kidney 11.0 cm normal color Doppler unremarkable bilateral kidneys.  Hepatitis negative, on IV vancomycin on admission  2.  Septic shock: required pressors.   3.  Nephrotic proteinuria greater than 6 g in the setting of diabetes.  4.  Altered mental status and hearing difficulty improved at this time.  5.  S/p recent right total knee replacement.  6.  Congestive heart failure: Ejection fraction 30% repeat ECHO showed EF 50%  7.  Hypocalcemia.      Plan:  Daily eval for HD. Since he has acute kidney injury. Next HD on Monday .   Monitor for renal recovery   Need optimization of volume status with HD       Demetrio Crook MD  04/18/21  13:13 EDT

## 2021-04-18 NOTE — PROGRESS NOTES
[facility]  PROGRESS NOTE    Patient Name: Elvis Hanson  : 1955  MRN: [id number]    Date of Admission: 2021  Primary Care Physician: April Chen,     Subjective   Subjective     CC:  Hearing loss, renal failure     HPI:  Pt sitting up in bed, A&OX3, very pleasant and talkative. He is hopeful his kidney function will return to normal. Denies any issues overnight. Reports a BM yesterday. He states he has been doing physical therapy exercises twice a day on most days.     ROS:  Gen- No fevers, chills  CV- No chest pain, palpitations  Resp- No cough, dyspnea  GI- No N/V/D, abd pain  All systems reviewed and are neg, except those mentioned in HPI.    Objective   Objective     Vital Signs:   Temp:  [96.9 °F (36.1 °C)-98.2 °F (36.8 °C)] 97.3 °F (36.3 °C)  Heart Rate:  [65-80] 69  Resp:  [18] 18  BP: (141-169)/(82-99) 141/90        Physical Exam:  Constitutional: Awake, alert  Eyes: PERRLA, sclerae anicteric, no conjunctival injection  HENT: NCAT, mucous membranes moist  Neck: Supple, no thyromegaly, no lymphadenopathy, trachea midline  Respiratory: Clear to auscultation bilaterally, nonlabored respirations   Cardiovascular: RRR, no murmurs, rubs, or gallops, palpable pedal pulses bilaterally  Gastrointestinal: Positive bowel sounds, soft, nontender, nondistended  Musculoskeletal: bilateral lower extremity edema R>L, Ace wrapped to the RLE no clubbing or cyanosis to extremities  Psychiatric: Appropriate affect, cooperative  Neurologic: Oriented x 3, strength symmetric in all extremities, Cranial Nerves grossly intact to confrontation, speech clear  Skin: No rashes    Results Reviewed:  Results from last 7 days   Lab Units 21  1238 04/15/21  0719 21  0502   WBC 10*3/mm3 9.44 9.23 10.15   HEMOGLOBIN g/dL 10.2* 10.0* 10.2*   HEMATOCRIT % 32.4* 32.3* 32.5*   PLATELETS 10*3/mm3 205 205 217     Results from last 7 days   Lab Units 21  0504  04/17/21  0635 04/16/21  0527 04/15/21  0719 04/14/21  0502 04/12/21  0800   SODIUM mmol/L 133* 132* 130* 131* 129* 135*   POTASSIUM mmol/L 4.2 3.8 4.0 3.4* 3.6 3.4*   CHLORIDE mmol/L 96* 95* 94* 95* 91* 92*   CO2 mmol/L 24.0 24.0 18.0* 23.0 23.0 24.0   BUN mg/dL 47* 38* 59* 49* 71* 69*   CREATININE mg/dL 8.63* 6.97* 9.17* 7.97* 8.66* 7.99*   GLUCOSE mg/dL 102* 104* 106* 108* 127* 128*   CALCIUM mg/dL 8.5* 8.6 7.6* 7.4* 7.6* 7.6*   ALT (SGPT) U/L  --   --  301* 374* 540* 1,197*   AST (SGOT) U/L  --   --  46* 45* 58* 188*   TROPONIN T ng/mL  --   --   --   --   --  0.717*     Estimated Creatinine Clearance: 11.9 mL/min (A) (by C-G formula based on SCr of 8.63 mg/dL (H)).    Microbiology Results Abnormal     Procedure Component Value - Date/Time    Blood Culture - Blood, Hand, Left [[id number]] Collected: 04/07/21 2057    Lab Status: Final result Specimen: Blood from Hand, Left Updated: 04/12/21 2145     Blood Culture No growth at 5 days    Blood Culture - Blood, Hand, Right [[id number]] Collected: 04/07/21 2057    Lab Status: Final result Specimen: Blood from Hand, Right Updated: 04/12/21 2145     Blood Culture No growth at 5 days    Urine Culture - Urine, Urine, Clean Catch [[id number]]  (Normal) Collected: 04/09/21 1325    Lab Status: Final result Specimen: Urine, Clean Catch Updated: 04/10/21 1742     Urine Culture No growth    MRSA Screen, PCR (Inpatient) - Swab, Nares [[id number]]  (Normal) Collected: 04/08/21 0341    Lab Status: Final result Specimen: Swab from Nares Updated: 04/08/21 0937     MRSA PCR Negative    Narrative:      MRSA Negative    COVID-19 and FLU A/B PCR - Swab, Nasopharynx [[id number]]  (Normal) Collected: 04/07/21 1357    Lab Status: Final result Specimen: Swab from Nasopharynx Updated: 04/07/21 1509     COVID19 Not Detected     Influenza A PCR Not Detected     Influenza B PCR Not Detected    Narrative:      Fact sheet for providers: https://www.fda.gov/media/987673/download    Fact sheet for  patients: https://www.[employer].gov/media/661836/download    Test performed by PCR.          Imaging Results (Last 24 Hours)     ** No results found for the last 24 hours. **          Results for orders placed during the hospital encounter of 04/07/21    Adult Transthoracic Echo Limited W/ Cont if Necessary Per Protocol    Interpretation Summary  · Mildly reduced right ventricular systolic function noted.  · Estimated left ventricular EF = 50% Left ventricular ejection fraction appears to be 46 - 50%. Left ventricular systolic function is normal.  · The left ventricular ejection fraction is significantly improved from previous echo with EF lower limits of normal      I have reviewed the medications:  Scheduled Meds:aspirin, 81 mg, Oral, Daily  carvedilol, 25 mg, Oral, BID With Meals  cetirizine, 10 mg, Oral, Daily  docusate sodium, 100 mg, Oral, BID  fluticasone, 2 spray, Each Nare, Daily  heparin (porcine), 5,000 Units, Subcutaneous, Q12H  insulin detemir, 10 Units, Subcutaneous, Daily  insulin lispro, 0-7 Units, Subcutaneous, 4x Daily AC & at Bedtime  magnesium oxide, 1,000 mg, Oral, Daily  polyethylene glycol, 17 g, Oral, Daily  povidone-iodine, , Topical, Once  sodium chloride, 10 mL, Intravenous, Q12H  sodium chloride, 10 mL, Intravenous, Q12H  traZODone, 25 mg, Oral, Nightly      Continuous Infusions:   PRN Meds:.calcium carbonate  •  calcium gluconate IVPB **AND** calcium gluconate IVPB **AND** Calcium, Ionized  •  diphenhydrAMINE  •  heparin (porcine)  •  hydrALAZINE  •  magnesium sulfate **OR** magnesium sulfate in D5W 1g/100mL (PREMIX) **OR** magnesium sulfate  •  melatonin  •  oxyCODONE  •  sodium chloride  •  sodium chloride  •  sodium chloride    Assessment/Plan   Assessment & Plan     Active Hospital Problems    Diagnosis  POA   • **RINKU [N17.9]  Yes   • Lactic acidosis [E87.2]  Yes   • Acute HFrEF (EF 30%) [I50.21]  Yes   • Shock (CMS/HCC) -cardiogenic versus septic [R57.9]  Yes   • Hypertension [I10]  Yes    • T2DM [E11.9]  Yes   • Dyslipidemia [E78.5]  Yes   • Sleep apnea [G47.30]  Yes   • Elevated liver enzymes [R74.8]  Yes   • Recent Rt Total Knee Replacement 4/5/21 [Z96.659]  Not Applicable   • Chronic pain w/pain pump [G89.29]  Yes      Resolved Hospital Problems   No resolved problems to display.        Brief Hospital Course to date:  Elvis Hanson is a 66 y.o. male with a history of recent right total knee replacement on 4/5, HTN, T2DM, SHYLA, and chronic pain with a pain pump who presented to the [facility] ED on 4/7 with complaints of hearing loss. Pt was found to have RINKU, elevated LFTs and was admitted to the ICU service on the PCU floor.  He became hypotensive requiring pressors and was transferred to the ICU. Pt was found to have an EF of 30% and Cardiology was consulted.  He was also initiated on HD during this admission.  He was transferred to our service on 4/10. Of note, his hearing loss has resolved and was thought to be due to Furosemide in setting of decreased renal function.      Plan:  Acute systolic HF  Elevated troponin  --Cardiology followed; repeat ECHO with normal EF now with volume removal - currently holding on any need for ischemic evaluation at this time due to improvement   - continue volume removal with dialysis   --continue ASA, not able to tolerate statin due to transaminitis  - No ACE/ARB due to renal function  - follow up cards in 6 weeks      Oliguric RINKU   --now on HD, NAL following; urine output is improving    - serological workup unremarkable  - Continue intermittent assessment for dialysis   - Monitor AM labs      Elevated LFTs  --AST/ALT significantly elevated on admission  --likely congestive hepatopathy  - improving - trend      Septic vs Cardiogenic shock - improved   -- patient was started on steroids which have been weaned and discontinued 4/14  - Anti-hypertensive resumed  - Antibiotics discontinued 4/13   - hemodynamics are stable off antibiotics and steroids   -- Remove  central line 4/17      Recent right knee replacement  --Dr. Avalos following; WBAT  - Attempted aspiration 4/13 due to knee swelling but no fluid aspirated and likely to be related to third spacing   - ASA for PPX on discharge -- currently on heparin for admission   - Continue PT/OT      Chronic Pain  --pain pump present     T2DM  --low dose basal insulin with SSI for now  - glucose stable 04/18 102-171   Hearing loss  --resolved. Thought to be due to home dose diuretics.       Insomnia  -- no improvement with melatonin or benadryl; improved on trazodone 25 mg daily     DVT Prophylaxis:  BEBETO     Disposition: I expect the patient to be discharged TBD,  determination on need for dialysis prior to discharge and renal function improvement        CODE STATUS:   Code Status and Medical Interventions:   Ordered at: 04/07/21 1422     Code Status:    CPR     Medical Interventions (Level of Support Prior to Arrest):    Full       Electronically signed by ANGUS Holley, 04/18/21, 1:09 PM EDT.

## 2021-04-18 NOTE — PLAN OF CARE
Goal Outcome Evaluation:  Plan of Care Reviewed With: patient  Progress: no change  Outcome Summary: Patient able to sleep through most of the night. Vital stable. Denies pain. Dialysis catheter dressing changed. Right Knee wrap changed. 600mL in urine output this shift.

## 2021-04-18 NOTE — PLAN OF CARE
Goal Outcome Evaluation:        Outcome Summary: VSS on room air, patient denies pain. No complaints. Fair urine output, patient had a bowel movement. Anticipate dialysis in AM. Will continue to monitor.

## 2021-04-19 ENCOUNTER — APPOINTMENT (OUTPATIENT)
Dept: NEPHROLOGY | Facility: HOSPITAL | Age: 66
End: 2021-04-19

## 2021-04-19 LAB
ALBUMIN SERPL-MCNC: 3.6 G/DL (ref 3.5–5.2)
ALBUMIN/GLOB SERPL: 1.2 G/DL
ALP SERPL-CCNC: 103 U/L (ref 39–117)
ALT SERPL W P-5'-P-CCNC: 140 U/L (ref 1–41)
ANION GAP SERPL CALCULATED.3IONS-SCNC: 13 MMOL/L (ref 5–15)
AST SERPL-CCNC: 23 U/L (ref 1–40)
BACTERIA UR QL AUTO: ABNORMAL /HPF
BASOPHILS # BLD AUTO: 0.01 10*3/MM3 (ref 0–0.2)
BASOPHILS NFR BLD AUTO: 0.1 % (ref 0–1.5)
BILIRUB SERPL-MCNC: 0.5 MG/DL (ref 0–1.2)
BILIRUB UR QL STRIP: NEGATIVE
BUN SERPL-MCNC: 56 MG/DL (ref 8–23)
BUN/CREAT SERPL: 5.3 (ref 7–25)
CALCIUM SPEC-SCNC: 8 MG/DL (ref 8.6–10.5)
CHLORIDE SERPL-SCNC: 93 MMOL/L (ref 98–107)
CLARITY UR: CLEAR
CO2 SERPL-SCNC: 24 MMOL/L (ref 22–29)
COLOR UR: YELLOW
CREAT SERPL-MCNC: 10.6 MG/DL (ref 0.76–1.27)
CREAT UR-MCNC: 37 MG/DL
DEPRECATED RDW RBC AUTO: 47.4 FL (ref 37–54)
EOSINOPHIL # BLD AUTO: 0.19 10*3/MM3 (ref 0–0.4)
EOSINOPHIL NFR BLD AUTO: 2.6 % (ref 0.3–6.2)
ERYTHROCYTE [DISTWIDTH] IN BLOOD BY AUTOMATED COUNT: 15.9 % (ref 12.3–15.4)
GFR SERPL CREATININE-BSD FRML MDRD: 6 ML/MIN/1.73
GFR SERPL CREATININE-BSD FRML MDRD: ABNORMAL ML/MIN/{1.73_M2}
GLOBULIN UR ELPH-MCNC: 2.9 GM/DL
GLUCOSE BLDC GLUCOMTR-MCNC: 109 MG/DL (ref 70–130)
GLUCOSE BLDC GLUCOMTR-MCNC: 117 MG/DL (ref 70–130)
GLUCOSE BLDC GLUCOMTR-MCNC: 124 MG/DL (ref 70–130)
GLUCOSE BLDC GLUCOMTR-MCNC: 93 MG/DL (ref 70–130)
GLUCOSE SERPL-MCNC: 103 MG/DL (ref 65–99)
GLUCOSE UR STRIP-MCNC: NEGATIVE MG/DL
HCT VFR BLD AUTO: 29.3 % (ref 37.5–51)
HGB BLD-MCNC: 9 G/DL (ref 13–17.7)
HGB UR QL STRIP.AUTO: ABNORMAL
HYALINE CASTS UR QL AUTO: ABNORMAL /LPF
IMM GRANULOCYTES # BLD AUTO: 0.05 10*3/MM3 (ref 0–0.05)
IMM GRANULOCYTES NFR BLD AUTO: 0.7 % (ref 0–0.5)
KETONES UR QL STRIP: NEGATIVE
LEUKOCYTE ESTERASE UR QL STRIP.AUTO: NEGATIVE
LYMPHOCYTES # BLD AUTO: 1.32 10*3/MM3 (ref 0.7–3.1)
LYMPHOCYTES NFR BLD AUTO: 17.9 % (ref 19.6–45.3)
MCH RBC QN AUTO: 25.8 PG (ref 26.6–33)
MCHC RBC AUTO-ENTMCNC: 30.7 G/DL (ref 31.5–35.7)
MCV RBC AUTO: 84 FL (ref 79–97)
MONOCYTES # BLD AUTO: 0.77 10*3/MM3 (ref 0.1–0.9)
MONOCYTES NFR BLD AUTO: 10.5 % (ref 5–12)
NEUTROPHILS NFR BLD AUTO: 5.02 10*3/MM3 (ref 1.7–7)
NEUTROPHILS NFR BLD AUTO: 68.2 % (ref 42.7–76)
NITRITE UR QL STRIP: NEGATIVE
NRBC BLD AUTO-RTO: 0 /100 WBC (ref 0–0.2)
PH UR STRIP.AUTO: 5.5 [PH] (ref 5–8)
PLATELET # BLD AUTO: 207 10*3/MM3 (ref 140–450)
PMV BLD AUTO: 10.7 FL (ref 6–12)
POTASSIUM SERPL-SCNC: 4.3 MMOL/L (ref 3.5–5.2)
PROT SERPL-MCNC: 6.5 G/DL (ref 6–8.5)
PROT UR QL STRIP: ABNORMAL
PROT UR-MCNC: 21.4 MG/DL
RBC # BLD AUTO: 3.49 10*6/MM3 (ref 4.14–5.8)
RBC # UR: ABNORMAL /HPF
REF LAB TEST METHOD: ABNORMAL
SODIUM SERPL-SCNC: 130 MMOL/L (ref 136–145)
SP GR UR STRIP: <=1.005 (ref 1–1.03)
SQUAMOUS #/AREA URNS HPF: ABNORMAL /HPF
UROBILINOGEN UR QL STRIP: ABNORMAL
WBC # BLD AUTO: 7.36 10*3/MM3 (ref 3.4–10.8)
WBC UR QL AUTO: ABNORMAL /HPF

## 2021-04-19 PROCEDURE — 82570 ASSAY OF URINE CREATININE: CPT | Performed by: INTERNAL MEDICINE

## 2021-04-19 PROCEDURE — 99231 SBSQ HOSP IP/OBS SF/LOW 25: CPT | Performed by: PHYSICIAN ASSISTANT

## 2021-04-19 PROCEDURE — 84156 ASSAY OF PROTEIN URINE: CPT | Performed by: INTERNAL MEDICINE

## 2021-04-19 PROCEDURE — 85025 COMPLETE CBC W/AUTO DIFF WBC: CPT | Performed by: NURSE PRACTITIONER

## 2021-04-19 PROCEDURE — 25010000002 HEPARIN (PORCINE) PER 1000 UNITS: Performed by: INTERNAL MEDICINE

## 2021-04-19 PROCEDURE — 25010000002 HYDRALAZINE PER 20 MG: Performed by: INTERNAL MEDICINE

## 2021-04-19 PROCEDURE — 97116 GAIT TRAINING THERAPY: CPT

## 2021-04-19 PROCEDURE — 97110 THERAPEUTIC EXERCISES: CPT

## 2021-04-19 PROCEDURE — 81001 URINALYSIS AUTO W/SCOPE: CPT | Performed by: INTERNAL MEDICINE

## 2021-04-19 PROCEDURE — 63710000001 INSULIN DETEMIR PER 5 UNITS: Performed by: INTERNAL MEDICINE

## 2021-04-19 PROCEDURE — 82962 GLUCOSE BLOOD TEST: CPT

## 2021-04-19 PROCEDURE — 80053 COMPREHEN METABOLIC PANEL: CPT | Performed by: NURSE PRACTITIONER

## 2021-04-19 RX ORDER — HEPARIN SODIUM 5000 [USP'U]/ML
5000 INJECTION, SOLUTION INTRAVENOUS; SUBCUTANEOUS ONCE
Status: COMPLETED | OUTPATIENT
Start: 2021-04-19 | End: 2021-04-19

## 2021-04-19 RX ADMIN — HEPARIN SODIUM 1600 UNITS: 1000 INJECTION INTRAVENOUS; SUBCUTANEOUS at 11:46

## 2021-04-19 RX ADMIN — CETIRIZINE HYDROCHLORIDE 10 MG: 10 TABLET, FILM COATED ORAL at 12:17

## 2021-04-19 RX ADMIN — TRAZODONE HYDROCHLORIDE 25 MG: 50 TABLET ORAL at 23:44

## 2021-04-19 RX ADMIN — DOCUSATE SODIUM 100 MG: 100 CAPSULE, LIQUID FILLED ORAL at 21:44

## 2021-04-19 RX ADMIN — OXYCODONE 5 MG: 5 TABLET ORAL at 00:31

## 2021-04-19 RX ADMIN — DOCUSATE SODIUM 100 MG: 100 CAPSULE, LIQUID FILLED ORAL at 12:15

## 2021-04-19 RX ADMIN — SODIUM CHLORIDE, PRESERVATIVE FREE 10 ML: 5 INJECTION INTRAVENOUS at 12:17

## 2021-04-19 RX ADMIN — CARVEDILOL 25 MG: 12.5 TABLET, FILM COATED ORAL at 20:43

## 2021-04-19 RX ADMIN — INSULIN DETEMIR 10 UNITS: 100 INJECTION, SOLUTION SUBCUTANEOUS at 12:14

## 2021-04-19 RX ADMIN — HYDRALAZINE HYDROCHLORIDE 10 MG: 20 INJECTION INTRAMUSCULAR; INTRAVENOUS at 12:18

## 2021-04-19 RX ADMIN — HEPARIN SODIUM 5000 UNITS: 5000 INJECTION INTRAVENOUS; SUBCUTANEOUS at 21:44

## 2021-04-19 RX ADMIN — HEPARIN SODIUM 5000 UNITS: 5000 INJECTION, SOLUTION INTRAVENOUS; SUBCUTANEOUS at 12:17

## 2021-04-19 RX ADMIN — OXYCODONE 5 MG: 5 TABLET ORAL at 23:44

## 2021-04-19 RX ADMIN — FLUTICASONE PROPIONATE 2 SPRAY: 50 SPRAY, METERED NASAL at 12:16

## 2021-04-19 RX ADMIN — CARVEDILOL 25 MG: 12.5 TABLET, FILM COATED ORAL at 12:15

## 2021-04-19 RX ADMIN — Medication 5 MG: at 00:03

## 2021-04-19 RX ADMIN — MAGNESIUM OXIDE TAB 400 MG (241.3 MG ELEMENTAL MG) 1000 MG: 400 (241.3 MG) TAB at 12:15

## 2021-04-19 NOTE — PAYOR COMM NOTE
"Ref # [id number]  Ashley Hardy RN, BSN  Phone # [phone]  Fax # [phone]  Elvis Marks (66 y.o. Male)     Date of Birth Social Security Number Address Home Phone MRN    1955  [address]  [facility] [id number] [phone] [id number]    Yarsani Marital Status          None        Admission Date Admission Type Admitting Provider Attending Provider Department, Room/Bed    4/7/21 Emergency Tiff Hess DO Roesch, Lyndsey, DO [facility] 6B, N646/1    Discharge Date Discharge Disposition Discharge Destination                       Attending Provider: Tiff Hess DO    Allergies: No Known Allergies    Isolation: None   Infection: None   Code Status: CPR    Ht: 177.8 cm (70\")   Wt: 129 kg (285 lb)    Admission Cmt: None   Principal Problem: RINKU [N17.9]                 Active Insurance as of 4/7/2021     Primary Coverage     Payor Plan Insurance Group Employer/Plan Group    [employer] AETNA [id number]     Payor Plan Address Payor Plan Phone Number Payor Plan Fax Number Effective Dates    [address] [phone]  1/1/2018 - None Entered    [facility] [id number]       Subscriber Name Subscriber Birth Date Member ID       ELVIS MRAKS 1955 [id number]                   Current Facility-Administered Medications   Medication Dose Route Frequency Provider Last Rate Last Admin   • calcium carbonate (TUMS) chewable tablet 500 mg (200 mg elemental)  2 tablet Oral TID PRN La Chung DO   2 tablet at 04/16/21 2230   • calcium gluconate 1 g in sodium chloride 0.9 % 100 mL IVPB  1 g Intravenous PRN Julio Montaño  mL/hr at 04/10/21 1731 1 g at 04/10/21 1731    And   • calcium gluconate 6 g in sodium chloride 0.9 % 500 mL IVPB  6 g Intravenous PRN Julio Montaño MD 83.3 mL/hr at 04/09/21 1317 6 g at 04/09/21 1317   • carvedilol (COREG) tablet 25 mg  25 mg Oral BID With Meals Kay Diaz MD   25 mg at 04/18/21 1721   • " cetirizine (zyrTEC) tablet 10 mg  10 mg Oral Daily Julio Montaño MD   10 mg at 04/18/21 0926   • diphenhydrAMINE (BENADRYL) capsule 25 mg  25 mg Oral Nightly PRN Kay Diaz MD   25 mg at 04/16/21 0313   • docusate sodium (COLACE) capsule 100 mg  100 mg Oral BID Julio Montaño MD   100 mg at 04/18/21 2001   • fluticasone (FLONASE) 50 MCG/ACT nasal spray 2 spray  2 spray Each Nare Daily Tiff Hess DO   2 spray at 04/18/21 0926   • heparin (porcine) 5000 UNIT/ML injection 5,000 Units  5,000 Units Subcutaneous Q12H Julio Montaño MD   5,000 Units at 04/18/21 2002   • heparin (porcine) injection 1,600 Units  1,600 Units Intracatheter PRN Julio Montaño MD   1,600 Units at 04/19/21 1146   • hydrALAZINE (APRESOLINE) injection 10 mg  10 mg Intravenous Q6H PRN Kay Diaz MD   10 mg at 04/12/21 0312   • insulin detemir (LEVEMIR) injection 10 Units  10 Units Subcutaneous Daily Julio Montaño MD   10 Units at 04/18/21 0925   • insulin lispro (humaLOG) injection 0-7 Units  0-7 Units Subcutaneous 4x Daily AC & at Bedtime Julio Montaño MD   2 Units at 04/17/21 2000   • magnesium oxide (MAG-OX) tablet 1,000 mg  1,000 mg Oral Daily Jimenez Daniels MD   1,000 mg at 04/18/21 0926   • magnesium sulfate 4 gram infusion - Mg less than or equal to 1mg/dL  4 g Intravenous PRN Jimenez Daniels MD        Or   • magnesium sulfate 3 gram infusion (1gm x 3) - Mg 1.1 - 1.5 mg/dL  1 g Intravenous PRN Jimenez Daniels MD        Or   • Magnesium Sulfate 2 gram infusion- Mg 1.6 - 1.9 mg/dL  2 g Intravenous PRN Jimenez Daniels MD 25 mL/hr at 04/11/21 1611 2 g at 04/11/21 1611   • melatonin tablet 5 mg  5 mg Oral Nightly PRN Nayely Cleveland APRN   5 mg at 04/19/21 0003   • oxyCODONE (ROXICODONE) immediate release tablet 5 mg  5 mg Oral Q6H PRN Julio Montaño MD   5 mg at 04/19/21 0031   • polyethylene glycol (MIRALAX) packet 17 g  17 g Oral Daily Julio Montaño MD   17 g at  04/15/21 0803   • sodium chloride 0.9 % flush 10 mL  10 mL Intravenous Q12H Julio Montaño MD   10 mL at 04/17/21 0859   • sodium chloride 0.9 % flush 10 mL  10 mL Intravenous PRN Julio Montaño MD       • sodium chloride 0.9 % flush 10 mL  10 mL Intravenous Q12H Tiff Hess DO   10 mL at 04/18/21 2002   • sodium chloride 0.9 % flush 10 mL  10 mL Intravenous PRN Tiff Hess DO       • sodium chloride nasal spray 2 spray  2 spray Each Nare PRN Julio Montaño MD   2 spray at 04/10/21 1346   • traZODone (DESYREL) tablet 25 mg  25 mg Oral Nightly Tiff Hess DO   25 mg at 04/18/21 2001     Lab Results (last 72 hours)     Procedure Component Value Units Date/Time    POC Glucose Once [[id number]]  (Normal) Collected: 04/19/21 0841    Specimen: Blood Updated: 04/19/21 0848     Glucose 93 mg/dL     Comprehensive Metabolic Panel [[id number]]  (Abnormal) Collected: 04/19/21 0700    Specimen: Blood Updated: 04/19/21 0746     Glucose 103 mg/dL      BUN 56 mg/dL      Creatinine 10.60 mg/dL      Sodium 130 mmol/L      Potassium 4.3 mmol/L      Chloride 93 mmol/L      CO2 24.0 mmol/L      Calcium 8.0 mg/dL      Total Protein 6.5 g/dL      Albumin 3.60 g/dL      ALT (SGPT) 140 U/L      AST (SGOT) 23 U/L      Alkaline Phosphatase 103 U/L      Total Bilirubin 0.5 mg/dL      eGFR Non  Amer --     Comment: <15 Indicative of kidney failure.        eGFR  African Amer 6 mL/min/1.73      Comment: <15 Indicative of kidney failure.        Globulin 2.9 gm/dL      A/G Ratio 1.2 g/dL      BUN/Creatinine Ratio 5.3     Anion Gap 13.0 mmol/L     Narrative:      GFR Normal >60  Chronic Kidney Disease <60  Kidney Failure <15      CBC & Differential [[id number]]  (Abnormal) Collected: 04/19/21 0700    Specimen: Blood Updated: 04/19/21 0725    Narrative:      The following orders were created for panel order CBC & Differential.  Procedure                               Abnormality         Status                      ---------                               -----------         ------                     CBC Auto Differential[[id number]]        Abnormal            Final result                 Please view results for these tests on the individual orders.    CBC Auto Differential [[id number]]  (Abnormal) Collected: 04/19/21 0700    Specimen: Blood Updated: 04/19/21 0725     WBC 7.36 10*3/mm3      RBC 3.49 10*6/mm3      Hemoglobin 9.0 g/dL      Hematocrit 29.3 %      MCV 84.0 fL      MCH 25.8 pg      MCHC 30.7 g/dL      RDW 15.9 %      RDW-SD 47.4 fl      MPV 10.7 fL      Platelets 207 10*3/mm3      Neutrophil % 68.2 %      Lymphocyte % 17.9 %      Monocyte % 10.5 %      Eosinophil % 2.6 %      Basophil % 0.1 %      Immature Grans % 0.7 %      Neutrophils, Absolute 5.02 10*3/mm3      Lymphocytes, Absolute 1.32 10*3/mm3      Monocytes, Absolute 0.77 10*3/mm3      Eosinophils, Absolute 0.19 10*3/mm3      Basophils, Absolute 0.01 10*3/mm3      Immature Grans, Absolute 0.05 10*3/mm3      nRBC 0.0 /100 WBC     POC Glucose Once [[id number]]  (Abnormal) Collected: 04/18/21 2050    Specimen: Blood Updated: 04/18/21 2052     Glucose 134 mg/dL     POC Glucose Once [[id number]]  (Abnormal) Collected: 04/18/21 1628    Specimen: Blood Updated: 04/18/21 1632     Glucose 136 mg/dL     POC Glucose Once [[id number]]  (Normal) Collected: 04/18/21 1156    Specimen: Blood Updated: 04/18/21 1202     Glucose 126 mg/dL     POC Glucose Once [[id number]]  (Normal) Collected: 04/18/21 0753    Specimen: Blood Updated: 04/18/21 0754     Glucose 108 mg/dL     Basic Metabolic Panel [[id number]]  (Abnormal) Collected: 04/18/21 0504    Specimen: Blood Updated: 04/18/21 0726     Glucose 102 mg/dL      BUN 47 mg/dL      Creatinine 8.63 mg/dL      Sodium 133 mmol/L      Potassium 4.2 mmol/L      Chloride 96 mmol/L      CO2 24.0 mmol/L      Calcium 8.5 mg/dL      eGFR  African Amer 8 mL/min/1.73      Comment: <15 Indicative of kidney failure.        eGFR Non African Amer --      Comment: <15 Indicative of kidney failure.        BUN/Creatinine Ratio 5.4     Anion Gap 13.0 mmol/L     Narrative:      GFR Normal >60  Chronic Kidney Disease <60  Kidney Failure <15      POC Glucose Once [[id number]]  (Abnormal) Collected: 04/17/21 1944    Specimen: Blood Updated: 04/17/21 1946     Glucose 171 mg/dL     POC Glucose Once [[id number]]  (Normal) Collected: 04/17/21 1657    Specimen: Blood Updated: 04/17/21 1658     Glucose 110 mg/dL     POC Glucose Once [[id number]]  (Abnormal) Collected: 04/17/21 1112    Specimen: Blood Updated: 04/17/21 1113     Glucose 136 mg/dL     POC Glucose Once [[id number]]  (Normal) Collected: 04/17/21 0829    Specimen: Blood Updated: 04/17/21 0830     Glucose 117 mg/dL     Basic Metabolic Panel [[id number]]  (Abnormal) Collected: 04/17/21 0635    Specimen: Blood Updated: 04/17/21 0819     Glucose 104 mg/dL      BUN 38 mg/dL      Creatinine 6.97 mg/dL      Sodium 132 mmol/L      Potassium 3.8 mmol/L      Chloride 95 mmol/L      CO2 24.0 mmol/L      Calcium 8.6 mg/dL      eGFR  African Amer 10 mL/min/1.73      Comment: <15 Indicative of kidney failure.        eGFR Non  Amer --     Comment: <15 Indicative of kidney failure.        BUN/Creatinine Ratio 5.5     Anion Gap 13.0 mmol/L     Narrative:      GFR Normal >60  Chronic Kidney Disease <60  Kidney Failure <15      POC Glucose Once [[id number]]  (Abnormal) Collected: 04/16/21 2000    Specimen: Blood Updated: 04/16/21 2001     Glucose 158 mg/dL     POC Glucose Once [[id number]]  (Abnormal) Collected: 04/16/21 1636    Specimen: Blood Updated: 04/16/21 1643     Glucose 135 mg/dL           Imaging Results (Last 72 Hours)     ** No results found for the last 72 hours. **        Operative/Procedure Notes (last 72 hours) (Notes from 04/16/21 1208 through 04/19/21 1208)    No notes of this type exist for this encounter.            Physician Progress Notes (last 72 hours) (Notes from 04/16/21 1208 through 04/19/21 1208)     "Demetrio Crook MD at 04/19/21 0940             LOS: 12 days    Patient Care Team:  April Chen DO as PCP - General (Internal Medicine)    Chief Complaint:      Subjective     Interval History:   Seen on HD tolerating well so far. Goal UF 3 liter as tolerated. Bp stable. Good access pressure. Started making urine. Non oliguric. Solute clearance is poor.       Review of Systems:   Complains of low urine output, edema, denies, nausea vomiting dysuria hematuria no shortness of breath or chest pain.  All other systems are negative.    Objective     Vital Sign Min/Max for last 24 hours  Temp  Min: 97.3 °F (36.3 °C)  Max: 98.7 °F (37.1 °C)   BP  Min: 141/90  Max: 168/98   Pulse  Min: 67  Max: 77   Resp  Min: 16  Max: 18   SpO2  Min: 96 %  Max: 98 %   No data recorded   Weight  Min: 129 kg (285 lb)  Max: 129 kg (285 lb)     Flowsheet Rows      First Filed Value   Admission Height  177.8 cm (70\") Documented at 04/07/2021 1041   Admission Weight  127 kg (280 lb) Documented at 04/07/2021 1041          I/O this shift:  In: -   Out: 450 [Urine:450]  I/O last 3 completed shifts:  In: 948 [P.O.:948]  Out: 1350 [Urine:1350]    Physical Exam:  General Appearance: -American male morbid obesity comfortable in bed -American male  Facial: Left-sided Bell's palsy noted chronic.  Eyes: PER, EOMI.  Neck: Supple no JVD.  Lungs: Clear auscultation, no rales rhonchi's, equal chest movement, nonlabored.  Heart: No gallop, murmur, rub, RRR.  Abdomen: Obesity distended soft nontender, positive bowel sounds, no organomegaly.  Extremities: Lateral lower extremity 3+ edema trace to 1+ upper extremity edema.  Neuro: No focal deficit, moving all extremities, alert oriented X 3  : No suprapubic fullness  Skin warm and dry.  WBC WBC   Date Value Ref Range Status   04/19/2021 7.36 3.40 - 10.80 10*3/mm3 Final   04/16/2021 9.44 3.40 - 10.80 10*3/mm3 Final      HGB Hemoglobin   Date Value Ref Range Status   04/19/2021 9.0 (L) " 13.0 - 17.7 g/dL Final   04/16/2021 10.2 (L) 13.0 - 17.7 g/dL Final      HCT Hematocrit   Date Value Ref Range Status   04/19/2021 29.3 (L) 37.5 - 51.0 % Final   04/16/2021 32.4 (L) 37.5 - 51.0 % Final      Platlets No results found for: LABPLAT   MCV MCV   Date Value Ref Range Status   04/19/2021 84.0 79.0 - 97.0 fL Final   04/16/2021 83.3 79.0 - 97.0 fL Final          Sodium Sodium   Date Value Ref Range Status   04/19/2021 130 (L) 136 - 145 mmol/L Final   04/18/2021 133 (L) 136 - 145 mmol/L Final   04/17/2021 132 (L) 136 - 145 mmol/L Final      Potassium Potassium   Date Value Ref Range Status   04/19/2021 4.3 3.5 - 5.2 mmol/L Final   04/18/2021 4.2 3.5 - 5.2 mmol/L Final   04/17/2021 3.8 3.5 - 5.2 mmol/L Final      Chloride Chloride   Date Value Ref Range Status   04/19/2021 93 (L) 98 - 107 mmol/L Final   04/18/2021 96 (L) 98 - 107 mmol/L Final   04/17/2021 95 (L) 98 - 107 mmol/L Final      CO2 CO2   Date Value Ref Range Status   04/19/2021 24.0 22.0 - 29.0 mmol/L Final   04/18/2021 24.0 22.0 - 29.0 mmol/L Final   04/17/2021 24.0 22.0 - 29.0 mmol/L Final      BUN BUN   Date Value Ref Range Status   04/19/2021 56 (H) 8 - 23 mg/dL Final   04/18/2021 47 (H) 8 - 23 mg/dL Final   04/17/2021 38 (H) 8 - 23 mg/dL Final      Creatinine Creatinine   Date Value Ref Range Status   04/19/2021 10.60 (H) 0.76 - 1.27 mg/dL Final   04/18/2021 8.63 (H) 0.76 - 1.27 mg/dL Final   04/17/2021 6.97 (H) 0.76 - 1.27 mg/dL Final      Calcium Calcium   Date Value Ref Range Status   04/19/2021 8.0 (L) 8.6 - 10.5 mg/dL Final   04/18/2021 8.5 (L) 8.6 - 10.5 mg/dL Final   04/17/2021 8.6 8.6 - 10.5 mg/dL Final      PO4 No results found for: CAPO4   Albumin Albumin   Date Value Ref Range Status   04/19/2021 3.60 3.50 - 5.20 g/dL Final      Magnesium No results found for: MG   Uric Acid No results found for: URICACID     Right kidney measures 11.2 cm in length without apparent mass or   hydronephrosis. Normal color Doppler flow.       Left  kidney measures 11.0 cm in length without apparent mass or   hydronephrosis. Normal color Doppler flow.       Unremarkable catheterized and contracted urinary bladder.       Impression:     Unremarkable sonographic appearance of both kidneys.         Results Review:     I reviewed the patient's new clinical results.    aspirin, 81 mg, Oral, Daily  carvedilol, 25 mg, Oral, BID With Meals  cetirizine, 10 mg, Oral, Daily  docusate sodium, 100 mg, Oral, BID  fluticasone, 2 spray, Each Nare, Daily  heparin (porcine), 5,000 Units, Subcutaneous, Q12H  insulin detemir, 10 Units, Subcutaneous, Daily  insulin lispro, 0-7 Units, Subcutaneous, 4x Daily AC & at Bedtime  magnesium oxide, 1,000 mg, Oral, Daily  polyethylene glycol, 17 g, Oral, Daily  sodium chloride, 10 mL, Intravenous, Q12H  sodium chloride, 10 mL, Intravenous, Q12H  traZODone, 25 mg, Oral, Nightly           Medication Review: As noted above    Assessment/Plan       RINKU    Hypertension    T2DM    Dyslipidemia    Sleep apnea    Elevated liver enzymes    Recent Rt Total Knee Replacement 4/5/21    Chronic pain w/pain pump    Lactic acidosis    Acute HFrEF (EF 30%)    Shock (CMS/HCC) -cardiogenic versus septic    1.  RINKU: Oliguirc now on HD. Presumed hemodynamic injury and ATN.    Nephrotic range proteinuria. Serological testing negative. MARIANA, ANCA, AntiDsDNA. Mildly depressed C3 and C4  Right kidney 11.2 cm, left kidney 11.0 cm normal color Doppler unremarkable bilateral kidneys.  Hepatitis negative, on IV vancomycin on admission  2.  Septic shock: required pressors.   3.  Nephrotic proteinuria greater than 6 g in the setting of diabetes vs infection related GN suspicion is low given no hematuria on previous testing.   4.  Altered mental status and hearing difficulty improved at this time.  5.  S/p recent right total knee replacement.  6.  Congestive heart failure: Ejection fraction 30% repeat ECHO showed EF 50%  7.  Hypocalcemia.  8. Hyponatremia. Optimization w HD  "      Plan:  Daily eval for HD. Since he has acute kidney injury started producing urine. However will need HD chair setup as outpatient due to poor solute clearance at this point.   Repeat UA and UPC. If there is hematuria and proteinuria. We can consider renal biopsy for diagnostic purposes.   Need optimization of volume status with HD       Demetrio Crook MD  04/19/21  09:40 EDT       Electronically signed by Demetrio Crook MD at 04/19/21 0912     Demetrio Crook MD at 04/18/21 1313             LOS: 11 days    Patient Care Team:  April Chen DO as PCP - General (Internal Medicine)    Chief Complaint:      Subjective     Interval History:   Plan for dialysis tomorrow     Review of Systems:   Complains of low urine output, edema, denies, nausea vomiting dysuria hematuria no shortness of breath or chest pain.  All other systems are negative.    Objective     Vital Sign Min/Max for last 24 hours  Temp  Min: 96.9 °F (36.1 °C)  Max: 98.2 °F (36.8 °C)   BP  Min: 141/90  Max: 169/99   Pulse  Min: 65  Max: 80   Resp  Min: 18  Max: 18   SpO2  Min: 100 %  Max: 100 %   No data recorded   Weight  Min: 133 kg (292 lb 8 oz)  Max: 133 kg (292 lb 8 oz)     Flowsheet Rows      First Filed Value   Admission Height  177.8 cm (70\") Documented at 04/07/2021 1041   Admission Weight  127 kg (280 lb) Documented at 04/07/2021 1041          I/O this shift:  In: -   Out: 400 [Urine:400]  I/O last 3 completed shifts:  In: 358 [P.O.:358]  Out: 1350 [Urine:1350]    Physical Exam:  General Appearance: -American male morbid obesity comfortable in bed -American male  Facial: Left-sided Bell's palsy noted chronic.  Eyes: PER, EOMI.  Neck: Supple no JVD.  Lungs: Clear auscultation, no rales rhonchi's, equal chest movement, nonlabored.  Heart: No gallop, murmur, rub, RRR.  Abdomen: Obesity distended soft nontender, positive bowel sounds, no organomegaly.  Extremities: Lateral lower extremity 3+ edema trace to 1+ upper " extremity edema.  Neuro: No focal deficit, moving all extremities, alert oriented X 3  : No suprapubic fullness  Skin warm and dry.  WBC WBC   Date Value Ref Range Status   04/16/2021 9.44 3.40 - 10.80 10*3/mm3 Final      HGB Hemoglobin   Date Value Ref Range Status   04/16/2021 10.2 (L) 13.0 - 17.7 g/dL Final      HCT Hematocrit   Date Value Ref Range Status   04/16/2021 32.4 (L) 37.5 - 51.0 % Final      Platlets No results found for: LABPLAT   MCV MCV   Date Value Ref Range Status   04/16/2021 83.3 79.0 - 97.0 fL Final          Sodium Sodium   Date Value Ref Range Status   04/18/2021 133 (L) 136 - 145 mmol/L Final   04/17/2021 132 (L) 136 - 145 mmol/L Final   04/16/2021 130 (L) 136 - 145 mmol/L Final      Potassium Potassium   Date Value Ref Range Status   04/18/2021 4.2 3.5 - 5.2 mmol/L Final   04/17/2021 3.8 3.5 - 5.2 mmol/L Final   04/16/2021 4.0 3.5 - 5.2 mmol/L Final     Comment:     Slight hemolysis detected by analyzer. Results may be affected.      Chloride Chloride   Date Value Ref Range Status   04/18/2021 96 (L) 98 - 107 mmol/L Final   04/17/2021 95 (L) 98 - 107 mmol/L Final   04/16/2021 94 (L) 98 - 107 mmol/L Final      CO2 CO2   Date Value Ref Range Status   04/18/2021 24.0 22.0 - 29.0 mmol/L Final   04/17/2021 24.0 22.0 - 29.0 mmol/L Final   04/16/2021 18.0 (L) 22.0 - 29.0 mmol/L Final      BUN BUN   Date Value Ref Range Status   04/18/2021 47 (H) 8 - 23 mg/dL Final   04/17/2021 38 (H) 8 - 23 mg/dL Final   04/16/2021 59 (H) 8 - 23 mg/dL Final      Creatinine Creatinine   Date Value Ref Range Status   04/18/2021 8.63 (H) 0.76 - 1.27 mg/dL Final   04/17/2021 6.97 (H) 0.76 - 1.27 mg/dL Final   04/16/2021 9.17 (H) 0.76 - 1.27 mg/dL Final      Calcium Calcium   Date Value Ref Range Status   04/18/2021 8.5 (L) 8.6 - 10.5 mg/dL Final   04/17/2021 8.6 8.6 - 10.5 mg/dL Final   04/16/2021 7.6 (L) 8.6 - 10.5 mg/dL Final      PO4 No results found for: CAPO4   Albumin Albumin   Date Value Ref Range Status    04/16/2021 3.30 (L) 3.50 - 5.20 g/dL Final      Magnesium Magnesium   Date Value Ref Range Status   04/16/2021 2.2 1.6 - 2.4 mg/dL Final      Uric Acid No results found for: URICACID     Right kidney measures 11.2 cm in length without apparent mass or   hydronephrosis. Normal color Doppler flow.       Left kidney measures 11.0 cm in length without apparent mass or   hydronephrosis. Normal color Doppler flow.       Unremarkable catheterized and contracted urinary bladder.       Impression:     Unremarkable sonographic appearance of both kidneys.         Results Review:     I reviewed the patient's new clinical results.    aspirin, 81 mg, Oral, Daily  carvedilol, 25 mg, Oral, BID With Meals  cetirizine, 10 mg, Oral, Daily  docusate sodium, 100 mg, Oral, BID  fluticasone, 2 spray, Each Nare, Daily  heparin (porcine), 5,000 Units, Subcutaneous, Q12H  insulin detemir, 10 Units, Subcutaneous, Daily  insulin lispro, 0-7 Units, Subcutaneous, 4x Daily AC & at Bedtime  magnesium oxide, 1,000 mg, Oral, Daily  polyethylene glycol, 17 g, Oral, Daily  povidone-iodine, , Topical, Once  sodium chloride, 10 mL, Intravenous, Q12H  sodium chloride, 10 mL, Intravenous, Q12H  traZODone, 25 mg, Oral, Nightly           Medication Review: As noted above    Assessment/Plan       RINKU    Hypertension    T2DM    Dyslipidemia    Sleep apnea    Elevated liver enzymes    Recent Rt Total Knee Replacement 4/5/21    Chronic pain w/pain pump    Lactic acidosis    Acute HFrEF (EF 30%)    Shock (CMS/HCC) -cardiogenic versus septic    1.  RINKU: Oliguirc now on HD. Presumed hemodynamic injury and ATN.    Nephrotic range proteinuria. Serological testing negative. MARIANA, ANCA, AntiDsDNA  Right kidney 11.2 cm, left kidney 11.0 cm normal color Doppler unremarkable bilateral kidneys.  Hepatitis negative, on IV vancomycin on admission  2.  Septic shock: required pressors.   3.  Nephrotic proteinuria greater than 6 g in the setting of diabetes.  4.  Altered  mental status and hearing difficulty improved at this time.  5.  S/p recent right total knee replacement.  6.  Congestive heart failure: Ejection fraction 30% repeat ECHO showed EF 50%  7.  Hypocalcemia.      Plan:  Daily eval for HD. Since he has acute kidney injury. Next HD on Monday .   Monitor for renal recovery   Need optimization of volume status with HD       Demetrio Crook MD  21  13:13 EDT       Electronically signed by Demetrio Crook MD at 21 1315     Maria De Jesus Hardy APRN at 21 1305              [facility] Medicine Services  PROGRESS NOTE    Patient Name: Elvis Hanson  : 1955  MRN: [id number]    Date of Admission: 2021  Primary Care Physician: April Chen,     Subjective   Subjective     CC:  Hearing loss, renal failure     HPI:  Pt sitting up in bed, A&OX3, very pleasant and talkative. He is hopeful his kidney function will return to normal. Denies any issues overnight. Reports a BM yesterday. He states he has been doing physical therapy exercises twice a day on most days.     ROS:  Gen- No fevers, chills  CV- No chest pain, palpitations  Resp- No cough, dyspnea  GI- No N/V/D, abd pain  All systems reviewed and are neg, except those mentioned in HPI.    Objective   Objective     Vital Signs:   Temp:  [96.9 °F (36.1 °C)-98.2 °F (36.8 °C)] 97.3 °F (36.3 °C)  Heart Rate:  [65-80] 69  Resp:  [18] 18  BP: (141-169)/(82-99) 141/90        Physical Exam:  Constitutional: Awake, alert  Eyes: PERRLA, sclerae anicteric, no conjunctival injection  HENT: NCAT, mucous membranes moist  Neck: Supple, no thyromegaly, no lymphadenopathy, trachea midline  Respiratory: Clear to auscultation bilaterally, nonlabored respirations   Cardiovascular: RRR, no murmurs, rubs, or gallops, palpable pedal pulses bilaterally  Gastrointestinal: Positive bowel sounds, soft, nontender, nondistended  Musculoskeletal: bilateral lower extremity edema R>L, Ace wrapped to the  RLE no clubbing or cyanosis to extremities  Psychiatric: Appropriate affect, cooperative  Neurologic: Oriented x 3, strength symmetric in all extremities, Cranial Nerves grossly intact to confrontation, speech clear  Skin: No rashes    Results Reviewed:  Results from last 7 days   Lab Units 04/16/21  1238 04/15/21  0719 04/14/21  0502   WBC 10*3/mm3 9.44 9.23 10.15   HEMOGLOBIN g/dL 10.2* 10.0* 10.2*   HEMATOCRIT % 32.4* 32.3* 32.5*   PLATELETS 10*3/mm3 205 205 217     Results from last 7 days   Lab Units 04/18/21  0504 04/17/21  0635 04/16/21  0527 04/15/21  0719 04/14/21  0502 04/12/21  0800   SODIUM mmol/L 133* 132* 130* 131* 129* 135*   POTASSIUM mmol/L 4.2 3.8 4.0 3.4* 3.6 3.4*   CHLORIDE mmol/L 96* 95* 94* 95* 91* 92*   CO2 mmol/L 24.0 24.0 18.0* 23.0 23.0 24.0   BUN mg/dL 47* 38* 59* 49* 71* 69*   CREATININE mg/dL 8.63* 6.97* 9.17* 7.97* 8.66* 7.99*   GLUCOSE mg/dL 102* 104* 106* 108* 127* 128*   CALCIUM mg/dL 8.5* 8.6 7.6* 7.4* 7.6* 7.6*   ALT (SGPT) U/L  --   --  301* 374* 540* 1,197*   AST (SGOT) U/L  --   --  46* 45* 58* 188*   TROPONIN T ng/mL  --   --   --   --   --  0.717*     Estimated Creatinine Clearance: 11.9 mL/min (A) (by C-G formula based on SCr of 8.63 mg/dL (H)).    Microbiology Results Abnormal     Procedure Component Value - Date/Time    Blood Culture - Blood, Hand, Left [[id number]] Collected: 04/07/21 2057    Lab Status: Final result Specimen: Blood from Hand, Left Updated: 04/12/21 2145     Blood Culture No growth at 5 days    Blood Culture - Blood, Hand, Right [[id number]] Collected: 04/07/21 2057    Lab Status: Final result Specimen: Blood from Hand, Right Updated: 04/12/21 2145     Blood Culture No growth at 5 days    Urine Culture - Urine, Urine, Clean Catch [[id number]]  (Normal) Collected: 04/09/21 1325    Lab Status: Final result Specimen: Urine, Clean Catch Updated: 04/10/21 1742     Urine Culture No growth    MRSA Screen, PCR (Inpatient) - Swab, Nares [[id number]]  (Normal)  Collected: 04/08/21 0341    Lab Status: Final result Specimen: Swab from Nares Updated: 04/08/21 0937     MRSA PCR Negative    Narrative:      MRSA Negative    COVID-19 and FLU A/B PCR - Swab, Nasopharynx [[id number]]  (Normal) Collected: 04/07/21 1357    Lab Status: Final result Specimen: Swab from Nasopharynx Updated: 04/07/21 1509     COVID19 Not Detected     Influenza A PCR Not Detected     Influenza B PCR Not Detected    Narrative:      Fact sheet for providers: https://www.fda.gov/media/480408/download    Fact sheet for patients: https://www.fda.gov/media/368968/download    Test performed by PCR.          Imaging Results (Last 24 Hours)     ** No results found for the last 24 hours. **          Results for orders placed during the hospital encounter of 04/07/21    Adult Transthoracic Echo Limited W/ Cont if Necessary Per Protocol    Interpretation Summary  · Mildly reduced right ventricular systolic function noted.  · Estimated left ventricular EF = 50% Left ventricular ejection fraction appears to be 46 - 50%. Left ventricular systolic function is normal.  · The left ventricular ejection fraction is significantly improved from previous echo with EF lower limits of normal      I have reviewed the medications:  Scheduled Meds:aspirin, 81 mg, Oral, Daily  carvedilol, 25 mg, Oral, BID With Meals  cetirizine, 10 mg, Oral, Daily  docusate sodium, 100 mg, Oral, BID  fluticasone, 2 spray, Each Nare, Daily  heparin (porcine), 5,000 Units, Subcutaneous, Q12H  insulin detemir, 10 Units, Subcutaneous, Daily  insulin lispro, 0-7 Units, Subcutaneous, 4x Daily AC & at Bedtime  magnesium oxide, 1,000 mg, Oral, Daily  polyethylene glycol, 17 g, Oral, Daily  povidone-iodine, , Topical, Once  sodium chloride, 10 mL, Intravenous, Q12H  sodium chloride, 10 mL, Intravenous, Q12H  traZODone, 25 mg, Oral, Nightly      Continuous Infusions:   PRN Meds:.calcium carbonate  •  calcium gluconate IVPB **AND** calcium gluconate IVPB **AND**  Calcium, Ionized  •  diphenhydrAMINE  •  heparin (porcine)  •  hydrALAZINE  •  magnesium sulfate **OR** magnesium sulfate in D5W 1g/100mL (PREMIX) **OR** magnesium sulfate  •  melatonin  •  oxyCODONE  •  sodium chloride  •  sodium chloride  •  sodium chloride    Assessment/Plan   Assessment & Plan     Active Hospital Problems    Diagnosis  POA   • **RINKU [N17.9]  Yes   • Lactic acidosis [E87.2]  Yes   • Acute HFrEF (EF 30%) [I50.21]  Yes   • Shock (CMS/HCC) -cardiogenic versus septic [R57.9]  Yes   • Hypertension [I10]  Yes   • T2DM [E11.9]  Yes   • Dyslipidemia [E78.5]  Yes   • Sleep apnea [G47.30]  Yes   • Elevated liver enzymes [R74.8]  Yes   • Recent Rt Total Knee Replacement 4/5/21 [Z96.659]  Not Applicable   • Chronic pain w/pain pump [G89.29]  Yes      Resolved Hospital Problems   No resolved problems to display.        Brief Hospital Course to date:  Elvis Hanson is a 66 y.o. male with a history of recent right total knee replacement on 4/5, HTN, T2DM, SHYLA, and chronic pain with a pain pump who presented to the [facility] ED on 4/7 with complaints of hearing loss. Pt was found to have RINKU, elevated LFTs and was admitted to the ICU service on the PCU floor.  He became hypotensive requiring pressors and was transferred to the ICU. Pt was found to have an EF of 30% and Cardiology was consulted.  He was also initiated on HD during this admission.  He was transferred to our service on 4/10. Of note, his hearing loss has resolved and was thought to be due to Furosemide in setting of decreased renal function.      Plan:  Acute systolic HF  Elevated troponin  --Cardiology followed; repeat ECHO with normal EF now with volume removal - currently holding on any need for ischemic evaluation at this time due to improvement   - continue volume removal with dialysis   --continue ASA, not able to tolerate statin due to transaminitis  - No ACE/ARB due to renal function  - follow up cards in 6 weeks      Oliguric RINKU   --now on HD,  NAL following; urine output is improving    - serological workup unremarkable  - Continue intermittent assessment for dialysis   - Monitor AM labs      Elevated LFTs  --AST/ALT significantly elevated on admission  --likely congestive hepatopathy  - improving - trend      Septic vs Cardiogenic shock - improved   -- patient was started on steroids which have been weaned and discontinued 4/14  - Anti-hypertensive resumed  - Antibiotics discontinued 4/13   - hemodynamics are stable off antibiotics and steroids   -- Remove central line 4/17      Recent right knee replacement  --Dr. Avalos following; WBAT  - Attempted aspiration 4/13 due to knee swelling but no fluid aspirated and likely to be related to third spacing   - ASA for PPX on discharge -- currently on heparin for admission   - Continue PT/OT      Chronic Pain  --pain pump present     T2DM  --low dose basal insulin with SSI for now  - glucose stable 04/18 102-171   Hearing loss  --resolved. Thought to be due to home dose diuretics.       Insomnia  -- no improvement with melatonin or benadryl; improved on trazodone 25 mg daily     DVT Prophylaxis:  BEBETO     Disposition: I expect the patient to be discharged TBD,  determination on need for dialysis prior to discharge and renal function improvement        CODE STATUS:   Code Status and Medical Interventions:   Ordered at: 04/07/21 1422     Code Status:    CPR     Medical Interventions (Level of Support Prior to Arrest):    Full       Electronically signed by ANGUS Holley, 04/18/21, 1:09 PM EDT.                Electronically signed by Maria De Jesus Hardy APRN at 04/18/21 1310     Demetrio Crook MD at 04/17/21 1344             LOS: 10 days    Patient Care Team:  April Chen DO as PCP - General (Internal Medicine)    Chief Complaint:      Subjective     Interval History:   HD yesterday 3.5 liter removed. Bp still high. Otherwise doing well. .     Review of Systems:   Complains of low urine output,  "edema, denies, nausea vomiting dysuria hematuria no shortness of breath or chest pain.  All other systems are negative.    Objective     Vital Sign Min/Max for last 24 hours  Temp  Min: 97.9 °F (36.6 °C)  Max: 99.7 °F (37.6 °C)   BP  Min: 162/91  Max: 177/100   Pulse  Min: 67  Max: 84   Resp  Min: 18  Max: 18   SpO2  Min: 93 %  Max: 98 %   No data recorded   Weight  Min: 139 kg (306 lb 12.8 oz)  Max: 139 kg (306 lb 12.8 oz)     Flowsheet Rows      First Filed Value   Admission Height  177.8 cm (70\") Documented at 04/07/2021 1041   Admission Weight  127 kg (280 lb) Documented at 04/07/2021 1041          I/O this shift:  In: 118 [P.O.:118]  Out: 200 [Urine:200]  I/O last 3 completed shifts:  In: -   Out: 4800 [Urine:950; Other:3850]    Physical Exam:  General Appearance: -American male morbid obesity comfortable in bed -American male  Facial: Left-sided Bell's palsy noted chronic.  Eyes: PER, EOMI.  Neck: Supple no JVD.  Lungs: Clear auscultation, no rales rhonchi's, equal chest movement, nonlabored.  Heart: No gallop, murmur, rub, RRR.  Abdomen: Obesity distended soft nontender, positive bowel sounds, no organomegaly.  Extremities: Lateral lower extremity 3+ edema trace to 1+ upper extremity edema.  Neuro: No focal deficit, moving all extremities, alert oriented X 3  : No suprapubic fullness  Skin warm and dry.  WBC WBC   Date Value Ref Range Status   04/16/2021 9.44 3.40 - 10.80 10*3/mm3 Final   04/15/2021 9.23 3.40 - 10.80 10*3/mm3 Final      HGB Hemoglobin   Date Value Ref Range Status   04/16/2021 10.2 (L) 13.0 - 17.7 g/dL Final   04/15/2021 10.0 (L) 13.0 - 17.7 g/dL Final      HCT Hematocrit   Date Value Ref Range Status   04/16/2021 32.4 (L) 37.5 - 51.0 % Final   04/15/2021 32.3 (L) 37.5 - 51.0 % Final      Platlets No results found for: LABPLAT   MCV MCV   Date Value Ref Range Status   04/16/2021 83.3 79.0 - 97.0 fL Final   04/15/2021 83.5 79.0 - 97.0 fL Final          Sodium Sodium   Date " Value Ref Range Status   04/17/2021 132 (L) 136 - 145 mmol/L Final   04/16/2021 130 (L) 136 - 145 mmol/L Final   04/15/2021 131 (L) 136 - 145 mmol/L Final      Potassium Potassium   Date Value Ref Range Status   04/17/2021 3.8 3.5 - 5.2 mmol/L Final   04/16/2021 4.0 3.5 - 5.2 mmol/L Final     Comment:     Slight hemolysis detected by analyzer. Results may be affected.   04/15/2021 3.4 (L) 3.5 - 5.2 mmol/L Final      Chloride Chloride   Date Value Ref Range Status   04/17/2021 95 (L) 98 - 107 mmol/L Final   04/16/2021 94 (L) 98 - 107 mmol/L Final   04/15/2021 95 (L) 98 - 107 mmol/L Final      CO2 CO2   Date Value Ref Range Status   04/17/2021 24.0 22.0 - 29.0 mmol/L Final   04/16/2021 18.0 (L) 22.0 - 29.0 mmol/L Final   04/15/2021 23.0 22.0 - 29.0 mmol/L Final      BUN BUN   Date Value Ref Range Status   04/17/2021 38 (H) 8 - 23 mg/dL Final   04/16/2021 59 (H) 8 - 23 mg/dL Final   04/15/2021 49 (H) 8 - 23 mg/dL Final      Creatinine Creatinine   Date Value Ref Range Status   04/17/2021 6.97 (H) 0.76 - 1.27 mg/dL Final   04/16/2021 9.17 (H) 0.76 - 1.27 mg/dL Final   04/15/2021 7.97 (H) 0.76 - 1.27 mg/dL Final      Calcium Calcium   Date Value Ref Range Status   04/17/2021 8.6 8.6 - 10.5 mg/dL Final   04/16/2021 7.6 (L) 8.6 - 10.5 mg/dL Final   04/15/2021 7.4 (L) 8.6 - 10.5 mg/dL Final      PO4 No results found for: CAPO4   Albumin Albumin   Date Value Ref Range Status   04/16/2021 3.30 (L) 3.50 - 5.20 g/dL Final   04/15/2021 3.40 (L) 3.50 - 5.20 g/dL Final      Magnesium Magnesium   Date Value Ref Range Status   04/16/2021 2.2 1.6 - 2.4 mg/dL Final      Uric Acid No results found for: URICACID     Right kidney measures 11.2 cm in length without apparent mass or   hydronephrosis. Normal color Doppler flow.       Left kidney measures 11.0 cm in length without apparent mass or   hydronephrosis. Normal color Doppler flow.       Unremarkable catheterized and contracted urinary bladder.       Impression:     Unremarkable  sonographic appearance of both kidneys.         Results Review:     I reviewed the patient's new clinical results.    aspirin, 81 mg, Oral, Daily  carvedilol, 25 mg, Oral, BID With Meals  cetirizine, 10 mg, Oral, Daily  docusate sodium, 100 mg, Oral, BID  fluticasone, 2 spray, Each Nare, Daily  heparin (porcine), 5,000 Units, Subcutaneous, Q12H  insulin detemir, 10 Units, Subcutaneous, Daily  insulin lispro, 0-7 Units, Subcutaneous, 4x Daily AC & at Bedtime  magnesium oxide, 1,000 mg, Oral, Daily  polyethylene glycol, 17 g, Oral, Daily  povidone-iodine, , Topical, Once  sodium chloride, 10 mL, Intravenous, Q12H  sodium chloride, 10 mL, Intravenous, Q12H  traZODone, 25 mg, Oral, Nightly           Medication Review: As noted above    Assessment/Plan       RINKU    Hypertension    T2DM    Dyslipidemia    Sleep apnea    Elevated liver enzymes    Recent Rt Total Knee Replacement 4/5/21    Chronic pain w/pain pump    Lactic acidosis    Acute HFrEF (EF 30%)    Shock (CMS/HCC) -cardiogenic versus septic  1.  RINKU: Oliguirc now on HD. Presumed hemodynamic injury and ATN.    Nephrotic range proteinuria. Serological testing negative. MARIANA, ANCA, AntiDsDNA  Right kidney 11.2 cm, left kidney 11.0 cm normal color Doppler unremarkable bilateral kidneys.  Hepatitis negative, on IV vancomycin on admission  2.  Septic shock: required pressors.   3.  Nephrotic proteinuria greater than 6 g in the setting of diabetes.  4.  Altered mental status and hearing difficulty improved at this time.  5.  S/p recent right total knee replacement.  6.  Congestive heart failure: Ejection fraction 30% repeat ECHO showed EF 50%  7.  Hypocalcemia.      Plan:  Daily eval for HD. Since he has acute kidney injury. Next HD on Monday .   Monitor for renal recovery   Need optimization of volume status with HD       Demetrio Crook MD  04/17/21  13:44 EDT       Electronically signed by Demetrio Crook MD at 04/17/21 9640     Tiff Hess DO at 04/17/21 0110               [facility] Medicine Services  PROGRESS NOTE    Patient Name: Elvis Hanson  : 1955  MRN: [id number]    Date of Admission: 2021  Primary Care Physician: April Chen DO    Subjective   Subjective     CC:  Hearing loss, renal failure     HPI:  States he is feeling fine. Anxious to get out of the hospital. Removing central line today.     ROS:  Gen- No fevers, chills  CV- No chest pain, palpitations  Resp- No cough, dyspnea  GI- No N/V/D, abd pain     Objective   Objective     Vital Signs:   Temp:  [97.9 °F (36.6 °C)-99.7 °F (37.6 °C)] 97.9 °F (36.6 °C)  Heart Rate:  [67-84] 67  Resp:  [18] 18  BP: (146-179)/() 171/104        Physical Exam:  Constitutional: No acute distress, awake, alert; sitting up in the chair   HENT: NCAT, mucous membranes moist  Respiratory: Clear to auscultation bilaterally, respiratory effort normal   Cardiovascular: RRR, no murmurs, rubs, or gallops  Gastrointestinal: Positive bowel sounds, soft, nontender, nondistended  Musculoskeletal: +1-2 bilateral ankle edema; right need with bandage   Psychiatric: Appropriate affect, cooperative  Neurologic: Oriented x 3, strength symmetric in all extremities, Cranial Nerves grossly intact to confrontation, speech clear  Skin: No rashes    Results Reviewed:  Results from last 7 days   Lab Units 21  1238 04/15/21  0719 21  0502   WBC 10*3/mm3 9.44 9.23 10.15   HEMOGLOBIN g/dL 10.2* 10.0* 10.2*   HEMATOCRIT % 32.4* 32.3* 32.5*   PLATELETS 10*3/mm3 205 205 217     Results from last 7 days   Lab Units 21  0635 21  0527 04/15/21  0719 21  0502 21  0800   SODIUM mmol/L 132* 130* 131* 129* 135*   POTASSIUM mmol/L 3.8 4.0 3.4* 3.6 3.4*   CHLORIDE mmol/L 95* 94* 95* 91* 92*   CO2 mmol/L 24.0 18.0* 23.0 23.0 24.0   BUN mg/dL 38* 59* 49* 71* 69*   CREATININE mg/dL 6.97* 9.17* 7.97* 8.66* 7.99*   GLUCOSE mg/dL 104* 106* 108* 127* 128*   CALCIUM mg/dL 8.6 7.6* 7.4* 7.6*  7.6*   ALT (SGPT) U/L  --  301* 374* 540* 1,197*   AST (SGOT) U/L  --  46* 45* 58* 188*   TROPONIN T ng/mL  --   --   --   --  0.717*     Estimated Creatinine Clearance: 15.1 mL/min (A) (by C-G formula based on SCr of 6.97 mg/dL (H)).    Microbiology Results Abnormal     Procedure Component Value - Date/Time    Blood Culture - Blood, Hand, Left [[id number]] Collected: 04/07/21 2057    Lab Status: Final result Specimen: Blood from Hand, Left Updated: 04/12/21 2145     Blood Culture No growth at 5 days    Blood Culture - Blood, Hand, Right [[id number]] Collected: 04/07/21 2057    Lab Status: Final result Specimen: Blood from Hand, Right Updated: 04/12/21 2145     Blood Culture No growth at 5 days    Urine Culture - Urine, Urine, Clean Catch [[id number]]  (Normal) Collected: 04/09/21 1325    Lab Status: Final result Specimen: Urine, Clean Catch Updated: 04/10/21 1742     Urine Culture No growth    MRSA Screen, PCR (Inpatient) - Swab, Nares [[id number]]  (Normal) Collected: 04/08/21 0341    Lab Status: Final result Specimen: Swab from Nares Updated: 04/08/21 0937     MRSA PCR Negative    Narrative:      MRSA Negative    COVID-19 and FLU A/B PCR - Swab, Nasopharynx [[id number]]  (Normal) Collected: 04/07/21 1357    Lab Status: Final result Specimen: Swab from Nasopharynx Updated: 04/07/21 1509     COVID19 Not Detected     Influenza A PCR Not Detected     Influenza B PCR Not Detected    Narrative:      Fact sheet for providers: https://www.fda.gov/media/475532/download    Fact sheet for patients: https://www.fda.gov/media/341562/download    Test performed by PCR.          Imaging Results (Last 24 Hours)     ** No results found for the last 24 hours. **          Results for orders placed during the hospital encounter of 04/07/21    Adult Transthoracic Echo Limited W/ Cont if Necessary Per Protocol    Interpretation Summary  · Mildly reduced right ventricular systolic function noted.  · Estimated left ventricular EF = 50% Left  ventricular ejection fraction appears to be 46 - 50%. Left ventricular systolic function is normal.  · The left ventricular ejection fraction is significantly improved from previous echo with EF lower limits of normal      I have reviewed the medications:  Scheduled Meds:aspirin, 81 mg, Oral, Daily  carvedilol, 25 mg, Oral, BID With Meals  cetirizine, 10 mg, Oral, Daily  docusate sodium, 100 mg, Oral, BID  fluticasone, 2 spray, Each Nare, Daily  heparin (porcine), 5,000 Units, Subcutaneous, Q12H  insulin detemir, 10 Units, Subcutaneous, Daily  insulin lispro, 0-7 Units, Subcutaneous, 4x Daily AC & at Bedtime  magnesium oxide, 1,000 mg, Oral, Daily  polyethylene glycol, 17 g, Oral, Daily  povidone-iodine, , Topical, Once  sodium chloride, 10 mL, Intravenous, Q12H  sodium chloride, 10 mL, Intravenous, Q12H  traZODone, 25 mg, Oral, Nightly      Continuous Infusions:   PRN Meds:.calcium carbonate  •  calcium gluconate IVPB **AND** calcium gluconate IVPB **AND** Calcium, Ionized  •  diphenhydrAMINE  •  heparin (porcine)  •  hydrALAZINE  •  magnesium sulfate **OR** magnesium sulfate in D5W 1g/100mL (PREMIX) **OR** magnesium sulfate  •  melatonin  •  oxyCODONE  •  oxymetazoline  •  sodium chloride  •  sodium chloride  •  sodium chloride    Assessment/Plan   Assessment & Plan     Active Hospital Problems    Diagnosis  POA   • **RINKU [N17.9]  Yes   • Lactic acidosis [E87.2]  Yes   • Acute HFrEF (EF 30%) [I50.21]  Yes   • Shock (CMS/HCC) -cardiogenic versus septic [R57.9]  Yes   • Hypertension [I10]  Yes   • T2DM [E11.9]  Yes   • Dyslipidemia [E78.5]  Yes   • Sleep apnea [G47.30]  Yes   • Elevated liver enzymes [R74.8]  Yes   • Recent Rt Total Knee Replacement 4/5/21 [Z96.659]  Not Applicable   • Chronic pain w/pain pump [G89.29]  Yes      Resolved Hospital Problems   No resolved problems to display.        Brief Hospital Course to date:  Elvis Hanson is a 66 y.o. male with a history of recent right total knee replacement on  4/5, HTN, T2DM, SHYLA, and chronic pain with a pain pump who presented to the [facility] ED on 4/7 with complaints of hearing loss. Pt was found to have RINKU, elevated LFTs and was admitted to the ICU service on the PCU floor.  He became hypotensive requiring pressors and was transferred to the ICU. Pt was found to have an EF of 30% and Cardiology was consulted.  He was also initiated on HD during this admission.  He was transferred to our service on 4/10. Of note, his hearing loss has resolved and was thought to be due to Furosemide in setting of decreased renal function.      Plan:  Acute systolic HF  Elevated troponin  --Cardiology followed; repeat ECHO with normal EF now with volume removal - currently holding on any need for ischemic evaluation at this time due to improvement   - continue volume removal with dialysis   --continue ASA, not able to tolerate statin due to transaminitis  - No ACE/ARB due to renal function  - follow up cards in 6 weeks      Oliguric RINKU   --now on HD, NAL following; urine output is improving    - serological workup unremarkable  - Continue intermittent assessment for dialysis   - Monitor AM labs      Elevated LFTs  --AST/ALT significantly elevated on admission  --likely congestive hepatopathy  - improving - trend      Septic vs Cardiogenic shock - improved   -- patient was started on steroids which have been weaned and discontinued 4/14  - Anti-hypertensive resumed  - Antibiotics discontinued 4/13   - hemodynamics are stable off antibiotics and steroids   -- Remove central line 4/17      Recent right knee replacement  --Dr. Avalos following; WBAT  - Attempted aspiration 4/13 due to knee swelling but no fluid aspirated and likely to be related to third spacing   - ASA for PPX on discharge -- currently on heparin for admission   - Continue PT/OT      Chronic Pain  --pain pump present     T2DM  --low dose basal insulin with SSI for now  - glucose stable     Hearing loss  --resolved. Thought to  be due to home dose diuretics.       Insomnia  -- no improvement with melatonin or benadryl; improved on trazodone 25 mg daily     DVT Prophylaxis:  BEBETO     Disposition: I expect the patient to be discharged TBD,  determination on need for dialysis prior to discharge and renal function improvement        CODE STATUS:   Code Status and Medical Interventions:   Ordered at: 04/07/21 1422     Code Status:    CPR     Medical Interventions (Level of Support Prior to Arrest):    Full       Tiff Hess DO  04/17/21                Electronically signed by Tiff Hess DO at 04/17/21 1248     Axel Avalos MD at 04/17/21 1106            Orthopedic Progress Note      Patient: Elvis Hanson  YOB: 1955     Date of Admission: 4/7/2021 10:40 AM Medical Record Number: [id number]     Attending Physician: Tiff Hess DO    Status Post:  R TKA  Post Operative Day Number:12    Subjective : No new orthopaedic complaints     Pain Relief: some relief with present medication.     Systemic Complaints: No Complaints    Vitals:    04/16/21 2339 04/17/21 0427 04/17/21 0500 04/17/21 0755   BP: 162/91 177/100  175/100   BP Location: Left arm Right arm  Right arm   Patient Position: Lying Lying  Lying   Pulse: 74 84  73   Resp: 18 18  18   Temp: 98 °F (36.7 °C) 99.7 °F (37.6 °C)  97.9 °F (36.6 °C)   TempSrc: Oral Oral  Oral   SpO2: 98% 93%     Weight:   (!) 139 kg (306 lb 12.8 oz)    Height:           Physical Exam: 66 y.o. male    General Appearance:       Alert, cooperative, in no acute distress                  Extremities:    Dressing mild drainage              No clinical sign of DVT        Able to do good movements of digits    Pulses:   Pulses palpable and equal bilaterally           Diagnostic Tests:     Results from last 7 days   Lab Units 04/16/21  1238 04/15/21  0719 04/14/21  0502   WBC 10*3/mm3 9.44 9.23 10.15   HEMOGLOBIN g/dL 10.2* 10.0* 10.2*   HEMATOCRIT % 32.4* 32.3* 32.5*   PLATELETS 10*3/mm3 205  205 217     Results from last 7 days   Lab Units 04/17/21  0635 04/16/21  0527 04/15/21  0719   SODIUM mmol/L 132* 130* 131*   POTASSIUM mmol/L 3.8 4.0 3.4*   CHLORIDE mmol/L 95* 94* 95*   CO2 mmol/L 24.0 18.0* 23.0   BUN mg/dL 38* 59* 49*   CREATININE mg/dL 6.97* 9.17* 7.97*   GLUCOSE mg/dL 104* 106* 108*   CALCIUM mg/dL 8.6 7.6* 7.4*         No results found for: URICACID  No results found for: CRYSTAL  Microbiology Results (last 10 days)     Procedure Component Value - Date/Time    Urine Culture - Urine, Urine, Clean Catch [[id number]]  (Normal) Collected: 04/09/21 1325    Lab Status: Final result Specimen: Urine, Clean Catch Updated: 04/10/21 1742     Urine Culture No growth    MRSA Screen, PCR (Inpatient) - Swab, Nares [[id number]]  (Normal) Collected: 04/08/21 0341    Lab Status: Final result Specimen: Swab from Nares Updated: 04/08/21 0937     MRSA PCR Negative    Narrative:      MRSA Negative    Blood Culture - Blood, Hand, Left [[id number]] Collected: 04/07/21 2057    Lab Status: Final result Specimen: Blood from Hand, Left Updated: 04/12/21 2145     Blood Culture No growth at 5 days    Blood Culture - Blood, Hand, Right [[id number]] Collected: 04/07/21 2057    Lab Status: Final result Specimen: Blood from Hand, Right Updated: 04/12/21 2145     Blood Culture No growth at 5 days    COVID-19 and FLU A/B PCR - Swab, Nasopharynx [[id number]]  (Normal) Collected: 04/07/21 1357    Lab Status: Final result Specimen: Swab from Nasopharynx Updated: 04/07/21 1509     COVID19 Not Detected     Influenza A PCR Not Detected     Influenza B PCR Not Detected    Narrative:      Fact sheet for providers: https://www.fda.gov/media/626321/download    Fact sheet for patients: https://www.fda.gov/media/834826/download    Test performed by PCR.        IR Tunneled Catheter    Result Date: 4/9/2021   Insertion of right-sided tunneled dialysis catheter, with tip in the expected location of the cavoatrial junction.  Plan:  The catheter  may be used immediately. _______________________________________________________________  PROCEDURE SUMMARY: - Venous access with ultrasound guidance - Tunneled dialysis catheter insertion with fluoroscopic guidance - Additional procedure(s): None  PROCEDURE DETAILS:  Pre-procedure Consent: Informed consent for the procedure including risks, benefits and alternatives was obtained and time-out was performed prior to the procedure. Preparation (MIPS): The site was prepared and draped using all elements of maximal sterile barrier technique including sterile gloves, sterile gown, cap, mask, large sterile sheet, sterile ultrasound probe cover, hand hygiene and cutaneous antisepsis with 2% chlorhexidine. Medical reason for site preparation exception (MIPS): Not applicable  Anesthesia/sedation Level of anesthesia/sedation: Moderate sedation (conscious sedation) Anesthesia/sedation administered by: Independent trained observer under attending supervision with continuous monitoring of the patient?s level of consciousness and physiologic status Total intra-service sedation time (minutes): 20  Access Local anesthesia was administered. The vessel was sonographically evaluated and determined to be patent. Real time ultrasound was used to visualize needle entry into the vessel and a permanent image obtained. Vein accessed (QCDR): Right internal jugular vein Internal jugular vein patency (QCDR): Patent Access technique: Micropuncture technique under ultrasound guidance  Venography Indication for venography: Not performed Catheter tip position for venography: Not applicable Findings: Not applicable  Catheter placement An incision was made near the venous access site and the catheter was tunneled subcutaneously to the venous access site. The catheter was advanced via a peel-away sheath into the vein under fluoroscopic guidance. Catheter tip location was fluoroscopically verified and a permanent image was stored. Catheter placed:  14.5 [place] Primavistarome SI Silver Ion Catheter Catheter cuff-to-tip length (cm): 19 Catheter flush: Heparin (1000 units per mL)  Closure A sterile dressing was applied. Access site closure technique: Tissue adhesive Catheter securement technique: Non-absorbable suture  Contrast Contrast agent: None Contrast volume (mL): 0  Radiation Dose Fluoroscopy time: 0.2 minutes Dose: 2.1 mGy  Additional Details Additional description of procedure: None Equipment details: None Specimens removed: None Estimated blood loss (mL): Less than 10 Standardized report: TunneledDialysisCatheter  Attestation I was present and scrubbed for the entire procedure. Imaging reviewed. Agree with final report as written.  This report was finalized on 4/9/2021 3:52 PM by Wil Franz.          Current Medications:  Scheduled Meds:aspirin, 81 mg, Oral, Daily  carvedilol, 25 mg, Oral, BID With Meals  cetirizine, 10 mg, Oral, Daily  docusate sodium, 100 mg, Oral, BID  fluticasone, 2 spray, Each Nare, Daily  heparin (porcine), 5,000 Units, Subcutaneous, Q12H  insulin detemir, 10 Units, Subcutaneous, Daily  insulin lispro, 0-7 Units, Subcutaneous, 4x Daily AC & at Bedtime  magnesium oxide, 1,000 mg, Oral, Daily  polyethylene glycol, 17 g, Oral, Daily  povidone-iodine, , Topical, Once  sodium chloride, 10 mL, Intravenous, Q12H  sodium chloride, 10 mL, Intravenous, Q12H  traZODone, 25 mg, Oral, Nightly      Continuous Infusions:   PRN Meds:.calcium carbonate  •  calcium gluconate IVPB **AND** calcium gluconate IVPB **AND** Calcium, Ionized  •  diphenhydrAMINE  •  heparin (porcine)  •  hydrALAZINE  •  magnesium sulfate **OR** magnesium sulfate in D5W 1g/100mL (PREMIX) **OR** magnesium sulfate  •  melatonin  •  oxyCODONE  •  oxymetazoline  •  sodium chloride  •  sodium chloride  •  sodium chloride    Assessment: Status post  No admission procedures for hospital encounter.    Patient Active Problem List   Diagnosis   • Hypertension   • T2DM   •  Dyslipidemia   • Sleep apnea   • Elevated liver enzymes   • RINKU   • Recent Rt Total Knee Replacement 4/5/21   • Chronic pain w/pain pump   • Lactic acidosis   • Acute HFrEF (EF 30%)   • Shock (CMS/HCC) -cardiogenic versus septic       PLAN:   Continues current post-op course  Mobilize with PT as tolerated per protocol  Asa 81 mg bid for dvt ppx when ok with medical team   Medical management of kidney function kidney function       Weight Bearing: WBAT  Discharge Plan: pending medical improvement     Axel Avalos MD    Date: 4/17/2021    Time: 11:06 EDT    Electronically signed by Axel Avalos MD at 04/17/21 1104

## 2021-04-19 NOTE — PLAN OF CARE
Goal Outcome Evaluation:  Plan of Care Reviewed With: patient, spouse  Progress: improving  Outcome Summary: Pt. demonstrates increased activity tolerance and improving independence w/ mobility.  He completed STS transfers w/ supervision and progressed forward ambulation to 200ft w/ wkr and SBA.  Pt. completed LE TherEx w/ good effort and reports compliance w/ TKA protocol.  PT recommends home w/ assist and HHPT at D/C.

## 2021-04-19 NOTE — THERAPY TREATMENT NOTE
Patient Name: Elvis Hanson  : 1955    MRN: [id number]                              Today's Date: 2021       Admit Date: 2021    Visit Dx:     ICD-10-CM ICD-9-CM   1. Altered mental status, unspecified altered mental status type  R41.82 780.97   2. Hypotension, unspecified hypotension type  I95.9 458.9   3. Acute renal failure, unspecified acute renal failure type (CMS/HCC)  N17.9 584.9   4. Bilateral hearing loss, unspecified hearing loss type  H91.93 389.9   5. Elevated LFTs  R79.89 790.6   6. Elevated troponin  R77.8 790.6     Patient Active Problem List   Diagnosis   • Hypertension   • T2DM   • Dyslipidemia   • Sleep apnea   • Elevated liver enzymes   • RINKU   • Recent Rt Total Knee Replacement 21   • Chronic pain w/pain pump   • Lactic acidosis   • Acute HFrEF (EF 30%)   • Shock (CMS/HCC) -cardiogenic versus septic     Past Medical History:   Diagnosis Date   • Diabetes mellitus (CMS/HCC)    • Hyperlipidemia    • Hypertension    • Sleep apnea      Past Surgical History:   Procedure Laterality Date   • CHOLECYSTECTOMY     • JOINT REPLACEMENT Right     KNEE     General Information     Row Name 21 1515          Physical Therapy Time and Intention    Document Type  therapy note (daily note)  -MB     Mode of Treatment  physical therapy  -MB     Row Name 21 1515          General Information    Patient Profile Reviewed  yes  -MB     Existing Precautions/Restrictions  no known precautions/restrictions  -MB     Barriers to Rehab  medically complex  -MB     Row Name 21 1515          Cognition    Orientation Status (Cognition)  oriented x 4  -MB     Row Name 21 1515          Safety Issues, Functional Mobility    Impairments Affecting Function (Mobility)  endurance/activity tolerance;strength;range of motion (ROM)  -MB       User Key  (r) = Recorded By, (t) = Taken By, (c) = Cosigned By    Initials Name Provider Type    Carloyn Cheng, PT Physical Therapist         Mobility     Row Name 04/19/21 1515          Bed Mobility    Supine-Sit [place] (Bed Mobility)  not tested  -MB     Comment (Bed Mobility)  EOB  -MB     Row Name 04/19/21 1515          Transfers    Comment (Transfers)  STS from EOB/BSC/recliner w/ safe hand placement, no LOB.  -MB     [facility] Name 04/19/21 1515          Sit-Stand Transfer    Sit-Stand [place] (Transfers)  supervision  -MB     Assistive Device (Sit-Stand Transfers)  walker, standard  -[facility] Name 04/19/21 1515          Gait/Stairs (Locomotion)    [place] Level (Gait)  supervision  -MB     Assistive Device (Gait)  walker, standard  -MB     Distance in Feet (Gait)  200  -MB     Deviations/Abnormal Patterns (Gait)  base of support, wide;jeancarlos decreased;gait speed decreased;stride length decreased  -MB     Bilateral Gait Deviations  heel strike decreased  -MB     Comment (Gait/Stairs)  Pt. ambulated w/ step through gait pattern w/ slow jeancarlos.  No LOB or instability.  Recommended wheels for pt's personal standard wkr. d/t dec. functional endurance.  Contacted CM.  -MB       User Key  (r) = Recorded By, (t) = Taken By, (c) = Cosigned By    Initials Name Provider Type    MB Carolyn Perera, PT Physical Therapist        Obj/Interventions     [facility] Name 04/19/21 1515          Hip (Therapeutic Exercise)    Hip Strengthening (Therapeutic Exercise)  bilateral;marching while seated;10 repetitions;5 repetitions  -[facility] Name 04/19/21 1515          Knee (Therapeutic Exercise)    Knee (Therapeutic Exercise)  strengthening exercise  -MB     Knee Isometrics (Therapeutic Exercise)  bilateral;quad sets;10 repetitions;5 repetitions  -MB     Knee Strengthening (Therapeutic Exercise)  right;SLR (straight leg raise);heel slides;bilateral;LAQ (long arc quad);10 repetitions;5 repetitions  -[facility] Name 04/19/21 1515          Ankle (Therapeutic Exercise)    Ankle Strengthening (Therapeutic Exercise)  bilateral;dorsiflexion;plantarflexion;10  repetitions  -MB       User Key  (r) = Recorded By, (t) = Taken By, (c) = Cosigned By    Initials Name Provider Type    Carolyn Cheng, PT Physical Therapist        Goals/Plan    No documentation.       Clinical Impression     Row Name 04/19/21 1564          Pain Scale: Numbers Pre/Post-Treatment    Pretreatment Pain Rating  0/10 - no pain  -MB     Posttreatment Pain Rating  0/10 - no pain  -MB     Row Name 04/19/21 1516          Plan of Care Review    Plan of Care Reviewed With  patient;spouse  -MB     Progress  improving  -MB     Outcome Summary  Pt. demonstrates increased activity tolerance and improving independence w/ mobility.  He completed STS transfers w/ supervision and progressed forward ambulation to 200ft w/ wkr and SBA.  Pt. completed LE TherEx w/ good effort and reports compliance w/ TKA protocol.  PT recommends home w/ assist and HHPT at D/C.  -MB     Row Name 04/19/21 4942          Vital Signs    Pre Patient Position  Sitting  -MB     Intra Patient Position  Standing  -MB     Post Patient Position  Sitting  -MB     Row Name 04/19/21 6423          Positioning and Restraints    Pre-Treatment Position  bedside commode  -MB     Post Treatment Position  chair  -MB     In Chair  notified nsg;reclined;call light within reach;encouraged to call for assist;with family/caregiver;waffle cushion;legs elevated  -MB       User Key  (r) = Recorded By, (t) = Taken By, (c) = Cosigned By    Initials Name Provider Type    Carolyn Cheng, PT Physical Therapist        Outcome Measures     Row Name 04/19/21 2385          How much help from another person do you currently need...    Turning from your back to your side while in flat bed without using bedrails?  4  -MB     Moving from lying on back to sitting on the side of a flat bed without bedrails?  3  -MB     Moving to and from a bed to a chair (including a wheelchair)?  4  -MB     Standing up from a chair using your arms (e.g., wheelchair, bedside  chair)?  4  -MB     Climbing 3-5 steps with a railing?  3  -MB     To walk in hospital room?  3  -MB     AM-PAC 6 Clicks Score (PT)  21  -MB     Row Name 04/19/21 1555          Functional Assessment    Outcome Measure Options  AM-PAC 6 Clicks Basic Mobility (PT)  -MB       User Key  (r) = Recorded By, (t) = Taken By, (c) = Cosigned By    Initials Name Provider Type    Carolyn Cheng, PT Physical Therapist        Physical Therapy Education                 Title: PT OT SLP Therapies (Done)     Topic: Physical Therapy (Done)     Point: Mobility training (Done)     Learning Progress Summary           Patient Acceptance, E,D, VU,DU by  at 4/15/2021 1521    Acceptance, E, VU,DU by  at 4/15/2021 0926    Acceptance, E,D, VU,NR by MB at 4/13/2021 1512    Comment: HEP, transfer safety/mechanics, gait safety/mechanics, home safety                   Point: Home exercise program (Done)     Learning Progress Summary           Patient Acceptance, E,D, VU,DU by  at 4/15/2021 1521    Acceptance, E, VU,DU by  at 4/15/2021 0926    Acceptance, E,D, VU,NR by MB at 4/13/2021 1512    Comment: HEP, transfer safety/mechanics, gait safety/mechanics, home safety                   Point: Body mechanics (Done)     Learning Progress Summary           Patient Acceptance, E,D, VU,DU by  at 4/15/2021 1521    Acceptance, E, VU,DU by  at 4/15/2021 0926    Acceptance, E,D, VU,NR by MB at 4/13/2021 1512    Comment: HEP, transfer safety/mechanics, gait safety/mechanics, home safety                   Point: Precautions (Done)     Learning Progress Summary           Patient Acceptance, E,D, VU,DU by  at 4/15/2021 1521    Acceptance, E, VU,DU by  at 4/15/2021 0926    Acceptance, E,D, VU,NR by MB at 4/13/2021 1512    Comment: HEP, transfer safety/mechanics, gait safety/mechanics, home safety                               User Key     Initials Effective Dates Name Provider Type [facility] 06/19/15 -  Ila Bearden PT  Physical Therapist PT    MB 03/14/16 -  Carolyn Perera, PT Physical Therapist PT              PT Recommendation and Plan  Planned Therapy Interventions (PT): balance training, bed mobility training, gait training, home exercise program, patient/family education, strengthening, transfer training  Plan of Care Reviewed With: patient, spouse  Progress: improving  Outcome Summary: Pt. demonstrates increased activity tolerance and improving independence w/ mobility.  He completed STS transfers w/ supervision and progressed forward ambulation to 200ft w/ wkr and SBA.  Pt. completed LE TherEx w/ good effort and reports compliance w/ TKA protocol.  PT recommends home w/ assist and HHPT at D/C.     Time Calculation:   PT Charges     Row Name 04/19/21 1556             Time Calculation    Start Time  1515  -MB      PT Received On  04/19/21  -MB      PT Goal Re-Cert Due Date  04/23/21  -MB         Time Calculation- PT    Total Timed Code Minutes- PT  40 minute(s)  -MB         Timed Charges    44507 - PT Therapeutic Exercise Minutes  26  -MB      93530 - Gait Training Minutes   14  -MB        User Key  (r) = Recorded By, (t) = Taken By, (c) = Cosigned By    Initials Name Provider Type    Carolyn Cheng, PT Physical Therapist        Therapy Charges for Today     Code Description Service Date Service Provider Modifiers Qty    91689090077 HC PT THER PROC EA 15 MIN 4/19/2021 Carolyn Perera, PT GP 2    40218408398 HC GAIT TRAINING EA 15 MIN 4/19/2021 Carolyn Perera, PT GP 1          PT G-Codes  Outcome Measure Options: AM-PAC 6 Clicks Basic Mobility (PT)  AM-PAC 6 Clicks Score (PT): 21    Carolyn Perera PT  4/19/2021

## 2021-04-19 NOTE — PROGRESS NOTES
Continued Stay Note  [facility]     Patient Name: Elvis Hanson  MRN: [id number]  Today's Date: 4/19/2021    Admit Date: 4/7/2021    Discharge Plan     Row Name 04/19/21 1326       Plan    Plan  update    Patient/Family in Agreement with Plan  yes    Plan Comments  Per MD rounds, patient will OP HD.  Spoke with patient at bedside regarding discharge plan who request to go to the dialysis clinic that is in the [facility] in [place] which is an [facility] clinic.  Patient reports he has transportation to and from dialysis.  Faxed packet to [facility] and will await contact by [facility] today or will reach out tommorrow to see if packet has been processed.  Patient plan is to discharge home via car with family to transport.    Final Discharge Disposition Code  01 - home or self-care        Discharge Codes    No documentation.       Expected Discharge Date and Time     Expected Discharge Date Expected Discharge Time    Apr 22, 2021             Kristen Prado RN

## 2021-04-19 NOTE — PROGRESS NOTES
[facility] Medicine Services  PROGRESS NOTE    Patient Name: Elvis Hanson  : 1955  MRN: [id number]    Date of Admission: 2021  Primary Care Physician: April Chen,     Subjective     CC: f/u renal failure     HPI:  Sitting on side of bed after HD, wife at bedside. He is feeling well and has no new complaints. Wondering when he'll be able to go home. We discussed.     ROS:  Gen- No fevers, chills  CV- No chest pain, palpitations  Resp- No cough, dyspnea  GI- No N/V/D, abd pain    Objective     Vital Signs:   Temp:  [97.7 °F (36.5 °C)-98.7 °F (37.1 °C)] 98 °F (36.7 °C)  Heart Rate:  [61-77] 71  Resp:  [16-18] 16  BP: (146-181)/() 167/94        Physical Exam:  Constitutional: No acute distress, awake, alert and conversant. Sitting on side of bed. Obese body habitus   HENT: NCAT, mucous membranes moist  Respiratory: Clear to auscultation bilaterally, respiratory effort normal on room air   Cardiovascular: RRR, no murmurs, rubs, or gallops  Gastrointestinal: Positive bowel sounds, soft, nontender, nondistended  Musculoskeletal: 1+ pitting bilateral ankle edema  Psychiatric: Appropriate affect, cooperative  Neurologic: Oriented x 3, moves all extremities spontaneously, speech clear and coherent   Skin: No rashes to exposed surfaces     Results Reviewed:  Results from last 7 days   Lab Units 21  0700 21  1238 04/15/21  0719   WBC 10*3/mm3 7.36 9.44 9.23   HEMOGLOBIN g/dL 9.0* 10.2* 10.0*   HEMATOCRIT % 29.3* 32.4* 32.3*   PLATELETS 10*3/mm3 207 205 205     Results from last 7 days   Lab Units 21  0700 21  0504 21  0635 21  0527 04/15/21  0719   SODIUM mmol/L 130* 133* 132* 130* 131*   POTASSIUM mmol/L 4.3 4.2 3.8 4.0 3.4*   CHLORIDE mmol/L 93* 96* 95* 94* 95*   CO2 mmol/L 24.0 24.0 24.0 18.0* 23.0   BUN mg/dL 56* 47* 38* 59* 49*   CREATININE mg/dL 10.60* 8.63* 6.97* 9.17* 7.97*   GLUCOSE mg/dL 103* 102* 104* 106* 108*   CALCIUM  mg/dL 8.0* 8.5* 8.6 7.6* 7.4*   ALT (SGPT) U/L 140*  --   --  301* 374*   AST (SGOT) U/L 23  --   --  46* 45*     Estimated Creatinine Clearance: 9.5 mL/min (A) (by C-G formula based on SCr of 10.6 mg/dL (H)).    Microbiology Results Abnormal     Procedure Component Value - Date/Time    Blood Culture - Blood, Hand, Left [[id number]] Collected: 04/07/21 2057    Lab Status: Final result Specimen: Blood from Hand, Left Updated: 04/12/21 2145     Blood Culture No growth at 5 days    Blood Culture - Blood, Hand, Right [[id number]] Collected: 04/07/21 2057    Lab Status: Final result Specimen: Blood from Hand, Right Updated: 04/12/21 2145     Blood Culture No growth at 5 days    Urine Culture - Urine, Urine, Clean Catch [[id number]]  (Normal) Collected: 04/09/21 1325    Lab Status: Final result Specimen: Urine, Clean Catch Updated: 04/10/21 1742     Urine Culture No growth    MRSA Screen, PCR (Inpatient) - Swab, Nares [[id number]]  (Normal) Collected: 04/08/21 0341    Lab Status: Final result Specimen: Swab from Nares Updated: 04/08/21 0937     MRSA PCR Negative    Narrative:      MRSA Negative    COVID-19 and FLU A/B PCR - Swab, Nasopharynx [[id number]]  (Normal) Collected: 04/07/21 1357    Lab Status: Final result Specimen: Swab from Nasopharynx Updated: 04/07/21 1509     COVID19 Not Detected     Influenza A PCR Not Detected     Influenza B PCR Not Detected    Narrative:      Fact sheet for providers: https://www.fda.gov/media/538269/download    Fact sheet for patients: https://www.fda.gov/media/346428/download    Test performed by PCR.        Imaging Results (Last 24 Hours)     ** No results found for the last 24 hours. **        Results for orders placed during the hospital encounter of 04/07/21    Adult Transthoracic Echo Limited W/ Cont if Necessary Per Protocol    Interpretation Summary  · Mildly reduced right ventricular systolic function noted.  · Estimated left ventricular EF = 50% Left ventricular ejection fraction  appears to be 46 - 50%. Left ventricular systolic function is normal.  · The left ventricular ejection fraction is significantly improved from previous echo with EF lower limits of normal    I have reviewed the medications:  Scheduled Meds:carvedilol, 25 mg, Oral, BID With Meals  cetirizine, 10 mg, Oral, Daily  docusate sodium, 100 mg, Oral, BID  fluticasone, 2 spray, Each Nare, Daily  heparin (porcine), 5,000 Units, Subcutaneous, Q12H  insulin detemir, 10 Units, Subcutaneous, Daily  insulin lispro, 0-7 Units, Subcutaneous, 4x Daily AC & at Bedtime  magnesium oxide, 1,000 mg, Oral, Daily  polyethylene glycol, 17 g, Oral, Daily  sodium chloride, 10 mL, Intravenous, Q12H  sodium chloride, 10 mL, Intravenous, Q12H  traZODone, 25 mg, Oral, Nightly      Continuous Infusions:   PRN Meds:.•  calcium carbonate  •  calcium gluconate IVPB **AND** calcium gluconate IVPB **AND** Calcium, Ionized  •  diphenhydrAMINE  •  heparin (porcine)  •  hydrALAZINE  •  magnesium sulfate **OR** magnesium sulfate in D5W 1g/100mL (PREMIX) **OR** magnesium sulfate  •  melatonin  •  oxyCODONE  •  sodium chloride  •  sodium chloride  •  sodium chloride    Assessment & Plan     Active Hospital Problems    Diagnosis  POA   • **RINKU [N17.9]  Yes   • Lactic acidosis [E87.2]  Yes   • Acute HFrEF (EF 30%) [I50.21]  Yes   • Shock (CMS/HCC) -cardiogenic versus septic [R57.9]  Yes   • Hypertension [I10]  Yes   • T2DM [E11.9]  Yes   • Dyslipidemia [E78.5]  Yes   • Sleep apnea [G47.30]  Yes   • Elevated liver enzymes [R74.8]  Yes   • Recent Rt Total Knee Replacement 4/5/21 [Z96.659]  Not Applicable   • Chronic pain w/pain pump [G89.29]  Yes      Resolved Hospital Problems   No resolved problems to display.     Brief Hospital Course to date:  Elvis Hanson is a 66 y.o. male with PMH significant for HTN, HLD, DMII, obesity, SHYLA and chronic pain s/p pain pump. He underwent R total knee arthroplasty on 4/5/2021 by Dr. Avalos. Labs were normal at that time. He  presented to [facility] ED on 4/7/21 with complaints of inability to hear after waking. BP 87/51 and creatinine 3.89, ALT 2631, AST 4533. He was admitted to the PCU. Subsequent ECHO revealed EF 30%. UA concerning for UTI and procalcitonin elevated.     Shock, cardiogenic vs septic, resolved  - Patient started on steroids. These have since been weaned and discontinued  - s/p IV Vancx 2 doses  / Zosyn x 7 days. Completed antibiotics 4/13/21   - CT head / CXR unremarkable / Urine culture NGTD / blood cultures NGTD    Acute systolic CHF   Elevated troponin  - Cardiology followed   - Initial ECHO showed EF 36%   - Repeat ECHO on 4/15 showed mildly reduced RV systolic function, LVEF 46-50%   - Deferring ischemic evaluation due to improvement with volume removal   - No ACEI / ARB / Spironolactone due to renal function  - Follow up with cardiology in 6 weeks  - Nephrology holding ASA due to possible renal biopsy    Oliguric acute renal failure  - Tunneled HD catheter placed 4/9/21 - now on HD  - Serological work up unremarkable. Nephrology repeating urine creatinine / urine protein studies today. May still consider renal biopsy prior to discharge  - Urine output improving, creatinine 10.6 today  - Discussed with nephrology today, will need outpatient HD arranged. CM aware    Elevated LFTs  - Congestive hepatopathy vs shock liver  - AST has normalized,  and continues to improve     Recent R total knee arthroplasty 4/5/21  - Dr. Avalos following. WBAT.   - Attempted aspiration on 4/13 due to swelling. No fluid aspirated, lightly third spacing   - ASA for DVT PPX at DC. Currently on Heparin   - Continue PT/OT    DMII  - Good control. Continue Levemir 10 units QAM + SSI     Hearing loss, resolved. [place] to be related to home diuretics in the setting of shock / renal failure  Insomnia, better with Trazodone   Chronic pain, s/p pain pump   SHYLA, CPAP QHS & PRN  Obesity, complicates all aspects of care     DVT  Prophylaxis: Heparin   Disposition: I expect the patient to be discharged home soon, once cleared for DC by nephrology and outpatient HD chair arranged     CODE STATUS:   Code Status and Medical Interventions:   Ordered at: 04/07/21 1422     Code Status:    CPR     Medical Interventions (Level of Support Prior to Arrest):    Full     Julieth Rodriguez PA-C  04/19/21

## 2021-04-19 NOTE — PLAN OF CARE
Goal Outcome Evaluation:  Plan of Care Reviewed With: patient  Progress: improving  Outcome Summary: VSS. Pt remains on RA with no compliants of SOA. complained of pain mid shift and requested tylenol. APRN notified and it was not ordered due to elevated liver enzymes however a heating pad was ordered. Pt also took the pain pill he had ordered and did not complain of anymore pain during shift.Pt remains NSR on the monitor.

## 2021-04-19 NOTE — PROGRESS NOTES
"   LOS: 12 days    Patient Care Team:  April Chen DO as PCP - General (Internal Medicine)    Chief Complaint:      Subjective     Interval History:   Seen on HD tolerating well so far. Goal UF 3 liter as tolerated. Bp stable. Good access pressure. Started making urine. Non oliguric. Solute clearance is poor.       Review of Systems:   Complains of low urine output, edema, denies, nausea vomiting dysuria hematuria no shortness of breath or chest pain.  All other systems are negative.    Objective     Vital Sign Min/Max for last 24 hours  Temp  Min: 97.3 °F (36.3 °C)  Max: 98.7 °F (37.1 °C)   BP  Min: 141/90  Max: 168/98   Pulse  Min: 67  Max: 77   Resp  Min: 16  Max: 18   SpO2  Min: 96 %  Max: 98 %   No data recorded   Weight  Min: 129 kg (285 lb)  Max: 129 kg (285 lb)     Flowsheet Rows      First Filed Value   Admission Height  177.8 cm (70\") Documented at 04/07/2021 1041   Admission Weight  127 kg (280 lb) Documented at 04/07/2021 1041          I/O this shift:  In: -   Out: 450 [Urine:450]  I/O last 3 completed shifts:  In: 948 [P.O.:948]  Out: 1350 [Urine:1350]    Physical Exam:  General Appearance: -American male morbid obesity comfortable in bed -American male  Facial: Left-sided Bell's palsy noted chronic.  Eyes: PER, EOMI.  Neck: Supple no JVD.  Lungs: Clear auscultation, no rales rhonchi's, equal chest movement, nonlabored.  Heart: No gallop, murmur, rub, RRR.  Abdomen: Obesity distended soft nontender, positive bowel sounds, no organomegaly.  Extremities: Lateral lower extremity 3+ edema trace to 1+ upper extremity edema.  Neuro: No focal deficit, moving all extremities, alert oriented X 3  : No suprapubic fullness  Skin warm and dry.  WBC WBC   Date Value Ref Range Status   04/19/2021 7.36 3.40 - 10.80 10*3/mm3 Final   04/16/2021 9.44 3.40 - 10.80 10*3/mm3 Final      HGB Hemoglobin   Date Value Ref Range Status   04/19/2021 9.0 (L) 13.0 - 17.7 g/dL Final   04/16/2021 10.2 (L) " 13.0 - 17.7 g/dL Final      HCT Hematocrit   Date Value Ref Range Status   04/19/2021 29.3 (L) 37.5 - 51.0 % Final   04/16/2021 32.4 (L) 37.5 - 51.0 % Final      Platlets No results found for: LABPLAT   MCV MCV   Date Value Ref Range Status   04/19/2021 84.0 79.0 - 97.0 fL Final   04/16/2021 83.3 79.0 - 97.0 fL Final          Sodium Sodium   Date Value Ref Range Status   04/19/2021 130 (L) 136 - 145 mmol/L Final   04/18/2021 133 (L) 136 - 145 mmol/L Final   04/17/2021 132 (L) 136 - 145 mmol/L Final      Potassium Potassium   Date Value Ref Range Status   04/19/2021 4.3 3.5 - 5.2 mmol/L Final   04/18/2021 4.2 3.5 - 5.2 mmol/L Final   04/17/2021 3.8 3.5 - 5.2 mmol/L Final      Chloride Chloride   Date Value Ref Range Status   04/19/2021 93 (L) 98 - 107 mmol/L Final   04/18/2021 96 (L) 98 - 107 mmol/L Final   04/17/2021 95 (L) 98 - 107 mmol/L Final      CO2 CO2   Date Value Ref Range Status   04/19/2021 24.0 22.0 - 29.0 mmol/L Final   04/18/2021 24.0 22.0 - 29.0 mmol/L Final   04/17/2021 24.0 22.0 - 29.0 mmol/L Final      BUN BUN   Date Value Ref Range Status   04/19/2021 56 (H) 8 - 23 mg/dL Final   04/18/2021 47 (H) 8 - 23 mg/dL Final   04/17/2021 38 (H) 8 - 23 mg/dL Final      Creatinine Creatinine   Date Value Ref Range Status   04/19/2021 10.60 (H) 0.76 - 1.27 mg/dL Final   04/18/2021 8.63 (H) 0.76 - 1.27 mg/dL Final   04/17/2021 6.97 (H) 0.76 - 1.27 mg/dL Final      Calcium Calcium   Date Value Ref Range Status   04/19/2021 8.0 (L) 8.6 - 10.5 mg/dL Final   04/18/2021 8.5 (L) 8.6 - 10.5 mg/dL Final   04/17/2021 8.6 8.6 - 10.5 mg/dL Final      PO4 No results found for: CAPO4   Albumin Albumin   Date Value Ref Range Status   04/19/2021 3.60 3.50 - 5.20 g/dL Final      Magnesium No results found for: MG   Uric Acid No results found for: URICACID     Right kidney measures 11.2 cm in length without apparent mass or   hydronephrosis. Normal color Doppler flow.       Left kidney measures 11.0 cm in length without apparent  mass or   hydronephrosis. Normal color Doppler flow.       Unremarkable catheterized and contracted urinary bladder.       Impression:     Unremarkable sonographic appearance of both kidneys.         Results Review:     I reviewed the patient's new clinical results.    aspirin, 81 mg, Oral, Daily  carvedilol, 25 mg, Oral, BID With Meals  cetirizine, 10 mg, Oral, Daily  docusate sodium, 100 mg, Oral, BID  fluticasone, 2 spray, Each Nare, Daily  heparin (porcine), 5,000 Units, Subcutaneous, Q12H  insulin detemir, 10 Units, Subcutaneous, Daily  insulin lispro, 0-7 Units, Subcutaneous, 4x Daily AC & at Bedtime  magnesium oxide, 1,000 mg, Oral, Daily  polyethylene glycol, 17 g, Oral, Daily  sodium chloride, 10 mL, Intravenous, Q12H  sodium chloride, 10 mL, Intravenous, Q12H  traZODone, 25 mg, Oral, Nightly           Medication Review: As noted above    Assessment/Plan       RINKU    Hypertension    T2DM    Dyslipidemia    Sleep apnea    Elevated liver enzymes    Recent Rt Total Knee Replacement 4/5/21    Chronic pain w/pain pump    Lactic acidosis    Acute HFrEF (EF 30%)    Shock (CMS/HCC) -cardiogenic versus septic    1.  RINKU: Oliguirc now on HD. Presumed hemodynamic injury and ATN.    Nephrotic range proteinuria. Serological testing negative. MARIANA, ANCA, AntiDsDNA. Mildly depressed C3 and C4  Right kidney 11.2 cm, left kidney 11.0 cm normal color Doppler unremarkable bilateral kidneys.  Hepatitis negative, on IV vancomycin on admission  2.  Septic shock: required pressors.   3.  Nephrotic proteinuria greater than 6 g in the setting of diabetes vs infection related GN suspicion is low given no hematuria on previous testing.   4.  Altered mental status and hearing difficulty improved at this time.  5.  S/p recent right total knee replacement.  6.  Congestive heart failure: Ejection fraction 30% repeat ECHO showed EF 50%  7.  Hypocalcemia.  8. Hyponatremia. Optimization w HD       Plan:  Daily eval for HD. Since he has acute  kidney injury started producing urine. However will need HD chair setup as outpatient due to poor solute clearance at this point.   Repeat UA and UPC. If there is hematuria and proteinuria. We can consider renal biopsy for diagnostic purposes.   Need optimization of volume status with HD       Demetrio Crook MD  04/19/21  09:40 EDT

## 2021-04-20 VITALS
OXYGEN SATURATION: 95 % | RESPIRATION RATE: 18 BRPM | WEIGHT: 285.5 LBS | BODY MASS INDEX: 40.87 KG/M2 | HEART RATE: 78 BPM | HEIGHT: 70 IN | TEMPERATURE: 98.2 F | DIASTOLIC BLOOD PRESSURE: 81 MMHG | SYSTOLIC BLOOD PRESSURE: 137 MMHG

## 2021-04-20 PROBLEM — E87.20 LACTIC ACIDOSIS: Status: RESOLVED | Noted: 2021-04-08 | Resolved: 2021-04-20

## 2021-04-20 PROBLEM — R57.9 SHOCK: Status: RESOLVED | Noted: 2021-04-08 | Resolved: 2021-04-20

## 2021-04-20 LAB
ALBUMIN SERPL-MCNC: 3.7 G/DL (ref 3.5–5.2)
ANION GAP SERPL CALCULATED.3IONS-SCNC: 11 MMOL/L (ref 5–15)
BUN SERPL-MCNC: 39 MG/DL (ref 8–23)
BUN/CREAT SERPL: 5.1 (ref 7–25)
CALCIUM SPEC-SCNC: 8.5 MG/DL (ref 8.6–10.5)
CHLORIDE SERPL-SCNC: 99 MMOL/L (ref 98–107)
CO2 SERPL-SCNC: 24 MMOL/L (ref 22–29)
CREAT SERPL-MCNC: 7.64 MG/DL (ref 0.76–1.27)
GFR SERPL CREATININE-BSD FRML MDRD: 9 ML/MIN/1.73
GFR SERPL CREATININE-BSD FRML MDRD: ABNORMAL ML/MIN/{1.73_M2}
GLUCOSE BLDC GLUCOMTR-MCNC: 105 MG/DL (ref 70–130)
GLUCOSE BLDC GLUCOMTR-MCNC: 137 MG/DL (ref 70–130)
GLUCOSE SERPL-MCNC: 103 MG/DL (ref 65–99)
MAGNESIUM SERPL-MCNC: 2.2 MG/DL (ref 1.6–2.4)
PHOSPHATE SERPL-MCNC: 5.1 MG/DL (ref 2.5–4.5)
POTASSIUM SERPL-SCNC: 4.4 MMOL/L (ref 3.5–5.2)
SARS-COV-2 RDRP RESP QL NAA+PROBE: NORMAL
SODIUM SERPL-SCNC: 134 MMOL/L (ref 136–145)

## 2021-04-20 PROCEDURE — 87635 SARS-COV-2 COVID-19 AMP PRB: CPT | Performed by: PHYSICIAN ASSISTANT

## 2021-04-20 PROCEDURE — 25010000002 HEPARIN (PORCINE) PER 1000 UNITS: Performed by: INTERNAL MEDICINE

## 2021-04-20 PROCEDURE — 80069 RENAL FUNCTION PANEL: CPT | Performed by: PHYSICIAN ASSISTANT

## 2021-04-20 PROCEDURE — 97110 THERAPEUTIC EXERCISES: CPT

## 2021-04-20 PROCEDURE — 99239 HOSP IP/OBS DSCHRG MGMT >30: CPT | Performed by: PHYSICIAN ASSISTANT

## 2021-04-20 PROCEDURE — 63710000001 INSULIN DETEMIR PER 5 UNITS: Performed by: INTERNAL MEDICINE

## 2021-04-20 PROCEDURE — 83735 ASSAY OF MAGNESIUM: CPT | Performed by: INTERNAL MEDICINE

## 2021-04-20 PROCEDURE — 82962 GLUCOSE BLOOD TEST: CPT

## 2021-04-20 PROCEDURE — 97116 GAIT TRAINING THERAPY: CPT

## 2021-04-20 RX ORDER — GLIPIZIDE 5 MG/1
5 TABLET ORAL
Qty: 60 TABLET | Refills: 2 | Status: SHIPPED | OUTPATIENT
Start: 2021-04-20 | End: 2021-04-21 | Stop reason: SDUPTHER

## 2021-04-20 RX ORDER — OXYCODONE HYDROCHLORIDE 5 MG/1
5 CAPSULE ORAL EVERY 8 HOURS PRN
Start: 2021-04-20

## 2021-04-20 RX ORDER — DOCUSATE SODIUM 100 MG/1
100 CAPSULE, LIQUID FILLED ORAL 2 TIMES DAILY PRN
Qty: 60 CAPSULE | Refills: 1 | Status: SHIPPED | OUTPATIENT
Start: 2021-04-20

## 2021-04-20 RX ORDER — ASPIRIN 81 MG/1
81 TABLET ORAL 2 TIMES DAILY
Qty: 90 TABLET | Refills: 1 | Status: SHIPPED | OUTPATIENT
Start: 2021-04-20

## 2021-04-20 RX ADMIN — SODIUM CHLORIDE, PRESERVATIVE FREE 10 ML: 5 INJECTION INTRAVENOUS at 10:28

## 2021-04-20 RX ADMIN — INSULIN DETEMIR 10 UNITS: 100 INJECTION, SOLUTION SUBCUTANEOUS at 10:00

## 2021-04-20 RX ADMIN — MAGNESIUM OXIDE TAB 400 MG (241.3 MG ELEMENTAL MG) 1000 MG: 400 (241.3 MG) TAB at 10:29

## 2021-04-20 RX ADMIN — HEPARIN SODIUM 5000 UNITS: 5000 INJECTION INTRAVENOUS; SUBCUTANEOUS at 10:27

## 2021-04-20 RX ADMIN — DOCUSATE SODIUM 100 MG: 100 CAPSULE, LIQUID FILLED ORAL at 10:28

## 2021-04-20 RX ADMIN — CETIRIZINE HYDROCHLORIDE 10 MG: 10 TABLET, FILM COATED ORAL at 10:28

## 2021-04-20 RX ADMIN — CARVEDILOL 25 MG: 12.5 TABLET, FILM COATED ORAL at 10:28

## 2021-04-20 NOTE — DISCHARGE SUMMARY
[facility] Medicine Services  DISCHARGE SUMMARY    Patient Name: Elvis Hanson  : 1955  MRN: [id number]    Date of Admission: 2021 10:40 AM  Date of Discharge: 2021  Primary Care Physician: April Chen DO    Consults     Date and Time Order Name Status Description    2021  7:47 AM Inpatient Cardiology Consult Completed     2021  6:42 AM Inpatient Nephrology Consult Completed         Hospital Course     Presenting Problem:   RINKU (acute kidney injury) (CMS/HCC) [N17.9]    Active Hospital Problems    Diagnosis  POA   • **Acute renal failure (ARF) (CMS/HCC) [N17.9]  Yes   • Acute HFrEF (EF 30%) [I50.21]  Yes   • Hypertension [I10]  Yes   • Type 2 diabetes mellitus, without long-term current use of insulin (CMS/HCC) [E11.9]  Yes   • Dyslipidemia [E78.5]  Yes   • Sleep apnea [G47.30]  Yes   • Recent Rt Total Knee Replacement 21 [Z96.659]  Not Applicable   • Chronic pain w/pain pump [G89.29]  Yes      Resolved Hospital Problems    Diagnosis Date Resolved POA   • Lactic acidosis [E87.2] 2021 Yes   • Shock (CMS/HCC) -cardiogenic versus septic [R57.9] 2021 Yes   • Elevated liver enzymes [R74.8] 2021 Yes      Hospital Course:  Elvis Hanson is a 66 y.o. male with PMH significant for HTN, HLD, DMII, obesity, SHYLA and chronic pain s/p pain pump. He underwent R total knee arthroplasty on 2021 by Dr. Avalos. Labs were normal at that time. He presented to [facility] ED on 21 with complaints of inability to hear after waking. BP 87/51 and creatinine 3.89, ALT 2631, AST 4533. He was admitted to the PCU. Subsequent ECHO revealed EF 30%. UA concerning for UTI and procalcitonin elevated.      Shock, cardiogenic vs septic, resolved  - Patient started on steroids. These have since been weaned and discontinued  - s/p IV Vancx 2 doses  / Zosyn x 7 days. Completed antibiotics 21   - CT head / CXR unremarkable / Urine culture NGTD  / blood cultures NGTD     Acute systolic CHF   Elevated troponin  - Cardiology followed   - Initial ECHO showed EF 36%   - Repeat ECHO on 4/15 showed mildly reduced RV systolic function, LVEF 46-50%   - Deferring ischemic evaluation due to improvement of EF with volume removal   - No ACEI / ARB / Spironolactone due to renal function  - Will arrange outpatient follow up with cardiology in 4-6 weeks     Oliguric acute renal failure  - Tunneled HD catheter placed 4/9/21 - now on HD  - Serological work up unremarkable  - Nephrology repeating urine creatinine / urine protein studies on 4/19 - appear to be improving. Renal biopsy deferred  -  has arranged outpatient HD at [facility] - first session 4/21/21 @ 1225     Elevated LFTs  - Congestive hepatopathy vs shock liver  - AST has normalized,  and continues to improve      Recent R total knee arthroplasty 4/5/21  - Dr. Avalos followed   - Attempted aspiration on 4/13 due to swelling. No fluid aspirated, lightly third spacing   - ASA 81mg BID for DVT PPX at DC  - Continue PT/OT - CM to resume at DC  - Follow up with Dr. Avalos in 1 week      DMII  - Hgb A1c not checked this admission  - At home, on Metformin 500mg BID with meals. Will DC due to renal function  - Start Glipizide 5mg PO BID at DC      Hearing loss, resolved. [place] to be related to home diuretics in the setting of shock / renal failure  Insomnia, better with Trazodone   Chronic pain, s/p pain pump   SHYLA, CPAP QHS & PRN  Obesity, complicates all aspects of care     Day of Discharge     HPI:   Sitting in chair. He is feeling well. No chest pain, dyspnea, abdominal pain, nausea or vomiting. Making progress with therapy. He feels ready to go home today     Review of Systems  Gen- No fevers, chills  CV- No chest pain, palpitations  Resp- No cough, dyspnea  GI- No N/V/D, abd pain    Vital Signs:   Temp:  [97.8 °F (36.6 °C)-99.2 °F (37.3 °C)] 98.2 °F (36.8 °C)  Heart Rate:  [69-82] 78  Resp:  [16-18] 18  BP:  (133-164)/(73-91) 137/81     Physical Exam:  Constitutional: No acute distress, awake, alert and conversant. Sitting upright in chair   HENT: NCAT, mucous membranes moist  Respiratory: Clear to auscultation bilaterally, respiratory effort normal on room air   Cardiovascular: RRR, no murmurs, rubs, or gallops  Gastrointestinal: Positive bowel sounds, soft, nontender, nondistende. Obese abdomen   Musculoskeletal: 1-2+ pitting BLE edema. R upper chest tunneled HD catheter in place   Psychiatric: Appropriate affect, cooperative  Neurologic: Oriented x 3, moves all extremities spontaneously, speech clear  Skin: No rashes to exposed surfaces    Pertinent  and/or Most Recent Results     LAB RESULTS:      Lab 04/19/21  0700 04/16/21  1238 04/15/21  0719 04/14/21  0502   WBC 7.36 9.44 9.23 10.15   HEMOGLOBIN 9.0* 10.2* 10.0* 10.2*   HEMATOCRIT 29.3* 32.4* 32.3* 32.5*   PLATELETS 207 205 205 217   NEUTROS ABS 5.02 7.24* 6.45  --    IMMATURE GRANS (ABS) 0.05 0.07* 0.07*  --    LYMPHS ABS 1.32 1.04 1.49  --    MONOS ABS 0.77 0.81 1.00*  --    EOS ABS 0.19 0.27 0.21  --    MCV 84.0 83.3 83.5 82.1         Lab 04/20/21  0702 04/19/21  0700 04/18/21  0504 04/17/21  0635 04/16/21  0527 04/14/21  0502   SODIUM 134* 130* 133* 132* 130* 129*   POTASSIUM 4.4 4.3 4.2 3.8 4.0 3.6   CHLORIDE 99 93* 96* 95* 94* 91*   CO2 24.0 24.0 24.0 24.0 18.0* 23.0   ANION GAP 11.0 13.0 13.0 13.0 18.0* 15.0   BUN 39* 56* 47* 38* 59* 71*   CREATININE 7.64* 10.60* 8.63* 6.97* 9.17* 8.66*   GLUCOSE 103* 103* 102* 104* 106* 127*   CALCIUM 8.5* 8.0* 8.5* 8.6 7.6* 7.6*   MAGNESIUM 2.2  --   --   --  2.2 2.4   PHOSPHORUS 5.1*  --   --   --   --   --          Lab 04/20/21  0702 04/19/21  0700 04/16/21  0527 04/15/21  0719 04/14/21  0502   TOTAL PROTEIN  --  6.5 6.3 5.9* 6.0   ALBUMIN 3.70 3.60 3.30* 3.40* 3.30*   GLOBULIN  --  2.9 3.0 2.5 2.7   ALT (SGPT)  --  140* 301* 374* 540*   AST (SGOT)  --  23 46* 45* 58*   BILIRUBIN  --  0.5 0.5 0.5 0.5   ALK PHOS  --   103 115 115 119*     Brief Urine Lab Results  (Last result in the past 365 days)      Color   Clarity   Blood   Leuk Est   Nitrite   Protein   CREAT   Urine HCG        04/19/21 1231             37.0       04/19/21 1231 Yellow Clear Trace Negative Negative Trace             Microbiology Results (last 10 days)     ** No results found for the last 240 hours. **        IR Tunneled Catheter    Result Date: 4/9/2021  PROCEDURE: Tunneled dialysis catheter placement  Procedural Personnel Attending physician(s): MADYSON Franz M.D. Fellow physician(s): None Resident physician(s): None Advanced practice provider(s): None  Pre-procedure diagnosis: ATN secondary to shock with deterioration renal function. ?No previous history of kidney disease per patient no history of epistaxis hemoptysis gross hematuria or kidney stones no family history of kidney disease.????Protein creatinine ratio nephrotic range (nephrotic proteinuria greater than 6 g in the setting of diabetes). Septic shock: On IV pressors. Congestive heart failure: Ejection fraction 30%. Post-procedure diagnosis: Same Indication (QCDR): Initiate dialysis Additional clinical history: None  Complications: No immediate complications.       Insertion of right-sided tunneled dialysis catheter, with tip in the expected location of the cavoatrial junction.  Plan:  The catheter may be used immediately. _______________________________________________________________  PROCEDURE SUMMARY: - Venous access with ultrasound guidance - Tunneled dialysis catheter insertion with fluoroscopic guidance - Additional procedure(s): None  PROCEDURE DETAILS:  Pre-procedure Consent: Informed consent for the procedure including risks, benefits and alternatives was obtained and time-out was performed prior to the procedure. Preparation (MIPS): The site was prepared and draped using all elements of maximal sterile barrier technique including sterile gloves, sterile gown, cap, mask, large sterile sheet,  sterile ultrasound probe cover, hand hygiene and cutaneous antisepsis with 2% chlorhexidine. Medical reason for site preparation exception (MIPS): Not applicable  Anesthesia/sedation Level of anesthesia/sedation: Moderate sedation (conscious sedation) Anesthesia/sedation administered by: Independent trained observer under attending supervision with continuous monitoring of the patient?s level of consciousness and physiologic status Total intra-service sedation time (minutes): 20  Access Local anesthesia was administered. The vessel was sonographically evaluated and determined to be patent. Real time ultrasound was used to visualize needle entry into the vessel and a permanent image obtained. Vein accessed (QCDR): Right internal jugular vein Internal jugular vein patency (QCDR): Patent Access technique: Micropuncture technique under ultrasound guidance  Venography Indication for venography: Not performed Catheter tip position for venography: Not applicable Findings: Not applicable  Catheter placement An incision was made near the venous access site and the catheter was tunneled subcutaneously to the venous access site. The catheter was advanced via a peel-away sheath into the vein under fluoroscopic guidance. Catheter tip location was fluoroscopically verified and a permanent image was stored. Catheter placed: 14.5 Romansh iwoca Palindrome SI Silver Ion Catheter Catheter cuff-to-tip length (cm): 19 Catheter flush: Heparin (1000 units per mL)  Closure A sterile dressing was applied. Access site closure technique: Tissue adhesive Catheter securement technique: Non-absorbable suture  Contrast Contrast agent: None Contrast volume (mL): 0  Radiation Dose Fluoroscopy time: 0.2 minutes Dose: 2.1 mGy  Additional Details Additional description of procedure: None Equipment details: None Specimens removed: None Estimated blood loss (mL): Less than 10 Standardized report: TunneledDialysisCatheter  Attestation I was present and  scrubbed for the entire procedure. Imaging reviewed. Agree with final report as written.  This report was finalized on 4/9/2021 3:52 PM by Wil Franz.      Adult Transthoracic Echo Limited W/ Cont if Necessary Per Protocol    Result Date: 4/15/2021  · Mildly reduced right ventricular systolic function noted. · Estimated left ventricular EF = 50% Left ventricular ejection fraction appears to be 46 - 50%. Left ventricular systolic function is normal. · The left ventricular ejection fraction is significantly improved from previous echo with EF lower limits of normal        Results for orders placed during the hospital encounter of 04/07/21    Duplex Venous Lower Extremity - Bilateral CAR    Interpretation Summary  · No evidence of DVT  · There is pulsatile venous flow noted consistent with elevated central venous pressure  · Fluid collection right popliteal fossa measuring 6.7 x 2.8 cm    Results for orders placed during the hospital encounter of 04/07/21    Adult Transthoracic Echo Limited W/ Cont if Necessary Per Protocol    Interpretation Summary  · Mildly reduced right ventricular systolic function noted.  · Estimated left ventricular EF = 50% Left ventricular ejection fraction appears to be 46 - 50%. Left ventricular systolic function is normal.  · The left ventricular ejection fraction is significantly improved from previous echo with EF lower limits of normal    Discharge Details        Discharge Medications      New Medications      Instructions Start Date   docusate sodium 100 MG capsule   100 mg, Oral, 2 Times Daily PRN      glipizide 5 MG tablet  Commonly known as: Glucotrol   5 mg, Oral, 2 Times Daily Before Meals         Changes to Medications      Instructions Start Date   aspirin 81 MG EC tablet  What changed:   when to take this  additional instructions   81 mg, Oral, 2 times daily, X 30 days, then take one tablet by mouth daily      oxyCODONE 5 MG capsule  Commonly known as: OXY-IR  What changed:  when to take this   5 mg, Oral, Every 8 Hours PRN         Continue These Medications      Instructions Start Date   ezetimibe 10 MG tablet  Commonly known as: ZETIA   10 mg, Oral, Daily      ferrous sulfate 325 (65 FE) MG tablet   325 mg, Oral, 2 times daily      ondansetron 4 MG tablet  Commonly known as: ZOFRAN   4 mg, Oral, Every 6 Hours PRN         Stop These Medications    carvedilol 25 MG tablet  Commonly known as: COREG     cefadroxil 500 MG capsule  Commonly known as: DURICEF     furosemide 40 MG tablet  Commonly known as: LASIX     hydrALAZINE 25 MG tablet  Commonly known as: APRESOLINE     LOSARTAN POTASSIUM PO     meloxicam 15 MG tablet  Commonly known as: MOBIC     metFORMIN 500 MG tablet  Commonly known as: GLUCOPHAGE     spironolactone 25 MG tablet  Commonly known as: ALDACTONE     traMADol 50 MG tablet  Commonly known as: ULTRAM          No Known Allergies    Discharge Disposition:  Home or Self Care    Diet:  Diet Instructions     Diet: Renal, Consistent Carbohydrate, Cardiac; Thin      Discharge Diet:  Renal  Consistent Carbohydrate  Cardiac       Fluid Consistency: Thin        Activity:  Activity Instructions     Activity as Tolerated          CODE STATUS:    Code Status and Medical Interventions:   Ordered at: 04/07/21 1422     Code Status:    CPR     Medical Interventions (Level of Support Prior to Arrest):    Full     No future appointments.    Additional Instructions for the Follow-ups that You Need to Schedule     Discharge Follow-up with PCP   As directed       Currently Documented PCP:    April Chen DO    PCP Phone Number:    [phone]     Follow Up Details: 7-10 days         Discharge Follow-up with Specified Provider: Bailey ([facility]) in 1 week   As directed      To: Bailey ([facility]) in 1 week         Discharge Follow-up with Specified Provider: Chandan in 4-6 weeks   As directed      To: Chandan in 4-6 weeks             Julieth Rodriguez,  RON  04/20/21    Time Spent on Discharge:  I spent 45 minutes on this discharge activity which included: face-to-face encounter with the patient, reviewing the data in the system, coordination of the care with the nursing staff as well as consultants, documentation, and entering orders.

## 2021-04-20 NOTE — PLAN OF CARE
Goal Outcome Evaluation:  Plan of Care Reviewed With: patient  Progress: improving  Outcome Summary: Patient continues to progress well with PT. He increased ambulation distance to 224ft with supervision and RW, demonstrating improvement in gait fluidity with RW. He gave good effort towards standing ther ex to challenge balance and facilitate weight shifting. Will continue to progress as appropriate.

## 2021-04-20 NOTE — PLAN OF CARE
Goal Outcome Evaluation:        Outcome Summary: Patient tolerated dialysis well. One time of hypertension addressed with PRN hydralazine. No complaints of pain. Fair PO intake. Will continue to monitor.

## 2021-04-20 NOTE — DISCHARGE INSTR - APPOINTMENTS
Axel Avalos MD   Orthopedic Surgery   [address] [facility] [address]     Next Steps: Follow up      Tuesday, April 27@ 3:00      [phone]      Dr. Chen (PCP)  Monday, April 26@ 2:15      [facility]    [phone] [address]  [place] [id number]     Next Steps: Follow up      ***Someone from the Cardiology office will call you with appointment information.

## 2021-04-20 NOTE — PLAN OF CARE
Goal Outcome Evaluation:  Plan of Care Reviewed With: patient  Progress: no change  Outcome Summary: VSS. Pain meds given per orders. Pt on room air. Denies any complaints or needs. Will continue POC.

## 2021-04-20 NOTE — PROGRESS NOTES
Continued Stay Note  [facility]     Patient Name: Elvis Hanson  MRN: [id number]  Today's Date: 4/20/2021    Admit Date: 4/7/2021    Discharge Plan     Row Name 04/20/21 0859       Plan    Plan  update    Patient/Family in Agreement with Plan  yes    Plan Comments  Received faxed notification from [facility] with assigned dialysis chair time being at 1225 with a tenative start of 4/21/21 at the [facility] on [address].  Spoke with patient at bedside regarding discharge plan and advised that [facility] had accepted him.  Patient verbalizes understanding and agreement with plan.  Also advised patient that a Rolling Walker is being provided to him today by [facility] and will be delivered to his room.  CM following.  Patient plan is to discharge home and begin OP HD at [facility].  Family will transport him home.    Final Discharge Disposition Code  01 - home or self-care        Discharge Codes    No documentation.       Expected Discharge Date and Time     Expected Discharge Date Expected Discharge Time    Apr 22, 2021             Kristen Prado RN

## 2021-04-20 NOTE — THERAPY TREATMENT NOTE
Patient Name: Elvis Hanson  : 1955    MRN: [id number]                              Today's Date: 2021       Admit Date: 2021    Visit Dx:     ICD-10-CM ICD-9-CM   1. Altered mental status, unspecified altered mental status type  R41.82 780.97   2. Hypotension, unspecified hypotension type  I95.9 458.9   3. Acute renal failure, unspecified acute renal failure type (CMS/HCC)  N17.9 584.9   4. Bilateral hearing loss, unspecified hearing loss type  H91.93 389.9   5. Elevated LFTs  R79.89 790.6   6. Elevated troponin  R77.8 790.6     Patient Active Problem List   Diagnosis   • Hypertension   • T2DM   • Dyslipidemia   • Sleep apnea   • Elevated liver enzymes   • RINKU   • Recent Rt Total Knee Replacement 21   • Chronic pain w/pain pump   • Lactic acidosis   • Acute HFrEF (EF 30%)   • Shock (CMS/HCC) -cardiogenic versus septic     Past Medical History:   Diagnosis Date   • Diabetes mellitus (CMS/HCC)    • Hyperlipidemia    • Hypertension    • Sleep apnea      Past Surgical History:   Procedure Laterality Date   • CHOLECYSTECTOMY     • JOINT REPLACEMENT Right     KNEE     General Information     Row Name 21 0916          Physical Therapy Time and Intention    Document Type  therapy note (daily note)  -NS     Mode of Treatment  individual therapy;physical therapy  -NS     Row Name 21 0916          General Information    Existing Precautions/Restrictions  weight bearing WBAT RLE  -NS     Row Name 21 0916          Cognition    Orientation Status (Cognition)  oriented x 4  -NS     Row Name 21 0916          Safety Issues, Functional Mobility    Safety Issues Affecting Function (Mobility)  sequencing abilities  -NS     Impairments Affecting Function (Mobility)  endurance/activity tolerance;strength;range of motion (ROM)  -NS       User Key  (r) = Recorded By, (t) = Taken By, (c) = Cosigned By    Initials Name Provider Type    Inna Junior PT Physical Therapist        Mobility      Row Name 04/20/21 0916          Bed Mobility    Comment (Bed Mobility)  Pt sitting EOB at start of session.  -NS     Row Name 04/20/21 0916          Transfers    Comment (Transfers)  Appropriate hand placement.  -NS     Row Name 04/20/21 0916          Sit-Stand Transfer    Sit-Stand [place] (Transfers)  supervision  -NS     Assistive Device (Sit-Stand Transfers)  walker, front-wheeled  -NS     Row Name 04/20/21 0916          Gait/Stairs (Locomotion)    [place] Level (Gait)  supervision  -NS     Assistive Device (Gait)  walker, front-wheeled  -NS     Distance in Feet (Gait)  224  -NS     Deviations/Abnormal Patterns (Gait)  base of support, wide;jeancarlos decreased;gait speed decreased;stride length decreased  -NS     Bilateral Gait Deviations  forward flexed posture  -NS     Right Sided Gait Deviations  weight shift ability decreased  -NS     Comment (Gait/Stairs)  Patient demonstrated step-through pattern with slightly decreased weight shifting onto RLE. He required cues for upright posture. Pt ambulated well with new RW, is requesting a RW that is wider.  -NS     Row Name 04/20/21 0916          Mobility    Extremity Weight-bearing Status  right lower extremity  -NS     Right Lower Extremity (Weight-bearing Status)  weight-bearing as tolerated (WBAT)  -NS       User Key  (r) = Recorded By, (t) = Taken By, (c) = Cosigned By    Initials Name Provider Type    Inna Junior PT Physical Therapist        Obj/Interventions     Row Name 04/20/21 0916          Motor Skills    Motor Skills  therapeutic exercise  -NS     Row Name 04/20/21 0916          Hip (Therapeutic Exercise)    Hip (Therapeutic Exercise)  strengthening exercise  -NS     Hip Strengthening (Therapeutic Exercise)  bilateral;flexion;extension;standing;marching while seated;marching while standing;10 repetitions  -NS     Row Name 04/20/21 0916          Knee (Therapeutic Exercise)    Knee (Therapeutic Exercise)  strengthening exercise  -NS     Knee  Strengthening (Therapeutic Exercise)  flexion;LAQ (long arc quad);10 repetitions  -NS     Row Name 04/20/21 0916          Ankle (Therapeutic Exercise)    Ankle (Therapeutic Exercise)  AROM (active range of motion)  -NS     Ankle AROM (Therapeutic Exercise)  bilateral;dorsiflexion;plantarflexion;10 repetitions  -NS     Ankle Strengthening (Therapeutic Exercise)  bilateral;dorsiflexion;plantarflexion;standing;10 repetitions  -NS     Row Name 04/20/21 0916          Balance    Balance Assessment  sitting static balance;sitting dynamic balance;standing static balance;standing dynamic balance  -NS     Static Sitting Balance  WFL;unsupported;sitting, edge of bed  -NS     Dynamic Sitting Balance  WFL;unsupported;sitting, edge of bed  -NS     Static Standing Balance  WFL;supported;standing  -NS     Dynamic Standing Balance  WFL;supported;standing  -NS     Balance Interventions  weight shifting activity  -NS     Comment, Balance  Patient tolerated standing ther ex to facilitate weight shifting and single limb stance. No LOB, just increased fatigue following.  -NS       User Key  (r) = Recorded By, (t) = Taken By, (c) = Cosigned By    Initials Name Provider Type    Inna Junior, PT Physical Therapist        Goals/Plan    No documentation.       Clinical Impression     Row Name 04/20/21 0916          Pain    Additional Documentation  Pain Scale: Numbers Pre/Post-Treatment (Group)  -NS     [facility] Name 04/20/21 0916          Pain Scale: Numbers Pre/Post-Treatment    Pretreatment Pain Rating  0/10 - no pain  -NS     Posttreatment Pain Rating  0/10 - no pain  -NS     Pre/Posttreatment Pain Comment  No pain reported with activity.  -NS     Row Name 04/20/21 0916          Plan of Care Review    Plan of Care Reviewed With  patient  -NS     Progress  improving  -NS     Outcome Summary  Patient continues to progress well with PT. He increased ambulation distance to 224ft with supervision and RW, demonstrating improvement in gait  fluidity with RW. He gave good effort towards standing ther ex to challenge balance and facilitate weight shifting. Will continue to progress as appropriate.  -NS     Row Name 04/20/21 0916          Vital Signs    Pre Systolic BP Rehab  -- VSS- RN cleared for PT treatment  -NS     Pre Patient Position  Sitting  -NS     Intra Patient Position  Standing  -NS     Post Patient Position  Sitting  -NS     Row Name 04/20/21 0916          Positioning and Restraints    Pre-Treatment Position  in bed  -NS     Post Treatment Position  chair  -NS     In Chair  notified nsg;sitting;call light within reach;encouraged to call for assist;waffle cushion;with other staff Pt with MD, RN deferred alarm  -NS       User Key  (r) = Recorded By, (t) = Taken By, (c) = Cosigned By    Initials Name Provider Type    Inna Junior PT Physical Therapist        Outcome Measures     Row Name 04/20/21 0916          How much help from another person do you currently need...    Turning from your back to your side while in flat bed without using bedrails?  4  -NS     Moving from lying on back to sitting on the side of a flat bed without bedrails?  3  -NS     Moving to and from a bed to a chair (including a wheelchair)?  4  -NS     Standing up from a chair using your arms (e.g., wheelchair, bedside chair)?  4  -NS     Climbing 3-5 steps with a railing?  3  -NS     To walk in hospital room?  3  -NS     AM-PAC 6 Clicks Score (PT)  21  -NS     Row Name 04/20/21 0916          Functional Assessment    Outcome Measure Options  AM-PAC 6 Clicks Basic Mobility (PT)  -NS       User Key  (r) = Recorded By, (t) = Taken By, (c) = Cosigned By    Initials Name Provider Type    Inna Junior PT Physical Therapist        Physical Therapy Education                 Title: PT OT SLP Therapies (Done)     Topic: Physical Therapy (Done)     Point: Mobility training (Done)     Learning Progress Summary           Patient Acceptance, E, VU by NS at 4/20/2021 1005     Acceptance, E,D, VU,DU by  at 4/15/2021 1521    Acceptance, E, VU,DU by  at 4/15/2021 0926    Acceptance, E,D, VU,NR by MB at 4/13/2021 1512    Comment: HEP, transfer safety/mechanics, gait safety/mechanics, home safety                   Point: Home exercise program (Done)     Learning Progress Summary           Patient Acceptance, E, VU by NS at 4/20/2021 1005    Acceptance, E,D, VU,DU by  at 4/15/2021 1521    Acceptance, E, VU,DU by  at 4/15/2021 0926    Acceptance, E,D, VU,NR by MB at 4/13/2021 1512    Comment: HEP, transfer safety/mechanics, gait safety/mechanics, home safety                   Point: Body mechanics (Done)     Learning Progress Summary           Patient Acceptance, E, VU by NS at 4/20/2021 1005    Acceptance, E,D, VU,DU by  at 4/15/2021 1521    Acceptance, E, VU,DU by  at 4/15/2021 0926    Acceptance, E,D, VU,NR by MB at 4/13/2021 1512    Comment: HEP, transfer safety/mechanics, gait safety/mechanics, home safety                   Point: Precautions (Done)     Learning Progress Summary           Patient Acceptance, E, VU by NS at 4/20/2021 1005    Acceptance, E,D, VU,DU by  at 4/15/2021 1521    Acceptance, E, VU,DU by  at 4/15/2021 0926    Acceptance, E,D, VU,NR by MB at 4/13/2021 1512    Comment: HEP, transfer safety/mechanics, gait safety/mechanics, home safety                               User Key     Initials Effective Dates Name Provider Type Discipline     06/19/15 -  Ila eBarden, PT Physical Therapist PT    MB 03/14/16 -  Carolyn Perera PT Physical Therapist PT    NS 09/10/19 -  Inna Jay PT Physical Therapist PT              PT Recommendation and Plan     Plan of Care Reviewed With: patient  Progress: improving  Outcome Summary: Patient continues to progress well with PT. He increased ambulation distance to 224ft with supervision and RW, demonstrating improvement in gait fluidity with RW. He gave good effort towards standing ther ex to challenge balance  and facilitate weight shifting. Will continue to progress as appropriate.     Time Calculation:   PT Charges     Row Name 04/20/21 0916             Time Calculation    Start Time  0916  -NS      PT Received On  04/20/21  -NS      PT Goal Re-Cert Due Date  04/23/21  -NS         Timed Charges    49696 - PT Therapeutic Exercise Minutes  9  -NS      12770 - Gait Training Minutes   15  -NS         Total Minutes    Timed Charges Total Minutes  24  -NS       Total Minutes  24  -NS        User Key  (r) = Recorded By, (t) = Taken By, (c) = Cosigned By    Initials Name Provider Type    Inna Junior, PT Physical Therapist        Therapy Charges for Today     Code Description Service Date Service Provider Modifiers Qty    93939441028 HC PT THER PROC EA 15 MIN 4/20/2021 Inna Jay, PT GP 1    15572601541 HC GAIT TRAINING EA 15 MIN 4/20/2021 Inna Jay, PT GP 1          PT G-Codes  Outcome Measure Options: AM-PAC 6 Clicks Basic Mobility (PT)  AM-PAC 6 Clicks Score (PT): 21    Inna Jay PT  4/20/2021

## 2021-04-20 NOTE — PROGRESS NOTES
"   LOS: 13 days    Patient Care Team:  April Chen DO as PCP - General (Internal Medicine)    Chief Complaint:      Subjective     Interval History:   HD yesterday UF 3 liter. Agreeable to stay here till Friday for renal biopsy       Review of Systems:   Complains of low urine output, edema, denies, nausea vomiting dysuria hematuria no shortness of breath or chest pain.  All other systems are negative.    Objective     Vital Sign Min/Max for last 24 hours  Temp  Min: 97.8 °F (36.6 °C)  Max: 99.2 °F (37.3 °C)   BP  Min: 133/73  Max: 164/91   Pulse  Min: 69  Max: 82   Resp  Min: 16  Max: 18   SpO2  Min: 95 %  Max: 95 %   No data recorded   Weight  Min: 130 kg (285 lb 8 oz)  Max: 130 kg (285 lb 8 oz)     Flowsheet Rows      First Filed Value   Admission Height  177.8 cm (70\") Documented at 04/07/2021 1041   Admission Weight  127 kg (280 lb) Documented at 04/07/2021 1041          I/O this shift:  In: -   Out: 500 [Urine:500]  I/O last 3 completed shifts:  In: 240 [P.O.:240]  Out: 4935 [Urine:1095; Other:3840]    Physical Exam:  General Appearance: -American male morbid obesity comfortable in bed -American male  Facial: Left-sided Bell's palsy noted chronic.  Eyes: PER, EOMI.  Neck: Supple no JVD.  Lungs: Clear auscultation, no rales rhonchi's, equal chest movement, nonlabored.  Heart: No gallop, murmur, rub, RRR.  Abdomen: Obesity distended soft nontender, positive bowel sounds, no organomegaly.  Extremities: Lateral lower extremity 3+ edema trace to 1+ upper extremity edema.  Neuro: No focal deficit, moving all extremities, alert oriented X 3  : No suprapubic fullness  Skin warm and dry.  WBC WBC   Date Value Ref Range Status   04/19/2021 7.36 3.40 - 10.80 10*3/mm3 Final      HGB Hemoglobin   Date Value Ref Range Status   04/19/2021 9.0 (L) 13.0 - 17.7 g/dL Final      HCT Hematocrit   Date Value Ref Range Status   04/19/2021 29.3 (L) 37.5 - 51.0 % Final      Platlets No results found for: " LABPLAT   MCV MCV   Date Value Ref Range Status   04/19/2021 84.0 79.0 - 97.0 fL Final          Sodium Sodium   Date Value Ref Range Status   04/20/2021 134 (L) 136 - 145 mmol/L Final   04/19/2021 130 (L) 136 - 145 mmol/L Final   04/18/2021 133 (L) 136 - 145 mmol/L Final      Potassium Potassium   Date Value Ref Range Status   04/20/2021 4.4 3.5 - 5.2 mmol/L Final   04/19/2021 4.3 3.5 - 5.2 mmol/L Final   04/18/2021 4.2 3.5 - 5.2 mmol/L Final      Chloride Chloride   Date Value Ref Range Status   04/20/2021 99 98 - 107 mmol/L Final   04/19/2021 93 (L) 98 - 107 mmol/L Final   04/18/2021 96 (L) 98 - 107 mmol/L Final      CO2 CO2   Date Value Ref Range Status   04/20/2021 24.0 22.0 - 29.0 mmol/L Final   04/19/2021 24.0 22.0 - 29.0 mmol/L Final   04/18/2021 24.0 22.0 - 29.0 mmol/L Final      BUN BUN   Date Value Ref Range Status   04/20/2021 39 (H) 8 - 23 mg/dL Final   04/19/2021 56 (H) 8 - 23 mg/dL Final   04/18/2021 47 (H) 8 - 23 mg/dL Final      Creatinine Creatinine   Date Value Ref Range Status   04/20/2021 7.64 (H) 0.76 - 1.27 mg/dL Final   04/19/2021 10.60 (H) 0.76 - 1.27 mg/dL Final   04/18/2021 8.63 (H) 0.76 - 1.27 mg/dL Final      Calcium Calcium   Date Value Ref Range Status   04/20/2021 8.5 (L) 8.6 - 10.5 mg/dL Final   04/19/2021 8.0 (L) 8.6 - 10.5 mg/dL Final   04/18/2021 8.5 (L) 8.6 - 10.5 mg/dL Final      PO4 No results found for: CAPO4   Albumin Albumin   Date Value Ref Range Status   04/20/2021 3.70 3.50 - 5.20 g/dL Final   04/19/2021 3.60 3.50 - 5.20 g/dL Final      Magnesium Magnesium   Date Value Ref Range Status   04/20/2021 2.2 1.6 - 2.4 mg/dL Final      Uric Acid No results found for: URICACID     Right kidney measures 11.2 cm in length without apparent mass or   hydronephrosis. Normal color Doppler flow.       Left kidney measures 11.0 cm in length without apparent mass or   hydronephrosis. Normal color Doppler flow.       Unremarkable catheterized and contracted urinary bladder.        Impression:     Unremarkable sonographic appearance of both kidneys.         Results Review:     I reviewed the patient's new clinical results.    carvedilol, 25 mg, Oral, BID With Meals  cetirizine, 10 mg, Oral, Daily  docusate sodium, 100 mg, Oral, BID  fluticasone, 2 spray, Each Nare, Daily  heparin (porcine), 5,000 Units, Subcutaneous, Q12H  insulin detemir, 10 Units, Subcutaneous, Daily  insulin lispro, 0-7 Units, Subcutaneous, 4x Daily AC & at Bedtime  magnesium oxide, 1,000 mg, Oral, Daily  sodium chloride, 10 mL, Intravenous, Q12H  sodium chloride, 10 mL, Intravenous, Q12H  traZODone, 25 mg, Oral, Nightly           Medication Review: As noted above    Assessment/Plan       RINKU    Hypertension    T2DM    Dyslipidemia    Sleep apnea    Elevated liver enzymes    Recent Rt Total Knee Replacement 4/5/21    Chronic pain w/pain pump    Lactic acidosis    Acute HFrEF (EF 30%)    Shock (CMS/HCC) -cardiogenic versus septic    1.  RINKU: Oliguirc now on HD. Presumed hemodynamic injury and ATN.    Nephrotic range proteinuria. Serological testing negative. MARIANA, ANCA, AntiDsDNA. Mildly depressed C3 and C4  Right kidney 11.2 cm, left kidney 11.0 cm normal color Doppler unremarkable bilateral kidneys.  Hepatitis negative, on IV vancomycin on admission  2.  Septic shock: required pressors.   3.  Nephrotic proteinuria greater than 6 g in the setting of diabetes vs infection related GN suspicion is low given no hematuria on previous testing.   4.  Altered mental status and hearing difficulty improved at this time.  5.  S/p recent right total knee replacement.  6.  Congestive heart failure: Ejection fraction 30% repeat ECHO showed EF 50%  7.  Hypocalcemia.  8. Hyponatremia. Optimization w HD       Plan:  Repeat UA bland tace hematuria and trace proteinuria. Will hold off on renal biopsy as it won't change our management. Started producing urine high chaces of renal recovery. Stable for discharge with resumption of dialysis on  MWF fabienne     Need optimization of volume status with HD       Demetrio Crook MD  04/20/21  13:01 EDT

## 2021-04-20 NOTE — PROGRESS NOTES
Continued Stay Note  [facility]     Patient Name: Elvis Hanson  MRN: [id number]  Today's Date: 4/20/2021    Admit Date: 4/7/2021    Discharge Plan     Row Name 04/20/21 0431       Plan    Plan  Home continue PT with [facility]    Patient/Family in Agreement with Plan  yes    Plan Comments  Per hospitalist RON, patient to discharge today and will need to continue his home program PT.  Called [facility] [phone] and spoke to Huma who request discharge summary be faxed to [phone].  They were not aware of patient coming today and want to review patient clinical information first.  Spoke to Fortino Borges, clinic manager, who advises that he will need a COVID  test prior to coming to their clinic and that if they do not have that they cannot give him medical clearance to come and will be turned away from clinic and not receive treatment.  RON notified who has placed order for COVID test.  Per patient, he was receiving in home PT through [facility]  Called [facility] [phone] and spoke to Elmer with PT who advises that they will see patient tomorrow.  CM following.    Final Discharge Disposition Code  01 - home or self-care        Discharge Codes    No documentation.       Expected Discharge Date and Time     Expected Discharge Date Expected Discharge Time    Apr 20, 2021             Kristen Prado, RN

## 2021-04-20 NOTE — PROGRESS NOTES
Case Management Discharge Note      Final Note: COVID results are back and have been faxed to [facility] [phone].  Called [facility] [phone] and spoke to Fortino to advise who has provided medical clearance and patient is scheduled to dialysis tomorrow 4/21 at 1225.  RN notified.  Patient plan is to discharge home today and resume PT with [facility] via car with family to transport.    Provided Post Acute Provider List?: Refused  Provided Post Acute Provider Quality & Resource List?: Refused    Selected Continued Care - Admitted Since 4/7/2021     Destination    No services have been selected for the patient.              Durable Medical Equipment Coordination complete    Service Provider Selected Services Address Phone Fax Patient Preferred    [facility] [address] [place] [id number] [phone] --          Dialysis/Infusion    No services have been selected for the patient.              Home Medical Care    No services have been selected for the patient.              Therapy    No services have been selected for the patient.              Community Resources    No services have been selected for the patient.                       Final Discharge Disposition Code: 01 - home or self-care

## 2021-04-21 ENCOUNTER — READMISSION MANAGEMENT (OUTPATIENT)
Dept: CALL CENTER | Facility: HOSPITAL | Age: 66
End: 2021-04-21

## 2021-04-21 RX ORDER — GLIPIZIDE 5 MG/1
5 TABLET ORAL
Qty: 60 TABLET | Refills: 2 | Status: SHIPPED | OUTPATIENT
Start: 2021-04-21

## 2021-04-21 NOTE — OUTREACH NOTE
Prep Survey      Responses   [facility] facility patient discharged from?  [place]   Is LACE score < 7 ?  No   Emergency Room discharge w/ pulse ox?  No   Eligibility  Readm Mgmt   Discharge diagnosis  Acute renal failure    Does the patient have one of the following disease processes/diagnoses(primary or secondary)?  Other   Does the patient have Home health ordered?  No   Is there a DME ordered?  No   Prep survey completed?  Yes          Nohemi Goodman RN

## 2021-04-22 ENCOUNTER — READMISSION MANAGEMENT (OUTPATIENT)
Dept: CALL CENTER | Facility: HOSPITAL | Age: 66
End: 2021-04-22

## 2021-04-22 NOTE — OUTREACH NOTE
Medical Week 1 Survey      Responses   [facility] patient discharged from?  [place]   Does the patient have one of the following disease processes/diagnoses(primary or secondary)?  Other   Week 1 attempt successful?  Yes   Call start time  1857   Call end time  1906   Discharge diagnosis  Acute renal failure    Meds reviewed with patient/caregiver?  Yes   Is the patient having any side effects they believe may be caused by any medication additions or changes?  No   Does the patient have all medications ordered at discharge?  Yes   Is the patient taking all medications as directed (includes completed medication regime)?  Yes   Does the patient have a primary care provider?   Yes   Does the patient have an appointment with their PCP within 7 days of discharge?  Yes   Has the patient kept scheduled appointments due by today?  N/A   Has home health visited the patient within 72 hours of discharge?  N/A   Psychosocial issues?  No   Did the patient receive a copy of their discharge instructions?  Yes   Nursing interventions  Reviewed instructions with patient   What is the patient's perception of their health status since discharge?  Improving   Is the patient/caregiver able to teach back signs and symptoms related to disease process for when to call PCP?  Yes   Is the patient/caregiver able to teach back signs and symptoms related to disease process for when to call 911?  Yes   Is the patient/caregiver able to teach back the hierarchy of who to call/visit for symptoms/problems? PCP, Specialist, Home health nurse, Urgent Care, ED, 911  Yes   If the patient is a current smoker, are they able to teach back resources for cessation?  Not a smoker   Additional teach back comments  Encouraged to check with DM educator, daily weights. He is aware and monitoring his BS which has been under 100.   Week 1 call completed?  Yes   Wrap up additional comments  Improving, going to dialysis.          Michelle Castillo RN

## 2024-10-08 ENCOUNTER — TELEPHONE (OUTPATIENT)
Dept: GASTROENTEROLOGY | Facility: CLINIC | Age: 69
End: 2024-10-08
Payer: MEDICARE

## 2024-10-08 NOTE — TELEPHONE ENCOUNTER
SPOKE WITH PATIENT. HE IS GOING TO HAVE HIS PCP SEND OVER A REFERRAL FOR SCREENING COLON SINCE WE HAVE NO PREVIOUS RECORDS.

## 2024-10-08 NOTE — TELEPHONE ENCOUNTER
HUB TRANSFERRED PATIENT. HE NEEDS TO SCHEDULE A COLONOSCOPY. HE IS IN PRACTICE FUSION FOR [id number] BUT THERE IS NO REPORT IN THERE DON'T KNOW IF HE KEPT THAT APPT.

## 2024-11-05 RX ORDER — SODIUM, POTASSIUM,MAG SULFATES 17.5-3.13G
SOLUTION, RECONSTITUTED, ORAL ORAL
Qty: 354 ML | Refills: 0 | Status: SHIPPED | OUTPATIENT
Start: 2024-11-05

## 2024-11-21 ENCOUNTER — TELEPHONE (OUTPATIENT)
Dept: GASTROENTEROLOGY | Facility: CLINIC | Age: 69
End: 2024-11-21
Payer: MEDICARE

## 2024-11-25 ENCOUNTER — OUTSIDE FACILITY SERVICE (OUTPATIENT)
Dept: GASTROENTEROLOGY | Facility: CLINIC | Age: 69
End: 2024-11-25
Payer: MEDICARE

## 2024-11-25 PROCEDURE — 88305 TISSUE EXAM BY PATHOLOGIST: CPT | Performed by: INTERNAL MEDICINE

## 2024-11-25 PROCEDURE — 45385 COLONOSCOPY W/LESION REMOVAL: CPT | Performed by: INTERNAL MEDICINE

## 2024-11-26 ENCOUNTER — LAB REQUISITION (OUTPATIENT)
Dept: LAB | Facility: HOSPITAL | Age: 69
End: 2024-11-26
Payer: MEDICARE

## 2024-11-26 DIAGNOSIS — Z12.11 ENCOUNTER FOR SCREENING FOR MALIGNANT NEOPLASM OF COLON: ICD-10-CM

## 2024-11-26 DIAGNOSIS — Z80.0 FAMILY HISTORY OF MALIGNANT NEOPLASM OF DIGESTIVE ORGANS: ICD-10-CM

## 2024-11-26 DIAGNOSIS — D12.0 BENIGN NEOPLASM OF CECUM: ICD-10-CM

## 2024-11-26 DIAGNOSIS — K57.30 DIVERTICULOSIS OF LARGE INTESTINE WITHOUT PERFORATION OR ABSCESS WITHOUT BLEEDING: ICD-10-CM

## 2024-11-27 LAB
CYTO UR: NORMAL
LAB AP CASE REPORT: NORMAL
LAB AP CLINICAL INFORMATION: NORMAL
PATH REPORT.FINAL DX SPEC: NORMAL
PATH REPORT.GROSS SPEC: NORMAL

## 2024-12-03 LAB
CYTO UR: NORMAL
LAB AP CASE REPORT: NORMAL
LAB AP CLINICAL INFORMATION: NORMAL
PATH REPORT.ADDENDUM SPEC: NORMAL
PATH REPORT.FINAL DX SPEC: NORMAL
PATH REPORT.GROSS SPEC: NORMAL

## 2025-07-16 NOTE — PLAN OF CARE
Lab Results   Component Value Date    HGBA1C 6.2 (H) 05/29/2025             Goal Outcome Evaluation:     Progress: no change  Outcome Summary: Pt had a small BM and 50 UOP at beginning of shift. BP remain high. PRN hydralazine given with some improvement. CHG done. BS was 140 at beginning of shift. Pt asked for melatonin for rest. Given 5mg PRN. No complaints of any pain or SOA. Pt remains on room air. Did not want bipap overnight. O2 sats remained in 90s. Dressings changed on right leg and bandage re-wrapped. Pt has dialysis today. VSS. Will continue to monitor.     0605: Pt still hypertensive. Called Dr. Diaz. Coreg increased and given.